# Patient Record
Sex: FEMALE | Race: WHITE | NOT HISPANIC OR LATINO | Employment: OTHER | URBAN - METROPOLITAN AREA
[De-identification: names, ages, dates, MRNs, and addresses within clinical notes are randomized per-mention and may not be internally consistent; named-entity substitution may affect disease eponyms.]

---

## 2017-04-11 ENCOUNTER — OFFICE VISIT (OUTPATIENT)
Dept: URGENT CARE | Facility: CLINIC | Age: 72
End: 2017-04-11
Payer: MEDICARE

## 2017-04-11 PROCEDURE — G0463 HOSPITAL OUTPT CLINIC VISIT: HCPCS

## 2017-04-11 PROCEDURE — 99213 OFFICE O/P EST LOW 20 MIN: CPT

## 2017-06-07 ENCOUNTER — ALLSCRIPTS OFFICE VISIT (OUTPATIENT)
Dept: OTHER | Facility: OTHER | Age: 72
End: 2017-06-07

## 2017-06-07 DIAGNOSIS — Z12.31 ENCOUNTER FOR SCREENING MAMMOGRAM FOR MALIGNANT NEOPLASM OF BREAST: ICD-10-CM

## 2017-06-07 DIAGNOSIS — E78.5 HYPERLIPIDEMIA: ICD-10-CM

## 2017-06-07 DIAGNOSIS — N63.0 BREAST LUMP: ICD-10-CM

## 2017-06-07 DIAGNOSIS — I10 ESSENTIAL (PRIMARY) HYPERTENSION: ICD-10-CM

## 2017-06-07 DIAGNOSIS — F32.9 MAJOR DEPRESSIVE DISORDER, SINGLE EPISODE: ICD-10-CM

## 2017-06-07 DIAGNOSIS — R73.09 OTHER ABNORMAL GLUCOSE: ICD-10-CM

## 2017-06-07 DIAGNOSIS — R92.8 OTHER ABNORMAL AND INCONCLUSIVE FINDINGS ON DIAGNOSTIC IMAGING OF BREAST: ICD-10-CM

## 2017-06-12 ENCOUNTER — APPOINTMENT (OUTPATIENT)
Dept: LAB | Facility: CLINIC | Age: 72
End: 2017-06-12
Payer: MEDICARE

## 2017-06-12 DIAGNOSIS — F32.9 MAJOR DEPRESSIVE DISORDER, SINGLE EPISODE: ICD-10-CM

## 2017-06-12 DIAGNOSIS — I10 ESSENTIAL (PRIMARY) HYPERTENSION: ICD-10-CM

## 2017-06-12 DIAGNOSIS — R73.09 OTHER ABNORMAL GLUCOSE: ICD-10-CM

## 2017-06-12 DIAGNOSIS — E78.5 HYPERLIPIDEMIA: ICD-10-CM

## 2017-06-12 LAB
ALBUMIN SERPL BCP-MCNC: 3.5 G/DL (ref 3.5–5)
ALP SERPL-CCNC: 108 U/L (ref 46–116)
ALT SERPL W P-5'-P-CCNC: 15 U/L (ref 12–78)
ANION GAP SERPL CALCULATED.3IONS-SCNC: 8 MMOL/L (ref 4–13)
AST SERPL W P-5'-P-CCNC: 14 U/L (ref 5–45)
BASOPHILS # BLD AUTO: 0.06 THOUSANDS/ΜL (ref 0–0.1)
BASOPHILS NFR BLD AUTO: 1 % (ref 0–1)
BILIRUB SERPL-MCNC: 0.43 MG/DL (ref 0.2–1)
BILIRUB UR QL STRIP: NEGATIVE
BUN SERPL-MCNC: 20 MG/DL (ref 5–25)
CALCIUM SERPL-MCNC: 9.1 MG/DL (ref 8.3–10.1)
CHLORIDE SERPL-SCNC: 105 MMOL/L (ref 100–108)
CHOLEST SERPL-MCNC: 174 MG/DL (ref 50–200)
CLARITY UR: NORMAL
CO2 SERPL-SCNC: 27 MMOL/L (ref 21–32)
COLOR UR: YELLOW
CREAT SERPL-MCNC: 1.25 MG/DL (ref 0.6–1.3)
EOSINOPHIL # BLD AUTO: 0.21 THOUSAND/ΜL (ref 0–0.61)
EOSINOPHIL NFR BLD AUTO: 2 % (ref 0–6)
ERYTHROCYTE [DISTWIDTH] IN BLOOD BY AUTOMATED COUNT: 14.3 % (ref 11.6–15.1)
EST. AVERAGE GLUCOSE BLD GHB EST-MCNC: 128 MG/DL
GFR SERPL CREATININE-BSD FRML MDRD: 42.4 ML/MIN/1.73SQ M
GLUCOSE P FAST SERPL-MCNC: 90 MG/DL (ref 65–99)
GLUCOSE UR STRIP-MCNC: NEGATIVE MG/DL
HBA1C MFR BLD: 6.1 % (ref 4.2–6.3)
HCT VFR BLD AUTO: 42.5 % (ref 34.8–46.1)
HDLC SERPL-MCNC: 38 MG/DL (ref 40–60)
HGB BLD-MCNC: 13.7 G/DL (ref 11.5–15.4)
HGB UR QL STRIP.AUTO: NEGATIVE
KETONES UR STRIP-MCNC: NEGATIVE MG/DL
LDLC SERPL CALC-MCNC: 110 MG/DL (ref 0–100)
LEUKOCYTE ESTERASE UR QL STRIP: NEGATIVE
LYMPHOCYTES # BLD AUTO: 2.57 THOUSANDS/ΜL (ref 0.6–4.47)
LYMPHOCYTES NFR BLD AUTO: 26 % (ref 14–44)
MCH RBC QN AUTO: 30.6 PG (ref 26.8–34.3)
MCHC RBC AUTO-ENTMCNC: 32.2 G/DL (ref 31.4–37.4)
MCV RBC AUTO: 95 FL (ref 82–98)
MONOCYTES # BLD AUTO: 1.3 THOUSAND/ΜL (ref 0.17–1.22)
MONOCYTES NFR BLD AUTO: 13 % (ref 4–12)
NEUTROPHILS # BLD AUTO: 5.69 THOUSANDS/ΜL (ref 1.85–7.62)
NEUTS SEG NFR BLD AUTO: 58 % (ref 43–75)
NITRITE UR QL STRIP: NEGATIVE
NRBC BLD AUTO-RTO: 0 /100 WBCS
PH UR STRIP.AUTO: 5.5 [PH] (ref 4.5–8)
PLATELET # BLD AUTO: 257 THOUSANDS/UL (ref 149–390)
PMV BLD AUTO: 11.8 FL (ref 8.9–12.7)
POTASSIUM SERPL-SCNC: 4.4 MMOL/L (ref 3.5–5.3)
PROT SERPL-MCNC: 7 G/DL (ref 6.4–8.2)
PROT UR STRIP-MCNC: NEGATIVE MG/DL
RBC # BLD AUTO: 4.48 MILLION/UL (ref 3.81–5.12)
SODIUM SERPL-SCNC: 140 MMOL/L (ref 136–145)
SP GR UR STRIP.AUTO: 1.01 (ref 1–1.03)
T4 FREE SERPL-MCNC: 0.99 NG/DL (ref 0.76–1.46)
TRIGL SERPL-MCNC: 130 MG/DL
TSH SERPL DL<=0.05 MIU/L-ACNC: 8.3 UIU/ML (ref 0.36–3.74)
UROBILINOGEN UR QL STRIP.AUTO: 0.2 E.U./DL
WBC # BLD AUTO: 9.95 THOUSAND/UL (ref 4.31–10.16)

## 2017-06-12 PROCEDURE — 84443 ASSAY THYROID STIM HORMONE: CPT

## 2017-06-12 PROCEDURE — 81003 URINALYSIS AUTO W/O SCOPE: CPT

## 2017-06-12 PROCEDURE — 83036 HEMOGLOBIN GLYCOSYLATED A1C: CPT

## 2017-06-12 PROCEDURE — 80053 COMPREHEN METABOLIC PANEL: CPT

## 2017-06-12 PROCEDURE — 84439 ASSAY OF FREE THYROXINE: CPT

## 2017-06-12 PROCEDURE — 85025 COMPLETE CBC W/AUTO DIFF WBC: CPT

## 2017-06-12 PROCEDURE — 80061 LIPID PANEL: CPT

## 2017-06-12 PROCEDURE — 36415 COLL VENOUS BLD VENIPUNCTURE: CPT

## 2017-06-23 ENCOUNTER — HOSPITAL ENCOUNTER (OUTPATIENT)
Dept: MAMMOGRAPHY | Facility: CLINIC | Age: 72
Discharge: HOME | End: 2017-06-23
Payer: MEDICARE

## 2017-06-23 DIAGNOSIS — Z12.31 ENCOUNTER FOR SCREENING MAMMOGRAM FOR MALIGNANT NEOPLASM OF BREAST: ICD-10-CM

## 2017-06-23 PROCEDURE — G0202 SCR MAMMO BI INCL CAD: HCPCS

## 2017-06-28 ENCOUNTER — GENERIC CONVERSION - ENCOUNTER (OUTPATIENT)
Dept: OTHER | Facility: OTHER | Age: 72
End: 2017-06-28

## 2017-06-28 ENCOUNTER — HOSPITAL ENCOUNTER (OUTPATIENT)
Dept: MAMMOGRAPHY | Facility: CLINIC | Age: 72
Discharge: HOME | End: 2017-06-28
Payer: MEDICARE

## 2017-06-28 ENCOUNTER — HOSPITAL ENCOUNTER (OUTPATIENT)
Dept: ULTRASOUND IMAGING | Facility: CLINIC | Age: 72
Discharge: HOME | End: 2017-06-28
Payer: MEDICARE

## 2017-06-28 DIAGNOSIS — N63.0 BREAST LUMP: ICD-10-CM

## 2017-06-28 DIAGNOSIS — R92.8 OTHER ABNORMAL AND INCONCLUSIVE FINDINGS ON DIAGNOSTIC IMAGING OF BREAST: ICD-10-CM

## 2017-06-28 DIAGNOSIS — R92.8 ABNORMAL SCREENING MAMMOGRAM: ICD-10-CM

## 2017-06-28 PROCEDURE — 76642 ULTRASOUND BREAST LIMITED: CPT

## 2017-06-28 PROCEDURE — G0279 TOMOSYNTHESIS, MAMMO: HCPCS

## 2017-06-28 PROCEDURE — G0206 DX MAMMO INCL CAD UNI: HCPCS

## 2017-07-25 ENCOUNTER — ALLSCRIPTS OFFICE VISIT (OUTPATIENT)
Dept: OTHER | Facility: OTHER | Age: 72
End: 2017-07-25

## 2017-08-04 ENCOUNTER — ALLSCRIPTS OFFICE VISIT (OUTPATIENT)
Dept: OTHER | Facility: OTHER | Age: 72
End: 2017-08-04

## 2017-08-29 ENCOUNTER — GENERIC CONVERSION - ENCOUNTER (OUTPATIENT)
Dept: OTHER | Facility: OTHER | Age: 72
End: 2017-08-29

## 2017-09-15 DIAGNOSIS — I70.211 ATHEROSCLEROSIS OF NATIVE ARTERY OF RIGHT LOWER EXTREMITY WITH INTERMITTENT CLAUDICATION (HCC): ICD-10-CM

## 2017-09-15 DIAGNOSIS — I71.6 THORACOABDOMINAL AORTIC ANEURYSM, WITHOUT RUPTURE (HCC): ICD-10-CM

## 2017-09-15 DIAGNOSIS — I71.4 ABDOMINAL AORTIC ANEURYSM WITHOUT RUPTURE (HCC): ICD-10-CM

## 2017-09-21 RX ORDER — VALSARTAN 160 MG/1
160 TABLET ORAL DAILY
COMMUNITY
End: 2018-03-09 | Stop reason: ALTCHOICE

## 2017-09-21 NOTE — PRE-PROCEDURE INSTRUCTIONS
Pre-Surgery Instructions:   Medication Instructions    aspirin (ECOTRIN) 325 mg EC tablet Patient was instructed by Physician and understands   atorvastatin (LIPITOR) 40 mg tablet Patient was instructed by Physician and understands   citalopram (CeleXA) 20 mg tablet Patient was instructed by Physician and understands   levothyroxine 100 mcg tablet Patient was instructed by Physician and understands   metoprolol succinate (TOPROL-XL) 50 mg 24 hr tablet Instructed patient per Anesthesia Guidelines   valsartan (DIOVAN) 160 mg tablet Patient was instructed by Physician and understands  Follow prep as per physician  You will receive a call with your time to arrive at the hospital   Secure transportation, you will be having anesthesia

## 2017-09-27 ENCOUNTER — HOSPITAL ENCOUNTER (OUTPATIENT)
Facility: HOSPITAL | Age: 72
Setting detail: OUTPATIENT SURGERY
Discharge: HOME | End: 2017-09-27
Attending: INTERNAL MEDICINE | Admitting: INTERNAL MEDICINE
Payer: MEDICARE

## 2017-09-27 ENCOUNTER — ANESTHESIA (OUTPATIENT)
Dept: PERIOP | Facility: HOSPITAL | Age: 72
End: 2017-09-27
Payer: MEDICARE

## 2017-09-27 ENCOUNTER — GENERIC CONVERSION - ENCOUNTER (OUTPATIENT)
Dept: FAMILY MEDICINE CLINIC | Facility: HOSPITAL | Age: 72
End: 2017-09-27

## 2017-09-27 ENCOUNTER — ANESTHESIA EVENT (OUTPATIENT)
Dept: PERIOP | Facility: HOSPITAL | Age: 72
End: 2017-09-27
Payer: MEDICARE

## 2017-09-27 VITALS
BODY MASS INDEX: 22.08 KG/M2 | OXYGEN SATURATION: 98 % | HEIGHT: 62 IN | WEIGHT: 120 LBS | TEMPERATURE: 97.9 F | SYSTOLIC BLOOD PRESSURE: 144 MMHG | RESPIRATION RATE: 18 BRPM | DIASTOLIC BLOOD PRESSURE: 82 MMHG | HEART RATE: 72 BPM

## 2017-09-27 DIAGNOSIS — R63.4 ABNORMAL WEIGHT LOSS: ICD-10-CM

## 2017-09-27 PROCEDURE — 88313 SPECIAL STAINS GROUP 2: CPT | Performed by: INTERNAL MEDICINE

## 2017-09-27 PROCEDURE — 87077 CULTURE AEROBIC IDENTIFY: CPT | Performed by: INTERNAL MEDICINE

## 2017-09-27 PROCEDURE — 88305 TISSUE EXAM BY PATHOLOGIST: CPT | Performed by: INTERNAL MEDICINE

## 2017-09-27 RX ORDER — PROPOFOL 10 MG/ML
INJECTION, EMULSION INTRAVENOUS AS NEEDED
Status: DISCONTINUED | OUTPATIENT
Start: 2017-09-27 | End: 2017-09-27 | Stop reason: SURG

## 2017-09-27 RX ORDER — SODIUM CHLORIDE 9 MG/ML
20 INJECTION, SOLUTION INTRAVENOUS CONTINUOUS
Status: DISCONTINUED | OUTPATIENT
Start: 2017-09-27 | End: 2017-09-27 | Stop reason: HOSPADM

## 2017-09-27 RX ADMIN — PROPOFOL 50 MG: 10 INJECTION, EMULSION INTRAVENOUS at 09:30

## 2017-09-27 RX ADMIN — PROPOFOL 20 MG: 10 INJECTION, EMULSION INTRAVENOUS at 10:00

## 2017-09-27 RX ADMIN — PROPOFOL 30 MG: 10 INJECTION, EMULSION INTRAVENOUS at 09:35

## 2017-09-27 RX ADMIN — PROPOFOL 30 MG: 10 INJECTION, EMULSION INTRAVENOUS at 09:40

## 2017-09-27 RX ADMIN — PROPOFOL 30 MG: 10 INJECTION, EMULSION INTRAVENOUS at 09:55

## 2017-09-27 RX ADMIN — PROPOFOL 30 MG: 10 INJECTION, EMULSION INTRAVENOUS at 09:50

## 2017-09-27 RX ADMIN — PROPOFOL 30 MG: 10 INJECTION, EMULSION INTRAVENOUS at 09:45

## 2017-09-27 RX ADMIN — SODIUM CHLORIDE 20 ML/HR: 0.9 INJECTION, SOLUTION INTRAVENOUS at 08:46

## 2017-09-27 NOTE — OP NOTE
**** GI/ENDOSCOPY REPORT ****     PATIENT NAME: Bishop Gunter - VISIT ID:  Patient ID: [id number]   YOB: 1945     INTRODUCTION: Esophagogastroduodenoscopy - A 81 female patient presents   for an outpatient Esophagogastroduodenoscopy at [facility]  INDICATIONS: Loss of weight  Nausea  Diarrhea  CONSENT: The benefits, risks, and alternatives to the procedure were   discussed and informed consent was obtained from the patient  PREPARATION:  EKG, pulse, pulse oximetry and blood pressure were monitored   throughout the procedure  MEDICATIONS:     PROCEDURE:  The endoscope was passed without difficulty through the mouth   under direct visualization and advanced to the 2nd portion of the   duodenum  The scope was withdrawn and the mucosa was carefully examined  FINDINGS:   Duodenum: The duodenum appeared to be normal  Two random   biopsies was taken  Stomach: Mild non-erosive gastritis was found in the   antrum  Two biopsies was taken  The specimens were collected for spencer   test    Esophagus: The esophagus appeared to be normal      COMPLICATIONS: There were no complications  IMPRESSIONS: Normal duodenum  Two biopsies taken  Mild non-erosive   gastritis found in the antrum  Two biopsies taken  Normal esophagus  RECOMMENDATIONS: Resume regular diet as tolerated  Continue current   medications  Follow-up on the results of the biopsy specimens in 2 weeks  Follow-up appointment in attending physician's office in 2 months  ESTIMATED BLOOD LOSS:     PATHOLOGY SPECIMENS: Two random biopsies taken  Two biopsies taken for spencer   test  Associated finding: Gastritis       PROCEDURE CODES: 10913 - EGD flexible; with biopsy     ICD-9 Codes: 783 21 Loss of weight 787 02 Nausea alone 787 91 Diarrhea     ICD-10 Codes: R63 4 Abnormal weight loss R11 0 Nausea R19 7 Diarrhea,   unspecified K29 Gastritis and duodenitis     PERFORMED BY: Dr Kya Helm, M D  on 09/27/2017  Version 1, electronically signed by JASVIR Shafer  on   09/27/2017 at 10:10

## 2017-09-27 NOTE — OP NOTE
**** GI/ENDOSCOPY REPORT ****     PATIENT NAME: Tom Madden ------ VISIT ID:  Patient ID:   [id number] YOB: 1945     INTRODUCTION: Colonoscopy - A 81 female patient presents for an outpatient   Colonoscopy at [facility]  PREVIOUS COLONOSCOPY:     INDICATIONS: Diarrhea  Loss of weight  CONSENT:  The benefits, risks, and alternatives to the procedure were   discussed and informed consent was obtained from the patient  PREPARATION: EKG, pulse, pulse oximetry and blood pressure were monitored   throughout the procedure  The patient was identified by myself both   verbally and by visual inspection of ID band  Airway Assessment   Classification: Airway class 2 - Visualization of the soft palate, fauces   and uvula  ASA Classification: See anesthesia record  MEDICATIONS: Anesthesia-check records     PROCEDURE:  The endoscope was passed without difficulty through the anus   under direct visualization and advanced to the cecum, confirmed by   appendiceal orifice and ileocecal valve  The scope was withdrawn and the   mucosa was carefully examined  The quality of the preparation was  Cecal   Intubation Time: 13 minutes(s) Scope Withdrawal Time: 6 minutes(s)     RECTAL EXAM: Normal rectal exam      FINDINGS:  Small internal hemorrhoids were found  Otherwise, the colon   appeared to be normal  Multiple random biopsies was taken from the   ascending colon and sigmoid colon  COMPLICATIONS: There were no complications  IMPRESSIONS: Internal hemorrhoids  RECOMMENDATIONS: Resume regular diet as tolerated  Continue current   medications  Follow-up on the results of the biopsy specimens  Follow-up   appointment in attending physician's office in 2 months  Colonoscopy   recommended in 10 years  ESTIMATED BLOOD LOSS:     PATHOLOGY SPECIMENS: Multiple random biopsies taken from the ascending   colon and sigmoid colon       PROCEDURE CODES: Colonoscopy with biopsy     ICD-9 Codes: 787 91 Diarrhea 783 21 Loss of weight     ICD-10 Codes: R19 7 Diarrhea, unspecified R63 4 Abnormal weight loss K64 9   Unspecified hemorrhoids     PERFORMED BY: JASVIR Mcgrath  on 09/27/2017  Version 1, electronically signed by JASVIR Masters  on   09/27/2017 at 10:13

## 2017-09-28 LAB — UREASE TISS QL: NEGATIVE

## 2017-10-10 ENCOUNTER — HOSPITAL ENCOUNTER (OUTPATIENT)
Dept: CT IMAGING | Facility: HOSPITAL | Age: 72
Discharge: HOME | End: 2017-10-10
Attending: SURGERY
Payer: MEDICARE

## 2017-10-10 DIAGNOSIS — I71.6 THORACOABDOMINAL AORTIC ANEURYSM, WITHOUT RUPTURE (HCC): ICD-10-CM

## 2017-10-10 DIAGNOSIS — I71.4 ABDOMINAL AORTIC ANEURYSM WITHOUT RUPTURE (HCC): ICD-10-CM

## 2017-10-10 PROCEDURE — 74174 CTA ABD&PLVS W/CONTRAST: CPT

## 2017-10-10 PROCEDURE — 71275 CT ANGIOGRAPHY CHEST: CPT

## 2017-10-10 RX ADMIN — IODIXANOL 100 ML: 320 INJECTION, SOLUTION INTRAVASCULAR at 09:41

## 2017-10-19 ENCOUNTER — ALLSCRIPTS OFFICE VISIT (OUTPATIENT)
Dept: OTHER | Facility: OTHER | Age: 72
End: 2017-10-19

## 2017-10-20 NOTE — PROGRESS NOTES
Assessment  1  Acute URI (465 9) (J06 9)    Plan  Acute URI    · Azithromycin 250 MG Oral Tablet; TAKE 2 TABLETS ON DAY 1 THEN TAKE 1  TABLET A DAY FOR 4 DAYS   · Fluticasone Propionate 50 MCG/ACT Nasal Suspension (Flonase Allergy Relief); 1  spray each nostril bid x 7 days    Discussion/Summary    Sinusitis, URI, Rx sentto stop smoking  Chief Complaint  Pt is here today c/o coughing , fever, chest congestion, and dry throat onset since yesterday Pt took some Delsym cough syrup today  Medications are current  Pt declined pneumonia immunization   DD      History of Present Illness  HPI: acute, fever, cough      Review of Systems    Constitutional: feeling poorly-- and-- feeling tired  ENT: nasal discharge  Cardiovascular: no complaints of slow or fast heart rate, no chest pain, no palpitations, no leg claudication or lower extremity edema  Respiratory: shortness of breath-- and-- cough  Gastrointestinal: no complaints of abdominal pain, no constipation, no nausea or diarrhea, no vomiting, no bloody stools  Active Problems  1  Aneurysm of infrarenal abdominal aorta (441 4) (I71 4)   2  Aneurysm, 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)   3  ASCVD (arteriosclerotic cardiovascular disease) (429 2,440 9) (I25 10)   4  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 (440 21) (I70 211)   5  Benign hypertension (401 1) (I10)   6  Chronic obstructive pulmonary disease (496) (J44 9)   7  Colon cancer screening (V76 51) (Z12 11)   8  Depression (311) (F32 9)   9  Diarrhea, unspecified type (787 91) (R19 7)   10  Exercise counseling (V65 41) (Z71 82)   11  History of CVA (cerebrovascular accident) (V12 54) (Z86 73)   12  Hyperlipidemia (272 4) (E78 5)   13  Left breast lump (611 72) (N63 20)   14  Denied: History of Mental health problem   15  History of No advance directives (V49 89) (Z78 9)   16  Prediabetes (790 29) (R73 03)   17   Denied: History of Substance abuse    Social History   · Cultural background   · Dental care, occasionally   · Does not exercise (V69 0) (Z72 3)   · Former smoker (V15 82) (Y32 519)   · Living alone (V60 3) (Z60 2)   · Living Situation: Supportive and safe   · No advance directives (V49 89) (Z78 9)   · History of No advance directives (V49 89) (Z78 9)   · No alcohol use   · No caffeine use   · No drug use   · Primary language is English   · Racial background   ·  (V61 07) (Z63 4)    Current Meds   1  Atorvastatin Calcium 10 MG Oral Tablet; TAKE 1 TABLET DAILY AS DIRECTED; Therapy: 68Hxh8360 to (Evaluate:09Jun2018)  Requested for: 77SAN5173; Last   Rx:14Jun2017 Ordered   2  Phoenix Aspirin 325 MG Oral Tablet; TAKE 1 TABLET DAILY; Last Rx:77Mxn5571 Ordered   3  Calcium 500 TABS; Therapy: (Recorded:39Neb1945) to Recorded   4  Dicyclomine HCl - 20 MG Oral Tablet; TAKE 1 TABLET 4 TIMES DAILY; Therapy: 75JGX6107 to (Evaluate:28Auu7149)  Requested for: 29Nxl5237; Last   Rx:17Dcu6699 Ordered   5  Levothyroxine Sodium 125 MCG Oral Tablet; TAKE 1 TABLET DAILY AS DIRECTED; Therapy: 99Quu2909 to (Evaluate:22Nov2017)  Requested for: 20KDH4170; Last   Rx:55Jmr6643 Ordered   6  Metoprolol Succinate ER 50 MG Oral Tablet Extended Release 24 Hour; Take 1 tablet   daily; Therapy: 68NEU1488 to (Evaluate:67Mrk7666); Last Rx:83Gwo5033 Ordered   7  Spiriva Respimat 2 5 MCG/ACT Inhalation Aerosol Solution; INHALE 2 PUFFS ONCE   DAILY; Therapy: 51AWM8855 to (Last Rx:03Mar2016)  Requested for: 65KZL1117 Ordered   8  Valsartan 160 MG Oral Tablet; TAKE ONE TABLET BY MOUTH ONCE DAILY; Therapy: 53AQE7772 to (Evaluate:21Jan2018)  Requested for: 14VTR5156; Last   Rx:20Tfe9028 Ordered    The medication list was reviewed and updated today  Allergies  1  Penicillins  2  No Known Environmental Allergies   3   No Known Food Allergies    Vitals   Recorded: 34EUS8422 11:34AM   Temperature 99 5 F, Tympanic   Heart Rate 80, L Radial   Pulse Quality Normal, L Radial   Systolic 754, LUE, Sitting   Diastolic 80, LUE, Sitting   Height 5 ft 2 in   Weight 118 lb    BMI Calculated 21 58   BSA Calculated 1 53     Physical Exam    Constitutional   General appearance: No acute distress, well appearing and well nourished  Eyes   Conjunctiva and lids: No swelling, erythema or discharge  Pupils and irises: Equal, round and reactive to light  Ears, Nose, Mouth, and Throat   External inspection of ears and nose: Normal     Otoscopic examination: Tympanic membranes translucent with normal light reflex  Canals patent without erythema  Nasal mucosa, septum, and turbinates: Normal without edema or erythema  Oropharynx: Normal with no erythema, edema, exudate or lesions  Pulmonary   Respiratory effort: No increased work of breathing or signs of respiratory distress  Auscultation of lungs: Clear to auscultation  Cardiovascular   Palpation of heart: Normal PMI, no thrills  Auscultation of heart: Normal rate and rhythm, normal S1 and S2, without murmurs  Examination of extremities for edema and/or varicosities: Normal     Carotid pulses: Normal     Abdomen   Abdomen: Non-tender, no masses  Liver and spleen: No hepatomegaly or splenomegaly  Lymphatic   Palpation of lymph nodes in neck: No lymphadenopathy  Musculoskeletal   Gait and station: Normal     Digits and nails: Normal without clubbing or cyanosis  Inspection/palpation of joints, bones, and muscles: Normal     Skin   Skin and subcutaneous tissue: Normal without rashes or lesions  Neurologic   Cranial nerves: Cranial nerves 2-12 intact  Reflexes: 2+ and symmetric  Sensation: No sensory loss      Psychiatric   Orientation to person, place, and time: Normal     Mood and affect: Normal          Future Appointments    Date/Time Provider Specialty Site   10/25/2017 01:00 PM Fadi Be [facility] [address]     Signatures   Electronically signed by : JASVIR Dooley ; Oct 19 2017 11:52AM EST                       (Author)

## 2017-11-06 ENCOUNTER — GENERIC CONVERSION - ENCOUNTER (OUTPATIENT)
Dept: OTHER | Facility: OTHER | Age: 72
End: 2017-11-06

## 2018-01-01 ENCOUNTER — HOSPITAL ENCOUNTER (INPATIENT)
Facility: HOSPITAL | Age: 73
LOS: 3 days | Discharge: HOME | DRG: 872 | End: 2018-09-24
Attending: EMERGENCY MEDICINE | Admitting: INTERNAL MEDICINE
Payer: COMMERCIAL

## 2018-01-01 ENCOUNTER — OFFICE VISIT (OUTPATIENT)
Dept: CARDIOLOGY CLINIC | Facility: CLINIC | Age: 73
End: 2018-01-01
Payer: COMMERCIAL

## 2018-01-01 ENCOUNTER — TELEPHONE (OUTPATIENT)
Dept: FAMILY MEDICINE CLINIC | Facility: HOSPITAL | Age: 73
End: 2018-01-01

## 2018-01-01 ENCOUNTER — HOSPITAL ENCOUNTER (OUTPATIENT)
Dept: INFUSION CENTER | Facility: HOSPITAL | Age: 73
Discharge: HOME | End: 2018-11-08
Payer: COMMERCIAL

## 2018-01-01 ENCOUNTER — HOSPITAL ENCOUNTER (OUTPATIENT)
Dept: NON INVASIVE DIAGNOSTICS | Facility: CLINIC | Age: 73
Discharge: HOME | End: 2018-07-16
Payer: COMMERCIAL

## 2018-01-01 ENCOUNTER — HOSPITAL ENCOUNTER (OUTPATIENT)
Facility: HOSPITAL | Age: 73
Setting detail: OUTPATIENT SURGERY
Discharge: HOME | End: 2018-08-01
Attending: INTERNAL MEDICINE | Admitting: INTERNAL MEDICINE
Payer: COMMERCIAL

## 2018-01-01 ENCOUNTER — TELEPHONE (OUTPATIENT)
Dept: HEMATOLOGY ONCOLOGY | Facility: CLINIC | Age: 73
End: 2018-01-01

## 2018-01-01 ENCOUNTER — HOSPITAL ENCOUNTER (OUTPATIENT)
Dept: NON INVASIVE DIAGNOSTICS | Facility: CLINIC | Age: 73
Discharge: HOME | End: 2018-07-09

## 2018-01-01 ENCOUNTER — DOCUMENTATION (OUTPATIENT)
Dept: INFUSION CENTER | Facility: HOSPITAL | Age: 73
End: 2018-01-01

## 2018-01-01 ENCOUNTER — OFFICE VISIT (OUTPATIENT)
Dept: CARDIAC SURGERY | Facility: CLINIC | Age: 73
End: 2018-01-01
Payer: COMMERCIAL

## 2018-01-01 ENCOUNTER — OFFICE VISIT (OUTPATIENT)
Dept: FAMILY MEDICINE CLINIC | Facility: HOSPITAL | Age: 73
End: 2018-01-01
Payer: COMMERCIAL

## 2018-01-01 ENCOUNTER — APPOINTMENT (EMERGENCY)
Dept: RADIOLOGY | Facility: HOSPITAL | Age: 73
DRG: 872 | End: 2018-01-01
Payer: COMMERCIAL

## 2018-01-01 ENCOUNTER — HOSPITAL ENCOUNTER (INPATIENT)
Facility: HOSPITAL | Age: 73
LOS: 6 days | Discharge: HOME | DRG: 683 | End: 2019-01-02
Attending: INTERNAL MEDICINE | Admitting: INTERNAL MEDICINE
Payer: COMMERCIAL

## 2018-01-01 ENCOUNTER — HOSPITAL ENCOUNTER (OUTPATIENT)
Dept: INFUSION CENTER | Facility: HOSPITAL | Age: 73
Discharge: HOME | End: 2018-10-04
Payer: COMMERCIAL

## 2018-01-01 ENCOUNTER — TELEPHONE (OUTPATIENT)
Dept: OTHER | Facility: OTHER | Age: 73
End: 2018-01-01

## 2018-01-01 ENCOUNTER — HOSPITAL ENCOUNTER (OUTPATIENT)
Dept: INFUSION CENTER | Facility: HOSPITAL | Age: 73
Discharge: HOME | End: 2018-12-13
Payer: COMMERCIAL

## 2018-01-01 ENCOUNTER — TELEPHONE (OUTPATIENT)
Dept: NUTRITION | Facility: CLINIC | Age: 73
End: 2018-01-01

## 2018-01-01 ENCOUNTER — APPOINTMENT (EMERGENCY)
Dept: RADIOLOGY | Facility: HOSPITAL | Age: 73
DRG: 683 | End: 2018-01-01
Payer: COMMERCIAL

## 2018-01-01 ENCOUNTER — HOSPITAL ENCOUNTER (OUTPATIENT)
Dept: INFUSION CENTER | Facility: CLINIC | Age: 73
Discharge: HOME | End: 2018-11-29
Payer: COMMERCIAL

## 2018-01-01 ENCOUNTER — APPOINTMENT (OUTPATIENT)
Dept: RADIATION ONCOLOGY | Facility: HOSPITAL | Age: 73
End: 2018-01-01
Attending: RADIOLOGY
Payer: COMMERCIAL

## 2018-01-01 ENCOUNTER — HOSPITAL ENCOUNTER (OUTPATIENT)
Dept: INFUSION CENTER | Facility: HOSPITAL | Age: 73
Discharge: HOME | End: 2018-09-27
Payer: COMMERCIAL

## 2018-01-01 ENCOUNTER — DOCUMENTATION (OUTPATIENT)
Dept: NUTRITION | Facility: HOSPITAL | Age: 73
End: 2018-01-01

## 2018-01-01 ENCOUNTER — OFFICE VISIT (OUTPATIENT)
Dept: HEMATOLOGY ONCOLOGY | Facility: CLINIC | Age: 73
End: 2018-01-01
Payer: COMMERCIAL

## 2018-01-01 ENCOUNTER — HOSPITAL ENCOUNTER (OUTPATIENT)
Dept: INFUSION CENTER | Facility: HOSPITAL | Age: 73
Discharge: HOME | End: 2018-12-27
Payer: COMMERCIAL

## 2018-01-01 ENCOUNTER — HOSPITAL ENCOUNTER (EMERGENCY)
Facility: HOSPITAL | Age: 73
Discharge: HOME | End: 2018-11-23
Attending: EMERGENCY MEDICINE | Admitting: EMERGENCY MEDICINE
Payer: COMMERCIAL

## 2018-01-01 ENCOUNTER — ANESTHESIA EVENT (OUTPATIENT)
Dept: GASTROENTEROLOGY | Facility: HOSPITAL | Age: 73
End: 2018-01-01
Payer: COMMERCIAL

## 2018-01-01 ENCOUNTER — APPOINTMENT (OUTPATIENT)
Dept: RADIATION ONCOLOGY | Facility: HOSPITAL | Age: 73
End: 2018-01-01
Payer: COMMERCIAL

## 2018-01-01 ENCOUNTER — HOSPITAL ENCOUNTER (OUTPATIENT)
Dept: INFUSION CENTER | Facility: HOSPITAL | Age: 73
Discharge: HOME | End: 2018-09-13
Payer: COMMERCIAL

## 2018-01-01 ENCOUNTER — HOSPITAL ENCOUNTER (OUTPATIENT)
Dept: INFUSION CENTER | Facility: HOSPITAL | Age: 73
Discharge: HOME | End: 2018-09-06
Payer: COMMERCIAL

## 2018-01-01 ENCOUNTER — APPOINTMENT (INPATIENT)
Dept: ULTRASOUND IMAGING | Facility: HOSPITAL | Age: 73
DRG: 683 | End: 2018-01-01
Payer: COMMERCIAL

## 2018-01-01 ENCOUNTER — CLINICAL SUPPORT (OUTPATIENT)
Dept: RADIATION ONCOLOGY | Facility: HOSPITAL | Age: 73
End: 2018-01-01
Attending: THORACIC SURGERY (CARDIOTHORACIC VASCULAR SURGERY)
Payer: COMMERCIAL

## 2018-01-01 ENCOUNTER — HOSPITAL ENCOUNTER (OUTPATIENT)
Dept: INFUSION CENTER | Facility: HOSPITAL | Age: 73
Discharge: HOME | End: 2018-08-29
Payer: COMMERCIAL

## 2018-01-01 ENCOUNTER — HOSPITAL ENCOUNTER (OUTPATIENT)
Dept: INFUSION CENTER | Facility: HOSPITAL | Age: 73
Discharge: HOME | End: 2018-09-20
Payer: COMMERCIAL

## 2018-01-01 ENCOUNTER — APPOINTMENT (EMERGENCY)
Dept: CT IMAGING | Facility: HOSPITAL | Age: 73
DRG: 372 | End: 2018-01-01
Payer: COMMERCIAL

## 2018-01-01 ENCOUNTER — TRANSITIONAL CARE MANAGEMENT (OUTPATIENT)
Dept: FAMILY MEDICINE CLINIC | Facility: HOSPITAL | Age: 73
End: 2018-01-01

## 2018-01-01 ENCOUNTER — TELEPHONE (OUTPATIENT)
Dept: CARDIAC SURGERY | Facility: CLINIC | Age: 73
End: 2018-01-01

## 2018-01-01 ENCOUNTER — HOSPITAL ENCOUNTER (OUTPATIENT)
Dept: RADIOLOGY | Age: 73
Discharge: HOME | End: 2018-08-17
Payer: COMMERCIAL

## 2018-01-01 ENCOUNTER — OFFICE VISIT (OUTPATIENT)
Dept: GASTROENTEROLOGY | Facility: MEDICAL CENTER | Age: 73
End: 2018-01-01
Payer: COMMERCIAL

## 2018-01-01 ENCOUNTER — HOSPITAL ENCOUNTER (INPATIENT)
Facility: HOSPITAL | Age: 73
LOS: 2 days | Discharge: HOME | DRG: 372 | End: 2018-10-10
Attending: EMERGENCY MEDICINE | Admitting: INTERNAL MEDICINE
Payer: COMMERCIAL

## 2018-01-01 ENCOUNTER — OFFICE VISIT (OUTPATIENT)
Dept: HEMATOLOGY ONCOLOGY | Facility: HOSPITAL | Age: 73
End: 2018-01-01
Payer: COMMERCIAL

## 2018-01-01 ENCOUNTER — TELEPHONE (OUTPATIENT)
Dept: GASTROENTEROLOGY | Facility: MEDICAL CENTER | Age: 73
End: 2018-01-01

## 2018-01-01 ENCOUNTER — VBI (OUTPATIENT)
Dept: ADMINISTRATIVE | Facility: OTHER | Age: 73
End: 2018-01-01

## 2018-01-01 ENCOUNTER — APPOINTMENT (OUTPATIENT)
Dept: RADIATION ONCOLOGY | Facility: CLINIC | Age: 73
End: 2018-01-01
Attending: RADIOLOGY
Payer: COMMERCIAL

## 2018-01-01 ENCOUNTER — ANESTHESIA (OUTPATIENT)
Dept: GASTROENTEROLOGY | Facility: HOSPITAL | Age: 73
End: 2018-01-01
Payer: COMMERCIAL

## 2018-01-01 ENCOUNTER — TELEPHONE (OUTPATIENT)
Dept: CARDIOLOGY CLINIC | Facility: CLINIC | Age: 73
End: 2018-01-01

## 2018-01-01 ENCOUNTER — DOCUMENTATION (OUTPATIENT)
Dept: FAMILY MEDICINE CLINIC | Facility: HOSPITAL | Age: 73
End: 2018-01-01

## 2018-01-01 ENCOUNTER — HOSPITAL ENCOUNTER (OUTPATIENT)
Dept: INFUSION CENTER | Facility: HOSPITAL | Age: 73
Discharge: HOME | End: 2018-11-23
Payer: COMMERCIAL

## 2018-01-01 ENCOUNTER — CLINICAL SUPPORT (OUTPATIENT)
Dept: RADIATION ONCOLOGY | Facility: HOSPITAL | Age: 73
End: 2018-01-01
Payer: COMMERCIAL

## 2018-01-01 ENCOUNTER — DOCUMENTATION (OUTPATIENT)
Dept: HEMATOLOGY ONCOLOGY | Facility: CLINIC | Age: 73
End: 2018-01-01

## 2018-01-01 ENCOUNTER — APPOINTMENT (INPATIENT)
Dept: RADIOLOGY | Facility: HOSPITAL | Age: 73
DRG: 872 | End: 2018-01-01
Payer: COMMERCIAL

## 2018-01-01 VITALS
DIASTOLIC BLOOD PRESSURE: 60 MMHG | HEIGHT: 61 IN | BODY MASS INDEX: 22.47 KG/M2 | HEART RATE: 105 BPM | WEIGHT: 119 LBS | SYSTOLIC BLOOD PRESSURE: 110 MMHG

## 2018-01-01 VITALS
DIASTOLIC BLOOD PRESSURE: 70 MMHG | SYSTOLIC BLOOD PRESSURE: 100 MMHG | HEIGHT: 61 IN | BODY MASS INDEX: 23.41 KG/M2 | HEART RATE: 84 BPM | WEIGHT: 124 LBS

## 2018-01-01 VITALS
SYSTOLIC BLOOD PRESSURE: 100 MMHG | HEIGHT: 62 IN | WEIGHT: 126.2 LBS | DIASTOLIC BLOOD PRESSURE: 78 MMHG | BODY MASS INDEX: 23.22 KG/M2

## 2018-01-01 VITALS
SYSTOLIC BLOOD PRESSURE: 134 MMHG | RESPIRATION RATE: 18 BRPM | DIASTOLIC BLOOD PRESSURE: 88 MMHG | HEART RATE: 93 BPM | TEMPERATURE: 97.5 F | OXYGEN SATURATION: 93 % | BODY MASS INDEX: 23.81 KG/M2 | WEIGHT: 130.2 LBS

## 2018-01-01 VITALS
OXYGEN SATURATION: 95 % | WEIGHT: 117.95 LBS | SYSTOLIC BLOOD PRESSURE: 120 MMHG | RESPIRATION RATE: 20 BRPM | TEMPERATURE: 97.5 F | BODY MASS INDEX: 22.66 KG/M2 | HEART RATE: 106 BPM | DIASTOLIC BLOOD PRESSURE: 76 MMHG

## 2018-01-01 VITALS
SYSTOLIC BLOOD PRESSURE: 105 MMHG | DIASTOLIC BLOOD PRESSURE: 60 MMHG | HEIGHT: 62 IN | WEIGHT: 128.5 LBS | BODY MASS INDEX: 23.65 KG/M2 | OXYGEN SATURATION: 97 % | HEART RATE: 72 BPM

## 2018-01-01 VITALS
HEIGHT: 61 IN | HEART RATE: 76 BPM | WEIGHT: 126.54 LBS | TEMPERATURE: 97 F | SYSTOLIC BLOOD PRESSURE: 132 MMHG | RESPIRATION RATE: 20 BRPM | DIASTOLIC BLOOD PRESSURE: 74 MMHG | BODY MASS INDEX: 23.89 KG/M2

## 2018-01-01 VITALS
RESPIRATION RATE: 18 BRPM | OXYGEN SATURATION: 96 % | DIASTOLIC BLOOD PRESSURE: 86 MMHG | HEART RATE: 79 BPM | WEIGHT: 125.88 LBS | HEIGHT: 61 IN | TEMPERATURE: 98.1 F | SYSTOLIC BLOOD PRESSURE: 137 MMHG | BODY MASS INDEX: 23.77 KG/M2

## 2018-01-01 VITALS
HEART RATE: 120 BPM | HEIGHT: 61 IN | BODY MASS INDEX: 24.45 KG/M2 | WEIGHT: 129.5 LBS | SYSTOLIC BLOOD PRESSURE: 100 MMHG | DIASTOLIC BLOOD PRESSURE: 60 MMHG

## 2018-01-01 VITALS
HEART RATE: 90 BPM | DIASTOLIC BLOOD PRESSURE: 97 MMHG | DIASTOLIC BLOOD PRESSURE: 86 MMHG | SYSTOLIC BLOOD PRESSURE: 126 MMHG | HEIGHT: 61 IN | TEMPERATURE: 97.8 F | OXYGEN SATURATION: 97 % | TEMPERATURE: 98.1 F | RESPIRATION RATE: 18 BRPM | OXYGEN SATURATION: 99 % | SYSTOLIC BLOOD PRESSURE: 144 MMHG | BODY MASS INDEX: 23.41 KG/M2 | WEIGHT: 124 LBS | WEIGHT: 130.07 LBS | BODY MASS INDEX: 24.56 KG/M2 | RESPIRATION RATE: 18 BRPM | HEART RATE: 122 BPM | HEIGHT: 61 IN

## 2018-01-01 VITALS
WEIGHT: 119.8 LBS | SYSTOLIC BLOOD PRESSURE: 122 MMHG | OXYGEN SATURATION: 95 % | HEART RATE: 94 BPM | BODY MASS INDEX: 23.4 KG/M2 | TEMPERATURE: 98 F | DIASTOLIC BLOOD PRESSURE: 82 MMHG | RESPIRATION RATE: 16 BRPM

## 2018-01-01 VITALS
OXYGEN SATURATION: 99 % | SYSTOLIC BLOOD PRESSURE: 154 MMHG | TEMPERATURE: 96.6 F | BODY MASS INDEX: 51.03 KG/M2 | HEART RATE: 87 BPM | HEIGHT: 60 IN | RESPIRATION RATE: 19 BRPM | WEIGHT: 259.92 LBS | DIASTOLIC BLOOD PRESSURE: 76 MMHG

## 2018-01-01 VITALS
RESPIRATION RATE: 18 BRPM | TEMPERATURE: 99.3 F | WEIGHT: 115.3 LBS | HEART RATE: 96 BPM | OXYGEN SATURATION: 90 % | DIASTOLIC BLOOD PRESSURE: 48 MMHG | SYSTOLIC BLOOD PRESSURE: 72 MMHG | BODY MASS INDEX: 22.52 KG/M2

## 2018-01-01 VITALS
TEMPERATURE: 98.2 F | SYSTOLIC BLOOD PRESSURE: 133 MMHG | HEART RATE: 83 BPM | HEIGHT: 61 IN | WEIGHT: 133.38 LBS | RESPIRATION RATE: 16 BRPM | BODY MASS INDEX: 25.18 KG/M2 | OXYGEN SATURATION: 94 % | DIASTOLIC BLOOD PRESSURE: 66 MMHG

## 2018-01-01 VITALS
TEMPERATURE: 96.8 F | HEART RATE: 85 BPM | DIASTOLIC BLOOD PRESSURE: 90 MMHG | BODY MASS INDEX: 22.39 KG/M2 | WEIGHT: 119 LBS | OXYGEN SATURATION: 99 % | RESPIRATION RATE: 18 BRPM | BODY MASS INDEX: 23.36 KG/M2 | TEMPERATURE: 97 F | SYSTOLIC BLOOD PRESSURE: 126 MMHG | WEIGHT: 114.64 LBS | DIASTOLIC BLOOD PRESSURE: 98 MMHG | OXYGEN SATURATION: 86 % | HEIGHT: 60 IN | SYSTOLIC BLOOD PRESSURE: 131 MMHG | HEART RATE: 89 BPM

## 2018-01-01 VITALS
RESPIRATION RATE: 18 BRPM | SYSTOLIC BLOOD PRESSURE: 178 MMHG | HEART RATE: 89 BPM | TEMPERATURE: 98.2 F | WEIGHT: 131 LBS | SYSTOLIC BLOOD PRESSURE: 138 MMHG | HEART RATE: 111 BPM | DIASTOLIC BLOOD PRESSURE: 107 MMHG | OXYGEN SATURATION: 96 % | OXYGEN SATURATION: 96 % | DIASTOLIC BLOOD PRESSURE: 82 MMHG | HEIGHT: 61 IN | TEMPERATURE: 96.5 F | BODY MASS INDEX: 24.73 KG/M2 | RESPIRATION RATE: 16 BRPM

## 2018-01-01 VITALS
DIASTOLIC BLOOD PRESSURE: 66 MMHG | SYSTOLIC BLOOD PRESSURE: 124 MMHG | TEMPERATURE: 97.2 F | BODY MASS INDEX: 24.56 KG/M2 | HEART RATE: 76 BPM | WEIGHT: 130.07 LBS | RESPIRATION RATE: 18 BRPM | HEIGHT: 61 IN

## 2018-01-01 VITALS
WEIGHT: 129 LBS | TEMPERATURE: 97.1 F | DIASTOLIC BLOOD PRESSURE: 90 MMHG | SYSTOLIC BLOOD PRESSURE: 144 MMHG | HEIGHT: 62 IN | RESPIRATION RATE: 16 BRPM | BODY MASS INDEX: 23.74 KG/M2 | HEART RATE: 94 BPM | OXYGEN SATURATION: 96 %

## 2018-01-01 VITALS
BODY MASS INDEX: 23.32 KG/M2 | SYSTOLIC BLOOD PRESSURE: 90 MMHG | HEIGHT: 61 IN | DIASTOLIC BLOOD PRESSURE: 70 MMHG | HEART RATE: 105 BPM | WEIGHT: 123.5 LBS

## 2018-01-01 VITALS
RESPIRATION RATE: 16 BRPM | WEIGHT: 126 LBS | SYSTOLIC BLOOD PRESSURE: 165 MMHG | DIASTOLIC BLOOD PRESSURE: 107 MMHG | HEART RATE: 84 BPM | TEMPERATURE: 97 F | HEIGHT: 62 IN | BODY MASS INDEX: 23.19 KG/M2 | OXYGEN SATURATION: 98 %

## 2018-01-01 VITALS
SYSTOLIC BLOOD PRESSURE: 148 MMHG | TEMPERATURE: 97.4 F | RESPIRATION RATE: 18 BRPM | HEART RATE: 100 BPM | BODY MASS INDEX: 24.56 KG/M2 | HEIGHT: 61 IN | WEIGHT: 130.07 LBS | DIASTOLIC BLOOD PRESSURE: 98 MMHG

## 2018-01-01 VITALS
OXYGEN SATURATION: 90 % | DIASTOLIC BLOOD PRESSURE: 67 MMHG | TEMPERATURE: 97.3 F | SYSTOLIC BLOOD PRESSURE: 129 MMHG | HEIGHT: 62 IN | HEART RATE: 83 BPM | WEIGHT: 128.8 LBS | BODY MASS INDEX: 23.7 KG/M2

## 2018-01-01 VITALS
TEMPERATURE: 97.3 F | DIASTOLIC BLOOD PRESSURE: 62 MMHG | RESPIRATION RATE: 18 BRPM | HEART RATE: 82 BPM | WEIGHT: 130.07 LBS | SYSTOLIC BLOOD PRESSURE: 135 MMHG | HEIGHT: 62 IN | BODY MASS INDEX: 23.94 KG/M2

## 2018-01-01 VITALS
HEART RATE: 98 BPM | OXYGEN SATURATION: 97 % | SYSTOLIC BLOOD PRESSURE: 148 MMHG | TEMPERATURE: 97 F | BODY MASS INDEX: 24.14 KG/M2 | WEIGHT: 131.2 LBS | HEIGHT: 62 IN | DIASTOLIC BLOOD PRESSURE: 77 MMHG

## 2018-01-01 VITALS
BODY MASS INDEX: 23.21 KG/M2 | WEIGHT: 118.83 LBS | TEMPERATURE: 98.2 F | RESPIRATION RATE: 16 BRPM | SYSTOLIC BLOOD PRESSURE: 116 MMHG | DIASTOLIC BLOOD PRESSURE: 86 MMHG | HEART RATE: 87 BPM

## 2018-01-01 VITALS
SYSTOLIC BLOOD PRESSURE: 118 MMHG | TEMPERATURE: 97.7 F | WEIGHT: 130 LBS | DIASTOLIC BLOOD PRESSURE: 82 MMHG | BODY MASS INDEX: 23.92 KG/M2 | HEIGHT: 62 IN | HEART RATE: 65 BPM

## 2018-01-01 VITALS
RESPIRATION RATE: 20 BRPM | SYSTOLIC BLOOD PRESSURE: 150 MMHG | BODY MASS INDEX: 23.64 KG/M2 | HEIGHT: 61 IN | DIASTOLIC BLOOD PRESSURE: 90 MMHG | WEIGHT: 125.22 LBS | TEMPERATURE: 97.8 F | HEART RATE: 100 BPM

## 2018-01-01 DIAGNOSIS — E86.0 DEHYDRATION: ICD-10-CM

## 2018-01-01 DIAGNOSIS — M79.18 MUSCLE PAIN, MYOFACIAL: ICD-10-CM

## 2018-01-01 DIAGNOSIS — R59.0 MEDIASTINAL ADENOPATHY: Primary | ICD-10-CM

## 2018-01-01 DIAGNOSIS — I65.23 BILATERAL CAROTID ARTERY STENOSIS: Chronic | ICD-10-CM

## 2018-01-01 DIAGNOSIS — Z01.810 PREOP CARDIOVASCULAR EXAM: Primary | ICD-10-CM

## 2018-01-01 DIAGNOSIS — M79.10 MUSCLE PAIN: ICD-10-CM

## 2018-01-01 DIAGNOSIS — A04.72 C. DIFFICILE DIARRHEA: ICD-10-CM

## 2018-01-01 DIAGNOSIS — I25.10 ASCVD (ARTERIOSCLEROTIC CARDIOVASCULAR DISEASE): ICD-10-CM

## 2018-01-01 DIAGNOSIS — C34.12 PANCOASTS SYNDROME, LEFT (HCC): Primary | ICD-10-CM

## 2018-01-01 DIAGNOSIS — E78.00 PURE HYPERCHOLESTEROLEMIA: ICD-10-CM

## 2018-01-01 DIAGNOSIS — C34.92 NONSQUAMOUS NONSMALL CELL NEOPLASM OF LEFT LUNG (HCC): ICD-10-CM

## 2018-01-01 DIAGNOSIS — R19.7 DIARRHEA: ICD-10-CM

## 2018-01-01 DIAGNOSIS — M79.10 MUSCLE PAIN: Primary | ICD-10-CM

## 2018-01-01 DIAGNOSIS — R78.81 GRAM-NEGATIVE BACTEREMIA: ICD-10-CM

## 2018-01-01 DIAGNOSIS — I10 BENIGN HYPERTENSION: ICD-10-CM

## 2018-01-01 DIAGNOSIS — Z01.810 PREOPERATIVE CARDIOVASCULAR EXAMINATION: Primary | ICD-10-CM

## 2018-01-01 DIAGNOSIS — I71.6 THORACOABDOMINAL AORTIC ANEURYSM (TAAA) WITHOUT RUPTURE (HCC): ICD-10-CM

## 2018-01-01 DIAGNOSIS — F32.A MINOR DEPRESSION: ICD-10-CM

## 2018-01-01 DIAGNOSIS — R50.9 FEVER: ICD-10-CM

## 2018-01-01 DIAGNOSIS — C34.92 NONSQUAMOUS NONSMALL CELL NEOPLASM OF LEFT LUNG (HCC): Primary | ICD-10-CM

## 2018-01-01 DIAGNOSIS — D49.1 LUNG NEOPLASM: ICD-10-CM

## 2018-01-01 DIAGNOSIS — J44.9 COPD WITH ASTHMA (HCC): Primary | ICD-10-CM

## 2018-01-01 DIAGNOSIS — H25.012 CORTICAL AGE-RELATED CATARACT OF LEFT EYE: ICD-10-CM

## 2018-01-01 DIAGNOSIS — E87.6 HYPOKALEMIA: ICD-10-CM

## 2018-01-01 DIAGNOSIS — R59.0 MEDIASTINAL ADENOPATHY: ICD-10-CM

## 2018-01-01 DIAGNOSIS — Z01.810 PREOPERATIVE CARDIOVASCULAR EXAMINATION: ICD-10-CM

## 2018-01-01 DIAGNOSIS — M48.062 SPINAL STENOSIS OF LUMBAR REGION WITH NEUROGENIC CLAUDICATION: ICD-10-CM

## 2018-01-01 DIAGNOSIS — R00.0 TACHYCARDIA: ICD-10-CM

## 2018-01-01 DIAGNOSIS — J44.9 CHRONIC OBSTRUCTIVE PULMONARY DISEASE, UNSPECIFIED COPD TYPE (HCC): Chronic | ICD-10-CM

## 2018-01-01 DIAGNOSIS — I10 ESSENTIAL HYPERTENSION: Primary | ICD-10-CM

## 2018-01-01 DIAGNOSIS — I95.2 HYPOTENSION DUE TO DRUGS: ICD-10-CM

## 2018-01-01 DIAGNOSIS — I70.211 ATHEROSCLER OF NATIVE ARTERY OF RIGHT LEG WITH INTERMIT CLAUDICATION (HCC): ICD-10-CM

## 2018-01-01 DIAGNOSIS — I10 HYPERTENSION, UNCONTROLLED: Chronic | ICD-10-CM

## 2018-01-01 DIAGNOSIS — A04.72 CLOSTRIDIUM DIFFICILE COLITIS: Primary | ICD-10-CM

## 2018-01-01 DIAGNOSIS — J41.0 SIMPLE CHRONIC BRONCHITIS (HCC): Chronic | ICD-10-CM

## 2018-01-01 DIAGNOSIS — N17.9 ACUTE ON CHRONIC RENAL FAILURE (HCC): Primary | ICD-10-CM

## 2018-01-01 DIAGNOSIS — I10 ESSENTIAL HYPERTENSION: ICD-10-CM

## 2018-01-01 DIAGNOSIS — I25.10 ASCVD (ARTERIOSCLEROTIC CARDIOVASCULAR DISEASE): Primary | ICD-10-CM

## 2018-01-01 DIAGNOSIS — N17.9 AKI (ACUTE KIDNEY INJURY) (HCC): Primary | ICD-10-CM

## 2018-01-01 DIAGNOSIS — C34.12 SQUAMOUS CELL CARCINOMA OF BRONCHUS IN LEFT UPPER LOBE (HCC): Primary | ICD-10-CM

## 2018-01-01 DIAGNOSIS — R65.20 SEVERE SEPSIS (HCC): Primary | ICD-10-CM

## 2018-01-01 DIAGNOSIS — C34.90 NONSQUAMOUS NONSMALL CELL NEOPLASM OF LUNG, UNSPECIFIED LATERALITY (HCC): ICD-10-CM

## 2018-01-01 DIAGNOSIS — Z23 NEED FOR VACCINATION WITH 13-POLYVALENT PNEUMOCOCCAL CONJUGATE VACCINE: ICD-10-CM

## 2018-01-01 DIAGNOSIS — I73.9 PAD (PERIPHERAL ARTERY DISEASE) (HCC): ICD-10-CM

## 2018-01-01 DIAGNOSIS — N13.30 HYDRONEPHROSIS DETERMINED BY ULTRASOUND: ICD-10-CM

## 2018-01-01 DIAGNOSIS — M54.16 LUMBAR RADICULOPATHY, ACUTE: ICD-10-CM

## 2018-01-01 DIAGNOSIS — E83.42 HYPOMAGNESEMIA: Primary | ICD-10-CM

## 2018-01-01 DIAGNOSIS — A41.9 SEVERE SEPSIS (HCC): Primary | ICD-10-CM

## 2018-01-01 DIAGNOSIS — Z01.810 PREOP CARDIOVASCULAR EXAM: ICD-10-CM

## 2018-01-01 DIAGNOSIS — N18.9 ACUTE ON CHRONIC RENAL FAILURE (HCC): Primary | ICD-10-CM

## 2018-01-01 DIAGNOSIS — I71.4 ANEURYSM OF INFRARENAL ABDOMINAL AORTA (HCC): ICD-10-CM

## 2018-01-01 DIAGNOSIS — N18.30 STAGE 3 CHRONIC KIDNEY DISEASE (HCC): ICD-10-CM

## 2018-01-01 LAB
ALBUMIN SERPL BCP-MCNC: 2.5 G/DL (ref 3.5–5)
ALBUMIN SERPL BCP-MCNC: 2.6 G/DL (ref 3.5–5)
ALBUMIN SERPL BCP-MCNC: 2.9 G/DL (ref 3.5–5)
ALBUMIN SERPL BCP-MCNC: 3.2 G/DL (ref 3.5–5)
ALBUMIN SERPL BCP-MCNC: 3.2 G/DL (ref 3.5–5.7)
ALBUMIN SERPL BCP-MCNC: 3.3 G/DL (ref 3.5–5)
ALBUMIN SERPL-MCNC: 3.5 G/DL (ref 3.6–5.1)
ALBUMIN SERPL-MCNC: 3.6 G/DL (ref 3.6–5.1)
ALBUMIN SERPL-MCNC: 3.8 G/DL (ref 3.6–5.1)
ALBUMIN SERPL-MCNC: 3.8 G/DL (ref 3.6–5.1)
ALBUMIN/GLOB SERPL: 1.3 (CALC) (ref 1–2.5)
ALBUMIN/GLOB SERPL: 1.3 (CALC) (ref 1–2.5)
ALBUMIN/GLOB SERPL: 1.4 (CALC) (ref 1–2.5)
ALBUMIN/GLOB SERPL: 1.6 (CALC) (ref 1–2.5)
ALP SERPL-CCNC: 103 U/L (ref 46–116)
ALP SERPL-CCNC: 103 U/L (ref 46–116)
ALP SERPL-CCNC: 108 U/L (ref 33–130)
ALP SERPL-CCNC: 112 U/L (ref 46–116)
ALP SERPL-CCNC: 80 U/L (ref 33–130)
ALP SERPL-CCNC: 82 U/L (ref 46–116)
ALP SERPL-CCNC: 87 U/L (ref 33–130)
ALP SERPL-CCNC: 89 U/L (ref 46–116)
ALP SERPL-CCNC: 97 U/L (ref 33–130)
ALP SERPL-CCNC: 99 U/L (ref 46–116)
ALT SERPL W P-5'-P-CCNC: 19 U/L (ref 12–78)
ALT SERPL W P-5'-P-CCNC: 21 U/L (ref 12–78)
ALT SERPL W P-5'-P-CCNC: 22 U/L (ref 12–78)
ALT SERPL W P-5'-P-CCNC: 27 U/L (ref 12–78)
ALT SERPL W P-5'-P-CCNC: 30 U/L (ref 12–78)
ALT SERPL W P-5'-P-CCNC: 85 U/L (ref 12–78)
ALT SERPL-CCNC: 11 U/L (ref 6–29)
ALT SERPL-CCNC: 12 U/L (ref 6–29)
ALT SERPL-CCNC: 13 U/L (ref 6–29)
ALT SERPL-CCNC: 15 U/L (ref 6–29)
ANION GAP BLD CALC-SCNC: 15 MMOL/L (ref 4–13)
ANION GAP SERPL CALCULATED.3IONS-SCNC: 11 MMOL/L (ref 4–13)
ANION GAP SERPL CALCULATED.3IONS-SCNC: 11 MMOL/L (ref 4–13)
ANION GAP SERPL CALCULATED.3IONS-SCNC: 15 MMOL/L (ref 4–13)
ANION GAP SERPL CALCULATED.3IONS-SCNC: 6 MMOL/L (ref 4–13)
ANION GAP SERPL CALCULATED.3IONS-SCNC: 6 MMOL/L (ref 4–13)
ANION GAP SERPL CALCULATED.3IONS-SCNC: 7 MMOL/L (ref 4–13)
ANION GAP SERPL CALCULATED.3IONS-SCNC: 7 MMOL/L (ref 4–13)
ANION GAP SERPL CALCULATED.3IONS-SCNC: 8 MMOL/L (ref 4–13)
ANION GAP SERPL CALCULATED.3IONS-SCNC: 9 MMOL/L (ref 4–13)
APTT PPP: 26 SECONDS (ref 24–36)
APTT PPP: 32 SECONDS (ref 26–38)
AST SERPL W P-5'-P-CCNC: 16 U/L (ref 5–45)
AST SERPL W P-5'-P-CCNC: 17 U/L (ref 5–45)
AST SERPL W P-5'-P-CCNC: 18 U/L (ref 5–45)
AST SERPL W P-5'-P-CCNC: 19 U/L (ref 5–45)
AST SERPL W P-5'-P-CCNC: 24 U/L (ref 5–45)
AST SERPL W P-5'-P-CCNC: 64 U/L (ref 5–45)
AST SERPL-CCNC: 14 U/L (ref 10–35)
AST SERPL-CCNC: 15 U/L (ref 10–35)
AST SERPL-CCNC: 15 U/L (ref 10–35)
AST SERPL-CCNC: 17 U/L (ref 10–35)
ATRIAL RATE: 101 BPM
ATRIAL RATE: 114 BPM
ATRIAL RATE: 86 BPM
BACTERIA BLD CULT: ABNORMAL
BACTERIA BLD CULT: NORMAL
BACTERIA BLD CULT: NORMAL
BACTERIA UR CULT: ABNORMAL
BACTERIA UR CULT: ABNORMAL
BACTERIA UR QL AUTO: ABNORMAL /HPF
BACTERIA UR QL AUTO: ABNORMAL /HPF
BASOPHILS # BLD AUTO: 0 THOUSAND/UL (ref 0–0.1)
BASOPHILS # BLD AUTO: 0.01 THOUSANDS/ΜL (ref 0–0.1)
BASOPHILS # BLD AUTO: 0.02 THOUSANDS/ΜL (ref 0–0.1)
BASOPHILS # BLD AUTO: 0.03 THOUSANDS/ΜL (ref 0–0.1)
BASOPHILS # BLD AUTO: 42 CELLS/UL (ref 0–200)
BASOPHILS # BLD AUTO: 53 CELLS/UL (ref 0–200)
BASOPHILS # BLD AUTO: 61 CELLS/UL (ref 0–200)
BASOPHILS # BLD AUTO: 61 CELLS/UL (ref 0–200)
BASOPHILS # BLD MANUAL: 0 THOUSAND/UL (ref 0–0.1)
BASOPHILS NFR BLD AUTO: 0 % (ref 0–1)
BASOPHILS NFR BLD AUTO: 0.7 %
BASOPHILS NFR BLD AUTO: 1 % (ref 0–1)
BASOPHILS NFR BLD AUTO: 1.1 %
BASOPHILS NFR MAR MANUAL: 0 % (ref 0–1)
BASOPHILS NFR MAR MANUAL: 0 % (ref 0–1)
BILIRUB SERPL-MCNC: 0.2 MG/DL (ref 0.2–1)
BILIRUB SERPL-MCNC: 0.2 MG/DL (ref 0.2–1)
BILIRUB SERPL-MCNC: 0.3 MG/DL (ref 0.2–1)
BILIRUB SERPL-MCNC: 0.3 MG/DL (ref 0.2–1.2)
BILIRUB SERPL-MCNC: 0.4 MG/DL (ref 0.2–1)
BILIRUB SERPL-MCNC: 0.4 MG/DL (ref 0.2–1)
BILIRUB SERPL-MCNC: 0.5 MG/DL (ref 0.2–1.2)
BILIRUB SERPL-MCNC: 0.6 MG/DL (ref 0.2–1)
BILIRUB UR QL STRIP: NEGATIVE
BUN BLD-MCNC: 17 MG/DL (ref 5–25)
BUN SERPL-MCNC: 10 MG/DL (ref 5–25)
BUN SERPL-MCNC: 13 MG/DL (ref 5–25)
BUN SERPL-MCNC: 14 MG/DL (ref 5–25)
BUN SERPL-MCNC: 15 MG/DL (ref 5–25)
BUN SERPL-MCNC: 16 MG/DL (ref 5–25)
BUN SERPL-MCNC: 18 MG/DL (ref 7–25)
BUN SERPL-MCNC: 21 MG/DL (ref 5–25)
BUN SERPL-MCNC: 22 MG/DL (ref 5–25)
BUN SERPL-MCNC: 22 MG/DL (ref 7–25)
BUN SERPL-MCNC: 24 MG/DL (ref 5–25)
BUN SERPL-MCNC: 27 MG/DL (ref 7–25)
BUN SERPL-MCNC: 29 MG/DL (ref 5–25)
BUN SERPL-MCNC: 32 MG/DL (ref 7–25)
BUN SERPL-MCNC: 33 MG/DL (ref 5–25)
BUN SERPL-MCNC: 35 MG/DL (ref 5–25)
BUN SERPL-MCNC: 35 MG/DL (ref 5–25)
BUN SERPL-MCNC: 8 MG/DL (ref 5–25)
BUN/CREAT SERPL: 14 (CALC) (ref 6–22)
BUN/CREAT SERPL: 15 (CALC) (ref 6–22)
BUN/CREAT SERPL: 16 (CALC) (ref 6–22)
BUN/CREAT SERPL: 18 (CALC) (ref 6–22)
BURR CELLS BLD QL SMEAR: PRESENT
C DIFF TOX GENS STL QL NAA+PROBE: ABNORMAL
C DIFF TOX GENS STL QL NAA+PROBE: NORMAL
CA-I BLD-SCNC: 1.27 MMOL/L (ref 1.12–1.32)
CALCIUM SERPL-MCNC: 7.5 MG/DL (ref 8.3–10.1)
CALCIUM SERPL-MCNC: 8.2 MG/DL (ref 8.3–10.1)
CALCIUM SERPL-MCNC: 8.2 MG/DL (ref 8.3–10.1)
CALCIUM SERPL-MCNC: 8.3 MG/DL (ref 8.3–10.1)
CALCIUM SERPL-MCNC: 8.4 MG/DL (ref 8.3–10.1)
CALCIUM SERPL-MCNC: 8.5 MG/DL (ref 8.3–10.1)
CALCIUM SERPL-MCNC: 8.5 MG/DL (ref 8.3–10.1)
CALCIUM SERPL-MCNC: 8.7 MG/DL (ref 8.3–10.1)
CALCIUM SERPL-MCNC: 8.8 MG/DL (ref 8.3–10.1)
CALCIUM SERPL-MCNC: 9 MG/DL (ref 8.6–10.4)
CALCIUM SERPL-MCNC: 9.1 MG/DL (ref 8.3–10.1)
CALCIUM SERPL-MCNC: 9.2 MG/DL (ref 8.3–10.1)
CALCIUM SERPL-MCNC: 9.3 MG/DL (ref 8.6–10.4)
CALCIUM SERPL-MCNC: 9.3 MG/DL (ref 8.6–10.4)
CALCIUM SERPL-MCNC: 9.4 MG/DL (ref 8.6–10.4)
CAMPYLOBACTER DNA SPEC NAA+PROBE: NORMAL
CHEST PAIN STATEMENT: NORMAL
CHLORIDE BLD-SCNC: 104 MMOL/L (ref 100–108)
CHLORIDE SERPL-SCNC: 103 MMOL/L (ref 100–108)
CHLORIDE SERPL-SCNC: 103 MMOL/L (ref 100–108)
CHLORIDE SERPL-SCNC: 104 MMOL/L (ref 98–110)
CHLORIDE SERPL-SCNC: 105 MMOL/L (ref 100–108)
CHLORIDE SERPL-SCNC: 106 MMOL/L (ref 98–108)
CHLORIDE SERPL-SCNC: 106 MMOL/L (ref 98–110)
CHLORIDE SERPL-SCNC: 107 MMOL/L (ref 98–110)
CHLORIDE SERPL-SCNC: 107 MMOL/L (ref 98–110)
CHLORIDE SERPL-SCNC: 109 MMOL/L (ref 100–108)
CHLORIDE SERPL-SCNC: 109 MMOL/L (ref 100–108)
CHLORIDE SERPL-SCNC: 110 MMOL/L (ref 100–108)
CHLORIDE SERPL-SCNC: 111 MMOL/L (ref 100–108)
CHLORIDE SERPL-SCNC: 111 MMOL/L (ref 100–108)
CHLORIDE SERPL-SCNC: 99 MMOL/L (ref 100–108)
CLARITY UR: ABNORMAL
CLARITY UR: CLEAR
CLARITY UR: CLEAR
CO2 SERPL-SCNC: 21 MMOL/L (ref 21–32)
CO2 SERPL-SCNC: 22 MMOL/L (ref 21–32)
CO2 SERPL-SCNC: 23 MMOL/L (ref 20–32)
CO2 SERPL-SCNC: 23 MMOL/L (ref 21–32)
CO2 SERPL-SCNC: 24 MMOL/L (ref 21–32)
CO2 SERPL-SCNC: 25 MMOL/L (ref 21–32)
CO2 SERPL-SCNC: 26 MMOL/L (ref 20–32)
CO2 SERPL-SCNC: 26 MMOL/L (ref 21–32)
CO2 SERPL-SCNC: 27 MMOL/L (ref 20–32)
CO2 SERPL-SCNC: 27 MMOL/L (ref 21–32)
CO2 SERPL-SCNC: 28 MMOL/L (ref 20–32)
CO2 SERPL-SCNC: 28 MMOL/L (ref 21–32)
COARSE GRAN CASTS URNS QL MICRO: ABNORMAL /LPF
COLOR UR: YELLOW
CREAT BLD-MCNC: 1.2 MG/DL (ref 0.6–1.3)
CREAT SERPL-MCNC: 1.19 MG/DL (ref 0.6–1.3)
CREAT SERPL-MCNC: 1.22 MG/DL (ref 0.6–1.3)
CREAT SERPL-MCNC: 1.23 MG/DL (ref 0.6–1.3)
CREAT SERPL-MCNC: 1.28 MG/DL (ref 0.6–1.3)
CREAT SERPL-MCNC: 1.3 MG/DL (ref 0.6–0.93)
CREAT SERPL-MCNC: 1.34 MG/DL (ref 0.6–1.3)
CREAT SERPL-MCNC: 1.4 MG/DL (ref 0.6–1.3)
CREAT SERPL-MCNC: 1.44 MG/DL (ref 0.6–1.3)
CREAT SERPL-MCNC: 1.46 MG/DL (ref 0.6–0.93)
CREAT SERPL-MCNC: 1.48 MG/DL (ref 0.6–0.93)
CREAT SERPL-MCNC: 1.49 MG/DL (ref 0.6–1.3)
CREAT SERPL-MCNC: 1.58 MG/DL (ref 0.6–1.3)
CREAT SERPL-MCNC: 1.83 MG/DL (ref 0.6–1.3)
CREAT SERPL-MCNC: 2.04 MG/DL (ref 0.6–0.93)
CREAT SERPL-MCNC: 2.1 MG/DL (ref 0.6–1.3)
CREAT SERPL-MCNC: 2.3 MG/DL (ref 0.6–1.3)
CREAT SERPL-MCNC: 2.57 MG/DL (ref 0.6–1.3)
EOSINOPHIL # BLD AUTO: 0.03 THOUSAND/ΜL (ref 0–0.61)
EOSINOPHIL # BLD AUTO: 0.04 THOUSAND/ΜL (ref 0–0.61)
EOSINOPHIL # BLD AUTO: 0.05 THOUSAND/ΜL (ref 0–0.61)
EOSINOPHIL # BLD AUTO: 0.07 THOUSAND/ΜL (ref 0–0.61)
EOSINOPHIL # BLD AUTO: 0.13 THOUSAND/ΜL (ref 0–0.61)
EOSINOPHIL # BLD AUTO: 0.14 THOUSAND/ΜL (ref 0–0.61)
EOSINOPHIL # BLD AUTO: 0.18 THOUSAND/ΜL (ref 0–0.61)
EOSINOPHIL # BLD AUTO: 0.21 THOUSAND/ΜL (ref 0–0.61)
EOSINOPHIL # BLD AUTO: 0.23 THOUSAND/UL (ref 0–0.61)
EOSINOPHIL # BLD AUTO: 0.24 THOUSAND/ΜL (ref 0–0.61)
EOSINOPHIL # BLD AUTO: 102 CELLS/UL (ref 15–500)
EOSINOPHIL # BLD AUTO: 157 CELLS/UL (ref 15–500)
EOSINOPHIL # BLD AUTO: 182 CELLS/UL (ref 15–500)
EOSINOPHIL # BLD AUTO: 503 CELLS/UL (ref 15–500)
EOSINOPHIL # BLD MANUAL: 0 THOUSAND/UL (ref 0–0.4)
EOSINOPHIL NFR BLD AUTO: 1 % (ref 0–6)
EOSINOPHIL NFR BLD AUTO: 1 % (ref 0–6)
EOSINOPHIL NFR BLD AUTO: 1.7 %
EOSINOPHIL NFR BLD AUTO: 1.8 %
EOSINOPHIL NFR BLD AUTO: 2 % (ref 0–6)
EOSINOPHIL NFR BLD AUTO: 2 % (ref 0–6)
EOSINOPHIL NFR BLD AUTO: 3 % (ref 0–6)
EOSINOPHIL NFR BLD AUTO: 3.3 %
EOSINOPHIL NFR BLD AUTO: 4 % (ref 0–6)
EOSINOPHIL NFR BLD AUTO: 5 % (ref 0–6)
EOSINOPHIL NFR BLD AUTO: 6.7 %
EOSINOPHIL NFR BLD MANUAL: 0 % (ref 0–6)
EOSINOPHIL NFR BLD MANUAL: 4 % (ref 0–6)
ERYTHROCYTE [DISTWIDTH] IN BLOOD BY AUTOMATED COUNT: 12.7 % (ref 11–15)
ERYTHROCYTE [DISTWIDTH] IN BLOOD BY AUTOMATED COUNT: 12.9 % (ref 11–15)
ERYTHROCYTE [DISTWIDTH] IN BLOOD BY AUTOMATED COUNT: 13.5 % (ref 11–15)
ERYTHROCYTE [DISTWIDTH] IN BLOOD BY AUTOMATED COUNT: 14.1 % (ref 11.6–15.1)
ERYTHROCYTE [DISTWIDTH] IN BLOOD BY AUTOMATED COUNT: 14.1 % (ref 11.6–15.1)
ERYTHROCYTE [DISTWIDTH] IN BLOOD BY AUTOMATED COUNT: 14.3 % (ref 11.6–15.1)
ERYTHROCYTE [DISTWIDTH] IN BLOOD BY AUTOMATED COUNT: 14.4 % (ref 11.6–15.1)
ERYTHROCYTE [DISTWIDTH] IN BLOOD BY AUTOMATED COUNT: 14.5 % (ref 11.6–15.1)
ERYTHROCYTE [DISTWIDTH] IN BLOOD BY AUTOMATED COUNT: 14.7 % (ref 11.6–15.1)
ERYTHROCYTE [DISTWIDTH] IN BLOOD BY AUTOMATED COUNT: 14.9 % (ref 11.6–15.1)
ERYTHROCYTE [DISTWIDTH] IN BLOOD BY AUTOMATED COUNT: 14.9 % (ref 11.6–15.1)
ERYTHROCYTE [DISTWIDTH] IN BLOOD BY AUTOMATED COUNT: 15.1 % (ref 11.6–15.1)
ERYTHROCYTE [DISTWIDTH] IN BLOOD BY AUTOMATED COUNT: 15.6 % (ref 11.6–15.1)
ERYTHROCYTE [DISTWIDTH] IN BLOOD BY AUTOMATED COUNT: 15.7 % (ref 11.6–15.1)
ERYTHROCYTE [DISTWIDTH] IN BLOOD BY AUTOMATED COUNT: 15.9 % (ref 11.6–15.1)
ERYTHROCYTE [DISTWIDTH] IN BLOOD BY AUTOMATED COUNT: 16.9 % (ref 11–15)
ERYTHROCYTE [DISTWIDTH] IN BLOOD BY AUTOMATED COUNT: 19.9 % (ref 11.6–15.1)
FLUAV AG SPEC QL: NORMAL
FLUAV AG SPEC QL: NORMAL
FLUBV AG SPEC QL: NORMAL
FLUBV AG SPEC QL: NORMAL
GFR SERPL CREATININE-BSD FRML MDRD: 18 ML/MIN/1.73SQ M
GFR SERPL CREATININE-BSD FRML MDRD: 21 ML/MIN/1.73SQ M
GFR SERPL CREATININE-BSD FRML MDRD: 23 ML/MIN/1.73SQ M
GFR SERPL CREATININE-BSD FRML MDRD: 27 ML/MIN/1.73SQ M
GFR SERPL CREATININE-BSD FRML MDRD: 33 ML/MIN/1.73SQ M
GFR SERPL CREATININE-BSD FRML MDRD: 35 ML/MIN/1.73SQ M
GFR SERPL CREATININE-BSD FRML MDRD: 37 ML/MIN/1.73SQ M
GFR SERPL CREATININE-BSD FRML MDRD: 38 ML/MIN/1.73SQ M
GFR SERPL CREATININE-BSD FRML MDRD: 40 ML/MIN/1.73SQ M
GFR SERPL CREATININE-BSD FRML MDRD: 42 ML/MIN/1.73SQ M
GFR SERPL CREATININE-BSD FRML MDRD: 44 ML/MIN/1.73SQ M
GFR SERPL CREATININE-BSD FRML MDRD: 45 ML/MIN/1.73SQ M
GFR SERPL CREATININE-BSD FRML MDRD: 46 ML/MIN/1.73SQ M
GFR SERPL CREATININE-BSD FRML MDRD: 46 ML/MIN/1.73SQ M
GLOBULIN SER CALC-MCNC: 2.4 G/DL (CALC) (ref 1.9–3.7)
GLOBULIN SER CALC-MCNC: 2.7 G/DL (CALC) (ref 1.9–3.7)
GLUCOSE SERPL-MCNC: 103 MG/DL (ref 65–140)
GLUCOSE SERPL-MCNC: 105 MG/DL (ref 65–140)
GLUCOSE SERPL-MCNC: 106 MG/DL (ref 65–140)
GLUCOSE SERPL-MCNC: 109 MG/DL (ref 65–140)
GLUCOSE SERPL-MCNC: 118 MG/DL (ref 65–140)
GLUCOSE SERPL-MCNC: 159 MG/DL (ref 65–99)
GLUCOSE SERPL-MCNC: 77 MG/DL (ref 65–140)
GLUCOSE SERPL-MCNC: 80 MG/DL (ref 65–140)
GLUCOSE SERPL-MCNC: 82 MG/DL (ref 65–140)
GLUCOSE SERPL-MCNC: 83 MG/DL (ref 65–140)
GLUCOSE SERPL-MCNC: 85 MG/DL (ref 65–140)
GLUCOSE SERPL-MCNC: 86 MG/DL (ref 65–140)
GLUCOSE SERPL-MCNC: 86 MG/DL (ref 65–140)
GLUCOSE SERPL-MCNC: 89 MG/DL (ref 65–140)
GLUCOSE SERPL-MCNC: 91 MG/DL (ref 65–140)
GLUCOSE SERPL-MCNC: 91 MG/DL (ref 65–140)
GLUCOSE SERPL-MCNC: 93 MG/DL (ref 65–99)
GLUCOSE SERPL-MCNC: 97 MG/DL (ref 65–99)
GLUCOSE SERPL-MCNC: 98 MG/DL (ref 65–99)
GLUCOSE UR STRIP-MCNC: NEGATIVE MG/DL
GRAM STN SPEC: ABNORMAL
HCT VFR BLD AUTO: 28 % (ref 34.8–46.1)
HCT VFR BLD AUTO: 28.9 % (ref 34.8–46.1)
HCT VFR BLD AUTO: 30.2 % (ref 34.8–46.1)
HCT VFR BLD AUTO: 30.2 % (ref 34.8–46.1)
HCT VFR BLD AUTO: 30.6 % (ref 34.8–46.1)
HCT VFR BLD AUTO: 31.2 % (ref 34.8–46.1)
HCT VFR BLD AUTO: 31.5 % (ref 34.8–46.1)
HCT VFR BLD AUTO: 31.8 % (ref 34.8–46.1)
HCT VFR BLD AUTO: 32 % (ref 34.8–46.1)
HCT VFR BLD AUTO: 32.9 % (ref 34.8–46.1)
HCT VFR BLD AUTO: 32.9 % (ref 34.8–46.1)
HCT VFR BLD AUTO: 33.7 % (ref 35–45)
HCT VFR BLD AUTO: 34.3 % (ref 34.8–46.1)
HCT VFR BLD AUTO: 35 % (ref 35–45)
HCT VFR BLD AUTO: 35.5 % (ref 35–45)
HCT VFR BLD AUTO: 35.8 % (ref 34.8–46.1)
HCT VFR BLD AUTO: 38.9 % (ref 35–45)
HCT VFR BLD CALC: 36 % (ref 34.8–46.1)
HGB BLD-MCNC: 10 G/DL (ref 11.5–15.4)
HGB BLD-MCNC: 10.2 G/DL (ref 11.5–15.4)
HGB BLD-MCNC: 10.2 G/DL (ref 11.5–15.4)
HGB BLD-MCNC: 10.5 G/DL (ref 11.5–15.4)
HGB BLD-MCNC: 10.5 G/DL (ref 11.5–15.4)
HGB BLD-MCNC: 11.3 G/DL (ref 11.7–15.5)
HGB BLD-MCNC: 11.4 G/DL (ref 11.5–15.4)
HGB BLD-MCNC: 11.6 G/DL (ref 11.5–15.4)
HGB BLD-MCNC: 11.8 G/DL (ref 11.7–15.5)
HGB BLD-MCNC: 11.8 G/DL (ref 11.7–15.5)
HGB BLD-MCNC: 13 G/DL (ref 11.7–15.5)
HGB BLD-MCNC: 8.8 G/DL (ref 11.5–15.4)
HGB BLD-MCNC: 9.4 G/DL (ref 11.5–15.4)
HGB BLD-MCNC: 9.5 G/DL (ref 11.5–15.4)
HGB BLD-MCNC: 9.6 G/DL (ref 11.5–15.4)
HGB BLD-MCNC: 9.7 G/DL (ref 11.5–15.4)
HGB BLD-MCNC: 9.8 G/DL (ref 11.5–15.4)
HGB BLDA-MCNC: 12.2 G/DL (ref 11.5–15.4)
HGB UR QL STRIP.AUTO: ABNORMAL
HGB UR QL STRIP.AUTO: NEGATIVE
HGB UR QL STRIP.AUTO: NEGATIVE
HYALINE CASTS #/AREA URNS LPF: ABNORMAL /LPF
IMM GRANULOCYTES # BLD AUTO: 0.01 THOUSAND/UL (ref 0–0.2)
IMM GRANULOCYTES # BLD AUTO: 0.02 THOUSAND/UL (ref 0–0.2)
IMM GRANULOCYTES # BLD AUTO: 0.03 THOUSAND/UL (ref 0–0.2)
IMM GRANULOCYTES # BLD AUTO: 0.06 THOUSAND/UL (ref 0–0.2)
IMM GRANULOCYTES # BLD AUTO: 0.06 THOUSAND/UL (ref 0–0.2)
IMM GRANULOCYTES NFR BLD AUTO: 0 % (ref 0–2)
IMM GRANULOCYTES NFR BLD AUTO: 1 % (ref 0–2)
INR PPP: 1.06 (ref 0.86–1.17)
INR PPP: 1.12 (ref 0.86–1.17)
KETONES UR STRIP-MCNC: NEGATIVE MG/DL
LACTATE SERPL-SCNC: 0.9 MMOL/L (ref 0.5–2)
LACTATE SERPL-SCNC: 1.2 MMOL/L (ref 0.5–2)
LACTATE SERPL-SCNC: 2.3 MMOL/L (ref 0.5–2)
LACTATE SERPL-SCNC: 2.4 MMOL/L (ref 0.5–2)
LEUKOCYTE ESTERASE UR QL STRIP: ABNORMAL
LEUKOCYTE ESTERASE UR QL STRIP: NEGATIVE
LEUKOCYTE ESTERASE UR QL STRIP: NEGATIVE
LIPASE SERPL-CCNC: 273 U/L (ref 73–393)
LYMPHOCYTES # BLD AUTO: 0.17 THOUSANDS/ΜL (ref 0.6–4.47)
LYMPHOCYTES # BLD AUTO: 0.23 THOUSAND/UL (ref 0.6–4.47)
LYMPHOCYTES # BLD AUTO: 0.41 THOUSANDS/ΜL (ref 0.6–4.47)
LYMPHOCYTES # BLD AUTO: 0.55 THOUSANDS/ΜL (ref 0.6–4.47)
LYMPHOCYTES # BLD AUTO: 0.56 THOUSANDS/ΜL (ref 0.6–4.47)
LYMPHOCYTES # BLD AUTO: 0.7 THOUSANDS/ΜL (ref 0.6–4.47)
LYMPHOCYTES # BLD AUTO: 0.8 THOUSANDS/ΜL (ref 0.6–4.47)
LYMPHOCYTES # BLD AUTO: 1.03 THOUSANDS/ΜL (ref 0.6–4.47)
LYMPHOCYTES # BLD AUTO: 1.07 THOUSANDS/ΜL (ref 0.6–4.47)
LYMPHOCYTES # BLD AUTO: 1.1 THOUSANDS/ΜL (ref 0.6–4.47)
LYMPHOCYTES # BLD AUTO: 1.68 THOUSAND/UL (ref 0.6–4.47)
LYMPHOCYTES # BLD AUTO: 12 % (ref 14–44)
LYMPHOCYTES # BLD AUTO: 1935 CELLS/UL (ref 850–3900)
LYMPHOCYTES # BLD AUTO: 2340 CELLS/UL (ref 850–3900)
LYMPHOCYTES # BLD AUTO: 29 % (ref 14–44)
LYMPHOCYTES # BLD AUTO: 2906 CELLS/UL (ref 850–3900)
LYMPHOCYTES # BLD AUTO: 996 CELLS/UL (ref 850–3900)
LYMPHOCYTES NFR BLD AUTO: 10 % (ref 14–44)
LYMPHOCYTES NFR BLD AUTO: 14 % (ref 14–44)
LYMPHOCYTES NFR BLD AUTO: 18 % (ref 14–44)
LYMPHOCYTES NFR BLD AUTO: 18.1 %
LYMPHOCYTES NFR BLD AUTO: 22 % (ref 14–44)
LYMPHOCYTES NFR BLD AUTO: 22 % (ref 14–44)
LYMPHOCYTES NFR BLD AUTO: 25 % (ref 14–44)
LYMPHOCYTES NFR BLD AUTO: 25.8 %
LYMPHOCYTES NFR BLD AUTO: 26 % (ref 14–44)
LYMPHOCYTES NFR BLD AUTO: 33.4 %
LYMPHOCYTES NFR BLD AUTO: 39 %
LYMPHOCYTES NFR BLD AUTO: 6 % (ref 14–44)
LYMPHOCYTES NFR BLD AUTO: 7 % (ref 14–44)
MACROCYTES BLD QL AUTO: PRESENT
MAGNESIUM SERPL-MCNC: 1.4 MG/DL (ref 1.6–2.6)
MAGNESIUM SERPL-MCNC: 1.5 MG/DL (ref 1.6–2.6)
MAGNESIUM SERPL-MCNC: 2 MG/DL (ref 1.6–2.6)
MAGNESIUM SERPL-MCNC: 2.1 MG/DL (ref 1.6–2.6)
MAX DIASTOLIC BP: 100 MMHG
MAX HEART RATE: 97 BPM
MAX PREDICTED HEART RATE: 149 BPM
MAX. SYSTOLIC BP: 169 MMHG
MCH RBC QN AUTO: 31.1 PG (ref 26.8–34.3)
MCH RBC QN AUTO: 31.8 PG (ref 26.8–34.3)
MCH RBC QN AUTO: 32 PG (ref 26.8–34.3)
MCH RBC QN AUTO: 32.1 PG (ref 27–33)
MCH RBC QN AUTO: 32.3 PG (ref 26.8–34.3)
MCH RBC QN AUTO: 32.3 PG (ref 27–33)
MCH RBC QN AUTO: 32.4 PG (ref 26.8–34.3)
MCH RBC QN AUTO: 32.6 PG (ref 26.8–34.3)
MCH RBC QN AUTO: 32.6 PG (ref 26.8–34.3)
MCH RBC QN AUTO: 32.7 PG (ref 26.8–34.3)
MCH RBC QN AUTO: 32.8 PG (ref 27–33)
MCH RBC QN AUTO: 33.1 PG (ref 26.8–34.3)
MCH RBC QN AUTO: 33.1 PG (ref 27–33)
MCH RBC QN AUTO: 33.6 PG (ref 26.8–34.3)
MCH RBC QN AUTO: 33.9 PG (ref 26.8–34.3)
MCHC RBC AUTO-ENTMCNC: 31 G/DL (ref 31.4–37.4)
MCHC RBC AUTO-ENTMCNC: 31.1 G/DL (ref 31.4–37.4)
MCHC RBC AUTO-ENTMCNC: 31.4 G/DL (ref 31.4–37.4)
MCHC RBC AUTO-ENTMCNC: 31.4 G/DL (ref 31.4–37.4)
MCHC RBC AUTO-ENTMCNC: 31.5 G/DL (ref 31.4–37.4)
MCHC RBC AUTO-ENTMCNC: 31.7 G/DL (ref 31.4–37.4)
MCHC RBC AUTO-ENTMCNC: 31.8 G/DL (ref 31.4–37.4)
MCHC RBC AUTO-ENTMCNC: 31.9 G/DL (ref 31.4–37.4)
MCHC RBC AUTO-ENTMCNC: 32.1 G/DL (ref 31.4–37.4)
MCHC RBC AUTO-ENTMCNC: 32.5 G/DL (ref 31.4–37.4)
MCHC RBC AUTO-ENTMCNC: 32.5 G/DL (ref 31.4–37.4)
MCHC RBC AUTO-ENTMCNC: 32.8 G/DL (ref 31.4–37.4)
MCHC RBC AUTO-ENTMCNC: 33.2 G/DL (ref 32–36)
MCHC RBC AUTO-ENTMCNC: 33.4 G/DL (ref 32–36)
MCHC RBC AUTO-ENTMCNC: 33.5 G/DL (ref 32–36)
MCHC RBC AUTO-ENTMCNC: 33.7 G/DL (ref 31.4–37.4)
MCHC RBC AUTO-ENTMCNC: 33.7 G/DL (ref 32–36)
MCV RBC AUTO: 100 FL (ref 82–98)
MCV RBC AUTO: 101 FL (ref 82–98)
MCV RBC AUTO: 101 FL (ref 82–98)
MCV RBC AUTO: 103 FL (ref 82–98)
MCV RBC AUTO: 104 FL (ref 82–98)
MCV RBC AUTO: 95.1 FL (ref 80–100)
MCV RBC AUTO: 96.8 FL (ref 80–100)
MCV RBC AUTO: 98 FL (ref 80–100)
MCV RBC AUTO: 99 FL (ref 82–98)
MCV RBC AUTO: 99.4 FL (ref 80–100)
MONOCYTES # BLD AUTO: 0.15 THOUSAND/ΜL (ref 0.17–1.22)
MONOCYTES # BLD AUTO: 0.17 THOUSAND/UL (ref 0–1.22)
MONOCYTES # BLD AUTO: 0.17 THOUSAND/ΜL (ref 0.17–1.22)
MONOCYTES # BLD AUTO: 0.17 THOUSAND/ΜL (ref 0.17–1.22)
MONOCYTES # BLD AUTO: 0.19 THOUSAND/ΜL (ref 0.17–1.22)
MONOCYTES # BLD AUTO: 0.52 THOUSAND/UL (ref 0–1.22)
MONOCYTES # BLD AUTO: 0.81 THOUSAND/ΜL (ref 0.17–1.22)
MONOCYTES # BLD AUTO: 0.84 THOUSAND/ΜL (ref 0.17–1.22)
MONOCYTES # BLD AUTO: 0.92 THOUSAND/ΜL (ref 0.17–1.22)
MONOCYTES # BLD AUTO: 0.97 THOUSAND/ΜL (ref 0.17–1.22)
MONOCYTES # BLD AUTO: 1.56 THOUSAND/ΜL (ref 0.17–1.22)
MONOCYTES # BLD AUTO: 534 CELLS/UL (ref 200–950)
MONOCYTES # BLD AUTO: 887 CELLS/UL (ref 200–950)
MONOCYTES # BLD AUTO: 975 CELLS/UL (ref 200–950)
MONOCYTES # BLD AUTO: 979 CELLS/UL (ref 200–950)
MONOCYTES NFR BLD AUTO: 10 % (ref 4–12)
MONOCYTES NFR BLD AUTO: 10.2 %
MONOCYTES NFR BLD AUTO: 13 %
MONOCYTES NFR BLD AUTO: 14 % (ref 4–12)
MONOCYTES NFR BLD AUTO: 15 % (ref 4–12)
MONOCYTES NFR BLD AUTO: 16 % (ref 4–12)
MONOCYTES NFR BLD AUTO: 17.8 %
MONOCYTES NFR BLD AUTO: 19 % (ref 4–12)
MONOCYTES NFR BLD AUTO: 6 % (ref 4–12)
MONOCYTES NFR BLD AUTO: 7 % (ref 4–12)
MONOCYTES NFR BLD AUTO: 8 % (ref 4–12)
MONOCYTES NFR BLD AUTO: 8 % (ref 4–12)
MONOCYTES NFR BLD AUTO: 8.9 %
MONOCYTES NFR BLD AUTO: 9 % (ref 4–12)
MONOCYTES NFR BLD: 9 % (ref 4–12)
NEUTROPHILS # BLD AUTO: 1.26 THOUSANDS/ΜL (ref 1.85–7.62)
NEUTROPHILS # BLD AUTO: 1.34 THOUSANDS/ΜL (ref 1.85–7.62)
NEUTROPHILS # BLD AUTO: 1.83 THOUSANDS/ΜL (ref 1.85–7.62)
NEUTROPHILS # BLD AUTO: 2.27 THOUSANDS/ΜL (ref 1.85–7.62)
NEUTROPHILS # BLD AUTO: 2.77 THOUSANDS/ΜL (ref 1.85–7.62)
NEUTROPHILS # BLD AUTO: 2982 CELLS/UL (ref 1500–7800)
NEUTROPHILS # BLD AUTO: 3.69 THOUSANDS/ΜL (ref 1.85–7.62)
NEUTROPHILS # BLD AUTO: 3.8 THOUSANDS/ΜL (ref 1.85–7.62)
NEUTROPHILS # BLD AUTO: 3284 CELLS/UL (ref 1500–7800)
NEUTROPHILS # BLD AUTO: 4035 CELLS/UL (ref 1500–7800)
NEUTROPHILS # BLD AUTO: 4689 CELLS/UL (ref 1500–7800)
NEUTROPHILS # BLD AUTO: 6.35 THOUSANDS/ΜL (ref 1.85–7.62)
NEUTROPHILS # BLD AUTO: 8.47 THOUSANDS/ΜL (ref 1.85–7.62)
NEUTROPHILS # BLD MANUAL: 1.49 THOUSAND/UL (ref 1.85–7.62)
NEUTROPHILS NFR BLD AUTO: 49.7 %
NEUTROPHILS NFR BLD AUTO: 53.8 %
NEUTROPHILS NFR BLD AUTO: 53.9 %
NEUTROPHILS NFR BLD AUTO: 59.7 %
NEUTS BAND NFR BLD MANUAL: 1 % (ref 0–8)
NEUTS BAND NFR BLD MANUAL: 1 % (ref 0–8)
NEUTS SEG # BLD: 3.36 THOUSAND/UL (ref 1.81–6.82)
NEUTS SEG NFR BLD AUTO: 54 % (ref 43–75)
NEUTS SEG NFR BLD AUTO: 57 % (ref 43–75)
NEUTS SEG NFR BLD AUTO: 61 % (ref 43–75)
NEUTS SEG NFR BLD AUTO: 64 % (ref 43–75)
NEUTS SEG NFR BLD AUTO: 65 % (ref 43–75)
NEUTS SEG NFR BLD AUTO: 66 % (ref 43–75)
NEUTS SEG NFR BLD AUTO: 66 % (ref 43–75)
NEUTS SEG NFR BLD AUTO: 74 % (ref 43–75)
NEUTS SEG NFR BLD AUTO: 78 % (ref 43–75)
NEUTS SEG NFR BLD AUTO: 80 % (ref 43–75)
NEUTS SEG NFR BLD AUTO: 84 % (ref 43–75)
NITRITE UR QL STRIP: NEGATIVE
NITRITE UR QL STRIP: NEGATIVE
NITRITE UR QL STRIP: POSITIVE
NON-SQ EPI CELLS URNS QL MICRO: ABNORMAL /HPF
NON-SQ EPI CELLS URNS QL MICRO: ABNORMAL /HPF
NRBC BLD AUTO-RTO: 0 /100 WBCS
P AXIS: 47 DEGREES
P AXIS: 76 DEGREES
P AXIS: 80 DEGREES
PCO2 BLD: 26 MMOL/L (ref 21–32)
PH UR STRIP.AUTO: 5.5 [PH] (ref 4.5–8)
PH UR STRIP.AUTO: 5.5 [PH] (ref 4.5–8)
PH UR STRIP.AUTO: 6 [PH] (ref 4.5–8)
PHOSPHATE SERPL-MCNC: 2.3 MG/DL (ref 2.3–4.1)
PLATELET # BLD AUTO: 100 THOUSANDS/UL (ref 149–390)
PLATELET # BLD AUTO: 103 THOUSANDS/UL (ref 149–390)
PLATELET # BLD AUTO: 107 THOUSANDS/UL (ref 149–390)
PLATELET # BLD AUTO: 120 THOUSANDS/UL (ref 149–390)
PLATELET # BLD AUTO: 132 THOUSANDS/UL (ref 149–390)
PLATELET # BLD AUTO: 146 THOUSANDS/UL (ref 149–390)
PLATELET # BLD AUTO: 152 THOUSANDS/UL (ref 149–390)
PLATELET # BLD AUTO: 156 THOUSANDS/UL (ref 149–390)
PLATELET # BLD AUTO: 160 THOUSANDS/UL (ref 149–390)
PLATELET # BLD AUTO: 173 THOUSANDS/UL (ref 149–390)
PLATELET # BLD AUTO: 176 THOUSANDS/UL (ref 149–390)
PLATELET # BLD AUTO: 192 THOUSAND/UL (ref 140–400)
PLATELET # BLD AUTO: 195 THOUSAND/UL (ref 140–400)
PLATELET # BLD AUTO: 196 THOUSAND/UL (ref 140–400)
PLATELET # BLD AUTO: 210 THOUSAND/UL (ref 140–400)
PLATELET # BLD AUTO: 89 THOUSANDS/UL (ref 149–390)
PLATELET # BLD AUTO: 93 THOUSANDS/UL (ref 149–390)
PLATELET # BLD AUTO: 94 THOUSANDS/UL (ref 149–390)
PLATELET BLD QL SMEAR: ABNORMAL
PLATELET BLD QL SMEAR: ADEQUATE
PMV BLD AUTO: 10.8 FL (ref 8.9–12.7)
PMV BLD AUTO: 11.1 FL (ref 8.9–12.7)
PMV BLD AUTO: 11.2 FL (ref 8.9–12.7)
PMV BLD AUTO: 11.3 FL (ref 8.9–12.7)
PMV BLD AUTO: 11.5 FL (ref 8.9–12.7)
PMV BLD AUTO: 11.5 FL (ref 8.9–12.7)
PMV BLD AUTO: 11.6 FL (ref 8.9–12.7)
PMV BLD AUTO: 11.7 FL (ref 8.9–12.7)
PMV BLD AUTO: 12 FL (ref 8.9–12.7)
PMV BLD AUTO: 8.2 FL (ref 8.9–12.7)
PMV BLD REES-ECKER: 10.6 FL (ref 7.5–12.5)
PMV BLD REES-ECKER: 11 FL (ref 7.5–12.5)
PMV BLD REES-ECKER: 11.2 FL (ref 7.5–12.5)
PMV BLD REES-ECKER: 11.5 FL (ref 7.5–12.5)
POTASSIUM BLD-SCNC: 4.2 MMOL/L (ref 3.5–5.3)
POTASSIUM SERPL-SCNC: 3.7 MMOL/L (ref 3.5–5.3)
POTASSIUM SERPL-SCNC: 3.7 MMOL/L (ref 3.5–5.3)
POTASSIUM SERPL-SCNC: 4 MMOL/L (ref 3.5–5.3)
POTASSIUM SERPL-SCNC: 4.1 MMOL/L (ref 3.5–5.3)
POTASSIUM SERPL-SCNC: 4.1 MMOL/L (ref 3.5–5.3)
POTASSIUM SERPL-SCNC: 4.2 MMOL/L (ref 3.5–5.3)
POTASSIUM SERPL-SCNC: 4.3 MMOL/L (ref 3.5–5.3)
POTASSIUM SERPL-SCNC: 4.4 MMOL/L (ref 3.5–5.3)
POTASSIUM SERPL-SCNC: 4.5 MMOL/L (ref 3.5–5.3)
POTASSIUM SERPL-SCNC: 4.5 MMOL/L (ref 3.5–5.3)
POTASSIUM SERPL-SCNC: 4.7 MMOL/L (ref 3.5–5.3)
POTASSIUM SERPL-SCNC: 4.7 MMOL/L (ref 3.5–5.3)
PR INTERVAL: 112 MS
PR INTERVAL: 124 MS
PR INTERVAL: 124 MS
PROCALCITONIN SERPL-MCNC: 0.14 NG/ML
PROCALCITONIN SERPL-MCNC: 0.48 NG/ML
PROCALCITONIN SERPL-MCNC: 0.51 NG/ML
PROCALCITONIN SERPL-MCNC: 0.54 NG/ML
PROT SERPL-MCNC: 5.5 G/DL (ref 6.4–8.2)
PROT SERPL-MCNC: 5.6 G/DL (ref 6.4–8.2)
PROT SERPL-MCNC: 6.1 G/DL (ref 6.4–8.2)
PROT SERPL-MCNC: 6.2 G/DL (ref 6.1–8.1)
PROT SERPL-MCNC: 6.2 G/DL (ref 6.1–8.1)
PROT SERPL-MCNC: 6.3 G/DL (ref 6.1–8.1)
PROT SERPL-MCNC: 6.5 G/DL (ref 6.1–8.1)
PROT SERPL-MCNC: 6.7 G/DL (ref 6.4–8.2)
PROT SERPL-MCNC: 6.8 G/DL (ref 6.4–8.2)
PROT SERPL-MCNC: 7 G/DL (ref 6.4–8.2)
PROT UR STRIP-MCNC: ABNORMAL MG/DL
PROT UR STRIP-MCNC: ABNORMAL MG/DL
PROT UR STRIP-MCNC: NEGATIVE MG/DL
PROTHROMBIN TIME: 13.2 SECONDS (ref 11.8–14.2)
PROTHROMBIN TIME: 13.8 SECONDS (ref 11.8–14.2)
PROTOCOL NAME: NORMAL
QRS AXIS: 53 DEGREES
QRS AXIS: 56 DEGREES
QRS AXIS: 68 DEGREES
QRS AXIS: NORMAL DEGREES
QRSD INTERVAL: 82 MS
QRSD INTERVAL: 84 MS
QRSD INTERVAL: 86 MS
QT INTERVAL: 344 MS
QT INTERVAL: 346 MS
QT INTERVAL: 374 MS
QTC INTERVAL: 447 MS
QTC INTERVAL: 448 MS
QTC INTERVAL: 474 MS
RBC # BLD AUTO: 2.77 MILLION/UL (ref 3.81–5.12)
RBC # BLD AUTO: 2.88 MILLION/UL (ref 3.81–5.12)
RBC # BLD AUTO: 2.91 MILLION/UL (ref 3.81–5.12)
RBC # BLD AUTO: 2.92 MILLION/UL (ref 3.81–5.12)
RBC # BLD AUTO: 2.94 MILLION/UL (ref 3.81–5.12)
RBC # BLD AUTO: 3 MILLION/UL (ref 3.81–5.12)
RBC # BLD AUTO: 3.14 MILLION/UL (ref 3.81–5.12)
RBC # BLD AUTO: 3.17 MILLION/UL (ref 3.81–5.12)
RBC # BLD AUTO: 3.19 MILLION/UL (ref 3.81–5.12)
RBC # BLD AUTO: 3.24 MILLION/UL (ref 3.81–5.12)
RBC # BLD AUTO: 3.28 MILLION/UL (ref 3.81–5.12)
RBC # BLD AUTO: 3.42 MILLION/UL (ref 3.81–5.12)
RBC # BLD AUTO: 3.44 MILLION/UL (ref 3.8–5.1)
RBC # BLD AUTO: 3.57 MILLION/UL (ref 3.8–5.1)
RBC # BLD AUTO: 3.58 MILLION/UL (ref 3.81–5.12)
RBC # BLD AUTO: 3.68 MILLION/UL (ref 3.8–5.1)
RBC # BLD AUTO: 4.02 MILLION/UL (ref 3.8–5.1)
RBC #/AREA URNS AUTO: ABNORMAL /HPF
RBC #/AREA URNS AUTO: ABNORMAL /HPF
RBC MORPH BLD: PRESENT
REASON FOR TERMINATION: NORMAL
RSV B RNA SPEC QL NAA+PROBE: NORMAL
RSV B RNA SPEC QL NAA+PROBE: NORMAL
SALMONELLA DNA SPEC QL NAA+PROBE: NORMAL
SHIGA TOXIN STX GENE SPEC NAA+PROBE: NORMAL
SHIGELLA DNA SPEC QL NAA+PROBE: NORMAL
SL AMB EGFR AFRICAN AMERICAN: 28 ML/MIN/1.73M2
SL AMB EGFR AFRICAN AMERICAN: 41 ML/MIN/1.73M2
SL AMB EGFR AFRICAN AMERICAN: 42 ML/MIN/1.73M2
SL AMB EGFR AFRICAN AMERICAN: 48 ML/MIN/1.73M2
SL AMB EGFR NON AFRICAN AMERICAN: 24 ML/MIN/1.73M2
SL AMB EGFR NON AFRICAN AMERICAN: 35 ML/MIN/1.73M2
SL AMB EGFR NON AFRICAN AMERICAN: 36 ML/MIN/1.73M2
SL AMB EGFR NON AFRICAN AMERICAN: 41 ML/MIN/1.73M2
SODIUM BLD-SCNC: 140 MMOL/L (ref 136–145)
SODIUM SERPL-SCNC: 134 MMOL/L (ref 136–145)
SODIUM SERPL-SCNC: 137 MMOL/L (ref 136–145)
SODIUM SERPL-SCNC: 139 MMOL/L (ref 136–145)
SODIUM SERPL-SCNC: 139 MMOL/L (ref 136–145)
SODIUM SERPL-SCNC: 140 MMOL/L (ref 135–146)
SODIUM SERPL-SCNC: 140 MMOL/L (ref 136–145)
SODIUM SERPL-SCNC: 141 MMOL/L (ref 135–146)
SODIUM SERPL-SCNC: 141 MMOL/L (ref 136–145)
SODIUM SERPL-SCNC: 142 MMOL/L (ref 136–145)
SODIUM SERPL-SCNC: 143 MMOL/L (ref 135–146)
SODIUM SERPL-SCNC: 143 MMOL/L (ref 135–146)
SODIUM SERPL-SCNC: 143 MMOL/L (ref 136–145)
SODIUM SERPL-SCNC: 143 MMOL/L (ref 136–145)
SODIUM SERPL-SCNC: 144 MMOL/L (ref 136–145)
SP GR UR STRIP.AUTO: 1.01 (ref 1–1.03)
SP GR UR STRIP.AUTO: 1.02 (ref 1–1.03)
SP GR UR STRIP.AUTO: >=1.03 (ref 1–1.03)
SPECIMEN SOURCE: ABNORMAL
T WAVE AXIS: 65 DEGREES
T WAVE AXIS: 67 DEGREES
T WAVE AXIS: 74 DEGREES
T4 FREE SERPL-MCNC: 1.1 NG/DL (ref 0.8–1.8)
T4 FREE SERPL-MCNC: 1.9 NG/DL (ref 0.8–1.8)
TARGET HR FORMULA: NORMAL
TEST INDICATION: NORMAL
TIME IN EXERCISE PHASE: NORMAL
TOTAL CELLS COUNTED SPEC: 100
TOTAL CELLS COUNTED SPEC: 100
TSH SERPL DL<=0.05 MIU/L-ACNC: 0.93 UIU/ML (ref 0.36–3.74)
TSH SERPL DL<=0.05 MIU/L-ACNC: 1.62 UIU/ML (ref 0.36–3.74)
TSH SERPL-ACNC: 0.05 MIU/L (ref 0.4–4.5)
TSH SERPL-ACNC: 4.87 MIU/L (ref 0.4–4.5)
URINE COMMENT: ABNORMAL
UROBILINOGEN UR QL STRIP.AUTO: 0.2 E.U./DL
VENTRICULAR RATE: 101 BPM
VENTRICULAR RATE: 114 BPM
VENTRICULAR RATE: 86 BPM
WBC # BLD AUTO: 1.66 THOUSAND/UL (ref 4.31–10.16)
WBC # BLD AUTO: 1.68 THOUSAND/UL (ref 4.31–10.16)
WBC # BLD AUTO: 1.88 THOUSAND/UL (ref 4.31–10.16)
WBC # BLD AUTO: 1.89 THOUSAND/UL (ref 4.31–10.16)
WBC # BLD AUTO: 11.32 THOUSAND/UL (ref 4.31–10.16)
WBC # BLD AUTO: 2.16 THOUSAND/UL (ref 4.31–10.16)
WBC # BLD AUTO: 2.71 THOUSAND/UL (ref 4.31–10.16)
WBC # BLD AUTO: 2.77 THOUSAND/UL (ref 4.31–10.16)
WBC # BLD AUTO: 5.12 THOUSAND/UL (ref 4.31–10.16)
WBC # BLD AUTO: 5.5 THOUSAND/UL (ref 3.8–10.8)
WBC # BLD AUTO: 5.75 THOUSAND/UL (ref 4.31–10.16)
WBC # BLD AUTO: 5.77 THOUSAND/UL (ref 4.31–10.16)
WBC # BLD AUTO: 5.8 THOUSAND/UL (ref 4.31–10.16)
WBC # BLD AUTO: 6 THOUSAND/UL (ref 3.8–10.8)
WBC # BLD AUTO: 7.5 THOUSAND/UL (ref 3.8–10.8)
WBC # BLD AUTO: 7.93 THOUSAND/UL (ref 4.31–10.16)
WBC # BLD AUTO: 8.7 THOUSAND/UL (ref 3.8–10.8)
WBC #/AREA URNS AUTO: ABNORMAL /HPF
WBC #/AREA URNS AUTO: ABNORMAL /HPF
WBC CASTS URNS QL MICRO: ABNORMAL /LPF
WBC CLUMPS # UR AUTO: PRESENT /UL
WBC NRBC COR # BLD: 5.8 THOUSAND/UL (ref 4.31–10.16)

## 2018-01-01 PROCEDURE — 85025 COMPLETE CBC W/AUTO DIFF WBC: CPT | Performed by: INTERNAL MEDICINE

## 2018-01-01 PROCEDURE — 99221 1ST HOSP IP/OBS SF/LOW 40: CPT | Performed by: INTERNAL MEDICINE

## 2018-01-01 PROCEDURE — 81001 URINALYSIS AUTO W/SCOPE: CPT | Performed by: PHYSICIAN ASSISTANT

## 2018-01-01 PROCEDURE — 93005 ELECTROCARDIOGRAM TRACING: CPT

## 2018-01-01 PROCEDURE — 77386 HB NTSTY MODUL RAD TX DLVR CPLX: CPT | Performed by: RADIOLOGY

## 2018-01-01 PROCEDURE — 93010 ELECTROCARDIOGRAM REPORT: CPT | Performed by: INTERNAL MEDICINE

## 2018-01-01 PROCEDURE — 99238 HOSP IP/OBS DSCHRG MGMT 30/<: CPT | Performed by: INTERNAL MEDICINE

## 2018-01-01 PROCEDURE — 77334 RADIATION TREATMENT AID(S): CPT | Performed by: RADIOLOGY

## 2018-01-01 PROCEDURE — 99222 1ST HOSP IP/OBS MODERATE 55: CPT | Performed by: INTERNAL MEDICINE

## 2018-01-01 PROCEDURE — A9502 TC99M TETROFOSMIN: HCPCS

## 2018-01-01 PROCEDURE — 83605 ASSAY OF LACTIC ACID: CPT | Performed by: INTERNAL MEDICINE

## 2018-01-01 PROCEDURE — 93017 CV STRESS TEST TRACING ONLY: CPT

## 2018-01-01 PROCEDURE — G8979 MOBILITY GOAL STATUS: HCPCS

## 2018-01-01 PROCEDURE — 88342 IMHCHEM/IMCYTCHM 1ST ANTB: CPT | Performed by: PATHOLOGY

## 2018-01-01 PROCEDURE — 96413 CHEMO IV INFUSION 1 HR: CPT

## 2018-01-01 PROCEDURE — 85025 COMPLETE CBC W/AUTO DIFF WBC: CPT | Performed by: PHYSICIAN ASSISTANT

## 2018-01-01 PROCEDURE — 85027 COMPLETE CBC AUTOMATED: CPT

## 2018-01-01 PROCEDURE — 99233 SBSQ HOSP IP/OBS HIGH 50: CPT | Performed by: INTERNAL MEDICINE

## 2018-01-01 PROCEDURE — 99222 1ST HOSP IP/OBS MODERATE 55: CPT | Performed by: PHYSICIAN ASSISTANT

## 2018-01-01 PROCEDURE — 77300 RADIATION THERAPY DOSE PLAN: CPT | Performed by: RADIOLOGY

## 2018-01-01 PROCEDURE — 84145 PROCALCITONIN (PCT): CPT | Performed by: INTERNAL MEDICINE

## 2018-01-01 PROCEDURE — 93000 ELECTROCARDIOGRAM COMPLETE: CPT | Performed by: INTERNAL MEDICINE

## 2018-01-01 PROCEDURE — 77338 DESIGN MLC DEVICE FOR IMRT: CPT | Performed by: RADIOLOGY

## 2018-01-01 PROCEDURE — 99213 OFFICE O/P EST LOW 20 MIN: CPT | Performed by: THORACIC SURGERY (CARDIOTHORACIC VASCULAR SURGERY)

## 2018-01-01 PROCEDURE — G0009 ADMIN PNEUMOCOCCAL VACCINE: HCPCS

## 2018-01-01 PROCEDURE — 99285 EMERGENCY DEPT VISIT HI MDM: CPT

## 2018-01-01 PROCEDURE — C9492 INJECTION, DURVALUMAB: HCPCS | Performed by: INTERNAL MEDICINE

## 2018-01-01 PROCEDURE — 94760 N-INVAS EAR/PLS OXIMETRY 1: CPT

## 2018-01-01 PROCEDURE — 99214 OFFICE O/P EST MOD 30 MIN: CPT | Performed by: INTERNAL MEDICINE

## 2018-01-01 PROCEDURE — 71046 X-RAY EXAM CHEST 2 VIEWS: CPT

## 2018-01-01 PROCEDURE — 99204 OFFICE O/P NEW MOD 45 MIN: CPT | Performed by: INTERNAL MEDICINE

## 2018-01-01 PROCEDURE — 99232 SBSQ HOSP IP/OBS MODERATE 35: CPT | Performed by: INTERNAL MEDICINE

## 2018-01-01 PROCEDURE — 84145 PROCALCITONIN (PCT): CPT | Performed by: EMERGENCY MEDICINE

## 2018-01-01 PROCEDURE — 80048 BASIC METABOLIC PNL TOTAL CA: CPT | Performed by: INTERNAL MEDICINE

## 2018-01-01 PROCEDURE — 85610 PROTHROMBIN TIME: CPT | Performed by: EMERGENCY MEDICINE

## 2018-01-01 PROCEDURE — 74176 CT ABD & PELVIS W/O CONTRAST: CPT

## 2018-01-01 PROCEDURE — 96374 THER/PROPH/DIAG INJ IV PUSH: CPT

## 2018-01-01 PROCEDURE — 77417 THER RADIOLOGY PORT IMAGE(S): CPT | Performed by: RADIOLOGY

## 2018-01-01 PROCEDURE — 94640 AIRWAY INHALATION TREATMENT: CPT

## 2018-01-01 PROCEDURE — G8988 SELF CARE GOAL STATUS: HCPCS

## 2018-01-01 PROCEDURE — 81001 URINALYSIS AUTO W/SCOPE: CPT | Performed by: INTERNAL MEDICINE

## 2018-01-01 PROCEDURE — 96365 THER/PROPH/DIAG IV INF INIT: CPT

## 2018-01-01 PROCEDURE — 80053 COMPREHEN METABOLIC PANEL: CPT | Performed by: EMERGENCY MEDICINE

## 2018-01-01 PROCEDURE — 83735 ASSAY OF MAGNESIUM: CPT | Performed by: INTERNAL MEDICINE

## 2018-01-01 PROCEDURE — 96367 TX/PROPH/DG ADDL SEQ IV INF: CPT

## 2018-01-01 PROCEDURE — 88305 TISSUE EXAM BY PATHOLOGIST: CPT | Performed by: PATHOLOGY

## 2018-01-01 PROCEDURE — 77336 RADIATION PHYSICS CONSULT: CPT | Performed by: RADIOLOGY

## 2018-01-01 PROCEDURE — 83605 ASSAY OF LACTIC ACID: CPT | Performed by: PHYSICIAN ASSISTANT

## 2018-01-01 PROCEDURE — 84443 ASSAY THYROID STIM HORMONE: CPT

## 2018-01-01 PROCEDURE — 87186 SC STD MICRODIL/AGAR DIL: CPT | Performed by: PHYSICIAN ASSISTANT

## 2018-01-01 PROCEDURE — 80053 COMPREHEN METABOLIC PANEL: CPT

## 2018-01-01 PROCEDURE — 87493 C DIFF AMPLIFIED PROBE: CPT | Performed by: INTERNAL MEDICINE

## 2018-01-01 PROCEDURE — 83605 ASSAY OF LACTIC ACID: CPT | Performed by: EMERGENCY MEDICINE

## 2018-01-01 PROCEDURE — 99205 OFFICE O/P NEW HI 60 MIN: CPT | Performed by: INTERNAL MEDICINE

## 2018-01-01 PROCEDURE — 36415 COLL VENOUS BLD VENIPUNCTURE: CPT | Performed by: EMERGENCY MEDICINE

## 2018-01-01 PROCEDURE — 77370 RADIATION PHYSICS CONSULT: CPT | Performed by: RADIOLOGY

## 2018-01-01 PROCEDURE — 96417 CHEMO IV INFUS EACH ADDL SEQ: CPT

## 2018-01-01 PROCEDURE — 99214 OFFICE O/P EST MOD 30 MIN: CPT | Performed by: RADIOLOGY

## 2018-01-01 PROCEDURE — 88172 CYTP DX EVAL FNA 1ST EA SITE: CPT | Performed by: PATHOLOGY

## 2018-01-01 PROCEDURE — 87631 RESP VIRUS 3-5 TARGETS: CPT | Performed by: INTERNAL MEDICINE

## 2018-01-01 PROCEDURE — 97163 PT EVAL HIGH COMPLEX 45 MIN: CPT

## 2018-01-01 PROCEDURE — 99215 OFFICE O/P EST HI 40 MIN: CPT | Performed by: RADIOLOGY

## 2018-01-01 PROCEDURE — 1111F DSCHRG MED/CURRENT MED MERGE: CPT | Performed by: INTERNAL MEDICINE

## 2018-01-01 PROCEDURE — 3008F BODY MASS INDEX DOCD: CPT | Performed by: INTERNAL MEDICINE

## 2018-01-01 PROCEDURE — 78452 HT MUSCLE IMAGE SPECT MULT: CPT

## 2018-01-01 PROCEDURE — 84443 ASSAY THYROID STIM HORMONE: CPT | Performed by: EMERGENCY MEDICINE

## 2018-01-01 PROCEDURE — 85027 COMPLETE CBC AUTOMATED: CPT | Performed by: INTERNAL MEDICINE

## 2018-01-01 PROCEDURE — 87077 CULTURE AEROBIC IDENTIFY: CPT | Performed by: PHYSICIAN ASSISTANT

## 2018-01-01 PROCEDURE — 88341 IMHCHEM/IMCYTCHM EA ADD ANTB: CPT | Performed by: PATHOLOGY

## 2018-01-01 PROCEDURE — 85007 BL SMEAR W/DIFF WBC COUNT: CPT | Performed by: INTERNAL MEDICINE

## 2018-01-01 PROCEDURE — 94664 DEMO&/EVAL PT USE INHALER: CPT

## 2018-01-01 PROCEDURE — 77470 SPECIAL RADIATION TREATMENT: CPT | Performed by: RADIOLOGY

## 2018-01-01 PROCEDURE — 88173 CYTOPATH EVAL FNA REPORT: CPT | Performed by: PATHOLOGY

## 2018-01-01 PROCEDURE — 97166 OT EVAL MOD COMPLEX 45 MIN: CPT

## 2018-01-01 PROCEDURE — 85025 COMPLETE CBC W/AUTO DIFF WBC: CPT | Performed by: EMERGENCY MEDICINE

## 2018-01-01 PROCEDURE — 99211 OFF/OP EST MAY X REQ PHY/QHP: CPT | Performed by: THORACIC SURGERY (CARDIOTHORACIC VASCULAR SURGERY)

## 2018-01-01 PROCEDURE — 80053 COMPREHEN METABOLIC PANEL: CPT | Performed by: INTERNAL MEDICINE

## 2018-01-01 PROCEDURE — 90670 PCV13 VACCINE IM: CPT

## 2018-01-01 PROCEDURE — 87505 NFCT AGENT DETECTION GI: CPT | Performed by: INTERNAL MEDICINE

## 2018-01-01 PROCEDURE — 78815 PET IMAGE W/CT SKULL-THIGH: CPT

## 2018-01-01 PROCEDURE — 87040 BLOOD CULTURE FOR BACTERIA: CPT | Performed by: EMERGENCY MEDICINE

## 2018-01-01 PROCEDURE — 85610 PROTHROMBIN TIME: CPT | Performed by: PHYSICIAN ASSISTANT

## 2018-01-01 PROCEDURE — 96360 HYDRATION IV INFUSION INIT: CPT

## 2018-01-01 PROCEDURE — G8987 SELF CARE CURRENT STATUS: HCPCS

## 2018-01-01 PROCEDURE — 76770 US EXAM ABDO BACK WALL COMP: CPT

## 2018-01-01 PROCEDURE — 43238 EGD US FINE NEEDLE BX/ASPIR: CPT | Performed by: INTERNAL MEDICINE

## 2018-01-01 PROCEDURE — 85730 THROMBOPLASTIN TIME PARTIAL: CPT | Performed by: EMERGENCY MEDICINE

## 2018-01-01 PROCEDURE — 77301 RADIOTHERAPY DOSE PLAN IMRT: CPT | Performed by: RADIOLOGY

## 2018-01-01 PROCEDURE — 99495 TRANSJ CARE MGMT MOD F2F 14D: CPT | Performed by: INTERNAL MEDICINE

## 2018-01-01 PROCEDURE — 87631 RESP VIRUS 3-5 TARGETS: CPT | Performed by: PHYSICIAN ASSISTANT

## 2018-01-01 PROCEDURE — 84100 ASSAY OF PHOSPHORUS: CPT | Performed by: INTERNAL MEDICINE

## 2018-01-01 PROCEDURE — 85014 HEMATOCRIT: CPT

## 2018-01-01 PROCEDURE — 87086 URINE CULTURE/COLONY COUNT: CPT | Performed by: PHYSICIAN ASSISTANT

## 2018-01-01 PROCEDURE — 36415 COLL VENOUS BLD VENIPUNCTURE: CPT | Performed by: PHYSICIAN ASSISTANT

## 2018-01-01 PROCEDURE — 83690 ASSAY OF LIPASE: CPT | Performed by: EMERGENCY MEDICINE

## 2018-01-01 PROCEDURE — 85049 AUTOMATED PLATELET COUNT: CPT | Performed by: PHYSICIAN ASSISTANT

## 2018-01-01 PROCEDURE — 87040 BLOOD CULTURE FOR BACTERIA: CPT | Performed by: INTERNAL MEDICINE

## 2018-01-01 PROCEDURE — 81003 URINALYSIS AUTO W/O SCOPE: CPT | Performed by: EMERGENCY MEDICINE

## 2018-01-01 PROCEDURE — 96361 HYDRATE IV INFUSION ADD-ON: CPT

## 2018-01-01 PROCEDURE — 96415 CHEMO IV INFUSION ADDL HR: CPT

## 2018-01-01 PROCEDURE — 80047 BASIC METABLC PNL IONIZED CA: CPT

## 2018-01-01 PROCEDURE — 85007 BL SMEAR W/DIFF WBC COUNT: CPT

## 2018-01-01 PROCEDURE — 84145 PROCALCITONIN (PCT): CPT | Performed by: PHYSICIAN ASSISTANT

## 2018-01-01 PROCEDURE — A9552 F18 FDG: HCPCS

## 2018-01-01 PROCEDURE — 99223 1ST HOSP IP/OBS HIGH 75: CPT | Performed by: INTERNAL MEDICINE

## 2018-01-01 PROCEDURE — 4040F PNEUMOC VAC/ADMIN/RCVD: CPT | Performed by: INTERNAL MEDICINE

## 2018-01-01 PROCEDURE — 99283 EMERGENCY DEPT VISIT LOW MDM: CPT

## 2018-01-01 PROCEDURE — 99239 HOSP IP/OBS DSCHRG MGMT >30: CPT | Performed by: INTERNAL MEDICINE

## 2018-01-01 PROCEDURE — 80048 BASIC METABOLIC PNL TOTAL CA: CPT | Performed by: PHYSICIAN ASSISTANT

## 2018-01-01 PROCEDURE — 80053 COMPREHEN METABOLIC PANEL: CPT | Performed by: PHYSICIAN ASSISTANT

## 2018-01-01 PROCEDURE — 85730 THROMBOPLASTIN TIME PARTIAL: CPT | Performed by: PHYSICIAN ASSISTANT

## 2018-01-01 PROCEDURE — 82948 REAGENT STRIP/BLOOD GLUCOSE: CPT

## 2018-01-01 PROCEDURE — 87040 BLOOD CULTURE FOR BACTERIA: CPT | Performed by: PHYSICIAN ASSISTANT

## 2018-01-01 PROCEDURE — G8978 MOBILITY CURRENT STATUS: HCPCS

## 2018-01-01 RX ORDER — SODIUM CHLORIDE 9 MG/ML
20 INJECTION, SOLUTION INTRAVENOUS CONTINUOUS
Status: DISPENSED | OUTPATIENT
Start: 2018-01-01 | End: 2018-01-01

## 2018-01-01 RX ORDER — GABAPENTIN 100 MG/1
200 CAPSULE ORAL 3 TIMES DAILY
Status: DISCONTINUED | OUTPATIENT
Start: 2018-01-01 | End: 2018-01-01 | Stop reason: HOSPADM

## 2018-01-01 RX ORDER — PREDNISOLONE ACETATE 10 MG/ML
1 SUSPENSION/ DROPS OPHTHALMIC 2 TIMES DAILY
Status: DISCONTINUED | OUTPATIENT
Start: 2018-01-01 | End: 2018-01-01

## 2018-01-01 RX ORDER — ONDANSETRON 2 MG/ML
4 INJECTION INTRAMUSCULAR; INTRAVENOUS EVERY 6 HOURS PRN
Status: DISCONTINUED | OUTPATIENT
Start: 2018-01-01 | End: 2018-01-01 | Stop reason: HOSPADM

## 2018-01-01 RX ORDER — SODIUM CHLORIDE 9 MG/ML
40 INJECTION, SOLUTION INTRAVENOUS ONCE
Status: COMPLETED | OUTPATIENT
Start: 2018-01-01 | End: 2018-01-01

## 2018-01-01 RX ORDER — DIPHENHYDRAMINE HCL 12.5MG/5ML
25 LIQUID (ML) ORAL EVERY 6 HOURS PRN
Status: DISCONTINUED | OUTPATIENT
Start: 2018-01-01 | End: 2019-01-01 | Stop reason: HOSPADM

## 2018-01-01 RX ORDER — CALCIUM CARBONATE 200(500)MG
1000 TABLET,CHEWABLE ORAL DAILY PRN
Status: DISCONTINUED | OUTPATIENT
Start: 2018-01-01 | End: 2019-01-01 | Stop reason: HOSPADM

## 2018-01-01 RX ORDER — METOPROLOL SUCCINATE 50 MG/1
50 TABLET, EXTENDED RELEASE ORAL 2 TIMES DAILY
Status: DISCONTINUED | OUTPATIENT
Start: 2018-01-01 | End: 2019-01-01 | Stop reason: HOSPADM

## 2018-01-01 RX ORDER — 0.9 % SODIUM CHLORIDE 0.9 %
3 VIAL (ML) INJECTION AS NEEDED
Status: DISCONTINUED | OUTPATIENT
Start: 2018-01-01 | End: 2019-01-01 | Stop reason: HOSPADM

## 2018-01-01 RX ORDER — GABAPENTIN 100 MG/1
CAPSULE ORAL
Qty: 30 CAPSULE | Refills: 11 | OUTPATIENT
Start: 2018-01-01

## 2018-01-01 RX ORDER — VANCOMYCIN HYDROCHLORIDE 1 G/200ML
15 INJECTION, SOLUTION INTRAVENOUS ONCE
Status: COMPLETED | OUTPATIENT
Start: 2018-01-01 | End: 2018-01-01

## 2018-01-01 RX ORDER — ASPIRIN 325 MG
325 TABLET, DELAYED RELEASE (ENTERIC COATED) ORAL DAILY
Status: DISCONTINUED | OUTPATIENT
Start: 2018-01-01 | End: 2018-01-01 | Stop reason: HOSPADM

## 2018-01-01 RX ORDER — HEPARIN SODIUM 5000 [USP'U]/ML
5000 INJECTION, SOLUTION INTRAVENOUS; SUBCUTANEOUS EVERY 8 HOURS SCHEDULED
Status: DISCONTINUED | OUTPATIENT
Start: 2018-01-01 | End: 2019-01-01 | Stop reason: HOSPADM

## 2018-01-01 RX ORDER — ATORVASTATIN CALCIUM 40 MG/1
40 TABLET, FILM COATED ORAL
Status: DISCONTINUED | OUTPATIENT
Start: 2018-01-01 | End: 2018-01-01

## 2018-01-01 RX ORDER — PREDNISONE 10 MG/1
TABLET ORAL
COMMUNITY
Start: 2018-01-01 | End: 2019-01-01 | Stop reason: DRUGHIGH

## 2018-01-01 RX ORDER — HYDROCODONE BITARTRATE AND ACETAMINOPHEN 5; 325 MG/1; MG/1
1 TABLET ORAL EVERY 6 HOURS PRN
Status: DISCONTINUED | OUTPATIENT
Start: 2018-01-01 | End: 2018-01-01 | Stop reason: HOSPADM

## 2018-01-01 RX ORDER — SODIUM CHLORIDE 9 MG/ML
75 INJECTION, SOLUTION INTRAVENOUS CONTINUOUS
Status: DISCONTINUED | OUTPATIENT
Start: 2018-01-01 | End: 2018-01-01 | Stop reason: HOSPADM

## 2018-01-01 RX ORDER — ACETAMINOPHEN 325 MG/1
650 TABLET ORAL EVERY 6 HOURS PRN
Status: DISCONTINUED | OUTPATIENT
Start: 2018-01-01 | End: 2018-01-01 | Stop reason: HOSPADM

## 2018-01-01 RX ORDER — LISINOPRIL 10 MG/1
10 TABLET ORAL DAILY
COMMUNITY
End: 2018-01-01 | Stop reason: HOSPADM

## 2018-01-01 RX ORDER — ATORVASTATIN CALCIUM 40 MG/1
40 TABLET, FILM COATED ORAL DAILY
Status: DISCONTINUED | OUTPATIENT
Start: 2018-01-01 | End: 2019-01-01 | Stop reason: HOSPADM

## 2018-01-01 RX ORDER — MAGNESIUM SULFATE HEPTAHYDRATE 40 MG/ML
2 INJECTION, SOLUTION INTRAVENOUS ONCE
Status: COMPLETED | OUTPATIENT
Start: 2018-01-01 | End: 2018-01-01

## 2018-01-01 RX ORDER — ATORVASTATIN CALCIUM 40 MG/1
40 TABLET, FILM COATED ORAL DAILY
Status: DISCONTINUED | OUTPATIENT
Start: 2018-01-01 | End: 2018-01-01 | Stop reason: HOSPADM

## 2018-01-01 RX ORDER — LISINOPRIL 10 MG/1
TABLET ORAL
COMMUNITY
Start: 2018-01-01 | End: 2018-01-01 | Stop reason: ALTCHOICE

## 2018-01-01 RX ORDER — HYDROCODONE BITARTRATE AND ACETAMINOPHEN 5; 325 MG/1; MG/1
1 TABLET ORAL ONCE
Status: COMPLETED | OUTPATIENT
Start: 2018-01-01 | End: 2018-01-01

## 2018-01-01 RX ORDER — LISINOPRIL 10 MG/1
10 TABLET ORAL DAILY
Qty: 90 TABLET | Refills: 1 | Status: SHIPPED | OUTPATIENT
Start: 2018-01-01 | End: 2018-01-01 | Stop reason: DRUGHIGH

## 2018-01-01 RX ORDER — SODIUM CHLORIDE 9 MG/ML
20 INJECTION, SOLUTION INTRAVENOUS CONTINUOUS
Status: DISCONTINUED | OUTPATIENT
Start: 2018-01-01 | End: 2018-01-01 | Stop reason: HOSPADM

## 2018-01-01 RX ORDER — SODIUM CHLORIDE 9 MG/ML
20 INJECTION, SOLUTION INTRAVENOUS ONCE
Status: COMPLETED | OUTPATIENT
Start: 2018-01-01 | End: 2018-01-01

## 2018-01-01 RX ORDER — ACETAMINOPHEN 325 MG/1
650 TABLET ORAL EVERY 6 HOURS PRN
Status: DISCONTINUED | OUTPATIENT
Start: 2018-01-01 | End: 2019-01-01 | Stop reason: HOSPADM

## 2018-01-01 RX ORDER — LEVOTHYROXINE SODIUM 0.1 MG/1
100 TABLET ORAL
Status: DISCONTINUED | OUTPATIENT
Start: 2018-01-01 | End: 2019-01-01 | Stop reason: HOSPADM

## 2018-01-01 RX ORDER — FAMOTIDINE 20 MG/1
20 TABLET, FILM COATED ORAL 2 TIMES DAILY
Status: DISCONTINUED | OUTPATIENT
Start: 2018-01-01 | End: 2018-01-01 | Stop reason: HOSPADM

## 2018-01-01 RX ORDER — LIDOCAINE 50 MG/G
1 PATCH TOPICAL ONCE
Status: COMPLETED | OUTPATIENT
Start: 2018-01-01 | End: 2018-01-01

## 2018-01-01 RX ORDER — METOPROLOL SUCCINATE 50 MG/1
50 TABLET, EXTENDED RELEASE ORAL 2 TIMES DAILY
Status: DISCONTINUED | OUTPATIENT
Start: 2018-01-01 | End: 2018-01-01 | Stop reason: HOSPADM

## 2018-01-01 RX ORDER — ALBUTEROL SULFATE 90 UG/1
2 AEROSOL, METERED RESPIRATORY (INHALATION) EVERY 4 HOURS PRN
Status: DISCONTINUED | OUTPATIENT
Start: 2018-01-01 | End: 2018-01-01 | Stop reason: HOSPADM

## 2018-01-01 RX ORDER — ACETAMINOPHEN 325 MG/1
650 TABLET ORAL ONCE
Status: COMPLETED | OUTPATIENT
Start: 2018-01-01 | End: 2018-01-01

## 2018-01-01 RX ORDER — SODIUM CHLORIDE 9 MG/ML
100 INJECTION, SOLUTION INTRAVENOUS CONTINUOUS
Status: DISCONTINUED | OUTPATIENT
Start: 2018-01-01 | End: 2018-01-01

## 2018-01-01 RX ORDER — ALBUTEROL SULFATE 90 UG/1
2 AEROSOL, METERED RESPIRATORY (INHALATION) EVERY 6 HOURS PRN
Qty: 18 G | Refills: 1 | Status: SHIPPED | OUTPATIENT
Start: 2018-01-01 | End: 2019-01-01 | Stop reason: HOSPADM

## 2018-01-01 RX ORDER — SODIUM CHLORIDE 9 MG/ML
50 INJECTION, SOLUTION INTRAVENOUS CONTINUOUS
Status: DISCONTINUED | OUTPATIENT
Start: 2018-01-01 | End: 2019-01-01

## 2018-01-01 RX ORDER — ASPIRIN 325 MG
325 TABLET, DELAYED RELEASE (ENTERIC COATED) ORAL DAILY
Status: DISCONTINUED | OUTPATIENT
Start: 2018-01-01 | End: 2019-01-01 | Stop reason: HOSPADM

## 2018-01-01 RX ORDER — ALBUTEROL SULFATE 90 UG/1
2 AEROSOL, METERED RESPIRATORY (INHALATION) EVERY 6 HOURS PRN
Status: DISCONTINUED | OUTPATIENT
Start: 2018-01-01 | End: 2018-01-01 | Stop reason: HOSPADM

## 2018-01-01 RX ORDER — DIPHENHYDRAMINE HCL 25 MG
25 TABLET ORAL EVERY 6 HOURS PRN
Status: DISCONTINUED | OUTPATIENT
Start: 2018-01-01 | End: 2018-01-01 | Stop reason: HOSPADM

## 2018-01-01 RX ORDER — INFLUENZA A VIRUSA/MICHIGAN/45/2015 X-275 (H1N1) ANTIGEN (FORMALDEHYDE INACTIVATED), INFLUENZA A VIRUS A/HONG KONG/4801/2014 X-263B (H3N2) ANTIGEN (FORMALDEHYDE INACTIVATED), AND INFLUENZA B VIRUS B/BRISBANE/60/2008 ANTIGEN (FORMALDEHYDE INACTIVATED) 60; 60; 60 UG/.5ML; UG/.5ML; UG/.5ML
INJECTION, SUSPENSION INTRAMUSCULAR
COMMUNITY
Start: 2018-01-01 | End: 2019-01-01 | Stop reason: HOSPADM

## 2018-01-01 RX ORDER — LEVOTHYROXINE SODIUM 0.1 MG/1
100 TABLET ORAL
Status: DISCONTINUED | OUTPATIENT
Start: 2018-01-01 | End: 2018-01-01 | Stop reason: HOSPADM

## 2018-01-01 RX ORDER — CHLORAL HYDRATE 500 MG
1000 CAPSULE ORAL DAILY
Status: DISCONTINUED | OUTPATIENT
Start: 2018-01-01 | End: 2019-01-01 | Stop reason: HOSPADM

## 2018-01-01 RX ORDER — PROPOFOL 10 MG/ML
INJECTION, EMULSION INTRAVENOUS CONTINUOUS PRN
Status: DISCONTINUED | OUTPATIENT
Start: 2018-01-01 | End: 2018-01-01 | Stop reason: SURG

## 2018-01-01 RX ORDER — METOPROLOL SUCCINATE 50 MG/1
50 TABLET, EXTENDED RELEASE ORAL 2 TIMES DAILY
Qty: 60 TABLET | Refills: 2 | Status: SHIPPED | OUTPATIENT
Start: 2018-01-01

## 2018-01-01 RX ORDER — VANCOMYCIN HYDROCHLORIDE 125 MG/1
125 CAPSULE ORAL 4 TIMES DAILY
Qty: 44 CAPSULE | Refills: 0 | Status: SHIPPED | OUTPATIENT
Start: 2018-01-01 | End: 2018-01-01

## 2018-01-01 RX ORDER — ONDANSETRON 2 MG/ML
4 INJECTION INTRAMUSCULAR; INTRAVENOUS EVERY 6 HOURS PRN
Status: DISCONTINUED | OUTPATIENT
Start: 2018-01-01 | End: 2019-01-01 | Stop reason: HOSPADM

## 2018-01-01 RX ORDER — GABAPENTIN 100 MG/1
200 CAPSULE ORAL 3 TIMES DAILY
COMMUNITY
End: 2018-01-01 | Stop reason: SDUPTHER

## 2018-01-01 RX ORDER — PROPOFOL 10 MG/ML
INJECTION, EMULSION INTRAVENOUS AS NEEDED
Status: DISCONTINUED | OUTPATIENT
Start: 2018-01-01 | End: 2018-01-01 | Stop reason: SURG

## 2018-01-01 RX ORDER — SODIUM CHLORIDE 9 MG/ML
125 INJECTION, SOLUTION INTRAVENOUS CONTINUOUS
Status: DISCONTINUED | OUTPATIENT
Start: 2018-01-01 | End: 2018-01-01 | Stop reason: HOSPADM

## 2018-01-01 RX ORDER — ALBUTEROL SULFATE 90 UG/1
2 AEROSOL, METERED RESPIRATORY (INHALATION) EVERY 6 HOURS PRN
Status: DISCONTINUED | OUTPATIENT
Start: 2018-01-01 | End: 2019-01-01 | Stop reason: HOSPADM

## 2018-01-01 RX ORDER — HEPARIN SODIUM 5000 [USP'U]/ML
5000 INJECTION, SOLUTION INTRAVENOUS; SUBCUTANEOUS EVERY 8 HOURS SCHEDULED
Status: DISCONTINUED | OUTPATIENT
Start: 2018-01-01 | End: 2018-01-01 | Stop reason: HOSPADM

## 2018-01-01 RX ORDER — METOPROLOL SUCCINATE 50 MG/1
50 TABLET, EXTENDED RELEASE ORAL ONCE
Status: COMPLETED | OUTPATIENT
Start: 2018-01-01 | End: 2018-01-01

## 2018-01-01 RX ORDER — SODIUM CHLORIDE 9 MG/ML
75 INJECTION, SOLUTION INTRAVENOUS CONTINUOUS
Status: DISCONTINUED | OUTPATIENT
Start: 2018-01-01 | End: 2018-01-01

## 2018-01-01 RX ORDER — GABAPENTIN 100 MG/1
200 CAPSULE ORAL 3 TIMES DAILY
Qty: 180 CAPSULE | Refills: 0 | Status: SHIPPED | OUTPATIENT
Start: 2018-01-01 | End: 2018-01-01 | Stop reason: SDUPTHER

## 2018-01-01 RX ORDER — LEVOTHYROXINE SODIUM 0.1 MG/1
100 TABLET ORAL DAILY
Status: DISCONTINUED | OUTPATIENT
Start: 2018-01-01 | End: 2018-01-01 | Stop reason: HOSPADM

## 2018-01-01 RX ORDER — GABAPENTIN 100 MG/1
200 CAPSULE ORAL 3 TIMES DAILY
Qty: 180 CAPSULE | Refills: 0 | Status: SHIPPED | OUTPATIENT
Start: 2018-01-01 | End: 2019-01-01 | Stop reason: SDUPTHER

## 2018-01-01 RX ORDER — METRONIDAZOLE 500 MG/1
500 TABLET ORAL 3 TIMES DAILY
Qty: 36 TABLET | Refills: 0 | Status: SHIPPED | OUTPATIENT
Start: 2018-01-01 | End: 2018-01-01

## 2018-01-01 RX ORDER — GABAPENTIN 100 MG/1
200 CAPSULE ORAL 3 TIMES DAILY
Status: DISCONTINUED | OUTPATIENT
Start: 2018-01-01 | End: 2019-01-01 | Stop reason: HOSPADM

## 2018-01-01 RX ORDER — SENNOSIDES 8.6 MG
1 TABLET ORAL DAILY
Status: DISCONTINUED | OUTPATIENT
Start: 2018-01-01 | End: 2019-01-01 | Stop reason: HOSPADM

## 2018-01-01 RX ORDER — LISINOPRIL 10 MG/1
10 TABLET ORAL DAILY
Status: DISCONTINUED | OUTPATIENT
Start: 2018-01-01 | End: 2018-01-01 | Stop reason: HOSPADM

## 2018-01-01 RX ORDER — VANCOMYCIN HYDROCHLORIDE 125 MG/1
CAPSULE ORAL
COMMUNITY
Start: 2018-01-01 | End: 2018-01-01 | Stop reason: ALTCHOICE

## 2018-01-01 RX ORDER — ALBUTEROL SULFATE 90 UG/1
2 AEROSOL, METERED RESPIRATORY (INHALATION) EVERY 6 HOURS PRN
Status: DISCONTINUED | OUTPATIENT
Start: 2018-01-01 | End: 2018-01-01

## 2018-01-01 RX ADMIN — LEVOTHYROXINE SODIUM 100 MCG: 100 TABLET ORAL at 06:44

## 2018-01-01 RX ADMIN — HYDROCODONE BITARTRATE AND ACETAMINOPHEN 1 TABLET: 5; 325 TABLET ORAL at 13:14

## 2018-01-01 RX ADMIN — HEPARIN SODIUM 5000 UNITS: 5000 INJECTION INTRAVENOUS; SUBCUTANEOUS at 21:21

## 2018-01-01 RX ADMIN — METOPROLOL SUCCINATE 50 MG: 50 TABLET, EXTENDED RELEASE ORAL at 08:48

## 2018-01-01 RX ADMIN — SODIUM CHLORIDE 1000 ML: 0.9 INJECTION, SOLUTION INTRAVENOUS at 13:17

## 2018-01-01 RX ADMIN — HEPARIN SODIUM 5000 UNITS: 5000 INJECTION INTRAVENOUS; SUBCUTANEOUS at 15:04

## 2018-01-01 RX ADMIN — HEPARIN SODIUM 5000 UNITS: 5000 INJECTION INTRAVENOUS; SUBCUTANEOUS at 21:53

## 2018-01-01 RX ADMIN — REGADENOSON 0.4 MG: 0.08 INJECTION, SOLUTION INTRAVENOUS at 09:10

## 2018-01-01 RX ADMIN — METOPROLOL SUCCINATE 50 MG: 50 TABLET, EXTENDED RELEASE ORAL at 21:38

## 2018-01-01 RX ADMIN — FAMOTIDINE 20 MG: 20 TABLET ORAL at 08:39

## 2018-01-01 RX ADMIN — GABAPENTIN 200 MG: 100 CAPSULE ORAL at 08:39

## 2018-01-01 RX ADMIN — ACETAMINOPHEN 650 MG: 325 TABLET, FILM COATED ORAL at 08:49

## 2018-01-01 RX ADMIN — GABAPENTIN 200 MG: 100 CAPSULE ORAL at 15:09

## 2018-01-01 RX ADMIN — Medication 30 MG: at 08:57

## 2018-01-01 RX ADMIN — ASPIRIN 325 MG: 325 TABLET, DELAYED RELEASE ORAL at 08:48

## 2018-01-01 RX ADMIN — GABAPENTIN 200 MG: 100 CAPSULE ORAL at 08:26

## 2018-01-01 RX ADMIN — SODIUM CHLORIDE 125 ML/HR: 0.9 INJECTION, SOLUTION INTRAVENOUS at 23:54

## 2018-01-01 RX ADMIN — VANCOMYCIN 125 MG: KIT at 13:36

## 2018-01-01 RX ADMIN — DEXAMETHASONE SODIUM PHOSPHATE: 10 INJECTION, SOLUTION INTRAMUSCULAR; INTRAVENOUS at 11:14

## 2018-01-01 RX ADMIN — HEPARIN SODIUM 5000 UNITS: 5000 INJECTION INTRAVENOUS; SUBCUTANEOUS at 21:33

## 2018-01-01 RX ADMIN — GABAPENTIN 200 MG: 100 CAPSULE ORAL at 21:38

## 2018-01-01 RX ADMIN — CEFTAZIDIME 2000 MG: 2 INJECTION, POWDER, FOR SOLUTION INTRAVENOUS at 09:51

## 2018-01-01 RX ADMIN — ENOXAPARIN SODIUM 30 MG: 30 INJECTION SUBCUTANEOUS at 15:26

## 2018-01-01 RX ADMIN — VANCOMYCIN HYDROCHLORIDE 750 MG: 750 INJECTION, SOLUTION INTRAVENOUS at 22:20

## 2018-01-01 RX ADMIN — METOPROLOL SUCCINATE 50 MG: 50 TABLET, EXTENDED RELEASE ORAL at 08:56

## 2018-01-01 RX ADMIN — PROPOFOL 60 MCG/KG/MIN: 10 INJECTION, EMULSION INTRAVENOUS at 10:04

## 2018-01-01 RX ADMIN — FAMOTIDINE 20 MG: 20 TABLET ORAL at 08:58

## 2018-01-01 RX ADMIN — SODIUM CHLORIDE 100 ML/HR: 0.9 INJECTION, SOLUTION INTRAVENOUS at 21:36

## 2018-01-01 RX ADMIN — SODIUM CHLORIDE 20 ML/HR: 9 INJECTION, SOLUTION INTRAVENOUS at 15:00

## 2018-01-01 RX ADMIN — VANCOMYCIN HYDROCHLORIDE 1000 MG: 1 INJECTION, SOLUTION INTRAVENOUS at 10:27

## 2018-01-01 RX ADMIN — DIPHENHYDRAMINE HYDROCHLORIDE 25 MG: 25 SOLUTION ORAL at 13:05

## 2018-01-01 RX ADMIN — SODIUM CHLORIDE 125 ML/HR: 0.9 INJECTION, SOLUTION INTRAVENOUS at 09:48

## 2018-01-01 RX ADMIN — HEPARIN SODIUM 5000 UNITS: 5000 INJECTION INTRAVENOUS; SUBCUTANEOUS at 08:05

## 2018-01-01 RX ADMIN — DIPHENHYDRAMINE HYDROCHLORIDE: 50 INJECTION, SOLUTION INTRAMUSCULAR; INTRAVENOUS at 11:12

## 2018-01-01 RX ADMIN — ATORVASTATIN CALCIUM 40 MG: 40 TABLET, FILM COATED ORAL at 09:34

## 2018-01-01 RX ADMIN — DIPHENHYDRAMINE HYDROCHLORIDE: 50 INJECTION, SOLUTION INTRAMUSCULAR; INTRAVENOUS at 12:50

## 2018-01-01 RX ADMIN — ALBUTEROL SULFATE 2 PUFF: 90 AEROSOL, METERED RESPIRATORY (INHALATION) at 02:24

## 2018-01-01 RX ADMIN — ENOXAPARIN SODIUM 30 MG: 30 INJECTION SUBCUTANEOUS at 08:26

## 2018-01-01 RX ADMIN — ALBUTEROL SULFATE 2 PUFF: 90 AEROSOL, METERED RESPIRATORY (INHALATION) at 03:40

## 2018-01-01 RX ADMIN — SODIUM CHLORIDE 1000 ML: 0.9 INJECTION, SOLUTION INTRAVENOUS at 09:12

## 2018-01-01 RX ADMIN — HEPARIN SODIUM 5000 UNITS: 5000 INJECTION INTRAVENOUS; SUBCUTANEOUS at 05:57

## 2018-01-01 RX ADMIN — SODIUM CHLORIDE 125 ML/HR: 0.9 INJECTION, SOLUTION INTRAVENOUS at 05:59

## 2018-01-01 RX ADMIN — ATORVASTATIN CALCIUM 40 MG: 40 TABLET, FILM COATED ORAL at 08:26

## 2018-01-01 RX ADMIN — PACLITAXEL 79 MG: 6 INJECTION, SOLUTION INTRAVENOUS at 12:50

## 2018-01-01 RX ADMIN — LEVOTHYROXINE SODIUM 100 MCG: 100 TABLET ORAL at 12:28

## 2018-01-01 RX ADMIN — HEPARIN SODIUM 5000 UNITS: 5000 INJECTION INTRAVENOUS; SUBCUTANEOUS at 05:10

## 2018-01-01 RX ADMIN — ASPIRIN 325 MG: 325 TABLET, DELAYED RELEASE ORAL at 09:11

## 2018-01-01 RX ADMIN — SODIUM CHLORIDE 20 ML/HR: 0.9 INJECTION, SOLUTION INTRAVENOUS at 11:50

## 2018-01-01 RX ADMIN — GABAPENTIN 200 MG: 100 CAPSULE ORAL at 17:04

## 2018-01-01 RX ADMIN — LEVOTHYROXINE SODIUM 100 MCG: 100 TABLET ORAL at 06:30

## 2018-01-01 RX ADMIN — SODIUM CHLORIDE 20 ML/HR: 0.9 INJECTION, SOLUTION INTRAVENOUS at 13:13

## 2018-01-01 RX ADMIN — DIPHENHYDRAMINE HYDROCHLORIDE: 50 INJECTION, SOLUTION INTRAMUSCULAR; INTRAVENOUS at 11:34

## 2018-01-01 RX ADMIN — IOHEXOL 5 ML: 240 INJECTION, SOLUTION INTRATHECAL; INTRAVASCULAR; INTRAVENOUS; ORAL at 13:10

## 2018-01-01 RX ADMIN — HEPARIN SODIUM 5000 UNITS: 5000 INJECTION INTRAVENOUS; SUBCUTANEOUS at 13:36

## 2018-01-01 RX ADMIN — GABAPENTIN 200 MG: 100 CAPSULE ORAL at 09:11

## 2018-01-01 RX ADMIN — MAGNESIUM SULFATE IN WATER 2 G: 40 INJECTION, SOLUTION INTRAVENOUS at 12:58

## 2018-01-01 RX ADMIN — HEPARIN SODIUM 5000 UNITS: 5000 INJECTION INTRAVENOUS; SUBCUTANEOUS at 06:38

## 2018-01-01 RX ADMIN — LEVOTHYROXINE SODIUM 100 MCG: 100 TABLET ORAL at 06:05

## 2018-01-01 RX ADMIN — VANCOMYCIN 125 MG: KIT at 00:55

## 2018-01-01 RX ADMIN — HEPARIN SODIUM 5000 UNITS: 5000 INJECTION INTRAVENOUS; SUBCUTANEOUS at 05:42

## 2018-01-01 RX ADMIN — Medication 10 ML: at 10:01

## 2018-01-01 RX ADMIN — SODIUM CHLORIDE 125 ML/HR: 0.9 INJECTION, SOLUTION INTRAVENOUS at 20:54

## 2018-01-01 RX ADMIN — SODIUM CHLORIDE 20 ML/HR: 0.9 INJECTION, SOLUTION INTRAVENOUS at 11:11

## 2018-01-01 RX ADMIN — ATORVASTATIN CALCIUM 40 MG: 40 TABLET, FILM COATED ORAL at 08:05

## 2018-01-01 RX ADMIN — PROPOFOL 60 MG: 10 INJECTION, EMULSION INTRAVENOUS at 10:19

## 2018-01-01 RX ADMIN — LEVOTHYROXINE SODIUM 100 MCG: 100 TABLET ORAL at 06:38

## 2018-01-01 RX ADMIN — VANCOMYCIN 125 MG: KIT at 11:10

## 2018-01-01 RX ADMIN — SODIUM CHLORIDE 75 ML/HR: 0.9 INJECTION, SOLUTION INTRAVENOUS at 21:25

## 2018-01-01 RX ADMIN — CEFEPIME HYDROCHLORIDE 2000 MG: 2 INJECTION, SOLUTION INTRAVENOUS at 23:47

## 2018-01-01 RX ADMIN — SODIUM CHLORIDE 1000 ML: 0.9 INJECTION, SOLUTION INTRAVENOUS at 11:04

## 2018-01-01 RX ADMIN — HEPARIN SODIUM 5000 UNITS: 5000 INJECTION INTRAVENOUS; SUBCUTANEOUS at 06:44

## 2018-01-01 RX ADMIN — DIPHENHYDRAMINE HCL 25 MG: 25 TABLET ORAL at 21:21

## 2018-01-01 RX ADMIN — OMEGA-3 FATTY ACIDS CAP 1000 MG 1000 MG: 1000 CAP at 08:05

## 2018-01-01 RX ADMIN — ENOXAPARIN SODIUM 30 MG: 30 INJECTION SUBCUTANEOUS at 09:34

## 2018-01-01 RX ADMIN — ACETAMINOPHEN 650 MG: 325 TABLET, FILM COATED ORAL at 17:23

## 2018-01-01 RX ADMIN — ACETAMINOPHEN 650 MG: 325 TABLET, FILM COATED ORAL at 20:45

## 2018-01-01 RX ADMIN — VANCOMYCIN 125 MG: KIT at 00:47

## 2018-01-01 RX ADMIN — GABAPENTIN 200 MG: 100 CAPSULE ORAL at 08:38

## 2018-01-01 RX ADMIN — PROPOFOL 80 MG: 10 INJECTION, EMULSION INTRAVENOUS at 10:04

## 2018-01-01 RX ADMIN — HEPARIN SODIUM 5000 UNITS: 5000 INJECTION INTRAVENOUS; SUBCUTANEOUS at 14:35

## 2018-01-01 RX ADMIN — METOPROLOL SUCCINATE 50 MG: 50 TABLET, EXTENDED RELEASE ORAL at 17:05

## 2018-01-01 RX ADMIN — ASPIRIN 325 MG: 325 TABLET, COATED ORAL at 09:33

## 2018-01-01 RX ADMIN — CEFEPIME HYDROCHLORIDE 1000 MG: 1 INJECTION, SOLUTION INTRAVENOUS at 00:35

## 2018-01-01 RX ADMIN — VANCOMYCIN HYDROCHLORIDE 750 MG: 750 INJECTION, SOLUTION INTRAVENOUS at 10:25

## 2018-01-01 RX ADMIN — SODIUM CHLORIDE 125 ML/HR: 0.9 INJECTION, SOLUTION INTRAVENOUS at 12:32

## 2018-01-01 RX ADMIN — Medication 30 MG: at 08:06

## 2018-01-01 RX ADMIN — PACLITAXEL 79 MG: 6 INJECTION, SOLUTION INTRAVENOUS at 12:04

## 2018-01-01 RX ADMIN — ASPIRIN 325 MG: 325 TABLET, COATED ORAL at 12:28

## 2018-01-01 RX ADMIN — CALCIUM CARBONATE (ANTACID) CHEW TAB 500 MG 1000 MG: 500 CHEW TAB at 20:18

## 2018-01-01 RX ADMIN — VANCOMYCIN 125 MG: KIT at 17:08

## 2018-01-01 RX ADMIN — CARBOPLATIN 120 MG: 10 INJECTION, SOLUTION INTRAVENOUS at 13:54

## 2018-01-01 RX ADMIN — FAMOTIDINE 20 MG: 20 TABLET ORAL at 17:28

## 2018-01-01 RX ADMIN — CEFEPIME HYDROCHLORIDE 1000 MG: 1 INJECTION, SOLUTION INTRAVENOUS at 22:54

## 2018-01-01 RX ADMIN — SODIUM CHLORIDE 1000 MG: 900 INJECTION INTRAVENOUS at 05:57

## 2018-01-01 RX ADMIN — SODIUM CHLORIDE 20 ML/HR: 0.9 INJECTION, SOLUTION INTRAVENOUS at 12:00

## 2018-01-01 RX ADMIN — HEPARIN SODIUM 5000 UNITS: 5000 INJECTION INTRAVENOUS; SUBCUTANEOUS at 00:24

## 2018-01-01 RX ADMIN — CALCIUM CARBONATE (ANTACID) CHEW TAB 500 MG 1000 MG: 500 CHEW TAB at 10:17

## 2018-01-01 RX ADMIN — SODIUM CHLORIDE 1000 ML: 0.9 INJECTION, SOLUTION INTRAVENOUS at 20:45

## 2018-01-01 RX ADMIN — METRONIDAZOLE 500 MG: 500 INJECTION, SOLUTION INTRAVENOUS at 01:42

## 2018-01-01 RX ADMIN — LISINOPRIL 10 MG: 10 TABLET ORAL at 11:09

## 2018-01-01 RX ADMIN — HEPARIN SODIUM 5000 UNITS: 5000 INJECTION INTRAVENOUS; SUBCUTANEOUS at 21:45

## 2018-01-01 RX ADMIN — GABAPENTIN 200 MG: 100 CAPSULE ORAL at 16:06

## 2018-01-01 RX ADMIN — LEVOTHYROXINE SODIUM 100 MCG: 100 TABLET ORAL at 05:57

## 2018-01-01 RX ADMIN — METOPROLOL SUCCINATE 50 MG: 50 TABLET, EXTENDED RELEASE ORAL at 18:01

## 2018-01-01 RX ADMIN — ASPIRIN 325 MG: 325 TABLET, DELAYED RELEASE ORAL at 08:56

## 2018-01-01 RX ADMIN — VANCOMYCIN 125 MG: KIT at 23:22

## 2018-01-01 RX ADMIN — CARBOPLATIN 115 MG: 10 INJECTION, SOLUTION INTRAVENOUS at 13:57

## 2018-01-01 RX ADMIN — OMEGA-3 FATTY ACIDS CAP 1000 MG 1000 MG: 1000 CAP at 08:48

## 2018-01-01 RX ADMIN — GABAPENTIN 200 MG: 100 CAPSULE ORAL at 08:56

## 2018-01-01 RX ADMIN — ASPIRIN 325 MG: 325 TABLET, DELAYED RELEASE ORAL at 08:05

## 2018-01-01 RX ADMIN — Medication 10 ML: at 07:38

## 2018-01-01 RX ADMIN — HEPARIN SODIUM 5000 UNITS: 5000 INJECTION INTRAVENOUS; SUBCUTANEOUS at 06:05

## 2018-01-01 RX ADMIN — METOPROLOL SUCCINATE 50 MG: 50 TABLET, EXTENDED RELEASE ORAL at 17:43

## 2018-01-01 RX ADMIN — SODIUM CHLORIDE 100 ML/HR: 0.9 INJECTION, SOLUTION INTRAVENOUS at 08:54

## 2018-01-01 RX ADMIN — VANCOMYCIN 125 MG: KIT at 06:37

## 2018-01-01 RX ADMIN — PACLITAXEL 79 MG: 6 INJECTION, SOLUTION INTRAVENOUS at 12:01

## 2018-01-01 RX ADMIN — DEXAMETHASONE SODIUM PHOSPHATE: 10 INJECTION, SOLUTION INTRAMUSCULAR; INTRAVENOUS at 12:25

## 2018-01-01 RX ADMIN — GABAPENTIN 200 MG: 100 CAPSULE ORAL at 08:59

## 2018-01-01 RX ADMIN — ONDANSETRON 4 MG: 2 INJECTION, SOLUTION INTRAMUSCULAR; INTRAVENOUS at 12:58

## 2018-01-01 RX ADMIN — ACETAMINOPHEN 650 MG: 325 TABLET, FILM COATED ORAL at 00:21

## 2018-01-01 RX ADMIN — CARBOPLATIN 115 MG: 10 INJECTION, SOLUTION INTRAVENOUS at 13:10

## 2018-01-01 RX ADMIN — LISINOPRIL 10 MG: 10 TABLET ORAL at 08:38

## 2018-01-01 RX ADMIN — GABAPENTIN 200 MG: 100 CAPSULE ORAL at 12:28

## 2018-01-01 RX ADMIN — GABAPENTIN 200 MG: 100 CAPSULE ORAL at 21:33

## 2018-01-01 RX ADMIN — METRONIDAZOLE 500 MG: 500 INJECTION, SOLUTION INTRAVENOUS at 10:25

## 2018-01-01 RX ADMIN — GABAPENTIN 200 MG: 100 CAPSULE ORAL at 21:15

## 2018-01-01 RX ADMIN — SODIUM CHLORIDE 1000 MG: 900 INJECTION INTRAVENOUS at 14:44

## 2018-01-01 RX ADMIN — METOPROLOL SUCCINATE 25 MG: 25 TABLET, EXTENDED RELEASE ORAL at 15:27

## 2018-01-01 RX ADMIN — GABAPENTIN 200 MG: 100 CAPSULE ORAL at 21:30

## 2018-01-01 RX ADMIN — GABAPENTIN 200 MG: 100 CAPSULE ORAL at 17:43

## 2018-01-01 RX ADMIN — ASPIRIN 325 MG: 325 TABLET, COATED ORAL at 08:39

## 2018-01-01 RX ADMIN — LIDOCAINE HYDROCHLORIDE 10 ML: 20 SOLUTION ORAL; TOPICAL at 09:50

## 2018-01-01 RX ADMIN — VANCOMYCIN 125 MG: KIT at 06:30

## 2018-01-01 RX ADMIN — SENNOSIDES 8.6 MG: 8.6 TABLET, FILM COATED ORAL at 08:48

## 2018-01-01 RX ADMIN — METOPROLOL SUCCINATE 50 MG: 50 TABLET, EXTENDED RELEASE ORAL at 08:38

## 2018-01-01 RX ADMIN — DIPHENHYDRAMINE HYDROCHLORIDE: 50 INJECTION, SOLUTION INTRAMUSCULAR; INTRAVENOUS at 11:58

## 2018-01-01 RX ADMIN — LIDOCAINE 1 PATCH: 50 PATCH TOPICAL at 11:04

## 2018-01-01 RX ADMIN — VANCOMYCIN 125 MG: KIT at 23:54

## 2018-01-01 RX ADMIN — DEXAMETHASONE SODIUM PHOSPHATE: 10 INJECTION, SOLUTION INTRAMUSCULAR; INTRAVENOUS at 11:25

## 2018-01-01 RX ADMIN — GABAPENTIN 200 MG: 100 CAPSULE ORAL at 15:59

## 2018-01-01 RX ADMIN — FAMOTIDINE 20 MG: 20 TABLET ORAL at 17:57

## 2018-01-01 RX ADMIN — ACETAMINOPHEN 650 MG: 325 TABLET, FILM COATED ORAL at 03:51

## 2018-01-01 RX ADMIN — CARBOPLATIN 114 MG: 10 INJECTION, SOLUTION INTRAVENOUS at 15:09

## 2018-01-01 RX ADMIN — METOPROLOL SUCCINATE 50 MG: 50 TABLET, EXTENDED RELEASE ORAL at 17:57

## 2018-01-01 RX ADMIN — SODIUM CHLORIDE 100 ML/HR: 0.9 INJECTION, SOLUTION INTRAVENOUS at 02:21

## 2018-01-01 RX ADMIN — HEPARIN SODIUM 5000 UNITS: 5000 INJECTION INTRAVENOUS; SUBCUTANEOUS at 13:35

## 2018-01-01 RX ADMIN — GABAPENTIN 200 MG: 100 CAPSULE ORAL at 00:24

## 2018-01-01 RX ADMIN — ACETAMINOPHEN 650 MG: 325 TABLET, FILM COATED ORAL at 10:17

## 2018-01-01 RX ADMIN — SODIUM CHLORIDE 40 ML/HR: 9 INJECTION, SOLUTION INTRAVENOUS at 14:24

## 2018-01-01 RX ADMIN — DIPHENHYDRAMINE HYDROCHLORIDE: 50 INJECTION, SOLUTION INTRAMUSCULAR; INTRAVENOUS at 11:04

## 2018-01-01 RX ADMIN — VANCOMYCIN 125 MG: KIT at 17:27

## 2018-01-01 RX ADMIN — HEPARIN SODIUM 5000 UNITS: 5000 INJECTION INTRAVENOUS; SUBCUTANEOUS at 21:15

## 2018-01-01 RX ADMIN — PACLITAXEL 79 MG: 6 INJECTION, SOLUTION INTRAVENOUS at 12:00

## 2018-01-01 RX ADMIN — VANCOMYCIN 125 MG: KIT at 18:37

## 2018-01-01 RX ADMIN — OMEGA-3 FATTY ACIDS CAP 1000 MG 1000 MG: 1000 CAP at 09:11

## 2018-01-01 RX ADMIN — SODIUM CHLORIDE 20 ML/HR: 0.9 INJECTION, SOLUTION INTRAVENOUS at 11:01

## 2018-01-01 RX ADMIN — SODIUM CHLORIDE 20 ML/HR: 0.9 INJECTION, SOLUTION INTRAVENOUS at 14:37

## 2018-01-01 RX ADMIN — GABAPENTIN 200 MG: 100 CAPSULE ORAL at 08:05

## 2018-01-01 RX ADMIN — VANCOMYCIN 125 MG: KIT at 05:57

## 2018-01-01 RX ADMIN — SODIUM CHLORIDE 20 ML/HR: 0.9 INJECTION, SOLUTION INTRAVENOUS at 11:04

## 2018-01-01 RX ADMIN — GABAPENTIN 200 MG: 100 CAPSULE ORAL at 20:40

## 2018-01-01 RX ADMIN — ATORVASTATIN CALCIUM 40 MG: 40 TABLET, FILM COATED ORAL at 15:30

## 2018-01-01 RX ADMIN — METOPROLOL SUCCINATE 50 MG: 50 TABLET, EXTENDED RELEASE ORAL at 12:28

## 2018-01-01 RX ADMIN — GABAPENTIN 200 MG: 100 CAPSULE ORAL at 20:18

## 2018-01-01 RX ADMIN — SODIUM CHLORIDE 50 ML/HR: 0.9 INJECTION, SOLUTION INTRAVENOUS at 04:57

## 2018-01-01 RX ADMIN — HEPARIN SODIUM 5000 UNITS: 5000 INJECTION INTRAVENOUS; SUBCUTANEOUS at 21:38

## 2018-01-01 RX ADMIN — ATORVASTATIN CALCIUM 40 MG: 40 TABLET, FILM COATED ORAL at 08:38

## 2018-01-01 RX ADMIN — ACETAMINOPHEN 650 MG: 325 TABLET, FILM COATED ORAL at 17:50

## 2018-01-01 RX ADMIN — HYDROCODONE BITARTRATE AND ACETAMINOPHEN 1 TABLET: 5; 325 TABLET ORAL at 17:27

## 2018-01-01 RX ADMIN — PACLITAXEL 79 MG: 6 INJECTION, SOLUTION INTRAVENOUS at 14:01

## 2018-01-01 RX ADMIN — DURVALUMAB 540 MG: 120 INJECTION, SOLUTION INTRAVENOUS at 13:13

## 2018-01-01 RX ADMIN — PACLITAXEL 79 MG: 6 INJECTION, SOLUTION, CONCENTRATE INTRAVENOUS at 12:53

## 2018-01-01 RX ADMIN — METOPROLOL SUCCINATE 50 MG: 50 TABLET, EXTENDED RELEASE ORAL at 18:51

## 2018-01-01 RX ADMIN — SENNOSIDES 8.6 MG: 8.6 TABLET, FILM COATED ORAL at 08:05

## 2018-01-01 RX ADMIN — SODIUM CHLORIDE 75 ML/HR: 0.9 INJECTION, SOLUTION INTRAVENOUS at 00:46

## 2018-01-01 RX ADMIN — DEXAMETHASONE SODIUM PHOSPHATE: 10 INJECTION, SOLUTION INTRAMUSCULAR; INTRAVENOUS at 11:33

## 2018-01-01 RX ADMIN — DURVALUMAB 540 MG: 500 INJECTION, SOLUTION INTRAVENOUS at 15:00

## 2018-01-01 RX ADMIN — FAMOTIDINE 20 MG: 20 TABLET ORAL at 18:37

## 2018-01-01 RX ADMIN — GABAPENTIN 200 MG: 100 CAPSULE ORAL at 15:51

## 2018-01-01 RX ADMIN — GABAPENTIN 200 MG: 100 CAPSULE ORAL at 21:21

## 2018-01-01 RX ADMIN — GABAPENTIN 200 MG: 100 CAPSULE ORAL at 21:45

## 2018-01-01 RX ADMIN — Medication 30 MG: at 08:49

## 2018-01-01 RX ADMIN — METOPROLOL SUCCINATE 50 MG: 50 TABLET, EXTENDED RELEASE ORAL at 08:26

## 2018-01-01 RX ADMIN — DURVALUMAB 562 MG: 500 INJECTION, SOLUTION INTRAVENOUS at 14:24

## 2018-01-01 RX ADMIN — METOPROLOL SUCCINATE 50 MG: 50 TABLET, EXTENDED RELEASE ORAL at 17:22

## 2018-01-01 RX ADMIN — ATORVASTATIN CALCIUM 40 MG: 40 TABLET, FILM COATED ORAL at 12:28

## 2018-01-01 RX ADMIN — GABAPENTIN 200 MG: 100 CAPSULE ORAL at 17:28

## 2018-01-01 RX ADMIN — ALBUTEROL SULFATE 2 PUFF: 90 AEROSOL, METERED RESPIRATORY (INHALATION) at 14:41

## 2018-01-01 RX ADMIN — METOPROLOL SUCCINATE 50 MG: 50 TABLET, EXTENDED RELEASE ORAL at 08:39

## 2018-01-01 RX ADMIN — LEVOTHYROXINE SODIUM 100 MCG: 100 TABLET ORAL at 05:42

## 2018-01-01 RX ADMIN — HEPARIN SODIUM 5000 UNITS: 5000 INJECTION INTRAVENOUS; SUBCUTANEOUS at 21:30

## 2018-01-01 RX ADMIN — ATORVASTATIN CALCIUM 40 MG: 40 TABLET, FILM COATED ORAL at 08:48

## 2018-01-01 RX ADMIN — DIPHENHYDRAMINE HCL 25 MG: 25 TABLET ORAL at 21:42

## 2018-01-01 RX ADMIN — VANCOMYCIN 125 MG: KIT at 11:27

## 2018-01-01 RX ADMIN — SODIUM CHLORIDE 1000 ML: 0.9 INJECTION, SOLUTION INTRAVENOUS at 11:31

## 2018-01-01 RX ADMIN — SODIUM CHLORIDE 100 ML/HR: 0.9 INJECTION, SOLUTION INTRAVENOUS at 11:57

## 2018-01-01 RX ADMIN — ASPIRIN 325 MG: 325 TABLET, COATED ORAL at 15:26

## 2018-01-01 RX ADMIN — METOPROLOL SUCCINATE 50 MG: 50 TABLET, EXTENDED RELEASE ORAL at 09:11

## 2018-01-01 RX ADMIN — LEVOTHYROXINE SODIUM 100 MCG: 100 TABLET ORAL at 05:10

## 2018-01-01 RX ADMIN — ALBUTEROL SULFATE 2 PUFF: 90 AEROSOL, METERED RESPIRATORY (INHALATION) at 18:37

## 2018-01-01 RX ADMIN — SODIUM CHLORIDE 50 ML/HR: 0.9 INJECTION, SOLUTION INTRAVENOUS at 11:47

## 2018-01-01 RX ADMIN — MAGNESIUM SULFATE HEPTAHYDRATE 2 G: 40 INJECTION, SOLUTION INTRAVENOUS at 11:31

## 2018-01-01 RX ADMIN — VANCOMYCIN 125 MG: KIT at 12:41

## 2018-01-01 RX ADMIN — SODIUM CHLORIDE 125 ML/HR: 0.9 INJECTION, SOLUTION INTRAVENOUS at 15:27

## 2018-01-01 RX ADMIN — METOPROLOL SUCCINATE 50 MG: 50 TABLET, EXTENDED RELEASE ORAL at 08:59

## 2018-01-01 RX ADMIN — VANCOMYCIN 125 MG: KIT at 06:05

## 2018-01-01 RX ADMIN — SODIUM CHLORIDE 1000 MG: 900 INJECTION INTRAVENOUS at 21:38

## 2018-01-01 RX ADMIN — LISINOPRIL 10 MG: 10 TABLET ORAL at 12:28

## 2018-01-01 RX ADMIN — HEPARIN SODIUM 5000 UNITS: 5000 INJECTION INTRAVENOUS; SUBCUTANEOUS at 13:56

## 2018-01-01 RX ADMIN — LEVOTHYROXINE SODIUM 100 MCG: 100 TABLET ORAL at 15:26

## 2018-01-01 RX ADMIN — CEFEPIME HYDROCHLORIDE 1000 MG: 1 INJECTION, SOLUTION INTRAVENOUS at 23:22

## 2018-01-01 RX ADMIN — SODIUM CHLORIDE 75 ML/HR: 0.9 INJECTION, SOLUTION INTRAVENOUS at 09:40

## 2018-01-01 RX ADMIN — LISINOPRIL 10 MG: 10 TABLET ORAL at 08:39

## 2018-01-01 RX ADMIN — ATORVASTATIN CALCIUM 40 MG: 40 TABLET, FILM COATED ORAL at 09:11

## 2018-01-01 RX ADMIN — ASPIRIN 325 MG: 325 TABLET, COATED ORAL at 08:26

## 2018-01-01 RX ADMIN — SODIUM CHLORIDE 75 ML/HR: 0.9 INJECTION, SOLUTION INTRAVENOUS at 00:24

## 2018-01-01 RX ADMIN — Medication 30 MG: at 09:12

## 2018-01-01 RX ADMIN — DEXAMETHASONE SODIUM PHOSPHATE: 10 INJECTION, SOLUTION INTRAMUSCULAR; INTRAVENOUS at 12:18

## 2018-01-01 RX ADMIN — ASPIRIN 325 MG: 325 TABLET, COATED ORAL at 08:58

## 2018-01-01 RX ADMIN — METRONIDAZOLE 500 MG: 500 INJECTION, SOLUTION INTRAVENOUS at 17:05

## 2018-01-01 RX ADMIN — DURVALUMAB 540 MG: 500 INJECTION, SOLUTION INTRAVENOUS at 14:37

## 2018-01-01 RX ADMIN — CARBOPLATIN 120 MG: 10 INJECTION, SOLUTION INTRAVENOUS at 13:04

## 2018-01-01 RX ADMIN — FAMOTIDINE 20 MG: 20 TABLET ORAL at 08:38

## 2018-01-01 RX ADMIN — ATORVASTATIN CALCIUM 40 MG: 40 TABLET, FILM COATED ORAL at 08:59

## 2018-01-01 RX ADMIN — DIPHENHYDRAMINE HYDROCHLORIDE: 50 INJECTION, SOLUTION INTRAMUSCULAR; INTRAVENOUS at 11:51

## 2018-01-01 RX ADMIN — LEVOTHYROXINE SODIUM 100 MCG: 100 TABLET ORAL at 06:00

## 2018-01-01 RX ADMIN — METOPROLOL SUCCINATE 50 MG: 50 TABLET, EXTENDED RELEASE ORAL at 09:34

## 2018-01-01 RX ADMIN — GABAPENTIN 200 MG: 100 CAPSULE ORAL at 21:39

## 2018-01-01 RX ADMIN — CARBOPLATIN 114 MG: 10 INJECTION, SOLUTION INTRAVENOUS at 13:06

## 2018-01-01 RX ADMIN — ASPIRIN 325 MG: 325 TABLET, COATED ORAL at 08:38

## 2018-01-01 RX ADMIN — VANCOMYCIN 125 MG: KIT at 08:38

## 2018-01-01 RX ADMIN — ACETAMINOPHEN 650 MG: 325 TABLET, FILM COATED ORAL at 20:18

## 2018-01-01 RX ADMIN — DEXAMETHASONE SODIUM PHOSPHATE: 10 INJECTION, SOLUTION INTRAMUSCULAR; INTRAVENOUS at 12:06

## 2018-01-01 RX ADMIN — GABAPENTIN 200 MG: 100 CAPSULE ORAL at 09:34

## 2018-01-01 RX ADMIN — GABAPENTIN 200 MG: 100 CAPSULE ORAL at 08:48

## 2018-01-01 RX ADMIN — ATORVASTATIN CALCIUM 40 MG: 40 TABLET, FILM COATED ORAL at 08:39

## 2018-01-01 RX ADMIN — HEPARIN SODIUM 5000 UNITS: 5000 INJECTION INTRAVENOUS; SUBCUTANEOUS at 15:09

## 2018-01-01 RX ADMIN — ATORVASTATIN CALCIUM 40 MG: 40 TABLET, FILM COATED ORAL at 08:56

## 2018-01-01 RX ADMIN — SODIUM CHLORIDE 20 ML/HR: 0.9 INJECTION, SOLUTION INTRAVENOUS at 11:47

## 2018-01-01 RX ADMIN — VANCOMYCIN 125 MG: KIT at 17:57

## 2018-01-01 RX ADMIN — GABAPENTIN 200 MG: 100 CAPSULE ORAL at 15:04

## 2018-01-01 RX ADMIN — OMEGA-3 FATTY ACIDS CAP 1000 MG 1000 MG: 1000 CAP at 08:56

## 2018-01-01 RX ADMIN — GABAPENTIN 200 MG: 100 CAPSULE ORAL at 15:25

## 2018-01-11 NOTE — RESULT NOTES
Verified Results  *US BREAST LEFT LIMITED (DIAGNOSTIC) 00TOL8900 11:58AM Ramon Govea     Test Name Result Flag Reference   US BREAST LEFT LIMITED (Report)     Patient History:   Patient is postmenopausal    Family history of premenopausal breast cancer at age 37 in    daughter, premenopausal breast cancer at age 39 in paternal    cousin  Patient is an every day smoker, and has smoked for 54 years  Patient's BMI is 21 9  Reason for exam: addl evaluation requested from abnormal    screening  Callback for mass in the upper outer left breast      Mammo Diagnostic Left W DBT and CAD: June 28, 2017 - Check In #:    [de-identified]   3D Procedure   3D views: CC and MLO view(s) were taken of the left breast    2D Synthetic views: CC and MLO view(s) were taken of the left    breast        Technologist: RT Daniel(R)(M)   Prior study comparison: June 23, 2017, mammo screening bilateral    W CAD, performed at [address] [place] [address]  There are scattered fibroglandular densities  These images were    obtained using digital technique and with the assistance of    Computer Aided Detection  There are no dominant masses, foci of    architectural distortion or suspicious clusters of calcification    to suggest malignancy  The visualized skin appears normal     Previously described mass persists on tomographic series  Targeted ultrasound demonstrates a lymph node correlate at the 2    o'clock position, 7 cm from the nipple  US Breast Left Limited: June 28, 2017 - Check In #: [de-identified]   Standard views  Technologist: Radha Rayo   A uniformly echogenic layer of fibroglandular tissue  ACR BI-RADSï¾® Assessments: BiRad:2 - Benign (Overall)   Left breast Lt Diag Mammo: BiRad:2 - benign finding in the left    breast    Left breast Left Brst US: BiRad:2 - benign finding in the left    breast      Recommendation:   Routine screening mammogram of both breasts in 1 year  Transcription Location: [address] [place] [address] [place]: [id number]     Risk Value(s):   Tyrer-Cuzick 10 Year: 2 900%, Tyrer-Cuzick Lifetime: 4 600%,    Myriad Table: 5 6%, NELLA 5 Year: 3 2%, NCI Lifetime: 9 2%, MRS    : Based on personal and/or family history,    consideration of hereditary risk assessment may be warranted     Signed by:   Betsy Domingo MD   6/28/17

## 2018-01-12 VITALS
DIASTOLIC BLOOD PRESSURE: 86 MMHG | BODY MASS INDEX: 22.26 KG/M2 | RESPIRATION RATE: 16 BRPM | WEIGHT: 121 LBS | SYSTOLIC BLOOD PRESSURE: 120 MMHG | HEART RATE: 82 BPM | HEIGHT: 62 IN

## 2018-01-12 NOTE — MISCELLANEOUS
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)   3  ASCVD (arteriosclerotic cardiovascular disease) (429 2,440 9) (I25 10)   4  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 (440 21) (I70 211)   5  Benign hypertension (401 1) (I10)   6  Chronic obstructive pulmonary disease (496) (J44 9)   7  Claudication, intermittent (443 9) (I73 9)   8  Depression (311) (F32 9)   9  History of CVA (cerebrovascular accident) (V12 54) (Z86 73)   10  Hyperlipidemia (272 4) (E78 5)   11  Insomnia (780 52) (G47 00)   12  Mediastinal lymphadenopathy (785 6) (R59 0)   13  Denied: History of Mental health problem   14  Sciatica of right side (724 3) (M54 31)   15  Denied: History of Substance abuse    Current Meds   1  Atorvastatin Calcium 40 MG Oral Tablet (Lipitor); TAKE 1 TABLET DAILY AS DIRECTED; Therapy: 71Rce3716 to (Evaluate:30Oct2016)  Requested for: 52RDS7302; Last   Rx:50Qzo7766 Ordered   2  Phoenix Aspirin 325 MG Oral Tablet; TAKE 1 TABLET DAILY; Last Rx:41Bsp7076 Ordered   3  Citalopram Hydrobromide 20 MG Oral Tablet; TAKE ONE TABLET BY MOUTH ONCE   DAILY  Requested for: 89OFZ3084; Last Rx:14Ajg3911 Ordered   4  Levothyroxine Sodium 100 MCG Oral Tablet (Synthroid); TAKE 1 TABLET DAILY AS   DIRECTED; Therapy: 49Mvx0276 to [phone])  Requested for: 91Tkh6379; Last   Rx:24Apr2015 Ordered   5  Metoprolol Succinate ER 50 MG Oral Tablet Extended Release 24 Hour; Take 1 tablet   daily; Therapy: 52CXR8923 to (Evaluate:48Xad8076) Recorded   6  PredniSONE 10 MG Oral Tablet; 1 BID Recorded   7  Spiriva Respimat 2 5 MCG/ACT Inhalation Aerosol Solution; INHALE 2 PUFFS ONCE   DAILY;    Therapy: 35YXC2211 to (Last Rx:03Mar2016)  Requested for: 19YBR4097 Ordered   8  TraMADol HCl - 50 MG Oral Tablet; 1 po tid; Last Rx:20Clp5761 Ordered   9  Valsartan 160 MG Oral Tablet; Take 1 tablet daily; Therapy: 08VTR7399 to (Liaar Rape)  Requested for: 56IWU5291; Last   Rx:06Apr2016 Ordered    Allergies    1  Penicillins    2  No Known Environmental Allergies   3  No Known Food Allergies    Signatures   Electronically signed by :  Rolan Benavides, ; Oct 28 2016  5:47PM EST                       (Author)

## 2018-01-13 VITALS
TEMPERATURE: 99.5 F | HEIGHT: 62 IN | HEART RATE: 80 BPM | WEIGHT: 118 LBS | DIASTOLIC BLOOD PRESSURE: 80 MMHG | BODY MASS INDEX: 21.71 KG/M2 | SYSTOLIC BLOOD PRESSURE: 130 MMHG

## 2018-01-13 NOTE — MISCELLANEOUS
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)   3  ASCVD (arteriosclerotic cardiovascular disease) (429 2,440 9) (I25 10)   4  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 (440 21) (I70 211)   5  Benign hypertension (401 1) (I10)   6  Chronic obstructive pulmonary disease (496) (J44 9)   7  Claudication, intermittent (443 9) (I73 9)   8  Depression (311) (F32 9)   9  History of CVA (cerebrovascular accident) (V12 54) (Z86 73)   10  Hyperlipidemia (272 4) (E78 5)   11  Insomnia (780 52) (G47 00)    Current Meds   1  Atorvastatin Calcium 40 MG Oral Tablet (Lipitor); TAKE 1 TABLET DAILY AS DIRECTED; Therapy: 24Apr2015 to 797 9386)  Requested for: 24Apr2015; Last   Rx:24Apr2015 Ordered   2  Phoenix Aspirin 325 MG Oral Tablet; TAKE 1 TABLET DAILY; Last Rx:76Anc3202 Ordered   3  Citalopram Hydrobromide 20 MG Oral Tablet; TAKE ONE TABLET BY MOUTH ONCE   DAILY; Therapy: 20Apr2015 to (Evaluate:00Qqo4811)  Requested for: 20Apr2015; Last   Rx:79Ftw6498 Ordered   4  Levothyroxine Sodium 100 MCG Oral Tablet (Synthroid); TAKE 1 TABLET DAILY AS   DIRECTED; Therapy: 47Czr0225 to (857 9386)  Requested for: 07Iot2742; Last   Rx:75Vxp6123 Ordered   5  Lisinopril 20 MG Oral Tablet; Take 1 tablet daily; Therapy: 26Iqf3493 to 797 9386)  Requested for: ; Last   Rx:27Apr2015 Ordered   6  Metoprolol Tartrate 50 MG Oral Tablet; Take 1 tablet twice daily; Therapy: 15OSP4816 to (Evaluate:96Inp9229)  Requested for: 66PWG1521; Last   Rx:14Jan2016 Ordered   7   Zolpidem Tartrate 10 MG Oral Tablet (Ambien); TAKE 1 TABLET AT BEDTIME AS   NEEDED FOR SLEEP; Last Rx:14Jan2016 Ordered   8  Zostavax 69533 UNT/0 65ML Subcutaneous Solution Reconstituted (Zostavax 95463   UNT/0 65ML Subcutaneous Solution Reconstituted); inject as directed; Therapy: 83Egm1809 to (Last Rx:88Xfp1777) Ordered    Allergies    1  Penicillins    2  No Known Environmental Allergies   3  No Known Food Allergies    Signatures   Electronically signed by :  Agustina Brown, ; Feb 19 2016  3:35PM EST                       (Author)

## 2018-01-14 NOTE — MISCELLANEOUS
Message  mess from Mika Elizondo that ct dept at [address] called to state pt's cta sched for 11/1 cx due to elevated creat (1 53) and gfr of 34  she did not get name of person she s/w  called and s/w Froy Ortega in ct, he did not call, he transf me to [place] at   she did not call  she can see note in epic that someone called to notifiy us of results but it does not state cta was cx, it is still on sched as of now  she cannot tell in epic who wrote note as they did note sign their name after it  emailed Courtney Michael PA-C req she rev pt chart and advise on plan for pt due to abnormal labs  Active Problems    1  Aneurysm of infrarenal abdominal aorta (441 4) (I71 4)   2  Aneurysm, 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)   3  ASCVD (arteriosclerotic cardiovascular disease) (429 2,440 9) (I25 10)   4  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 (440 21) (I70 211)   5  Benign hypertension (401 1) (I10)   6  Chronic obstructive pulmonary disease (496) (J44 9)   7  Claudication, intermittent (443 9) (I73 9)   8  Depression (311) (F32 9)   9  History of CVA (cerebrovascular accident) (V12 54) (Z86 73)   10  Hyperlipidemia (272 4) (E78 5)   11  Insomnia (780 52) (G47 00)   12  Mediastinal lymphadenopathy (785 6) (R59 0)   13  Denied: History of Mental health problem   14  Sciatica of right side (724 3) (M54 31)   15  Denied: History of Substance abuse    Current Meds   1  Atorvastatin Calcium 40 MG Oral Tablet (Lipitor); TAKE 1 TABLET DAILY AS DIRECTED; Therapy: 24Apr2015 to (Evaluate:30Oct2016)  Requested for: 46PNT8670; Last   Rx:54Frp6115 Ordered   2  Phoenix Aspirin 325 MG Oral Tablet; TAKE 1 TABLET DAILY; Last Rx:09Fop6719 Ordered   3  Citalopram Hydrobromide 20 MG Oral Tablet; TAKE ONE TABLET BY MOUTH ONCE   DAILY  Requested for: 45QXZ1546; Last Rx:13Jun2016 Ordered   4  Levothyroxine Sodium 100 MCG Oral Tablet (Synthroid); TAKE 1 TABLET DAILY AS   DIRECTED;    Therapy: 20Apr2015 to Nael Iverson)  Requested for: 69Ioj9124; Last   Rx:98Ibn9395 Ordered   5  Metoprolol Succinate ER 50 MG Oral Tablet Extended Release 24 Hour; Take 1 tablet   daily; Therapy: 42RKI1831 to (Evaluate:52Kok3908) Recorded   6  PredniSONE 10 MG Oral Tablet; 1 BID Recorded   7  Spiriva Respimat 2 5 MCG/ACT Inhalation Aerosol Solution; INHALE 2 PUFFS ONCE   DAILY; Therapy: 56ZDQ6985 to (Last Rx:03Mar2016)  Requested for: 96ZYH9220 Ordered   8  TraMADol HCl - 50 MG Oral Tablet; 1 po tid; Last Rx:38Nxg2103 Ordered   9  Valsartan 160 MG Oral Tablet; Take 1 tablet daily; Therapy: 44PXA5557 to (Manolo Maxwell)  Requested for: 31YTP8115; Last   Rx:06Apr2016 Ordered    Allergies    1  Penicillins    2  No Known Environmental Allergies   3   No Known Food Allergies    Signatures   Electronically signed by : Braxton Heart, ; Oct 27 2016  4:48PM EST                       (Author)

## 2018-01-14 NOTE — RESULT NOTES
Message   need to increase the syntrhoid to 125mcg daily TSH in 3 months  Ask if she is taking the atorvastatin     Verified Results  (1) CBC/PLT/DIFF [id number] 11:11AM [facility] Order Number: [id number]     Test Name Result Flag Reference   WBC COUNT 8 04 Thousand/uL  4 31-10 16   RBC COUNT 4 58 Million/uL  3 81-5 12   HEMOGLOBIN 14 5 g/dL  11 5-15 4   HEMATOCRIT 44 7 %  34 8-46  1   MCV 98 fL  82-98   MCH 31 7 pg  26 8-34 3   MCHC 32 4 g/dL  31 4-37 4   RDW 14 4 %  11 6-15 1   MPV 11 6 fL  8 9-12 7   PLATELET COUNT 291 Thousands/uL  149-390   nRBC AUTOMATED 0 /100 WBCs     NEUTROPHILS RELATIVE PERCENT 51 %  43-75   LYMPHOCYTES RELATIVE PERCENT 33 %  14-44   MONOCYTES RELATIVE PERCENT 12 %  4-12   EOSINOPHILS RELATIVE PERCENT 3 %  0-6   BASOPHILS RELATIVE PERCENT 1 %  0-1   NEUTROPHILS ABSOLUTE COUNT 4 16 Thousands/?L  1 85-7 62   LYMPHOCYTES ABSOLUTE COUNT 2 64 Thousands/?L  0 60-4 47   MONOCYTES ABSOLUTE COUNT 0 95 Thousand/?L  0 17-1 22   EOSINOPHILS ABSOLUTE COUNT 0 21 Thousand/?L  0 00-0 61   BASOPHILS ABSOLUTE COUNT 0 06 Thousands/?L  0 00-0 10   - Patient Instructions: This bloodwork is non-fasting  Please drink two glasses of water morning of bloodwork  - Patient Instructions: This bloodwork is non-fasting  Please drink two glasses of water morning of bloodwork  (1) COMPREHENSIVE METABOLIC PANEL [id number] 66:15ZF Denise [facility] Order Number: [id number]     Test Name Result Flag Reference   GLUCOSE,RANDM 80 mg/dL     If the patient is fasting, the ADA then defines impaired fasting glucose as > 100 mg/dL and diabetes as > or equal to 123 mg/dL     SODIUM 143 mmol/L  136-145   POTASSIUM 4 2 mmol/L  3 5-5 3   CHLORIDE 107 mmol/L  100-108   CARBON DIOXIDE 29 mmol/L  21-32   ANION GAP (CALC) 7 mmol/L  4-13   BLOOD UREA NITROGEN 18 mg/dL  5-25   CREATININE 1 22 mg/dL  0 60-1 30   Standardized to IDMS reference method   CALCIUM 9 1 mg/dL  8 3-10 1   BILI, TOTAL 0 32 mg/dL 0  20-1 00   ALK PHOSPHATAS 101 U/L     ALT (SGPT) 16 U/L  12-78   AST(SGOT) 10 U/L  5-45   ALBUMIN 3 6 g/dL  3 5-5 0   TOTAL PROTEIN 6 8 g/dL  6 4-8 2   eGFR Non-African American 43 7 ml/min/1 73sq m     - Patient Instructions: This is a fasting blood test  Water,black tea or black  coffee only after 9:00pm the night before test Drink 2 glasses of water the morning of test - Patient Instructions: This bloodwork is non-fasting  Please drink two glasses of   water morning of bloodwork  National Kidney Disease Education Program recommendations are as follows:  GFR calculation is accurate only with a steady state creatinine  Chronic Kidney disease less than 60 ml/min/1 73 sq  meters  Kidney failure less than 15 ml/min/1 73 sq  meters  (1) HEMOGLOBIN A1C 13Dec2016 11:11AM Danya Boxer Order Number: [id number]     Test Name Result Flag Reference   HEMOGLOBIN A1C 5 9 %  4 2-6 3   EST  AVG  GLUCOSE 123 mg/dl       (1) LIPID PANEL, FASTING 13Dec2016 11:11AM Danya Boxer Order Number: [id number]     Test Name Result Flag Reference   CHOLESTEROL 224 mg/dL H    HDL,DIRECT 52 mg/dL  40-60   Specimen collection should occur prior to Metamizole administration due to the potential for falsely depressed results  LDL CHOLESTEROL CALCULATED 150 mg/dL H 0-100   - Patient Instructions: This is a fasting blood test  Water,black tea or black  coffee only after 9:00pm the night before test   Drink 2 glasses of water the morning of test     - Patient Instructions: This is a fasting blood test  Water,black tea or black  coffee only after 9:00pm the night before test Drink 2 glasses of water the morning of test - Patient Instructions: This bloodwork is non-fasting  Please drink two glasses of   water morning of bloodwork    Triglyceride:         Normal              <150 mg/dl       Borderline High    150-199 mg/dl       High               200-499 mg/dl       Very High          >499 mg/dl  Cholesterol:         Desirable        <200 mg/dl      Borderline High  200-239 mg/dl      High             >239 mg/dl  HDL Cholesterol:        High    >59 mg/dL      Low     <41 mg/dL  LDL CALCULATED:    This screening LDL is a calculated result  It does not have the accuracy of the Direct Measured LDL in the monitoring of patients with hyperlipidemia and/or statin therapy  Direct Measure LDL (CLC188) must be ordered separately in these patients  TRIGLYCERIDES 109 mg/dL  <=150   Specimen collection should occur prior to N-Acetylcysteine or Metamizole administration due to the potential for falsely depressed results  (1) TSH [id number] 11:11AM [facility] Order Number: [id number]     Test Name Result Flag Reference   TSH 12 100 uIU/mL H 0 358-3 740   - Patient Instructions: This bloodwork is non-fasting  Please drink two glasses of water morning of bloodwork  - Patient Instructions: This is a fasting blood test  Water,black tea or black  coffee only after 9:00pm the night before test Drink 2 glasses of water the morning of test - Patient Instructions: This bloodwork is non-fasting  Please drink two glasses of   water morning of bloodwork  Patients undergoing fluorescein dye angiography may retain small amounts of fluorescein in the body for 48-72 hours post procedure  Samples containing fluorescein can produce falsely depressed TSH values  If the patient had this procedure,a specimen should be resubmitted post fluorescein clearance            The recommended reference ranges for TSH during pregnancy are as follows:  First trimester 0 1 to 2 5 uIU/mL  Second trimester  0 2 to 3 0 uIU/mL  Third trimester 0 3 to 3 0 uIU/m     (1) HEP C ANTIBODY [id number] 11:11AM [facility] Order Number: [id number]     Test Name Result Flag Reference   HEPATITIS C ANTIBODY Non-reactive  Non-reactive

## 2018-01-17 NOTE — PROGRESS NOTES
Assessment    1  Benign hypertension (401 1) (I10)   2  ASCVD (arteriosclerotic cardiovascular disease) (429 2,440 9) (I25 10)   · 3 vessel med tx CAD   3  Exercise counseling (V65 41) (Z71 89)   4  Prediabetes (790 29) (R73 03)   5  Encounter for preventive health examination (V70 0) (Z00 00)   6  Chronic obstructive pulmonary disease (496) (J44 9)   7  Former smoker (V15 82) (G07 393)    Plan  ASCVD (arteriosclerotic cardiovascular disease)    · Valsartan 160 MG Oral Tablet; TAKE ONE TABLET BY MOUTH ONCE DAILY  Benign hypertension    · Metoprolol Succinate ER 50 MG Oral Tablet Extended Release 24 Hour; Take 1  tablet daily  Depression    · Citalopram Hydrobromide 20 MG Oral Tablet  PMH: History of hypothyroidism    · Levothyroxine Sodium 125 MCG Oral Tablet; TAKE 1 TABLET DAILY AS  DIRECTED  Vaginal candidiasis    · Fluconazole 150 MG Oral Tablet (Diflucan)    Discussion/Summary    Discussed medications at length  Discussed recent blood test  Recommend CoQ10 supplements, vitamin B complex, and Omega 3 fish oil  Discussed meal planning, recommend 3 meals daily or protein shakes, samples given to patient  Impression: Initial Annual Wellness Visit  Cardiovascular screening and counseling: screening is current  Diabetes screening and counseling: counseling was given on maintaining a healthy diet and counseling was given on maintaining a healthy weight  Colorectal cancer screening and counseling: due for a colonoscopy (low risk)  Patient Discussion: plan discussed with the patient  Possible side effects of new medications were reviewed with the patient/guardian today  The treatment plan was reviewed with the patient/guardian  The patient/guardian understands and agrees with the treatment plan      Chief Complaint  Medicare wellness  History of Present Illness  HPI: 81year old white female presents with boyfriend to have Medicare annual PE, and wellness visit   C/O right leg pain, and muscle cramps, admits to not eating a healthy, balanced diet  Has been out of medications for past few weeks, due to cost  Does not believe she needs Celexa, rarely uses Spiriva  Has recently stopped smoking, and per boyfriend, eating better  Welcome to [employer] and Wellness Visits: The patient is being seen for the initial annual wellness visit  Medicare Screening and Risk Factors   Hospitalizations: no previous hospitalizations  Medicare Screening Tests Risk Questions   Abdominal aortic aneurysm risk assessment: family history of AAA  Osteoporosis risk assessment: none indicated  HIV risk assessment: none indicated  Drug and Alcohol Use: The patient is a former cigarette smoker  The patient reports rare alcohol use  Alcohol concern:   The patient has no concerns about alcohol abuse  She has never used illicit drugs  Diet and Physical Activity: Current diet includes unhealthy food choices, limited junk food, 1 cups of coffee per day and 1 cans of regular soda per day  The patient does not exercise  Mood Disorder and Cognitive Impairment Screening:   Depression screening  negative for symptoms  She denies feeling down, depressed, or hopeless over the past two weeks  She denies feeling little interest or pleasure in doing things over the past two weeks  Cognitive impairment screening: denies difficulty learning/retaining new information, denies difficulty handling complex tasks, denies difficulty with reasoning, denies difficulty with spatial ability and orientation, denies difficulty with language and denies difficulty with behavior  Functional Ability/Level of Safety: Hearing is normal bilaterally, normal in the right ear, normal in the left ear and a hearing aid is not used  She denies hearing difficulties   The patient is currently able to do activities of daily living without limitations, able to do instrumental activities of daily living without limitations, able to participate in social activities without limitations and able to drive without limitations  Activities of daily living details: does not need help using the phone, no transportation help needed, does not need help shopping, no meal preparation help needed, does not need help doing housework, does not need help doing laundry, does not need help managing medications and does not need help managing money  Fall risk factors:  antihypertensive use, but The patient fell 0 times in the past 12 months , no polypharmacy, no alcohol use, no mobility impairment, no antidepressant use, no deconditioning, no postural hypotension, no sedative use, no visual impairment, no urinary incontinence, no cognitive impairment, up and go test was normal and no previous fall  Home safety risk factors:  no grab bars in the bathroom, but no unfamiliar surroundings, no loose rugs, no poor household lighting, no uneven floors, no household clutter and handrails on the stairs  Co-Managers and Medical Equipment/Suppliers: See Patient Care Team   Preventive Quality Program 65 and Older: The patient is currently asymptomatic Symptoms Include: leg weakness, but no confusion, no lightheadedness, no vertigo, no dizziness, no syncope, no impaired balance, no visual problems, no recurring falls and no recent fall  Associated symptoms:  No associated symptoms are reported  The patient currently has no urinary incontinence symptoms  Patient Care Team    Care Team Member Role Specialty Office Number   [id number] [facility]  Nurse Practitioner [phone]   Jaci PANDA  Attending Internal Medicine [phone] MD  Thoracic Surgery [phone]     Review of Systems    Constitutional: no fever, no chills and no fatigue  Head and Face: no facial pain and no facial pressure  ENT: no earache, no nasal congestion and no sore throat  Gastrointestinal: no abdominal pain, no nausea, no vomiting, no diarrhea and no constipation     Musculoskeletal: generalized muscle aches, but no diffuse joint pain  Yes, the patient is satisfied with Her life  No, the patient has not dropped any activities or interests  No, the patient does not feel that their life is empty  No, the patient does not often get bored  Yes, the patient is hopeful about the future  Yes, the patient is bothered by thoughts they cant get out of their head  Point = 1  No, the patient is not in good spirits most of the time  Point = 1  No, the patient is not afraid something bad is going to happen to them  Yes, the patient feels happy most of the time  No, the patient does not often feel helpless  No, the patient stevenson not often get restless and fidgety  Yes, the patient prefers to stay home rather then try new things  Point = 1  No, the patient does not worry about the future  No, the patient does not feel that they have more problems with their memory than most    Yes, the patient thinks its wonderful to be alive now  No, the patient does not feel downhearted or blue  No, the patient does not feel worthless the way they are currently  Yes, the patient finds that life is very exciting  No, the patient does not worry a lot about the past    No, it is not hard for the patient to get started on new projects  Yes, the patient feels full of energy  No, the patient does not feel their situation is hopeless  No, the patient does not feel that most people are better off then they are  No, the patient does not frequently get upset about the little things  No, the patient does not frequently feel like crying  No, the patient does not have any problems concentrating  Yes, the patient enjoys getting up in the morning  No, the patient enjoys social gatherings  Yes, the patient finds it easy to make decisions  No, the patient does not feel thier mind is clear as it used to be  Point = 1      Normal 0 - 9, Mild Depression 10 - 19, Severe Depression 20 - 30     Over the past 2 weeks, how often have you been bothered by the following problems? 1 ) Little interest or pleasure in doing things? Not at all    2 ) Feeling down, depressed or hopeless? Not at all    3 ) Trouble falling asleep or sleeping too much? Not at all    4 ) Feeling tired or having little energy? Several days  5 ) Poor appetite or overeating? Not at all    6 ) Feeling bad about yourself, or that you are a failure, or have let yourself or your family down? Not at all    7 ) Trouble concentrating on things, such as reading a newspaper or watching television? Not at all    8 ) Moving or speaking so slowly that other people could have noticed, or the opposite, moving or speaking faster than usual? Not at all    9 ) Thoughts that you would be better off dead or of hurting yourself in some way? 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)   3  ASCVD (arteriosclerotic cardiovascular disease) (429 2,440 9) (I25 10)   4  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 (440 21) (I70 211)   5  Benign hypertension (401 1) (I10)   6  Chronic obstructive pulmonary disease (496) (J44 9)   7  Depression (311) (F32 9)   8  Exercise counseling (V65 41) (Z71 89)   9  History of CVA (cerebrovascular accident) (V12 54) (Z86 73)   10  Hyperlipidemia (272 4) (E78 5)   11  Left breast lump (611 72) (N63)   12  Denied: History of Mental health problem   13  No advance directives (V49 89) (Z78 9)   14  Prediabetes (790 29) (R73 03)   15   Denied: History of Substance abuse    Past Medical History    · History of Blood type B- (V49 89) (Z67 21)   · History of acute bronchitis (V12 69) (Z87 09)   · History of cataract (V12 49) (Z86 69)   · Denied: History of Mental health problem   · History of Screening for breast cancer (V76 10) (Z12 31)   · Denied: History of Substance abuse   · History of Transient insomnia (307 41) (F51 02)    Surgical History    · History of PTA Subclavian Artery   · History of Tubal Ligation    Family History  Mother    · Denied: Family history of substance abuse   · Denied: Family history of Mental problem  Father    · Family history of hepatic failure (V18 59) (Z83 79)   · Denied: Family history of substance abuse   · Denied: Family history of Mental problem  Daughter    · Family history of malignant neoplasm of breast (V16 3) (Z80 3)    The family history was reviewed and updated today  Social History  The social history was reviewed and updated today  The social history was reviewed and is unchanged  Current Meds   1  Atorvastatin Calcium 10 MG Oral Tablet; TAKE 1 TABLET DAILY AS DIRECTED; Therapy: 24Ahm4310 to (Evaluate:09Jun2018)  Requested for: 32ELR1720; Last   Rx:14Jun2017 Ordered   2  [place] Aspirin 325 MG Oral Tablet; TAKE 1 TABLET DAILY; Last Rx:98Vlr8842 Ordered   3  Calcium 500 TABS; Therapy: (Recorded:45Xqp9942) to Recorded   4  Citalopram Hydrobromide 20 MG Oral Tablet; TAKE ONE TABLET BY MOUTH ONCE   DAILY  Requested for: 22NDW4833; Last Rx:13Jun2016 Ordered   5  Fluconazole 150 MG Oral Tablet; TAKE 1 TABLET 1 TIME ONLY; Therapy: 96IGX4306 to (Evaluate:19Jun2017)  Requested for: 95OBN5818; Last   Rx:07Jun2017 Ordered   6  Levothyroxine Sodium 125 MCG Oral Tablet; TAKE 1 TABLET DAILY AS DIRECTED; Therapy: 94Afr4148 to (Evaluate:12Oct2017)  Requested for: 42TCF7786; Last   Rx:14Jun2017 Ordered   7  Metoprolol Succinate ER 50 MG Oral Tablet Extended Release 24 Hour; Take 1 tablet   daily; Therapy: 64MNE3533 to (Evaluate:21Cqv5714) Recorded   8  PredniSONE 10 MG Oral Tablet; Therapy: 57EAB1022 to Recorded   9  Spiriva Respimat 2 5 MCG/ACT Inhalation Aerosol Solution; INHALE 2 PUFFS ONCE   DAILY; Therapy: 18XRK1497 to (Last Rx:03Mar2016)  Requested for: 60WKG8863 Ordered   10  Valsartan 160 MG Oral Tablet; TAKE ONE TABLET BY MOUTH ONCE DAILY;     Therapy: 58Nlj6352 to (Bishop Liu)  Requested for: 48VNS0009; Last    Rx:08Jun2017 Ordered    The medication list was reviewed and updated today  Allergies    1  Penicillins    2  No Known Environmental Allergies   3  No Known Food Allergies    Immunizations   1 2    Influenza  01-Oct-2012 01-Oct-2013    Tdap  Temporarily Deferred: Pt refuses      Vitals  Signs    Systolic: 013, LUE, Sitting  Diastolic: 90, LUE, Sitting  Weight: 121 lb   BMI Calculated: 22 13  BSA Calculated: 1 19  Systolic: 847, LUE, Sitting  Diastolic: 90, LUE, Sitting    Physical Exam    Constitutional   General appearance: No acute distress, well appearing and well nourished  Head and Face   Head and face: Normal     Eyes   Conjunctiva and lids: No swelling, erythema or discharge  Pupils and irises: Equal, round, reactive to light  Ears, Nose, Mouth, and Throat   External inspection of ears and nose: Normal     Hearing: Normal     Nasal mucosa, septum, and turbinates: Normal without edema or erythema  Lips, teeth, and gums: Normal, good dentition  Neck   Neck: Supple, symmetric, trachea midline, no masses  Pulmonary   Respiratory effort: No increased work of breathing or signs of respiratory distress  Auscultation of lungs: Clear to auscultation  Cardiovascular   Auscultation of heart: Normal rate and rhythm, normal S1 and S2, no murmurs  Pedal pulses: 2+ bilaterally  Examination of extremities for edema and/or varicosities: Normal     Musculoskeletal   Gait and station: Normal     Digits and nails: Normal without clubbing or cyanosis  Joints, bones, and muscles: Normal     Range of motion: Normal     Stability: Normal     Muscle strength/tone: Normal     Neurologic   Cranial nerves: Cranial nerves II-XII intact  Cortical function: Normal mental status  Reflexes: 2+ and symmetric  Sensation: No sensory loss  Coordination: Normal finger to nose and heel to shin      Psychiatric   Judgment and insight: Normal  Orientation to person, place, and time: Normal     Recent and remote memory: Intact      Mood and affect: Normal        Future Appointments    Date/Time Provider Specialty Site   10/25/2017 01:00 PM Ned Fang [facility] Internal Medicine [facility]     Signatures   Electronically signed by : Mariela Norman [facility]; Jul 25 2017  3:25PM EST                       (Author)    Electronically signed by : JASVIR Garcia ; Jul 25 2017  3:38PM EST

## 2018-01-22 VITALS — SYSTOLIC BLOOD PRESSURE: 130 MMHG | DIASTOLIC BLOOD PRESSURE: 90 MMHG | WEIGHT: 121 LBS | BODY MASS INDEX: 22.13 KG/M2

## 2018-01-22 VITALS
SYSTOLIC BLOOD PRESSURE: 122 MMHG | HEART RATE: 80 BPM | DIASTOLIC BLOOD PRESSURE: 68 MMHG | HEIGHT: 62 IN | WEIGHT: 119.03 LBS | BODY MASS INDEX: 21.9 KG/M2
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
## 2018-01-25 ENCOUNTER — OFFICE VISIT (OUTPATIENT)
Dept: URGENT CARE | Facility: CLINIC | Age: 73
End: 2018-01-25
Payer: COMMERCIAL

## 2018-01-25 VITALS
HEART RATE: 72 BPM | HEIGHT: 62 IN | DIASTOLIC BLOOD PRESSURE: 80 MMHG | WEIGHT: 127 LBS | RESPIRATION RATE: 14 BRPM | OXYGEN SATURATION: 94 % | SYSTOLIC BLOOD PRESSURE: 138 MMHG | TEMPERATURE: 98 F | BODY MASS INDEX: 23.37 KG/M2

## 2018-01-25 DIAGNOSIS — J06.9 ACUTE URI: Primary | ICD-10-CM

## 2018-01-25 PROCEDURE — 99213 OFFICE O/P EST LOW 20 MIN: CPT

## 2018-01-25 RX ORDER — BENZONATATE 100 MG/1
100 CAPSULE ORAL 3 TIMES DAILY PRN
Qty: 20 CAPSULE | Refills: 0 | Status: SHIPPED | OUTPATIENT
Start: 2018-01-25 | End: 2018-03-12 | Stop reason: CLARIF

## 2018-01-25 NOTE — PROGRESS NOTES
Assessment/Plan:      There are no diagnoses linked to this encounter  Subjective:     Patient ID: Duane Bustos is a 81 y o  female  Patient is a 27-year-old female ex cigarette smoker who presents with a several day history of a cough  She has no fever she has no wheezing she has no sinus symptoms or sore throat  Her smoking history remote at this time  Cough   Pertinent negatives include no sore throat, shortness of breath or wheezing  Review of Systems   HENT: Negative for sinus pain, sore throat and trouble swallowing  Respiratory: Positive for cough  Negative for shortness of breath and wheezing  Musculoskeletal: Negative  Objective:     Physical Exam   Constitutional: She appears well-developed  HENT:   Head: Normocephalic and atraumatic  Mouth/Throat: No oropharyngeal exudate  Eyes: Pupils are equal, round, and reactive to light  Neck: Normal range of motion  Cardiovascular: Normal rate  Pulmonary/Chest: Effort normal and breath sounds normal    Musculoskeletal: Normal range of motion

## 2018-01-25 NOTE — PATIENT INSTRUCTIONS
This upper respiratory infection appears to be viral   Your exam is negative  Used the clock medication as written  Consider vitamin D3 5000 units daily for a week or so  This may help to to boost your immune system

## 2018-01-29 ENCOUNTER — TELEPHONE (OUTPATIENT)
Dept: FAMILY MEDICINE CLINIC | Facility: HOSPITAL | Age: 73
End: 2018-01-29

## 2018-01-30 ENCOUNTER — TELEPHONE (OUTPATIENT)
Dept: FAMILY MEDICINE CLINIC | Facility: HOSPITAL | Age: 73
End: 2018-01-30

## 2018-01-30 ENCOUNTER — OFFICE VISIT (OUTPATIENT)
Dept: FAMILY MEDICINE CLINIC | Facility: HOSPITAL | Age: 73
End: 2018-01-30
Payer: COMMERCIAL

## 2018-01-30 VITALS
WEIGHT: 129 LBS | SYSTOLIC BLOOD PRESSURE: 128 MMHG | TEMPERATURE: 98.2 F | BODY MASS INDEX: 23.74 KG/M2 | HEART RATE: 68 BPM | DIASTOLIC BLOOD PRESSURE: 68 MMHG | RESPIRATION RATE: 16 BRPM | HEIGHT: 62 IN

## 2018-01-30 DIAGNOSIS — J42 CHRONIC BRONCHITIS, UNSPECIFIED CHRONIC BRONCHITIS TYPE (HCC): Primary | Chronic | ICD-10-CM

## 2018-01-30 DIAGNOSIS — J01.90 ACUTE SINUSITIS, RECURRENCE NOT SPECIFIED, UNSPECIFIED LOCATION: ICD-10-CM

## 2018-01-30 DIAGNOSIS — M79.18 MUSCLE PAIN, MYOFACIAL: ICD-10-CM

## 2018-01-30 DIAGNOSIS — E78.5 HYPERLIPIDEMIA, UNSPECIFIED HYPERLIPIDEMIA TYPE: ICD-10-CM

## 2018-01-30 PROBLEM — R05.8 DRY COUGH: Status: ACTIVE | Noted: 2018-01-30

## 2018-01-30 PROBLEM — R73.03 PREDIABETES: Status: ACTIVE | Noted: 2017-06-07

## 2018-01-30 PROBLEM — N63.20 LEFT BREAST LUMP: Status: ACTIVE | Noted: 2017-06-23

## 2018-01-30 PROCEDURE — 99214 OFFICE O/P EST MOD 30 MIN: CPT | Performed by: PHYSICIAN ASSISTANT

## 2018-01-30 RX ORDER — PREDNISONE 10 MG/1
10 TABLET ORAL DAILY
Qty: 5 TABLET | Refills: 2 | Status: SHIPPED | OUTPATIENT
Start: 2018-01-30 | End: 2018-03-12 | Stop reason: CLARIF

## 2018-01-30 RX ORDER — FLUTICASONE PROPIONATE 50 MCG
1 SPRAY, SUSPENSION (ML) NASAL DAILY
Qty: 16 G | Refills: 1 | Status: SHIPPED | OUTPATIENT
Start: 2018-01-30 | End: 2018-04-27 | Stop reason: ALTCHOICE

## 2018-01-30 RX ORDER — AZITHROMYCIN 250 MG/1
250 TABLET, FILM COATED ORAL EVERY 24 HOURS
Qty: 6 TABLET | Refills: 0 | Status: SHIPPED | OUTPATIENT
Start: 2018-01-30 | End: 2018-02-04

## 2018-01-30 NOTE — PROGRESS NOTES
Assessment/Plan:    Dry cough  Dry cough past one to 2  Weeks  Muscle pain, myofacial  Muscle pain affecting right leg past few years, affecting walking  Subjective:      Patient ID: Chris Gregorio is a 81 y o  female  81year old white female c/o dry cough past one to two weeks, went to urgent care 1/25/18 and was given perls  Cough worse after lying down  Has cut down on smoking, now only 5 cigs  Weekly  Also c/o right leg, muscle cramps, past few years  Used to work at [employer], retired last year, July  Plans to travel/go on cruise with boyfriend  Has no  runny nose, and no headaches  Muscle Pain   This is a chronic problem  The current episode started more than 1 year ago  The problem occurs constantly  The problem has been gradually worsening since onset  Pertinent negatives include no fatigue, fever, shortness of breath or wheezing  Review of Systems   Constitutional: Negative for chills, diaphoresis, fatigue and fever  HENT: Positive for sore throat  Negative for ear pain, facial swelling, postnasal drip, rhinorrhea, sinus pain, sinus pressure and voice change  Respiratory: Positive for cough  Negative for shortness of breath and wheezing  Musculoskeletal: Positive for myalgias  Negative for arthralgias  Neurological: Negative for numbness  Objective:     Physical Exam   Constitutional: She is oriented to person, place, and time  She appears well-developed and well-nourished  No distress  HENT:   Head: Normocephalic and atraumatic  Right Ear: External ear normal    Left Ear: External ear normal    Mouth/Throat: Oropharynx is clear and moist  No oropharyngeal exudate  TM bulging and erythematous, with congestion noted in middle ears  Turbinates inflamed, bilaterally  Pulmonary/Chest: Effort normal  She has no wheezes  She has no rales  Diminished breath sounds noted, all over  Musculoskeletal: Normal range of motion  She exhibits tenderness  She exhibits no edema  Patient did have stiffness when ambulating and moving around, ex  Getting on exam table, was straining somewhat  Had tenderness when palpating right lower leg  Neurological: She is alert and oriented to person, place, and time  Skin: She is not diaphoretic  Psychiatric: She has a normal mood and affect   Her behavior is normal  Judgment and thought content normal

## 2018-01-30 NOTE — PATIENT INSTRUCTIONS
Patient to start Zithromax and short course of prednisone  Can also start using fluticasone at bedtime once a day  Reyes Matute for complete blood work fasting  Reviewed and discussed all medications patient unsure of what meds she is currently taking  Stop statin for 2 weeks then restart every other day and start Co Q10 200 mg once a day  Written instructions given to patient as well as blood work orders  Patient to call office or return if symptoms worsen or persist after taking medication

## 2018-02-28 LAB
ALBUMIN SERPL-MCNC: 4.1 G/DL (ref 3.6–5.1)
ALBUMIN/GLOB SERPL: 1.6 (CALC) (ref 1–2.5)
ALP SERPL-CCNC: 110 U/L (ref 33–130)
ALT SERPL-CCNC: 20 U/L (ref 6–29)
AST SERPL-CCNC: 19 U/L (ref 10–35)
BASOPHILS # BLD AUTO: 53 CELLS/UL (ref 0–200)
BASOPHILS NFR BLD AUTO: 0.7 %
BILIRUB SERPL-MCNC: 0.5 MG/DL (ref 0.2–1.2)
BUN SERPL-MCNC: 23 MG/DL (ref 7–25)
BUN/CREAT SERPL: 17 (CALC) (ref 6–22)
CALCIUM SERPL-MCNC: 9.3 MG/DL (ref 8.6–10.4)
CHLORIDE SERPL-SCNC: 105 MMOL/L (ref 98–110)
CHOLEST SERPL-MCNC: 255 MG/DL
CHOLEST/HDLC SERPL: 4.9 (CALC)
CO2 SERPL-SCNC: 31 MMOL/L (ref 20–31)
CREAT SERPL-MCNC: 1.39 MG/DL (ref 0.6–0.93)
EOSINOPHIL # BLD AUTO: 220 CELLS/UL (ref 15–500)
EOSINOPHIL NFR BLD AUTO: 2.9 %
ERYTHROCYTE [DISTWIDTH] IN BLOOD BY AUTOMATED COUNT: 13.3 % (ref 11–15)
GLOBULIN SER CALC-MCNC: 2.5 G/DL (CALC) (ref 1.9–3.7)
GLUCOSE SERPL-MCNC: 90 MG/DL (ref 65–99)
HBA1C MFR BLD: 5.5 % OF TOTAL HGB
HCT VFR BLD AUTO: 41.5 % (ref 35–45)
HDLC SERPL-MCNC: 52 MG/DL
HGB BLD-MCNC: 13.8 G/DL (ref 11.7–15.5)
LDLC SERPL CALC-MCNC: 178 MG/DL (CALC)
LYMPHOCYTES # BLD AUTO: 2911 CELLS/UL (ref 850–3900)
LYMPHOCYTES NFR BLD AUTO: 38.3 %
MCH RBC QN AUTO: 31.4 PG (ref 27–33)
MCHC RBC AUTO-ENTMCNC: 33.3 G/DL (ref 32–36)
MCV RBC AUTO: 94.3 FL (ref 80–100)
MONOCYTES # BLD AUTO: 851 CELLS/UL (ref 200–950)
MONOCYTES NFR BLD AUTO: 11.2 %
NEUTROPHILS # BLD AUTO: 3564 CELLS/UL (ref 1500–7800)
NEUTROPHILS NFR BLD AUTO: 46.9 %
NONHDLC SERPL-MCNC: 203 MG/DL (CALC)
PLATELET # BLD AUTO: 217 THOUSAND/UL (ref 140–400)
PMV BLD REES-ECKER: 11.1 FL (ref 7.5–12.5)
POTASSIUM SERPL-SCNC: 4.5 MMOL/L (ref 3.5–5.3)
PROT SERPL-MCNC: 6.6 G/DL (ref 6.1–8.1)
RBC # BLD AUTO: 4.4 MILLION/UL (ref 3.8–5.1)
SL AMB EGFR AFRICAN AMERICAN: 44 ML/MIN/1.73M2
SL AMB EGFR NON AFRICAN AMERICAN: 38 ML/MIN/1.73M2
SODIUM SERPL-SCNC: 141 MMOL/L (ref 135–146)
T4 FREE SERPL-MCNC: 1 NG/DL (ref 0.8–1.8)
TRIGL SERPL-MCNC: 119 MG/DL
TSH SERPL-ACNC: 10.72 MIU/L (ref 0.4–4.5)
VIT B12 SERPL-MCNC: 501 PG/ML (ref 200–1100)
WBC # BLD AUTO: 7.6 THOUSAND/UL (ref 3.8–10.8)

## 2018-03-08 ENCOUNTER — TELEPHONE (OUTPATIENT)
Dept: FAMILY MEDICINE CLINIC | Facility: HOSPITAL | Age: 73
End: 2018-03-08

## 2018-03-09 ENCOUNTER — TELEPHONE (OUTPATIENT)
Dept: FAMILY MEDICINE CLINIC | Facility: HOSPITAL | Age: 73
End: 2018-03-09

## 2018-03-09 DIAGNOSIS — E03.9 HYPOTHYROIDISM, UNSPECIFIED TYPE: ICD-10-CM

## 2018-03-09 DIAGNOSIS — I10 ESSENTIAL HYPERTENSION: ICD-10-CM

## 2018-03-09 DIAGNOSIS — E78.00 PURE HYPERCHOLESTEROLEMIA: Primary | ICD-10-CM

## 2018-03-09 RX ORDER — LEVOTHYROXINE SODIUM 0.1 MG/1
100 TABLET ORAL DAILY
Qty: 30 TABLET | Refills: 3 | Status: SHIPPED | OUTPATIENT
Start: 2018-03-09 | End: 2019-01-01 | Stop reason: SDUPTHER

## 2018-03-09 RX ORDER — LISINOPRIL 10 MG/1
10 TABLET ORAL DAILY
Qty: 30 TABLET | Refills: 3 | Status: SHIPPED | OUTPATIENT
Start: 2018-03-09 | End: 2018-04-05 | Stop reason: SDUPTHER

## 2018-03-09 RX ORDER — ATORVASTATIN CALCIUM 40 MG/1
40 TABLET, FILM COATED ORAL DAILY
Qty: 30 TABLET | Refills: 3 | Status: SHIPPED | OUTPATIENT
Start: 2018-03-09 | End: 2018-03-09 | Stop reason: SDUPTHER

## 2018-03-09 RX ORDER — ATORVASTATIN CALCIUM 40 MG/1
40 TABLET, FILM COATED ORAL DAILY
Qty: 30 TABLET | Refills: 0 | Status: SHIPPED | OUTPATIENT
Start: 2018-03-09 | End: 2018-04-05 | Stop reason: SDUPTHER

## 2018-03-09 NOTE — TELEPHONE ENCOUNTER
----- Message from Deanne Leon sent at 3/8/2018  4:02 PM EST -----  Regarding: LILIYA GARCIA    PT WANTS A CALL ABOUT HER 2/26/18 B/W --SHE NEVER GOT RESULTS     # [phone]

## 2018-03-09 NOTE — TELEPHONE ENCOUNTER
Please advise   pt is requesting the results of her recent labs     ALSO     she needs another ABX SENT TO PHARMACY, for the past 2 days she started with a bad cough sore throat low grade fever and body aches   DD

## 2018-03-12 ENCOUNTER — OFFICE VISIT (OUTPATIENT)
Dept: FAMILY MEDICINE CLINIC | Facility: HOSPITAL | Age: 73
End: 2018-03-12
Payer: COMMERCIAL

## 2018-03-12 VITALS
HEIGHT: 62 IN | RESPIRATION RATE: 16 BRPM | TEMPERATURE: 97.1 F | SYSTOLIC BLOOD PRESSURE: 118 MMHG | HEART RATE: 64 BPM | BODY MASS INDEX: 23.92 KG/M2 | WEIGHT: 130 LBS | DIASTOLIC BLOOD PRESSURE: 70 MMHG

## 2018-03-12 DIAGNOSIS — J06.9 UPPER RESPIRATORY TRACT INFECTION, UNSPECIFIED TYPE: ICD-10-CM

## 2018-03-12 DIAGNOSIS — M25.551 PAIN OF RIGHT HIP JOINT: Primary | ICD-10-CM

## 2018-03-12 DIAGNOSIS — M79.604 RIGHT LEG PAIN: ICD-10-CM

## 2018-03-12 PROCEDURE — 99214 OFFICE O/P EST MOD 30 MIN: CPT | Performed by: PHYSICIAN ASSISTANT

## 2018-03-12 RX ORDER — OXYCODONE HYDROCHLORIDE AND ACETAMINOPHEN 5; 325 MG/1; MG/1
1 TABLET ORAL EVERY 4 HOURS PRN
Qty: 18 TABLET | Refills: 0 | Status: SHIPPED | OUTPATIENT
Start: 2018-03-12 | End: 2018-04-23

## 2018-03-12 RX ORDER — AZITHROMYCIN 250 MG/1
250 TABLET, FILM COATED ORAL EVERY 24 HOURS
Qty: 6 TABLET | Refills: 0 | Status: SHIPPED | OUTPATIENT
Start: 2018-03-12 | End: 2018-03-17

## 2018-03-12 NOTE — PROGRESS NOTES
Assessment/Plan:         Diagnoses and all orders for this visit:    Pain of right hip joint  -     Ambulatory referral to Pain Management; Future  -     oxyCODONE-acetaminophen (PERCOCET) 5-325 mg per tablet; Take 1 tablet by mouth every 4 (four) hours as needed for moderate pain Max Daily Amount: 6 tablets    Right leg pain  -     Ambulatory referral to Pain Management; Future  -     oxyCODONE-acetaminophen (PERCOCET) 5-325 mg per tablet; Take 1 tablet by mouth every 4 (four) hours as needed for moderate pain Max Daily Amount: 6 tablets    Upper respiratory tract infection, unspecified type  -     azithromycin (ZITHROMAX) 250 mg tablet; Take 1 tablet (250 mg total) by mouth every 24 hours for 5 days Take 2 tabs  Day one then one daily for 5 days  Subjective:      Patient ID: Lupe Boyd is a 81 y o  female  81year old white female c/o sore throat and congestion  Tried otc meds  Including cough syrup, and Mucinex, but nothing is helping  Has a cough productive of phlegm  Per daughter, who is a therapist, and patient has severe, chronic right leg pains  Was in physical therapy in the past, helped somewhat  Review of Systems   Constitutional: Positive for fatigue  Negative for chills, diaphoresis and fever  HENT: Positive for postnasal drip and rhinorrhea  Negative for ear pain  Respiratory: Positive for cough and shortness of breath  Objective:      /70   Pulse 64   Temp (!) 97 1 °F (36 2 °C) (Tympanic)   Resp 16   Ht 5' 2" (1 575 m)   Wt 59 kg (130 lb)   BMI 23 78 kg/m²          Physical Exam   Constitutional: She is oriented to person, place, and time  She appears well-developed and well-nourished  No distress  HENT:   Head: Normocephalic and atraumatic  Right Ear: External ear normal    Left Ear: External ear normal    Mouth/Throat: No oropharyngeal exudate  TM erythematous      Eyes: Conjunctivae and EOM are normal  Right eye exhibits no discharge  Left eye exhibits no discharge  Pulmonary/Chest: Breath sounds normal  No respiratory distress  She has no wheezes  She has no rales  Musculoskeletal: Normal range of motion  She exhibits no edema, tenderness or deformity  Neurological: She is alert and oriented to person, place, and time  Skin: She is not diaphoretic

## 2018-03-12 NOTE — PATIENT INSTRUCTIONS
Patient given a few days worth of Percocet for leg pains to be used p r n  Referred to pain management  Start Zithromax for upper respiratory infection recommend increasing water intake  Recommend taking Co Q10 to prevent side effects of statin

## 2018-03-20 ENCOUNTER — CONSULT (OUTPATIENT)
Dept: PAIN MEDICINE | Facility: CLINIC | Age: 73
End: 2018-03-20
Payer: COMMERCIAL

## 2018-03-20 VITALS
DIASTOLIC BLOOD PRESSURE: 68 MMHG | HEIGHT: 62 IN | WEIGHT: 131.2 LBS | TEMPERATURE: 97.5 F | BODY MASS INDEX: 24.14 KG/M2 | SYSTOLIC BLOOD PRESSURE: 118 MMHG | HEART RATE: 74 BPM

## 2018-03-20 DIAGNOSIS — M54.16 RIGHT LUMBAR RADICULITIS: Primary | ICD-10-CM

## 2018-03-20 DIAGNOSIS — M25.551 PAIN OF RIGHT HIP JOINT: ICD-10-CM

## 2018-03-20 DIAGNOSIS — M79.604 RIGHT LEG PAIN: ICD-10-CM

## 2018-03-20 PROCEDURE — 99204 OFFICE O/P NEW MOD 45 MIN: CPT | Performed by: ANESTHESIOLOGY

## 2018-03-20 RX ORDER — METHYLPREDNISOLONE 4 MG/1
TABLET ORAL
Qty: 21 TABLET | Refills: 0 | Status: SHIPPED | OUTPATIENT
Start: 2018-03-20 | End: 2018-04-23

## 2018-03-20 NOTE — PROGRESS NOTES
Assessment:  1  Right lumbar radiculitis    2  Pain of right hip joint    3  Right leg pain        Plan: At this point the patient's pain persists despite time, relative rest, activity modification, and nonsteroidal anti-inflammatories  She has undergone both chiropractic treatment and physical therapy  Her pain is significantly interfering with her daily living activities  At this point, I believe is appropriate to order an MRI of the lumbar spine to rule out any significant etiology of her symptoms  I will start her on a titrating dose of oral methylprednisone to address any inflammatory component of the patient's pain  She understands she should not take nonsteroidal anti-inflammatories until she is finished with this steroid  If she has any problems or questions she will give us a call  Also but the patient had blood clots in the past and she does have tenderness on long her right calf will obtain ultrasound trial any significant issues  My impressions and treatment recommendations were discussed in detail with the patient who verbalized understanding and had no further questions  Discharge instructions were provided  I personally saw and examined the patient and I agree with the above discussed plan of care  Orders Placed This Encounter   Procedures    MRI lumbar spine without contrast     Standing Status:   Future     Standing Expiration Date:   3/20/2022     Scheduling Instructions: There is no preparation for this test  Please leave your jewelry and valuables at home, wedding rings are the exception  Please bring your insurance cards, a form of photo ID and a list of your medications with you  Arrive 15 minutes prior to your appointment time in order to register  Please bring any prior CT or MRI studies of this area that were not performed at a [facility]  To schedule this appointment, please contact Central Scheduling at [phone]       Order Specific Question:   What is the patient's sedation requirement? Answer:   No Sedation     New Medications Ordered This Visit   Medications    Methylprednisolone 4 MG TBPK     Sig: Use as directed on package     Dispense:  21 tablet     Refill:  0       History of Present Illness:    Yenifer Leiva is a 81 y o  female with the whole longstanding history of low back pain 6 month history of right lower extremity pain  She is unaware of any clear precipitating event denies any trauma or injury  She has undergone physical therapy and chiropractic treatment short-term relief  She rates her pain to a 9 to 10/10 on visual analog scale to nearly constant described as burning and shooting with weakness of her lower limbs  I have personally reviewed and/or updated the patient's past medical history, past surgical history, family history, social history, current medications, allergies, and vital signs today  Review of Systems:    Review of Systems   Constitutional: Positive for unexpected weight change  Negative for fever  HENT: Negative for trouble swallowing  Eyes: Negative for visual disturbance  Respiratory: Negative for shortness of breath and wheezing  Cardiovascular: Negative for chest pain and palpitations  Gastrointestinal: Positive for diarrhea  Negative for constipation, nausea and vomiting  Endocrine: Negative for cold intolerance, heat intolerance and polydipsia  Genitourinary: Positive for frequency  Negative for difficulty urinating  Musculoskeletal: Positive for gait problem (DIFFICULTY WALKING ) and joint swelling (STIFFNESS)  Negative for arthralgias and myalgias  Skin: Negative for rash  Neurological: Positive for weakness (MUSCLE WEAKNESS)  Negative for dizziness, seizures, syncope and headaches  Hematological: Does not bruise/bleed easily  Psychiatric/Behavioral: Negative for dysphoric mood  All other systems reviewed and are negative        Patient Active Problem List Diagnosis    Acute respiratory failure with hypoxia (HCC)    Chronic obstructive pulmonary disease (HCC)    Hypothyroidism    Aortic aneurysm (HCC)    Hyperlipidemia    Hypertension, uncontrolled    Anxiety    ASCVD (arteriosclerotic cardiovascular disease)    Lymphadenopathy, mediastinal    Aneurysm of infrarenal abdominal aorta (HCC)    Aneurysm, thoracoabdominal aortic (HCC)    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)    Benign hypertension    Mental health problem    Substance abuse    Depression    Left breast lump    Prediabetes    Dry cough    Muscle pain, myofacial       Past Medical History:   Diagnosis Date    [facility]     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     Blood type B-     [facility] obstructive [facility]) [facility]     Coronary artery disease     [facility]     [facility]) [facility]     Depression     Disease of thyroid gland     History of cataract     Hyperlipidemia     Hypertension     Prediabetes     PVD (peripheral vascular disease) ([facility])        Past Surgical History:   Procedure Laterality Date    CAROTID STENT Left     [place] COLONOSCOPY FLX DX W/COLLJ SPEC WHEN PFRMD N/A 9/27/2017    Procedure: EGD AND COLONOSCOPY;  Surgeon: Kandace Castillo MD;  Location: [facility] OR;  Service: Gastroenterology       Family History   Problem Relation Age of Onset    Hypertension Mother     Heart disease Mother     Diabetes Father     Breast cancer Daughter        Social History     Occupational History    Not on file       Social History Main Topics    Smoking status: Former Smoker     Packs/day: 0 50     Years: 45 00     Types: Cigarettes     Quit date: 08/2017    Smokeless tobacco: Never Used      Comment: Quit 3 weeks ago    Alcohol use No      Comment: rare    Drug use: No    Sexual activity: Not Currently       Current Outpatient Prescriptions on File Prior to Visit   Medication Sig    aspirin (ECOTRIN) 325 mg EC tablet Take 325 mg by mouth daily   atorvastatin (LIPITOR) 40 mg tablet Take 1 tablet (40 mg total) by mouth daily    levothyroxine 100 mcg tablet Take 1 tablet (100 mcg total) by mouth daily    lisinopril (ZESTRIL) 10 mg tablet Take 1 tablet (10 mg total) by mouth daily    metoprolol succinate (TOPROL-XL) 50 mg 24 hr tablet Take 50 mg by mouth 2 (two) times a day   oxyCODONE-acetaminophen (PERCOCET) 5-325 mg per tablet Take 1 tablet by mouth every 4 (four) hours as needed for moderate pain Max Daily Amount: 6 tablets    fluticasone (FLONASE) 50 mcg/act nasal spray 1 spray into each nostril daily     No current facility-administered medications on file prior to visit  Allergies   Allergen Reactions    Penicillins Rash       Physical Exam:    /68   Pulse 74   Temp 97 5 °F (36 4 °C) (Oral)   Ht 5' 2" (1 575 m)   Wt 59 5 kg (131 lb 3 2 oz)   BMI 24 00 kg/m²     Constitutional: normal, well developed, well nourished, alert, in no distress and non-toxic and no overt pain behavior  Eyes: anicteric  HEENT: grossly intact  Neck: supple, symmetric, trachea midline and no masses   Pulmonary:even and unlabored  Cardiovascular:No edema or pitting edema present  Skin:Normal without rashes or lesions and well hydrated  Psychiatric:Mood and affect appropriate  Neurologic:Cranial Nerves II-XII grossly intact  Musculoskeletal:  Difficulty going from sitting to standing sitting position, no tenderness to palpation lumbar sacral spine spinous process sacroiliac joint or greater trochanter bilateral, positive tenderness along the right calf positive straight leg raising on the right deep tendon reflexes are symmetrical bilateral patella absent right Achilles 1+ left Achilles, decreased sensation right L5 distribution to pinwheel, no focal motor deficit appreciated the lower limbs she has some difficulty with heel walking      Imaging  LUMBAR SPINE     INDICATION:  Right posterior low back pain that goes down to the toes since Thursday      COMPARISON:   views from CT study 3/5/2016     VIEWS:  AP, lateral and coned-down projections; 3 images     FINDINGS:     Lumbar dextroscoliosis      There is no radiographic evidence of acute fracture or destructive osseous lesion      Degenerative disc disease L5-S1 with small marginal osteophytes in the mid lumbar spine      The pedicles are intact      Aneurysmal dilatation of the thoracoabdominal aorta, better appreciated on recent CT imaging      IMPRESSION:     No acute findings      Lumbar dextroscoliosis with discogenic disease L5-S1      Aneurysmal dilatation of the thoracoabdominal aorta, better appreciated on recent CT imaging  I have personally reviewed pertinent films in PACS

## 2018-03-22 ENCOUNTER — HOSPITAL ENCOUNTER (OUTPATIENT)
Dept: NON INVASIVE DIAGNOSTICS | Facility: HOSPITAL | Age: 73
Discharge: HOME | End: 2018-03-22
Attending: ANESTHESIOLOGY
Payer: COMMERCIAL

## 2018-03-22 DIAGNOSIS — M79.604 RIGHT LEG PAIN: ICD-10-CM

## 2018-03-22 PROCEDURE — 93971 EXTREMITY STUDY: CPT | Performed by: INTERNAL MEDICINE

## 2018-03-22 PROCEDURE — 93971 EXTREMITY STUDY: CPT

## 2018-03-23 ENCOUNTER — TELEPHONE (OUTPATIENT)
Dept: PAIN MEDICINE | Facility: CLINIC | Age: 73
End: 2018-03-23

## 2018-03-23 NOTE — TELEPHONE ENCOUNTER
S/w pt, advised of above  Pt questioned the cause of her pain  Advised pt to proceed w/ MRI as ordered and continue w/ medications as prescribed / directed  This office will cb when mri results are available   Pt verbalized understanding and appreciation        ----- Message from Torey Jean-Baptiste DO sent at 3/23/2018  7:53 AM EDT -----  PAtients Dopplers were negative for DVT

## 2018-04-02 ENCOUNTER — TELEPHONE (OUTPATIENT)
Dept: PAIN MEDICINE | Facility: CLINIC | Age: 73
End: 2018-04-02

## 2018-04-02 ENCOUNTER — TRANSCRIBE ORDERS (OUTPATIENT)
Dept: ADMINISTRATIVE | Facility: HOSPITAL | Age: 73
End: 2018-04-02

## 2018-04-02 DIAGNOSIS — I71.4 ABDOMINAL AORTIC ANEURYSM WITHOUT RUPTURE (HCC): Primary | ICD-10-CM

## 2018-04-02 DIAGNOSIS — M79.18 MUSCLE PAIN, MYOFACIAL: Primary | ICD-10-CM

## 2018-04-02 RX ORDER — GABAPENTIN 100 MG/1
CAPSULE ORAL
Qty: 90 CAPSULE | Refills: 0 | Status: SHIPPED | OUTPATIENT
Start: 2018-04-02 | End: 2018-04-23 | Stop reason: SDUPTHER

## 2018-04-02 NOTE — TELEPHONE ENCOUNTER
S/w pt, states she has approx 25% improvement in her pain w/ oral steroid  Pt c/o shooting pain w/ muscle cramping in her foot and leg  States she has some relief with OTC "leg cramp" medication  Also drinking pickle juice with some relief  Pt states she is scheduled for MRI on Thursday  Questioning if there is anything she can do in the meantime to get some relief  Advised pt, will d/w DG and cb to advise  Pt verbalized understanding  Note: pt states she has 6 refills per her medrol dose pack  Will fu w/ Pharmacy  Advised pt, do not refill medrol dose pack  Pt verbalized understanding and appreciation

## 2018-04-02 NOTE — TELEPHONE ENCOUNTER
Pt called and stated she received Methylpred 4mg and they don't seem to be helping  Would like to know if she can get something stronger  Please call [phone]

## 2018-04-02 NOTE — TELEPHONE ENCOUNTER
As per Dr Shad Martinez recommendations I e-scribed a script for gabapentin 100 mg, 1 PO HS x 5 days, then 1 PO BID x 5 days, then 1 PO TID  She is to see how it affects her before she drives or operates machinery and should not abruptly stop it, but rather call our office is he has any issues or side effects  Thank you

## 2018-04-03 NOTE — TELEPHONE ENCOUNTER
S/w the patient and reviewed the instructions for starting the gabapentin  She was able to write the instructions down and reviewed the possible SE and she will notify the office of any issues or SE's  She stated she is having her MRI done in [facility] for the MRI  Pt  Aware that once we receive the results, SL or DG will f/u c/ further recommendations   Just JOHN hernandez

## 2018-04-05 ENCOUNTER — HOSPITAL ENCOUNTER (OUTPATIENT)
Dept: MRI IMAGING | Facility: HOSPITAL | Age: 73
Discharge: HOME | End: 2018-04-05
Attending: ANESTHESIOLOGY
Payer: COMMERCIAL

## 2018-04-05 DIAGNOSIS — I10 ESSENTIAL HYPERTENSION: ICD-10-CM

## 2018-04-05 DIAGNOSIS — E78.00 PURE HYPERCHOLESTEROLEMIA: ICD-10-CM

## 2018-04-05 DIAGNOSIS — M54.16 RIGHT LUMBAR RADICULITIS: ICD-10-CM

## 2018-04-05 PROCEDURE — 72148 MRI LUMBAR SPINE W/O DYE: CPT

## 2018-04-05 RX ORDER — LISINOPRIL 10 MG/1
10 TABLET ORAL DAILY
Qty: 90 TABLET | Refills: 3 | Status: SHIPPED | OUTPATIENT
Start: 2018-04-05 | End: 2018-01-01 | Stop reason: SDUPTHER

## 2018-04-05 RX ORDER — ATORVASTATIN CALCIUM 40 MG/1
40 TABLET, FILM COATED ORAL DAILY
Qty: 90 TABLET | Refills: 3 | Status: SHIPPED | OUTPATIENT
Start: 2018-04-05 | End: 2019-01-01 | Stop reason: SDUPTHER

## 2018-04-05 RX ORDER — METOPROLOL SUCCINATE 50 MG/1
50 TABLET, EXTENDED RELEASE ORAL 2 TIMES DAILY
Qty: 60 TABLET | Refills: 5 | Status: SHIPPED | OUTPATIENT
Start: 2018-04-05 | End: 2018-01-01 | Stop reason: SDUPTHER

## 2018-04-05 NOTE — TELEPHONE ENCOUNTER
Yes I would think so-I haven't seen her for 2 years-I should she is too complicated for Dionisio Guzman

## 2018-04-06 ENCOUNTER — TELEPHONE (OUTPATIENT)
Dept: PAIN MEDICINE | Facility: CLINIC | Age: 73
End: 2018-04-06

## 2018-04-06 NOTE — TELEPHONE ENCOUNTER
----- Message from Fiorella Hurtado DO sent at 4/5/2018  3:00 PM EDT -----  Patient's MRI reveals a disc affecting the right L5 nerve root would recommend right L5 transforaminal epidural steroid injection x2

## 2018-04-06 NOTE — TELEPHONE ENCOUNTER
Attempted to reach pt at home #  Per male, please try her cell [phone]  Attempted to reach pt at cell # provided  LMOM to cb  Provided cb number and office hours

## 2018-04-09 NOTE — TELEPHONE ENCOUNTER
S/w pt, advised of above  Confirmed asa - pt is not aware of any other blood thinning medications  Advised pt, [facility]  will cb to schedule procedures  Pt verbalized understanding and appreciation

## 2018-04-09 NOTE — TELEPHONE ENCOUNTER
patient called to get the results for this  Please call back when available     Call back number: [phone]

## 2018-04-10 ENCOUNTER — TELEPHONE (OUTPATIENT)
Dept: PAIN MEDICINE | Facility: CLINIC | Age: 73
End: 2018-04-10

## 2018-04-10 NOTE — TELEPHONE ENCOUNTER
Proc #1 scheduled for 4/13/2018, #2 scheduled on 4/27/2018, pt aware need for , npo 1 hr prior, wear sweatpants/knitpants, if become ill/fever/antbx call to r/s, pt verbalized understanding

## 2018-04-10 NOTE — TELEPHONE ENCOUNTER
M of 4/9/2018 @   Pt calling, please fax a copy of the most recent lumbar MRI to [facility] at [address]   Fax # [phone]    Cb# [phone]

## 2018-04-13 ENCOUNTER — HOSPITAL ENCOUNTER (OUTPATIENT)
Dept: RADIOLOGY | Facility: CLINIC | Age: 73
Discharge: HOME | End: 2018-04-13
Attending: ANESTHESIOLOGY | Admitting: ANESTHESIOLOGY
Payer: COMMERCIAL

## 2018-04-13 VITALS
OXYGEN SATURATION: 97 % | DIASTOLIC BLOOD PRESSURE: 90 MMHG | TEMPERATURE: 98.1 F | SYSTOLIC BLOOD PRESSURE: 170 MMHG | HEART RATE: 93 BPM | RESPIRATION RATE: 18 BRPM

## 2018-04-13 DIAGNOSIS — M51.26 DISPLACEMENT OF LUMBAR INTERVERTEBRAL DISC WITHOUT MYELOPATHY: ICD-10-CM

## 2018-04-13 DIAGNOSIS — M54.16 LUMBAR RADICULITIS: ICD-10-CM

## 2018-04-13 PROCEDURE — 64483 NJX AA&/STRD TFRM EPI L/S 1: CPT | Performed by: ANESTHESIOLOGY

## 2018-04-13 RX ORDER — PAPAVERINE HCL 150 MG
20 CAPSULE, EXTENDED RELEASE ORAL ONCE
Status: COMPLETED | OUTPATIENT
Start: 2018-04-13 | End: 2018-04-13

## 2018-04-13 RX ORDER — LIDOCAINE HYDROCHLORIDE 10 MG/ML
5 INJECTION, SOLUTION EPIDURAL; INFILTRATION; INTRACAUDAL; PERINEURAL ONCE
Status: COMPLETED | OUTPATIENT
Start: 2018-04-13 | End: 2018-04-13

## 2018-04-13 RX ADMIN — LIDOCAINE HYDROCHLORIDE 3 ML: 10 INJECTION, SOLUTION EPIDURAL; INFILTRATION; INTRACAUDAL; PERINEURAL at 11:19

## 2018-04-13 RX ADMIN — DEXAMETHASONE SODIUM PHOSPHATE 20 MG: 10 INJECTION, SOLUTION INTRAMUSCULAR; INTRAVENOUS at 11:19

## 2018-04-13 RX ADMIN — IOHEXOL 1 ML: 300 INJECTION, SOLUTION INTRAVENOUS at 11:30

## 2018-04-13 NOTE — H&P
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     [facility]     Depression     Disease of thyroid gland     History of cataract     Hyperlipidemia     Hypertension     Prediabetes     [facility]ipheral [facility]        Past Surgical History:   Procedure Laterality Date    CAROTID STENT Left     ME COLONOSCOPY FLX DX W/COLLJ SPEC WHEN PFRMD N/A 9/27/2017    Procedure: EGD AND COLONOSCOPY;  Surgeon: Flako Powell MD;  Location:  MAIN OR;  Service: Gastroenterology         Current Outpatient Prescriptions:     aspirin (ECOTRIN) 325 mg EC tablet, Take 325 mg by mouth daily  , Disp: , Rfl:     atorvastatin (LIPITOR) 40 mg tablet, Take 1 tablet (40 mg total) by mouth daily, Disp: 90 tablet, Rfl: 3    fluticasone (FLONASE) 50 mcg/act nasal spray, 1 spray into each nostril daily, Disp: 16 g, Rfl: 1    gabapentin (NEURONTIN) 100 mg capsule, Take 1 PO HS x 5 days, then 1 PO BID x  5 days, then 1 PO TID , Disp: 90 capsule, Rfl: 0    levothyroxine 100 mcg tablet, Take 1 tablet (100 mcg total) by mouth daily, Disp: 30 tablet, Rfl: 3    lisinopril (ZESTRIL) 10 mg tablet, Take 1 tablet (10 mg total) by mouth daily, Disp: 90 tablet, Rfl: 3    Methylprednisolone 4 MG TBPK, Use as directed on package, Disp: 21 tablet, Rfl: 0    metoprolol succinate (TOPROL-XL) 50 mg 24 hr tablet, Take 1 tablet (50 mg total) by mouth 2 (two) times a day, Disp: 60 tablet, Rfl: 5    oxyCODONE-acetaminophen (PERCOCET) 5-325 mg per tablet, Take 1 tablet by mouth every 4 (four) hours as needed for moderate pain Max Daily Amount: 6 tablets, Disp: 18 tablet, Rfl: 0    Allergies   Allergen Reactions    Penicillins Rash       Physical Exam:   Vitals:    04/13/18 1105   BP: 141/89   Pulse: 84   Resp: 20   Temp: 98 1 °F (36 7 °C)   SpO2: 98%     General: Awake, Alert, Oriented x 3, Mood and affect appropriate  Respiratory: Respirations even and unlabored  Cardiovascular: Peripheral pulses intact; no edema  Musculoskeletal Exam:  Positive straight leg raising on the right    ASA Score: II    Assessment:   1  Displacement of lumbar intervertebral disc without myelopathy    2   Lumbar radiculitis        Plan: RT L5 TFESI #1

## 2018-04-13 NOTE — DISCHARGE INSTRUCTIONS
Epidural Steroid Injection   WHAT YOU NEED TO KNOW:   An epidural steroid injection (PENNY) is a procedure to inject steroid medicine into the epidural space  The epidural space is between your spinal cord and vertebrae  Steroids reduce inflammation and fluid buildup in your spine that may be causing pain  You may be given pain medicine along with the steroids  ACTIVITY  · Do not drive or operate machinery today  · No strenuous activity today - bending, lifting, etc   · You may resume normal activites starting tomorrow - start slowly and as tolerated  · You may shower today, but no tub baths or hot tubs  · You may have numbness for several hours from the local anesthetic  Please use caution and common sense, especially with weight-bearing activities  CARE OF THE INJECTION SITE  · If you have soreness or pain, apply ice to the area today (20 minutes on/20 minutes off)  · Starting tomorrow, you may use warm, moist heat or ice if needed  · You may have an increase or change in your discomfort for 36-48 hours after your treatment  · Apply ice and continue with any pain medication you have been prescribed  · Notify [facility] if you have any of the following: redness, drainage, swelling, headache, stiff neck or fever above 100°F     SPECIAL INSTRUCTIONS  · Our office will contact you in approximately 7 days for a progress report  MEDICATIONS  · Continue to take all routine medications  · Our office may have instructed you to hold some medications  If you have a problem specifically related to your procedure, please call our office at [phone]  Problems not related to your procedure should be directed to your primary care physician

## 2018-04-17 ENCOUNTER — TELEPHONE (OUTPATIENT)
Dept: PAIN MEDICINE | Facility: MEDICAL CENTER | Age: 73
End: 2018-04-17

## 2018-04-17 NOTE — TELEPHONE ENCOUNTER
S/w the patient and she had an injection on 4/13  Reiterated the postop instructions and that she has to give it some more time for the shot to help  Reviewed that we would be calling her at the end of the week to f/u post opro  The patient verbalized understanding

## 2018-04-17 NOTE — TELEPHONE ENCOUNTER
Phone call from patient stating that she is still having a lot of pain since procedure  Please call patient at [phone]

## 2018-04-20 ENCOUNTER — TELEPHONE (OUTPATIENT)
Dept: PAIN MEDICINE | Facility: CLINIC | Age: 73
End: 2018-04-20

## 2018-04-20 NOTE — TELEPHONE ENCOUNTER
1st attempt LM to COB needing %of relief and pain level        S/P RT L5 TFESI #1 on 04/13/18 w/SL [place]  RT L5 TFESI #2 scheduled on 04/27/18

## 2018-04-20 NOTE — TELEPHONE ENCOUNTER
Pt returning call  Pt states pain level is a 6 and has a relief of 50%  Pt stated that now she is having pain in both legs   PT can be reached at 21

## 2018-04-23 ENCOUNTER — HOSPITAL ENCOUNTER (OUTPATIENT)
Dept: NON INVASIVE DIAGNOSTICS | Facility: HOSPITAL | Age: 73
Discharge: HOME | End: 2018-04-23
Attending: SURGERY
Payer: COMMERCIAL

## 2018-04-23 ENCOUNTER — OFFICE VISIT (OUTPATIENT)
Dept: PAIN MEDICINE | Facility: CLINIC | Age: 73
End: 2018-04-23
Payer: COMMERCIAL

## 2018-04-23 VITALS
DIASTOLIC BLOOD PRESSURE: 82 MMHG | BODY MASS INDEX: 24.29 KG/M2 | HEIGHT: 62 IN | SYSTOLIC BLOOD PRESSURE: 142 MMHG | WEIGHT: 132 LBS | HEART RATE: 72 BPM | TEMPERATURE: 97.9 F

## 2018-04-23 DIAGNOSIS — M54.41 CHRONIC BILATERAL LOW BACK PAIN WITH RIGHT-SIDED SCIATICA: Primary | ICD-10-CM

## 2018-04-23 DIAGNOSIS — M54.16 LUMBAR RADICULOPATHY: ICD-10-CM

## 2018-04-23 DIAGNOSIS — M48.062 SPINAL STENOSIS OF LUMBAR REGION WITH NEUROGENIC CLAUDICATION: ICD-10-CM

## 2018-04-23 DIAGNOSIS — G89.29 CHRONIC BILATERAL LOW BACK PAIN WITH RIGHT-SIDED SCIATICA: Primary | ICD-10-CM

## 2018-04-23 DIAGNOSIS — I71.4 ABDOMINAL AORTIC ANEURYSM WITHOUT RUPTURE (HCC): ICD-10-CM

## 2018-04-23 DIAGNOSIS — M79.18 MUSCLE PAIN, MYOFACIAL: ICD-10-CM

## 2018-04-23 DIAGNOSIS — G89.4 CHRONIC PAIN SYNDROME: ICD-10-CM

## 2018-04-23 PROCEDURE — 93978 VASCULAR STUDY: CPT | Performed by: SURGERY

## 2018-04-23 PROCEDURE — 93978 VASCULAR STUDY: CPT

## 2018-04-23 PROCEDURE — 99214 OFFICE O/P EST MOD 30 MIN: CPT | Performed by: NURSE PRACTITIONER

## 2018-04-23 RX ORDER — GABAPENTIN 100 MG/1
CAPSULE ORAL
Qty: 180 CAPSULE | Refills: 1 | Status: SHIPPED | OUTPATIENT
Start: 2018-04-23 | End: 2018-05-24

## 2018-04-23 NOTE — PROGRESS NOTES
Assessment:  1  Chronic bilateral low back pain with right-sided sciatica    2  Spinal stenosis of lumbar region with neurogenic claudication    3  Lumbar radiculopathy    4  Chronic pain syndrome    5  Muscle pain, myofacial        Plan:  While the patient was in the office today, I discussed with the patient that at this point time since I do feel there is still a significant inflammatory component and she did note at least moderate relief from the 1st injection, that it would be in her best interest to proceed with a repeat transforaminal epidural steroid injection as scheduled later on this week with Dr Ronak Earl  The patient was agreeable and verbalized an understanding  However, in the meantime since she is on a low and subtherapeutic dose of gabapentin, with some improvement, but denies side effects, I do feel that it would be beneficial to slowly titrate her up to 600 mg a day over the next week or so and see how she does  I did review with her the type of medication gabapentin is, how it works, however requires a titration process that is specific each individual to reach a therapeutic dosing level  The patient will follow-up in 8 weeks for medication prescription refill and reevaluation  The patient was advised to contact the office should their symptoms worsen in the interim  The patient was agreeable and verbalized an understanding  History of Present Illness: The patient is a 81 y o  female last seen on 4/13/18 Right L5 TFESI who presents for a follow up office visit in regards to chronic pain secondary to lumbar spondylosis and stenosis  The patient currently reports that she does report that although initially right afterwards she had what seemed like an acute neuritis, that did to subside and she was noting at least moderate relief up until this morning    She reports that she woke up 3 times this morning with significant radicular leg pain and although her pain is currently not as bad as it was this morning she presents today to discuss her medication regimen and treatment plan  She is scheduled for her repeat right L5 transforaminal epidural steroid injection on April 27, 2018 with Dr Katey Montiel and is hoping that will provide more stable and significant overall relief  She is taking the gabapentin 100 mg t i d  as prescribed and does feel it is somewhat helpful, without side effects, but again is still having breakthrough pain and days were her pain is worse  Current pain medications includes:  Gabapentin 300 mg daily  The patient reports that this regimen is providing 10% pain relief  The patient is reporting no side effects from this pain medication regimen  I have personally reviewed and/or updated the patient's past medical history, past surgical history, family history, social history, current medications, allergies, and vital signs today  Review of Systems:    Review of Systems   Musculoskeletal: Positive for gait problem  Past Medical History:   Diagnosis Date    Aortic aneurysm ([facility]     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)     Blood type B-     COPD (chronic obstructive pulmonary disease) ([facility])     Coronary artery disease     CVA [facility]cerebral vascular accident) ([facility] [address] [facility]     CVA (cerebral vascular accident) ([facility] [address] [facility]     Depression     Disease of thyroid gland     History of cataract     Hyperlipidemia     Hypertension     Prediabetes     PVD (peripheral vascular disease) ([facility])        Past Surgical History:   Procedure Laterality Date    CAROTID STENT Left     AR COLONOSCOPY FLX DX W/COLLJ SPEC WHEN PFRMD N/A 9/27/2017    Procedure: EGD AND COLONOSCOPY;  Surgeon: Marcos Rasmussen MD;  Location:  MAIN OR;  Service: Gastroenterology       Family History   Problem Relation Age of Onset    Hypertension Mother     Heart disease Mother     Diabetes Father     Breast cancer Daughter        Social History     Occupational History    Not on file       Social History Main Topics    Smoking status: Former Smoker     Packs/day: 0 50     Years: 45 00     Types: Cigarettes     Quit date: 08/2017    Smokeless tobacco: Never Used      Comment: Quit 3 weeks ago    Alcohol use No      Comment: rare    Drug use: No    Sexual activity: Not Currently         Current Outpatient Prescriptions:     aspirin (ECOTRIN) 325 mg EC tablet, Take 325 mg by mouth daily  , Disp: , Rfl:     atorvastatin (LIPITOR) 40 mg tablet, Take 1 tablet (40 mg total) by mouth daily, Disp: 90 tablet, Rfl: 3    fluticasone (FLONASE) 50 mcg/act nasal spray, 1 spray into each nostril daily, Disp: 16 g, Rfl: 1    gabapentin (NEURONTIN) 100 mg capsule, Take 1 PO HS x 5 days, then 1 PO BID x  5 days, then 1 PO TID , Disp: 90 capsule, Rfl: 0    levothyroxine 100 mcg tablet, Take 1 tablet (100 mcg total) by mouth daily, Disp: 30 tablet, Rfl: 3    lisinopril (ZESTRIL) 10 mg tablet, Take 1 tablet (10 mg total) by mouth daily, Disp: 90 tablet, Rfl: 3    Methylprednisolone 4 MG TBPK, Use as directed on package, Disp: 21 tablet, Rfl: 0    metoprolol succinate (TOPROL-XL) 50 mg 24 hr tablet, Take 1 tablet (50 mg total) by mouth 2 (two) times a day, Disp: 60 tablet, Rfl: 5    oxyCODONE-acetaminophen (PERCOCET) 5-325 mg per tablet, Take 1 tablet by mouth every 4 (four) hours as needed for moderate pain Max Daily Amount: 6 tablets, Disp: 18 tablet, Rfl: 0    Allergies   Allergen Reactions    Penicillins Rash       Physical Exam:    There were no vitals taken for this visit  Constitutional:normal, well developed, well nourished, alert, in no distress and non-toxic and no overt pain behavior    Eyes:anicteric  HEENT:grossly intact  Neck:supple, symmetric, trachea midline and no masses   Pulmonary:even and unlabored  Cardiovascular:No edema or pitting edema present  Skin:Normal without rashes or lesions and well hydrated  Psychiatric:Mood and affect appropriate  Neurologic:Cranial Nerves II-XII grossly intact  Musculoskeletal:normal      Imaging  No orders to display         No orders of the defined types were placed in this encounter

## 2018-04-23 NOTE — TELEPHONE ENCOUNTER
Pt returning call  Pt states pain level is a 6 and has a relief of 50%  Pt stated that now she is having pain in both legs   PT can be reached at 21       S/P RT L5 TFESI #1 on 04/13/18 w/SL [place]  RT L5 TFESI #2 scheduled on 04/27/18

## 2018-04-23 NOTE — PATIENT INSTRUCTIONS
Gabapentin 100 mg: Take 1 pill in AM & Afternoon and 2 pills at bed time x 3 days, then  Take 2 pills in Am, 1 pill in afternoon, and 2 pills at bed time x 3 days, then 2 pills 3 x daily

## 2018-04-27 ENCOUNTER — HOSPITAL ENCOUNTER (OUTPATIENT)
Dept: RADIOLOGY | Facility: CLINIC | Age: 73
Discharge: HOME | End: 2018-04-27
Attending: ANESTHESIOLOGY | Admitting: ANESTHESIOLOGY
Payer: COMMERCIAL

## 2018-04-27 VITALS
SYSTOLIC BLOOD PRESSURE: 174 MMHG | DIASTOLIC BLOOD PRESSURE: 94 MMHG | OXYGEN SATURATION: 96 % | HEART RATE: 83 BPM | TEMPERATURE: 97.4 F | RESPIRATION RATE: 20 BRPM

## 2018-04-27 DIAGNOSIS — M51.26 DISPLACEMENT OF LUMBAR INTERVERTEBRAL DISC WITHOUT MYELOPATHY: ICD-10-CM

## 2018-04-27 DIAGNOSIS — M54.16 LUMBAR RADICULITIS: ICD-10-CM

## 2018-04-27 PROCEDURE — 62323 NJX INTERLAMINAR LMBR/SAC: CPT | Performed by: ANESTHESIOLOGY

## 2018-04-27 RX ORDER — LIDOCAINE HYDROCHLORIDE 10 MG/ML
5 INJECTION, SOLUTION EPIDURAL; INFILTRATION; INTRACAUDAL; PERINEURAL ONCE
Status: COMPLETED | OUTPATIENT
Start: 2018-04-27 | End: 2018-04-27

## 2018-04-27 RX ORDER — METHYLPREDNISOLONE ACETATE 80 MG/ML
80 INJECTION, SUSPENSION INTRA-ARTICULAR; INTRALESIONAL; INTRAMUSCULAR; PARENTERAL; SOFT TISSUE ONCE
Status: COMPLETED | OUTPATIENT
Start: 2018-04-27 | End: 2018-04-27

## 2018-04-27 RX ADMIN — IOHEXOL 1 ML: 300 INJECTION, SOLUTION INTRAVENOUS at 11:15

## 2018-04-27 RX ADMIN — LIDOCAINE HYDROCHLORIDE 5 ML: 20 INJECTION, SOLUTION EPIDURAL; INFILTRATION; INTRACAUDAL at 11:09

## 2018-04-27 RX ADMIN — METHYLPREDNISOLONE ACETATE 80 MG: 80 INJECTION, SUSPENSION INTRA-ARTICULAR; INTRALESIONAL; INTRAMUSCULAR; SOFT TISSUE at 11:09

## 2018-04-27 RX ADMIN — LIDOCAINE HYDROCHLORIDE 3 ML: 10 INJECTION, SOLUTION EPIDURAL; INFILTRATION; INTRACAUDAL; PERINEURAL at 11:09

## 2018-04-27 NOTE — H&P
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 ([facility])    Benign hypertension    Mental health problem    Substance abuse    Depression    Left breast lump    Prediabetes    Dry cough    Muscle pain, myofacial    Chronic bilateral low back pain with right-sided sciatica    Spinal stenosis of lumbar region with neurogenic claudication    Lumbar radiculopathy    Chronic pain syndrome       Past Medical History:   Diagnosis Date    Aortic aneurysm ([facility] [address] [facility]     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[facility])     Blood type B-     COPD (chronic obstructive pulmonary disease) ([facility])     Coronary artery disease     CVA (cerebral vascular accident) ([facility] [address] [facility]     CVA (cerebral vascular accident) ([facility] [address] [facility]     Depression     Disease of thyroid gland     History of cataract     Hyperlipidemia     Hypertension     Prediabetes     PVD (peripheral vascular disease) ([facility])        Past Surgical History:   Procedure Laterality Date    CAROTID STENT Left     LA COLONOSCOPY FLX DX W/COLLJ SPEC WHEN PFRMD N/A 9/27/2017    Procedure: EGD AND COLONOSCOPY;  Surgeon: Kris Burrows MD;  Location:  MAIN OR;  Service: Gastroenterology         Current Outpatient Prescriptions:     aspirin (ECOTRIN) 325 mg EC tablet, Take 325 mg by mouth daily  , Disp: , Rfl:     atorvastatin (LIPITOR) 40 mg tablet, Take 1 tablet (40 mg total) by mouth daily, Disp: 90 tablet, Rfl: 3   gabapentin (NEURONTIN) 100 mg capsule, Take 2 PO TID, Disp: 180 capsule, Rfl: 1    levothyroxine 100 mcg tablet, Take 1 tablet (100 mcg total) by mouth daily, Disp: 30 tablet, Rfl: 3    lisinopril (ZESTRIL) 10 mg tablet, Take 1 tablet (10 mg total) by mouth daily, Disp: 90 tablet, Rfl: 3    metoprolol succinate (TOPROL-XL) 50 mg 24 hr tablet, Take 1 tablet (50 mg total) by mouth 2 (two) times a day, Disp: 60 tablet, Rfl: 5    Allergies   Allergen Reactions    Penicillins Rash       Physical Exam:   Vitals:    04/27/18 1055   BP: 162/98   Pulse: 85   Resp: 20   Temp: (!) 97 4 °F (36 3 °C)   SpO2: 97%     General: Awake, Alert, Oriented x 3, Mood and affect appropriate  Respiratory: Respirations even and unlabored  Cardiovascular: Peripheral pulses intact; no edema  Musculoskeletal Exam:  Decreased range of motion lumbar spine    ASA Score: II    Assessment:   1  Displacement of lumbar intervertebral disc without myelopathy    2   Lumbar radiculitis        Plan: RT L5 TFESI #2

## 2018-04-27 NOTE — DISCHARGE INSTRUCTIONS
Epidural Steroid Injection   WHAT YOU NEED TO KNOW:   An epidural steroid injection (PENNY) is a procedure to inject steroid medicine into the epidural space  The epidural space is between your spinal cord and vertebrae  Steroids reduce inflammation and fluid buildup in your spine that may be causing pain  You may be given pain medicine along with the steroids  ACTIVITY  · Do not drive or operate machinery today  · No strenuous activity today - bending, lifting, etc   · You may resume normal activites starting tomorrow - start slowly and as tolerated  · You may shower today, but no tub baths or hot tubs  · You may have numbness for several hours from the local anesthetic  Please use caution and common sense, especially with weight-bearing activities  CARE OF THE INJECTION SITE  · If you have soreness or pain, apply ice to the area today (20 minutes on/20 minutes off)  · Starting tomorrow, you may use warm, moist heat or ice if needed  · You may have an increase or change in your discomfort for 36-48 hours after your treatment  · Apply ice and continue with any pain medication you have been prescribed  · Notify the [facility] if you have any of the following: redness, drainage, swelling, headache, stiff neck or fever above 100°F     SPECIAL INSTRUCTIONS  · Our office will contact you in approximately 7 days for a progress report  MEDICATIONS  · Continue to take all routine medications  · Our office may have instructed you to hold some medications  If you have a problem specifically related to your procedure, please call our office at [phone]  Problems not related to your procedure should be directed to your primary care physician

## 2018-05-04 ENCOUNTER — TELEPHONE (OUTPATIENT)
Dept: PAIN MEDICINE | Facility: CLINIC | Age: 73
End: 2018-05-04

## 2018-05-04 NOTE — TELEPHONE ENCOUNTER
S/w male friend who lives with her  He states she told him to tell us that she still having significant pain with little to no relief from injection   States the medication we prescribed for her (gabapentin) is not helping at all and requests something more for pain because she is in a lot of pain    S/p Right L5 TFESI #2 on 4/27/18 w/SL in [place]  F/u scheduled for 6/18/18 w/DG

## 2018-05-04 NOTE — TELEPHONE ENCOUNTER
----- Message from [address] [facility] sent at 4/27/2018  1:21 PM EDT -----  S/p Right L5 TFESI #2 on 4/27/18 w/SL in [place]  F/u scheduled for 6/18/18 w/DG

## 2018-05-07 NOTE — TELEPHONE ENCOUNTER
LMOM to cb, provided cb number and office hours       Will confirm:  gabapentin (NEURONTIN) 100 mg capsule [[id number]]   Order Details   Dose, Route, Frequency: As Directed    Dispense Quantity:  180 capsule Refills:  1 Fills remaining:  --           Sig: Take 2 PO TID          Written Date:  04/23/18 Expiration Date:  04/23/19     Start Date:  04/23/18 End Date:  --            Ordering Provider:  -- SANTIAGO #:  -- NPI:  --    Authorizing Provider:  Nadiya Hazel [address] [place] [address] SANTIAGO #:  [id number] NPI:  [id number]    Ordering User:  Lloyd Duong

## 2018-05-07 NOTE — TELEPHONE ENCOUNTER
[address] [facility]- patient called back stating she is still in pain and wants to know what to do  Patients olivia has been moved up and r/s to see G06 05/22/18  Patient appreciative

## 2018-05-07 NOTE — TELEPHONE ENCOUNTER
[address] [facility]- patient called back stating she is still in pain and wants to know what to do  Patients olivia has been moved up and r/s to see G06 05/22/18  Patient appreciative       S/p Right L5 TFESI #2 on 4/27/18 w/SL in [place]  F/u scheduled for 6/18/18 w/DG

## 2018-05-11 NOTE — TELEPHONE ENCOUNTER
s/w pt, confirmed gabapentin 100 mg, 2 pills po tid w/ min relief, no se's  Advised pt per SL, increase to 3 pills po tid  Cb if se's or questions arise  Do not drive or operate machinery until you are familiar with how this increase will affect you  Fu w/ DG on 5/22 as scheduled  Pt verbalized understanding  Will fu as directed

## 2018-05-16 ENCOUNTER — TRANSCRIBE ORDERS (OUTPATIENT)
Dept: FAMILY MEDICINE CLINIC | Facility: HOSPITAL | Age: 73
End: 2018-05-16

## 2018-05-16 DIAGNOSIS — I71.4 ABDOMINAL AORTIC ANEURYSM (AAA) WITHOUT RUPTURE (HCC): Primary | ICD-10-CM

## 2018-05-17 ENCOUNTER — OFFICE VISIT (OUTPATIENT)
Dept: VASCULAR SURGERY | Facility: CLINIC | Age: 73
End: 2018-05-17
Payer: COMMERCIAL

## 2018-05-17 VITALS
DIASTOLIC BLOOD PRESSURE: 74 MMHG | SYSTOLIC BLOOD PRESSURE: 132 MMHG | HEIGHT: 62 IN | HEART RATE: 72 BPM | BODY MASS INDEX: 24.66 KG/M2 | WEIGHT: 134 LBS | RESPIRATION RATE: 20 BRPM

## 2018-05-17 DIAGNOSIS — I71.4 ABDOMINAL AORTIC ANEURYSM (AAA) WITHOUT RUPTURE (HCC): ICD-10-CM

## 2018-05-17 DIAGNOSIS — I65.22 STENOSIS OF LEFT CAROTID ARTERY: ICD-10-CM

## 2018-05-17 DIAGNOSIS — I70.211 ATHEROSCLER OF NATIVE ARTERY OF RIGHT LEG WITH INTERMIT CLAUDICATION (HCC): ICD-10-CM

## 2018-05-17 DIAGNOSIS — I71.6 THORACOABDOMINAL AORTIC ANEURYSM (TAAA) WITHOUT RUPTURE (HCC): Primary | ICD-10-CM

## 2018-05-17 PROBLEM — I73.9 PAD (PERIPHERAL ARTERY DISEASE) (HCC): Status: ACTIVE | Noted: 2018-05-17

## 2018-05-17 PROCEDURE — 99214 OFFICE O/P EST MOD 30 MIN: CPT | Performed by: NURSE PRACTITIONER

## 2018-05-17 NOTE — PATIENT INSTRUCTIONS
Thoracoabdominal aortic aneurysm  -You will have a CT scan chest/abdomen/pelvis for further evaluation of your aneurysm    Do to your kidney function we will plan to hydrate you before and after your CT scan  -Followup with Dr Dominguez Means after imaging  -Complete smoking cessation is optimal to help slow aneurysm growth rate    Carotid stenosis h/o carotid endarterectomy vs stent  -Will evaluate further with CV duplex    Lower extremity pain/cramping  -Will evaluate further with arterial study of the legs

## 2018-05-17 NOTE — PROGRESS NOTES
Assessment/Plan:    1  Thoracoabdominal aortic aneurysm  CTA 10/2017 with TAAA with max diameter 4 8cm  Recent AOIL done with note of 5 8cm suprarenal aneurysm, prior 4 5cm which corresponded with CTA  Will chest CTA chest/abdomen/pelvis for further evaluation  CKD will check creatine and plan for IV hydration pre/post   Followup with Dr Juancarlos Lawrence after imaging  Counseled on full smoking cessation in relation to aneurysm growth rate  2   Atherosclerotic peripheral arterial disease  Denies claudication symptoms, but complains of near nightly cramping to right leg  Will check HAILEE for further evaluation  3   Left carotid stenosis  Left neck incision consistent with carotid endarterectomy, however patient reports stent placement complicated by stroke  No recent imaging  Will evaluate further with CV duplex  Diagnoses and all orders for this visit:    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)  -     CTA chest abdomen pelvis w wo contrast; Future  -     Basic metabolic panel; Future  -     VAS lower limb arterial duplex, complete bilateral; Future    Abdominal aortic aneurysm (AAA) without rupture (HCC)  -     Ambulatory referral to Vascular Surgery    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)    Stenosis of left carotid artery  -     VAS carotid complete study; Future          Subjective:      Patient ID: Ashia Melo is a 81 y o  female  Patient is seen for followup after recent AOIL  She has known TAAA, which was being monitored with CTA on annual basis  She had AOIL done on 4/23/18 with note of increase in aneurysm size  Patient reports years of chronic lower back pain that comes and goes  She states she has disk problems and recently had back injections in April and May 2017  Denies any acute changes in back pain or worsening of pain  She has known PAD  She denies any claudication, but complains of nightly cramping to the right leg  She has cut down to less than 10 cigarettes per day  Left neck incision consistent with carotid endarterectomy  However, patient states that she had stent placement at [facility] and it was complicated by stroke  The following portions of the patient's history were reviewed and updated as appropriate: allergies, current medications, past family history, past medical history, past social history, past surgical history and problem list     Review of Systems   Constitutional: Negative  HENT: Negative  Eyes: Negative  Respiratory: Positive for shortness of breath (COPD)  Cardiovascular: Negative  Gastrointestinal: Negative  Endocrine: Negative  Genitourinary: Negative  Musculoskeletal: Positive for arthralgias and myalgias  Chronic lower back pain   Skin: Negative  Allergic/Immunologic: Negative  Neurological: Negative  Hematological: Negative  Psychiatric/Behavioral: Negative  Objective:      /74 (BP Location: Right arm, Patient Position: Sitting, Cuff Size: Standard)   Pulse 72   Resp 20   Ht 5' 2" (1 575 m)   Wt 60 8 kg (134 lb)   BMI 24 51 kg/m²          Physical Exam   Constitutional: She is oriented to person, place, and time  No distress  HENT:   Head: Normocephalic and atraumatic  Eyes: EOM are normal    Left eyelid droop   Neck: Neck supple  No JVD present  Left neck surgical scar consistent with CEA   Cardiovascular: Normal rate and regular rhythm  Pulses:       Femoral pulses are 0 on the right side, and 2+ on the left side  Dorsalis pedis pulses are 0 on the right side, and 0 on the left side  Posterior tibial pulses are 0 on the right side, and 0 on the left side  Pulmonary/Chest: Effort normal  No respiratory distress  Abdominal: Soft  Bowel sounds are normal  She exhibits no distension  There is no tenderness  Musculoskeletal: Normal range of motion  She exhibits no edema     Neurological: She is alert and oriented to person, place, and time  Skin: Skin is warm and dry  Psychiatric: She has a normal mood and affect  Vitals:    05/17/18 1310   BP: 132/74   BP Location: Right arm   Patient Position: Sitting   Cuff Size: Standard   Pulse: 72   Resp: 20   Weight: 60 8 kg (134 lb)   Height: 5' 2" (1 575 m)       Patient Active Problem List   Diagnosis    Acute respiratory failure with hypoxia (HCC)    Chronic obstructive pulmonary disease (HCC)    Hypothyroidism    Aortic aneurysm (HCC)    Hyperlipidemia    Hypertension, uncontrolled    Anxiety    ASCVD (arteriosclerotic cardiovascular disease)    Lymphadenopathy, mediastinal    Aneurysm of infrarenal abdominal aorta (HCC)    Aneurysm, thoracoabdominal aortic (HCC)    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)    Benign hypertension    Mental health problem    Substance abuse    Depression    Left breast lump    Prediabetes    Dry cough    Muscle pain, myofacial    Chronic bilateral low back pain with right-sided sciatica    Spinal stenosis of lumbar region with neurogenic claudication    Lumbar radiculopathy    Chronic pain syndrome    [facility]    Stenosis of left carotid artery       Past Surgical History:   Procedure Laterality Date    CAROTID STENT Left     CA COLONOSCOPY FLX DX W/COLLJ SPEC WHEN PFRMD N/A 9/27/2017    Procedure: EGD AND COLONOSCOPY;  Surgeon: Abbi Ram MD;  Location:  MAIN OR;  Service: Gastroenterology       Family History   Problem Relation Age of Onset    Hypertension Mother     Heart disease Mother     Diabetes Father     Breast cancer Daughter        Social History     Social History    Marital status:      Spouse name: N/A    Number of children: N/A    Years of education: N/A     Occupational History    Not on file       Social History Main Topics    Smoking status: Former Smoker     Packs/day: 0 50     Years: 45 00 Types: Cigarettes     Quit date: 08/2017    Smokeless tobacco: Never Used      Comment: Quit 3 weeks ago    Alcohol use No      Comment: rare    Drug use: No    Sexual activity: Not Currently     Other Topics Concern    Not on file     Social History Narrative    Lives with family  Feels safe at home  Sees dentist occas  No living will  Allergies   Allergen Reactions    Penicillins Rash         Current Outpatient Prescriptions:     aspirin (ECOTRIN) 325 mg EC tablet, Take 325 mg by mouth daily  , Disp: , Rfl:     atorvastatin (LIPITOR) 40 mg tablet, Take 1 tablet (40 mg total) by mouth daily, Disp: 90 tablet, Rfl: 3    gabapentin (NEURONTIN) 100 mg capsule, Take 2 PO TID, Disp: 180 capsule, Rfl: 1    levothyroxine 100 mcg tablet, Take 1 tablet (100 mcg total) by mouth daily, Disp: 30 tablet, Rfl: 3    lisinopril (ZESTRIL) 10 mg tablet, Take 1 tablet (10 mg total) by mouth daily, Disp: 90 tablet, Rfl: 3    metoprolol succinate (TOPROL-XL) 50 mg 24 hr tablet, Take 1 tablet (50 mg total) by mouth 2 (two) times a day, Disp: 60 tablet, Rfl: 5

## 2018-05-22 ENCOUNTER — HOSPITAL ENCOUNTER (EMERGENCY)
Facility: HOSPITAL | Age: 73
Discharge: HOME | End: 2018-05-22
Attending: EMERGENCY MEDICINE | Admitting: EMERGENCY MEDICINE
Payer: COMMERCIAL

## 2018-05-22 ENCOUNTER — TELEPHONE (OUTPATIENT)
Dept: PAIN MEDICINE | Facility: CLINIC | Age: 73
End: 2018-05-22

## 2018-05-22 ENCOUNTER — APPOINTMENT (EMERGENCY)
Dept: CT IMAGING | Facility: HOSPITAL | Age: 73
End: 2018-05-22
Payer: COMMERCIAL

## 2018-05-22 ENCOUNTER — TELEPHONE (OUTPATIENT)
Dept: VASCULAR SURGERY | Facility: CLINIC | Age: 73
End: 2018-05-22

## 2018-05-22 VITALS
DIASTOLIC BLOOD PRESSURE: 86 MMHG | TEMPERATURE: 96.9 F | OXYGEN SATURATION: 94 % | RESPIRATION RATE: 18 BRPM | HEART RATE: 84 BPM | SYSTOLIC BLOOD PRESSURE: 181 MMHG | WEIGHT: 130 LBS | BODY MASS INDEX: 23.78 KG/M2

## 2018-05-22 DIAGNOSIS — I71.4 ABDOMINAL AORTIC ANEURYSM (AAA) GREATER THAN 5.0 CM IN DIAMETER IN FEMALE (HCC): Primary | ICD-10-CM

## 2018-05-22 DIAGNOSIS — I71.6 THORACOABDOMINAL AORTIC ANEURYSM (TAAA) WITHOUT RUPTURE (HCC): Primary | ICD-10-CM

## 2018-05-22 DIAGNOSIS — G89.29 ACUTE EXACERBATION OF CHRONIC LOW BACK PAIN: ICD-10-CM

## 2018-05-22 DIAGNOSIS — M54.50 ACUTE EXACERBATION OF CHRONIC LOW BACK PAIN: ICD-10-CM

## 2018-05-22 LAB
ALBUMIN SERPL BCP-MCNC: 3.7 G/DL (ref 3.5–5)
ALP SERPL-CCNC: 102 U/L (ref 46–116)
ALT SERPL W P-5'-P-CCNC: 23 U/L (ref 12–78)
ANION GAP SERPL CALCULATED.3IONS-SCNC: 8 MMOL/L (ref 4–13)
AST SERPL W P-5'-P-CCNC: 18 U/L (ref 5–45)
BASOPHILS # BLD AUTO: 0.04 THOUSANDS/ΜL (ref 0–0.1)
BASOPHILS NFR BLD AUTO: 1 % (ref 0–1)
BILIRUB SERPL-MCNC: 0.5 MG/DL (ref 0.2–1)
BUN SERPL-MCNC: 24 MG/DL (ref 5–25)
CALCIUM SERPL-MCNC: 9.6 MG/DL (ref 8.3–10.1)
CHLORIDE SERPL-SCNC: 103 MMOL/L (ref 100–108)
CLARITY, POC: CLEAR
CO2 SERPL-SCNC: 30 MMOL/L (ref 21–32)
COLOR, POC: YELLOW
CREAT SERPL-MCNC: 1.44 MG/DL (ref 0.6–1.3)
EOSINOPHIL # BLD AUTO: 0.1 THOUSAND/ΜL (ref 0–0.61)
EOSINOPHIL NFR BLD AUTO: 1 % (ref 0–6)
ERYTHROCYTE [DISTWIDTH] IN BLOOD BY AUTOMATED COUNT: 15.4 % (ref 11.6–15.1)
EXT BILIRUBIN, UA: NORMAL
EXT BLOOD URINE: NORMAL
EXT GLUCOSE, UA: NORMAL
EXT KETONES: NORMAL
EXT NITRITE, UA: NORMAL
EXT PH, UA: 6.5
EXT PROTEIN, UA: NORMAL
EXT SPECIFIC GRAVITY, UA: 1
EXT UROBILINOGEN: 0.2
GFR SERPL CREATININE-BSD FRML MDRD: 37 ML/MIN/1.73SQ M
GLUCOSE SERPL-MCNC: 121 MG/DL (ref 65–140)
HCT VFR BLD AUTO: 43.6 % (ref 34.8–46.1)
HGB BLD-MCNC: 14 G/DL (ref 11.5–15.4)
LIPASE SERPL-CCNC: 175 U/L (ref 73–393)
LYMPHOCYTES # BLD AUTO: 2.63 THOUSANDS/ΜL (ref 0.6–4.47)
LYMPHOCYTES NFR BLD AUTO: 31 % (ref 14–44)
MCH RBC QN AUTO: 31.7 PG (ref 26.8–34.3)
MCHC RBC AUTO-ENTMCNC: 32.1 G/DL (ref 31.4–37.4)
MCV RBC AUTO: 99 FL (ref 82–98)
MONOCYTES # BLD AUTO: 0.78 THOUSAND/ΜL (ref 0.17–1.22)
MONOCYTES NFR BLD AUTO: 9 % (ref 4–12)
NEUTROPHILS # BLD AUTO: 4.85 THOUSANDS/ΜL (ref 1.85–7.62)
NEUTS SEG NFR BLD AUTO: 58 % (ref 43–75)
PLATELET # BLD AUTO: 201 THOUSANDS/UL (ref 149–390)
PMV BLD AUTO: 10.5 FL (ref 8.9–12.7)
POTASSIUM SERPL-SCNC: 4.1 MMOL/L (ref 3.5–5.3)
PROT SERPL-MCNC: 7.9 G/DL (ref 6.4–8.2)
RBC # BLD AUTO: 4.42 MILLION/UL (ref 3.81–5.12)
SODIUM SERPL-SCNC: 141 MMOL/L (ref 136–145)
WBC # BLD AUTO: 8.4 THOUSAND/UL (ref 4.31–10.16)
WBC # BLD EST: NORMAL 10*3/UL

## 2018-05-22 PROCEDURE — 96361 HYDRATE IV INFUSION ADD-ON: CPT

## 2018-05-22 PROCEDURE — 83690 ASSAY OF LIPASE: CPT | Performed by: EMERGENCY MEDICINE

## 2018-05-22 PROCEDURE — 74175 CTA ABDOMEN W/CONTRAST: CPT

## 2018-05-22 PROCEDURE — 96376 TX/PRO/DX INJ SAME DRUG ADON: CPT

## 2018-05-22 PROCEDURE — 85025 COMPLETE CBC W/AUTO DIFF WBC: CPT | Performed by: EMERGENCY MEDICINE

## 2018-05-22 PROCEDURE — 36415 COLL VENOUS BLD VENIPUNCTURE: CPT | Performed by: EMERGENCY MEDICINE

## 2018-05-22 PROCEDURE — 96374 THER/PROPH/DIAG INJ IV PUSH: CPT

## 2018-05-22 PROCEDURE — 81002 URINALYSIS NONAUTO W/O SCOPE: CPT | Performed by: EMERGENCY MEDICINE

## 2018-05-22 PROCEDURE — 99284 EMERGENCY DEPT VISIT MOD MDM: CPT

## 2018-05-22 PROCEDURE — 80053 COMPREHEN METABOLIC PANEL: CPT | Performed by: EMERGENCY MEDICINE

## 2018-05-22 PROCEDURE — 71275 CT ANGIOGRAPHY CHEST: CPT

## 2018-05-22 PROCEDURE — 96375 TX/PRO/DX INJ NEW DRUG ADDON: CPT

## 2018-05-22 RX ORDER — ONDANSETRON 2 MG/ML
4 INJECTION INTRAMUSCULAR; INTRAVENOUS ONCE
Status: COMPLETED | OUTPATIENT
Start: 2018-05-22 | End: 2018-05-22

## 2018-05-22 RX ORDER — MORPHINE SULFATE 10 MG/ML
6 INJECTION, SOLUTION INTRAMUSCULAR; INTRAVENOUS ONCE
Status: DISCONTINUED | OUTPATIENT
Start: 2018-05-22 | End: 2018-05-22

## 2018-05-22 RX ORDER — SODIUM CHLORIDE 9 MG/ML
500 INJECTION, SOLUTION INTRAVENOUS ONCE
Status: COMPLETED | OUTPATIENT
Start: 2018-05-22 | End: 2018-05-22

## 2018-05-22 RX ORDER — TRAMADOL HYDROCHLORIDE 50 MG/1
50 TABLET ORAL EVERY 8 HOURS PRN
Qty: 12 TABLET | Refills: 0 | Status: SHIPPED | OUTPATIENT
Start: 2018-05-22 | End: 2018-05-24

## 2018-05-22 RX ORDER — ONDANSETRON 4 MG/1
4 TABLET, ORALLY DISINTEGRATING ORAL EVERY 8 HOURS PRN
Qty: 12 TABLET | Refills: 0 | Status: SHIPPED | OUTPATIENT
Start: 2018-05-22 | End: 2018-05-31 | Stop reason: ALTCHOICE

## 2018-05-22 RX ORDER — MORPHINE SULFATE 4 MG/ML
4 INJECTION, SOLUTION INTRAMUSCULAR; INTRAVENOUS ONCE
Status: COMPLETED | OUTPATIENT
Start: 2018-05-22 | End: 2018-05-22

## 2018-05-22 RX ORDER — ONDANSETRON 4 MG/1
4 TABLET, ORALLY DISINTEGRATING ORAL ONCE
Status: COMPLETED | OUTPATIENT
Start: 2018-05-22 | End: 2018-05-22

## 2018-05-22 RX ORDER — MORPHINE SULFATE 2 MG/ML
2 INJECTION, SOLUTION INTRAMUSCULAR; INTRAVENOUS ONCE
Status: COMPLETED | OUTPATIENT
Start: 2018-05-22 | End: 2018-05-22

## 2018-05-22 RX ADMIN — SODIUM CHLORIDE 1000 ML: 0.9 INJECTION, SOLUTION INTRAVENOUS at 10:24

## 2018-05-22 RX ADMIN — MORPHINE SULFATE 4 MG: 4 INJECTION, SOLUTION INTRAMUSCULAR; INTRAVENOUS at 10:59

## 2018-05-22 RX ADMIN — ONDANSETRON 4 MG: 4 TABLET, ORALLY DISINTEGRATING ORAL at 13:21

## 2018-05-22 RX ADMIN — ONDANSETRON 4 MG: 2 INJECTION INTRAMUSCULAR; INTRAVENOUS at 12:23

## 2018-05-22 RX ADMIN — MORPHINE SULFATE 2 MG: 2 INJECTION, SOLUTION INTRAMUSCULAR; INTRAVENOUS at 10:59

## 2018-05-22 RX ADMIN — SODIUM CHLORIDE 500 ML/HR: 0.9 INJECTION, SOLUTION INTRAVENOUS at 11:42

## 2018-05-22 RX ADMIN — MORPHINE SULFATE 4 MG: 4 INJECTION, SOLUTION INTRAMUSCULAR; INTRAVENOUS at 10:31

## 2018-05-22 RX ADMIN — IODIXANOL 80 ML: 320 INJECTION, SOLUTION INTRAVASCULAR at 11:41

## 2018-05-22 RX ADMIN — ONDANSETRON 4 MG: 2 INJECTION INTRAMUSCULAR; INTRAVENOUS at 10:28

## 2018-05-22 NOTE — ED NOTES
Patient assisted with dressing  While sitting up, patient began to vomit again  Emesis appears to be gastric contents  Physician notified        Dale Rehman RN  05/22/18 [id number]

## 2018-05-22 NOTE — TELEPHONE ENCOUNTER
Later on today or tomorrow can we call this patient and see if she can come in one day this week with me or DR Luciano  Use the emergency slot if you have to  Thank you

## 2018-05-22 NOTE — ED NOTES
Physician requests this RN assist patient with ambulation to restroom  Patient sat up in bed and began to vomit  Physician notified  Proceed with med administration per physician order        Juancho Christianson RN  05/22/18 [id number]

## 2018-05-22 NOTE — TELEPHONE ENCOUNTER
Patient missed her appt  5/22/18 @ 10:15 AM with Jorge  I called the patient and left a message on the voicemail concerning this appt  I asked the patient to call the office to reschedule

## 2018-05-22 NOTE — ED NOTES
Patient aware ct scan ordered, call bell within reach     Palmira Awad, [address]  05/22/18 [id number]

## 2018-05-22 NOTE — DISCHARGE INSTRUCTIONS
Return for any worsening pain, incontinence, lightheadedness / passing out or for any concerns    Chronic Back Pain   WHAT YOU NEED TO KNOW:   Chronic back pain is back pain that lasts 3 months or longer  This may include pain that has not been controlled or does not improve with treatment  Your back pain may cause weakness or pain that spreads to your arms or legs  DISCHARGE INSTRUCTIONS:   Return to the emergency department if:   · You have severe pain  · You have new signs of numbness or weakness, especially in your lower back, legs, arms, or genital area  · You lose control of your bladder or bowel movements  · You have a fever or sudden weight loss  Contact your healthcare provider if:   · You have new or worsened pain  · You have questions or concerns about your condition or care  Medicines:   · NSAIDs  help decrease swelling and pain  This medicine can be bought with or without a doctor's order  This medicine can cause stomach bleeding or kidney problems in certain people  If you take blood thinner medicine, always ask your healthcare provider if NSAIDs are safe for you  Always read the medicine label and follow the directions on it before using this medicine  · Acetaminophen  decreases pain  It is available without a doctor's order  Ask how much to take and how often to take it  Follow directions  Acetaminophen can cause liver damage if not taken correctly  · Prescription pain medicine  may also be given to decrease pain  Do not wait until the pain is severe before you take this medicine  · Muscle relaxers  help decrease muscle spasms and back pain  · Take your medicine as directed  Contact your healthcare provider if you think your medicine is not helping or if you have side effects  Tell him if you are allergic to any medicine  Keep a list of the medicines, vitamins, and herbs you take  Include the amounts, and when and why you take them   Bring the list or the pill bottles to follow-up visits  Carry your medicine list with you in case of an emergency  Follow up with your healthcare provider as directed: You may be referred to a sports medicine or spine specialist  Write down your questions so you remember to ask them during your visits  Manage your chronic back pain:   · Heat  helps decrease pain and muscle spasms  Apply heat on your back for 15 to 20 minutes every 2 hours or as often as directed  · Stay active  as much as you can without causing more pain  Ask your healthcare provider what exercises are right for you  Do not sit or lie down for long periods  This could make your back pain worse  Avoid heavy lifting until your pain is gone  · A physical therapist  can teach you exercises to help improve movement and strength, and to decrease pain  © 2017 [employer]  Information is for End User's use only and may not be sold, redistributed or otherwise used for commercial purposes  All illustrations and images included in CareNotes® are the copyrighted property of A D A M , Inc  or Manolo Gualberto  The above information is an  only  It is not intended as medical advice for individual conditions or treatments  Talk to your doctor, nurse or pharmacist before following any medical regimen to see if it is safe and effective for you  Nonruptured Abdominal Aortic Aneurysm   AMBULATORY CARE:   What you need to know about an abdominal aortic aneurysm (AAA): The aorta is a large blood vessel that extends from your heart to your abdomen  The part of the aorta that extends into your abdomen is called your abdominal aorta  Your abdominal aorta brings blood to your stomach, pelvis, and legs  An AAA is a bulging or weak area in your abdominal aorta  Over time, the bulge may grow and is at risk for tearing or rupturing  An AAA that ruptures is a life-threatening emergency  Signs and symptoms:   An AAA usually does not have signs or symptoms if it has not ruptured  If the AAA starts to leak or ruptures, you may have any of the following:  · Sudden pain in your abdomen, groin, back, legs, or buttocks    · Nausea and vomiting    · A lump or swelling in your abdomen    · Stiff abdominal muscles    · Numbness or tingling in your legs    · Pale, sweaty, or clammy skin    · Dizziness, fainting or loss of consciousness  Call 911 or have someone else call for any of the following:   · You faint or lose consciousness  · You cannot be woken  Seek care immediately if:  The following signs or symptoms may mean the AAA is at risk of rupturing:  · You have sudden sharp pain in your abdomen, groin, back, legs, or buttocks  · You have nausea and vomiting  · You feel dizzy  · You have stiffness or swelling in your abdomen, or a lump in your abdomen  · You have numbness or tingling in your legs  · Your skin is pale, sweaty, or clammy  Contact your healthcare provider if:   · You have questions or concerns about your condition or care  Treatment of an AAA  may not be needed  Your healthcare provider may monitor the size of your AAA with tests, such as an ultrasound  You may be given medicines to prevent the AAA from growing  Examples include blood pressure medicine and medicine to lower your cholesterol  If your AAA gets bigger, starts to leak, or ruptures, you may need any of the following:  · Endovascular repair  is a procedure that uses a graft to repair your AAA  The graft stops blood flow to the aneurysm and protects your abdominal aorta  You may need to have more than 1 endovascular repair  · Open repair  is surgery to repair or remove an AAA  Manage a nonruptured AAA:  You can help prevent your AAA from growing or rupturing by doing the following:  · Do not smoke  Nicotine and other chemicals in cigarettes and cigars can increase your blood pressure  It can also damage your aorta and increase the size of your AAA   Ask your healthcare provider for information if you currently smoke and need help to quit  E-cigarettes or smokeless tobacco still contain nicotine  Talk to your healthcare provider before you use these products  · Exercise as directed  Exercise can help control your blood pressure and cholesterol level  Ask your healthcare provider how much exercise you need each day and which exercises are best for you  · Follow the meal plan recommended by your healthcare provider  Talk to your dietitian about a heart-healthy or low-sodium eating plan  Meal plans will help you lower your cholesterol and blood pressure  They will also help you reach a healthy weight  · Do not lift anything heavier than 10 pounds  Heavy lifting can increase pressure in your abdominal aorta  This can increase your risk for a ruptured AAA  Follow up with your healthcare provider as directed: You will need regular tests and follow-up visits to monitor the size of your AAA  Keep all appointments  Write down your questions so you remember to ask them during your visits  © 2017 [employer]  Information is for End User's use only and may not be sold, redistributed or otherwise used for commercial purposes  All illustrations and images included in CareNotes® are the copyrighted property of [employer]  or Manolo Burciaga  The above information is an  only  It is not intended as medical advice for individual conditions or treatments  Talk to your doctor, nurse or pharmacist before following any medical regimen to see if it is safe and effective for you

## 2018-05-22 NOTE — ED NOTES
Called pharmacy regarding morphine order not showing in accudose  Pharmacy addressing       Adan Damon RN  05/22/18 5380

## 2018-05-22 NOTE — ED NOTES
Patient returned from [email or web address]  Patient denies pain and nausea at present  Requesting ice chips  Informed patient that CT results must be obtained prior to PO intake  Patient verbalized understanding        Jenn Garcia, KIM  05/22/18 9121

## 2018-05-22 NOTE — ED PROVIDER NOTES
History  Chief Complaint   Patient presents with    Back Pain     Low back pain, pain down side of right leg x several months  Reports seeing pain management and joint and having injections  Here w/ mult complaints  Has chronic back pain and has been undergoing injections for the pain w/ the last one near the end of April  Pt states she was doing well, but a few days ago started again w/ worsening pain  Notes right sided low back pain w/ radiation down the leg to the foot  Denies b/b/ incont, no saddle/perineal anesthesia, no numbness or weakness  Also c/o of vague abd discomfort and mild nausea  abd discomfort also started a couple of days ago  Denies f/c/s, no n/v/d, notes mild urinary frequency, no dysuria or urgency  Has hx of AAA and follows w/ vascular  Is supposed to have an outpt study to eval the aorta and hasn't had done yet  History provided by:  Patient and medical records   used: No    Back Pain   Location:  Lumbar spine  Quality:  Aching  Radiates to:  R posterior upper leg, R knee and R foot  Pain severity:  Moderate  Pain is:  Same all the time  Onset quality:  Gradual  Timing:  Intermittent  Progression:  Waxing and waning  Chronicity:  Chronic  Context: not recent illness and not recent injury    Relieved by:  Nothing  Worsened by: Movement and twisting  Ineffective treatments:  None tried  Associated symptoms: abdominal pain and leg pain    Associated symptoms: no abdominal swelling, no bladder incontinence, no bowel incontinence, no chest pain, no dysuria, no fever, no headaches, no numbness, no paresthesias, no perianal numbness, no tingling and no weakness    Risk factors: vascular disease    Risk factors: no recent surgery        Prior to Admission Medications   Prescriptions Last Dose Informant Patient Reported? Taking?   aspirin (ECOTRIN) 325 mg EC tablet  Self Yes Yes   Sig: Take 325 mg by mouth daily     atorvastatin (LIPITOR) 40 mg tablet Self No Yes   Sig: Take 1 tablet (40 mg total) by mouth daily   gabapentin (NEURONTIN) 100 mg capsule  Self No Yes   Sig: Take 2 PO TID   levothyroxine 100 mcg tablet  Self No Yes   Sig: Take 1 tablet (100 mcg total) by mouth daily   lisinopril (ZESTRIL) 10 mg tablet  Self No Yes   Sig: Take 1 tablet (10 mg total) by mouth daily   metoprolol succinate (TOPROL-XL) 50 mg 24 hr tablet  Self No Yes   Sig: Take 1 tablet (50 mg total) by mouth 2 (two) times a day      Facility-Administered Medications: None       Past Medical History:   Diagnosis Date    [facility] aneurysm ([facility] )     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     ASCVD (arteriosclerotic cardiovascular disease)     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 ([facility])     Back problem     Blood type B-     Cataract     COPD (chronic obstructive pulmonary disease) ([facility])     Coronary artery disease     CVA [facility]cerebral vascular accident) ([facility] )     CVA (cerebral vascular accident) ([place] [address] )     Depression     Disease of thyroid gland     History of cataract     Hyperlipidemia     Hypertension     Prediabetes     PVD (peripheral vascular disease) ([facility])        Past Surgical History:   Procedure Laterality Date    CAROTID STENT Left     TN COLONOSCOPY FLX DX W/COLLJ SPEC WHEN PFRMD N/A 9/27/2017    Procedure: EGD AND COLONOSCOPY;  Surgeon: Girish Mejia MD;  [facility];  Service: Gastroenterology    TUBAL LIGATION         Family History   Problem Relation Age of Onset    Hypertension Mother     Heart disease Mother     Diabetes Father     Breast cancer Daughter      I have reviewed and agree with the history as documented  Social History   Substance Use Topics    Smoking status: Former Smoker     Packs/day: 0 50     Years: 45 00     Quit date: 08/2017    Smokeless tobacco: Never Used      Comment: Quit 3 weeks ago    Alcohol use No      Comment: rare        Review of Systems   Constitutional: Negative for fever  Cardiovascular: Negative for chest pain  Gastrointestinal: Positive for abdominal pain  Negative for bowel incontinence  Genitourinary: Negative for bladder incontinence and dysuria  Musculoskeletal: Positive for back pain  Neurological: Negative for tingling, weakness, numbness, headaches and paresthesias  All other systems reviewed and are negative  Physical Exam  Physical Exam   Constitutional: She is oriented to person, place, and time  She appears well-developed and well-nourished  HENT:   Nose: Nose normal    Mouth/Throat: Oropharynx is clear and moist    Eyes: Conjunctivae are normal    Neck: Neck supple  Cardiovascular: Normal rate and regular rhythm  Pulmonary/Chest: Effort normal and breath sounds normal    Abdominal: Soft  She exhibits no distension  There is tenderness (mild) in the epigastric area  There is no rigidity, no rebound and no guarding  Musculoskeletal: She exhibits no deformity  Lumbar back: She exhibits bony tenderness, pain and spasm  She exhibits normal range of motion and no tenderness  Back:    Neurological: She is alert and oriented to person, place, and time  Skin: Skin is warm  Psychiatric: She has a normal mood and affect  Nursing note and vitals reviewed        Vital Signs  ED Triage Vitals   Temperature Pulse Respirations Blood Pressure SpO2   05/22/18 1012 05/22/18 1012 05/22/18 1012 05/22/18 1012 05/22/18 1012   (!) 96 9 °F (36 1 °C) 99 18 150/77 98 %      Temp Source Heart Rate Source Patient Position - Orthostatic VS BP Location FiO2 (%)   05/22/18 1012 05/22/18 1012 05/22/18 1145 05/22/18 1012 --   Tympanic Monitor Sitting Right arm       Pain Score       05/22/18 1012       5           Vitals:    05/22/18 1115 05/22/18 1145 05/22/18 1200 05/22/18 1245   BP:  132/67 140/72 (!) 181/86   Pulse: 77 83 79 84   Patient Position - Orthostatic VS:  Sitting Sitting Sitting       Visual Acuity      ED Medications  Medications   sodium chloride 0 9 % bolus 1,000 mL (0 mL Intravenous Stopped 5/22/18 1141)   ondansetron (ZOFRAN) injection 4 mg (4 mg Intravenous Given 5/22/18 1028)   morphine (PF) 4 mg/mL injection 4 mg (4 mg Intravenous Given 5/22/18 1031)   morphine injection 2 mg (2 mg Intravenous Given 5/22/18 1059)   morphine (PF) 4 mg/mL injection 4 mg (4 mg Intravenous Given 5/22/18 1059)   iodixanol (VISIPAQUE) 320 MG/ML injection 80 mL (80 mL Intravenous Given 5/22/18 1141)   sodium chloride 0 9 % infusion (0 mL/hr Intravenous Stopped 5/22/18 1219)   ondansetron (ZOFRAN) injection 4 mg (4 mg Intravenous Given 5/22/18 1223)   ondansetron (ZOFRAN-ODT) dispersible tablet 4 mg (4 mg Oral Given 5/22/18 1321)       Diagnostic Studies  Results Reviewed     Procedure Component Value Units Date/Time    POCT urinalysis dipstick [[id number]]  (Normal) Resulted:  05/22/18 1230    Lab Status:  Final result Specimen:  Urine Updated:  05/22/18 1302     Color, UA YELLOW     Clarity, UA CLEAR     EXT Glucose, UA (Ref: Negative) NEG     EXT Bilirubin, UA (Ref: Negative) NEG     EXT Ketones, UA (Ref: Negative) NEG     EXT Spec Grav, UA 1 005     EXT Blood, UA (Ref: Negative) NEG     EXT pH, UA 6 5     EXT Protein, UA (Ref: Negative) NEG     EXT Urobilinogen, UA (Ref: 0 2- 1 0) 0 2     EXT Leukocytes, UA (Ref: Negative) NEG     EXT Nitrite, UA (Ref: Negative) NEG    Comprehensive metabolic panel [[id number]]  (Abnormal) Collected:  05/22/18 1021    Lab Status:  Final result Specimen:  Blood from Arm, Left Updated:  05/22/18 1054     Sodium 141 mmol/L      Potassium 4 1 mmol/L      Chloride 103 mmol/L      CO2 30 mmol/L      Anion Gap 8 mmol/L      BUN 24 mg/dL      Creatinine 1 44 (H) mg/dL      Glucose 121 mg/dL      Calcium 9 6 mg/dL      AST 18 U/L      ALT 23 U/L      Alkaline Phosphatase 102 U/L      Total Protein 7 9 g/dL      Albumin 3 7 g/dL      Total Bilirubin 0 50 mg/dL      eGFR 37 ml/min/1 73sq m     Narrative:         National Kidney Disease Education Program recommendations are as follows:  GFR calculation is accurate only with a steady state creatinine  Chronic Kidney disease less than 60 ml/min/1 73 sq  meters  Kidney failure less than 15 ml/min/1 73 sq  meters  Lipase [[id number]]  (Normal) Collected:  05/22/18 1021    Lab Status:  Final result Specimen:  Blood from Arm, Left Updated:  05/22/18 1054     Lipase 175 u/L     CBC and differential [[id number]]  (Abnormal) Collected:  05/22/18 1021    Lab Status:  Final result Specimen:  Blood from Arm, Left Updated:  05/22/18 1037     WBC 8 40 Thousand/uL      RBC 4 42 Million/uL      Hemoglobin 14 0 g/dL      Hematocrit 43 6 %      MCV 99 (H) fL      MCH 31 7 pg      MCHC 32 1 g/dL      RDW 15 4 (H) %      MPV 10 5 fL      Platelets 378 Thousands/uL      Neutrophils Relative 58 %      Lymphocytes Relative 31 %      Monocytes Relative 9 %      Eosinophils Relative 1 %      Basophils Relative 1 %      Neutrophils Absolute 4 85 Thousands/µL      Lymphocytes Absolute 2 63 Thousands/µL      Monocytes Absolute 0 78 Thousand/µL      Eosinophils Absolute 0 10 Thousand/µL      Basophils Absolute 0 04 Thousands/µL                  CTA dissection protocol chest and abdomen   ED Interpretation by Deepali Jade DO (05/22 1218)   Abnormal   See below      Final Result by Mario Romero MD (05/22 1200)      1  Extensive thoracoabdominal descending aortic aneurysm with prominent mural thrombus  The aneurysm measures up to 5 6 cm in AP dimension at the diaphragmatic hiatus, previously 5 0 cm and reflecting a critical value  Penetrating ulcer redemonstrated    at the aortic arch measuring up to 28 mm, stable  No dissection evident  No evidence of leak  2   Mediastinal and left hilar adenopathy, nonspecific               I personally discussed this study with Inge Kamara on 5/22/2018 at 11:58 AM                            Workstation performed: [id number]                    Procedures  Procedures       Phone Contacts  ED Phone Contact    ED Course  ED Course as of May 22 1750   Tue May 22, 2018   1049 No improvement w/ morphine, requesting additional meds    1216 Spoke w/ Vascular  Will call her to schedule an outpt f/u to discuss findings and options    1218 Pt now w/ mult episodes nbnb emesis in department - given zofran    1226 Spoke w/ Parag Crenshaw, [facility] w/ pain management  Pt was supposed to be in their office at 1015 this am  Will likely need to be referred to neurosurg for eval given she has failed the injections  1300 Feels much better, no longer nauseated  Awaiting urine dip results prior to d/c                                MDM  Number of Diagnoses or Management Options  Abdominal aortic aneurysm (AAA) greater than 5 0 cm in diameter in female [facility]): established and worsening  Acute exacerbation of chronic low back pain: new and requires workup     Amount and/or Complexity of Data Reviewed  Clinical lab tests: reviewed and ordered  Tests in the radiology section of CPT®: reviewed and ordered  Discuss the patient with other providers: yes  Independent visualization of images, tracings, or specimens: yes      CritCare Time    Disposition  Final diagnoses:   Abdominal aortic aneurysm (AAA) greater than 5 0 cm in diameter in female [facility])   Acute exacerbation of chronic low back pain     Time reflects when diagnosis was documented in both MDM as applicable and the Disposition within this note     Time User Action Codes Description Comment    5/22/2018  1:01 PM Caitlyn De La Rosa Add [I71 4] Abdominal aortic aneurysm (AAA) greater than 5 0 cm in diameter in female ([facility] )     5/22/2018  1:01 PM Caitlyn De La Rosa Add [M54 5,  G89 29] Acute exacerbation of chronic low back pain       ED Disposition     ED Disposition Condition Comment    Discharge  [facility] discharge to home/self care      Condition at discharge: Good        Follow-up Information     Follow up With Specialties Details Why Contact Info Dylon Santana MD Vascular Surgery, Radiology  will call you to discuss a follow up appointment  If you do not hear from them in the next 1-2 days, call the office [facility]  [phone]      Mal Amin, [address] [place] [address], Nurse Practitioner Call today to discuss a follow up appointment [address]  [phone]            Discharge Medication List as of 5/22/2018  1:06 PM      START taking these medications    Details   traMADol (ULTRAM) 50 mg tablet Take 1 tablet (50 mg total) by mouth every 8 (eight) hours as needed for moderate pain for up to 10 days, Starting Tue 5/22/2018, Until Fri 6/1/2018, Normal         CONTINUE these medications which have NOT CHANGED    Details   aspirin (ECOTRIN) 325 mg EC tablet Take 325 mg by mouth daily  , Historical Med      atorvastatin (LIPITOR) 40 mg tablet Take 1 tablet (40 mg total) by mouth daily, Starting Thu 4/5/2018, Normal      gabapentin (NEURONTIN) 100 mg capsule Take 2 PO TID, Normal      levothyroxine 100 mcg tablet Take 1 tablet (100 mcg total) by mouth daily, Starting Fri 3/9/2018, Normal      lisinopril (ZESTRIL) 10 mg tablet Take 1 tablet (10 mg total) by mouth daily, Starting Thu 4/5/2018, Normal      metoprolol succinate (TOPROL-XL) 50 mg 24 hr tablet Take 1 tablet (50 mg total) by mouth 2 (two) times a day, Starting Thu 4/5/2018, Normal           No discharge procedures on file      ED Provider  Electronically Signed by           Daniel Mcginnis DO  05/22/18 9615

## 2018-05-22 NOTE — TELEPHONE ENCOUNTER
Dr Sheryn Goon called to report that our pt is in [facility] ED with reports of abd pain and has a hx of TAAA  She said a CTA of chest and abd where done and it showed the aneurysm has grown to 5 6cm  She asked what the f/u was  I told her she is scheduled for an ov on 6/28 with Dr Edward Nathan to discuss TAAA repair  She said that she can not wait that long  I told her I would check with scheduling to move up the appt and call the pt back  Scheduling moved her appt up to 6/6 @ 3:30pm   Scheduling attempted to call pt but no answer,lmom  I have a message to Dr Megha Parker asking if she still needs a CTA of the pelvis since he originally ordered a CTA chest/abd/pelvis and only a cta chest and abd where done

## 2018-05-23 ENCOUNTER — VBI (OUTPATIENT)
Dept: ADMINISTRATIVE | Facility: OTHER | Age: 73
End: 2018-05-23

## 2018-05-23 LAB
BUN SERPL-MCNC: 25 MG/DL (ref 7–25)
BUN/CREAT SERPL: 18 (CALC) (ref 6–22)
CALCIUM SERPL-MCNC: 9.6 MG/DL (ref 8.6–10.4)
CHLORIDE SERPL-SCNC: 105 MMOL/L (ref 98–110)
CO2 SERPL-SCNC: 29 MMOL/L (ref 20–31)
CREAT SERPL-MCNC: 1.38 MG/DL (ref 0.6–0.93)
GLUCOSE SERPL-MCNC: 92 MG/DL (ref 65–99)
POTASSIUM SERPL-SCNC: 4.6 MMOL/L (ref 3.5–5.3)
SL AMB EGFR AFRICAN AMERICAN: 45 ML/MIN/1.73M2
SL AMB EGFR NON AFRICAN AMERICAN: 39 ML/MIN/1.73M2
SODIUM SERPL-SCNC: 141 MMOL/L (ref 135–146)

## 2018-05-23 NOTE — TELEPHONE ENCOUNTER
Charlotte Dewitt    ED Visit Information     Ed visit date: 05/22/2018  Diagnosis Description: Abdominal aortic aneurysm (AAA) greater than 5 0 cm in diameter in female [facility]); Acute exacerbation of chronic low back pain  In Network? Yes [facility]  Discharge status: Home  Discharged with meds ? Yes  Number of ED visits to date: 1  ED Severity:3     Outreach Information    Outreach successful: Yes 1  Date letter mailed:N/a  Date Finalized:05/23/2018    Care Coordination    Follow up appointment with pcp: yes 05/31/2018, Follow up appointment with vascular on 06/06 and appt with pain management on 05/24  Transportation issues ? No    Value [employer] type:  7 Day Outreach  Emergent necessity warranted by diagnosis:  No  [facility] PCP:  Yes  Transportation:  Friend/Family Transport  Called PCP first?:  No  Feels able to call PCP for urgent problems ?:  Yes  Understands what emergencies can be handled by PCP ?:  Yes  Ever any problems getting appointment with PCP for minor emergency/urgency problems?:  No  Practice Contacted Patient ?:  No  Pt had ED follow up with pcp/staff ?:  No    Seen for follow-up out of network ?:  No  Reason Patient went to ED instead of Urgent Care or PCP?:  Perceived Severity of Illness  Urgent care Education?:  Yes    05/23/2018 10:48 AM Phone (Danny Smith) Jeaneth Das (Self) [phone] (H) Remove  Call Complete - Personal communication with patient:    Sd Fonseca stated that she is doing well s/p ED visit on 05/22/2018 for Abdominal aortic aneurysm (AAA) greater than 5 0 cm in diameter in female [facility]); Acute exacerbation of chronic low back pain  Patient states that she is feeling better today after resting  She has a f/u appt with Dr Makayla Regalado on 05/31 and will be seeing vascular surgeon on 06/06 and pain management on 05/24  Patient was not aware of her nearest [place] [id number] urgent care and what conditions can be treated  She was receptive to [facility] education   She denies any transportation issues

## 2018-05-24 ENCOUNTER — OFFICE VISIT (OUTPATIENT)
Dept: PAIN MEDICINE | Facility: CLINIC | Age: 73
End: 2018-05-24
Payer: COMMERCIAL

## 2018-05-24 VITALS
HEIGHT: 62 IN | DIASTOLIC BLOOD PRESSURE: 78 MMHG | SYSTOLIC BLOOD PRESSURE: 136 MMHG | TEMPERATURE: 98.3 F | BODY MASS INDEX: 24.48 KG/M2 | WEIGHT: 133 LBS | HEART RATE: 76 BPM

## 2018-05-24 DIAGNOSIS — M48.062 SPINAL STENOSIS OF LUMBAR REGION WITH NEUROGENIC CLAUDICATION: ICD-10-CM

## 2018-05-24 DIAGNOSIS — G89.4 CHRONIC PAIN SYNDROME: ICD-10-CM

## 2018-05-24 DIAGNOSIS — M54.16 LUMBAR RADICULOPATHY: ICD-10-CM

## 2018-05-24 DIAGNOSIS — M54.41 CHRONIC BILATERAL LOW BACK PAIN WITH RIGHT-SIDED SCIATICA: Primary | ICD-10-CM

## 2018-05-24 DIAGNOSIS — G89.29 CHRONIC BILATERAL LOW BACK PAIN WITH RIGHT-SIDED SCIATICA: Primary | ICD-10-CM

## 2018-05-24 PROCEDURE — 99214 OFFICE O/P EST MOD 30 MIN: CPT | Performed by: NURSE PRACTITIONER

## 2018-05-24 RX ORDER — DULOXETIN HYDROCHLORIDE 30 MG/1
CAPSULE, DELAYED RELEASE ORAL
Qty: 60 CAPSULE | Refills: 1 | Status: SHIPPED | OUTPATIENT
Start: 2018-05-24 | End: 2018-05-31

## 2018-05-24 NOTE — PROGRESS NOTES
Assessment:  1  Chronic bilateral low back pain with right-sided sciatica    2  Spinal stenosis of lumbar region with neurogenic claudication    3  Chronic pain syndrome    4  Lumbar radiculopathy        Plan:  While the patient was in the office today, I did have a thorough conversation with the patient regarding her medication regimen and treatment plan  I explained to the patient that at this point time since she has had 2 epidural steroid injections without even moderate or any kind of lasting relief, I do not feel it would be in her best interest to proceed with any repeat injections  The patient was agreeable and verbalized an understanding  However, I did explain to the patient that I do feel that she may want to give some series consideration towards a surgical consultation to see if she does have any surgical options  However, at this point the patient is not interested in a surgical consultation and would like to exhaust every other nonsurgical option before she would even consider surgery  I discussed with the patient that at this point time since I feel there is definitely significant neuropathic, osteoarthritic, and myofascial component to her pain symptoms and she has previously tried and failed gabapentin, that we can try different neuropathic medications such as Cymbalta  The patient denied being prescribed any anti-depressant and/or psychiatric medications  I reviewed with the patient that it may take 3-4 weeks for the medication's effects to be noticed and that it should never be abruptly stopped  Possible side effects include but are not limited to; vertigo, lethargy, nausea, worsening depression/anxiety, and confusion  I advised the patient to call our office if they experience any side effects  We will start her on 30 milligrams and then work up to 60 milligrams over the next 5-6 weeks and see how she does  The patient verbalized an understanding       With regards to the tramadol, I explained to the patient that at this point since she does not feel the tramadol provides any significant relief, I feel would be in her best interest just to discontinue the tramadol and we will hold off any other medication options at this time  The patient was agreeable and verbalized an understanding  The patient will follow-up in 8 weeks for medication prescription refill and reevaluation  The patient was advised to contact the office should their symptoms worsen in the interim  The patient was agreeable and verbalized an understanding  I attest that I have spent at least 25 minutes face to face with the patient and that at least 50% of the time was educating and/or discussing the patient's symptoms and treatment plan options until the patient was satisfied with the plan  History of Present Illness: The patient is a 81 y o  female last seen on 4/27/18 Right L5 TFESI #2 who presents for a follow up office visit in regards to chronic pain secondary to lumbar stenosis and spondylosis  The patient currently reports that her right-sided greater than left low back and right lower extremity radicular pain symptoms have not improved significantly since her last office visit and she does not feel that the repeat epidural steroid injection provided any significant relief as she only noted relief for 3-4 days at best   This past Tuesday the patient was supposed to be seen in the office, however, her pain was so severe she went to the emergency room and they did give her some tramadol which is providing very minimal relief  She reports that she had previously run out of the gabapentin and decided to stay off of the gabapentin because it was not providing any significant relief presents today to discuss her medication regimen and treatment plan      I have personally reviewed and/or updated the patient's past medical history, past surgical history, family history, social history, current medications, allergies, and vital signs today  Review of Systems:    Review of Systems   Respiratory: Negative for shortness of breath  Cardiovascular: Negative for chest pain  Gastrointestinal: Positive for vomiting  Negative for constipation, diarrhea and nausea  Musculoskeletal: Negative for arthralgias, gait problem, joint swelling and myalgias  Skin: Negative for rash  Neurological: Positive for dizziness  Negative for seizures and weakness  All other systems reviewed and are negative  Past Medical History:   Diagnosis Date    [facility]ortic aneurysm ([facility] )     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     ASCVD (arteriosclerotic cardiovascular disease)     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 ([facility])     Back problem     Blood type B-     Cataract     COPD (chronic obstructive pulmonary disease) ([facility]     Coronary artery disease     CVA (cerebral vascular accident) ([facility] )     CVA (cerebral vascular accident) ([place] [address] )     Depression     Disease of thyroid gland     History of cataract     Hyperlipidemia     Hypertension     Prediabetes     PVD (peripheral vascular disease) ([facility])        Past Surgical History:   Procedure Laterality Date    CAROTID STENT Left     CT COLONOSCOPY FLX DX W/COLLJ SPEC WHEN PFRMD N/A 9/27/2017    Procedure: EGD AND COLONOSCOPY;  Surgeon: Elaina Sotomayor MD;  Location: [facility];  Service: Gastroenterology    TUBAL LIGATION         Family History   Problem Relation Age of Onset    Hypertension Mother     Heart disease Mother     Diabetes Father     Breast cancer Daughter        Social History     Occupational History    Not on file       Social History Main Topics    Smoking status: Former Smoker     Packs/day: 0 50     Years: 45 00     Quit date: 08/2017    Smokeless tobacco: Never Used      Comment: Quit 3 weeks ago    Alcohol use No      Comment: rare    Drug use: No    Sexual activity: Not Currently Current Outpatient Prescriptions:     aspirin (ECOTRIN) 325 mg EC tablet, Take 325 mg by mouth daily  , Disp: , Rfl:     atorvastatin (LIPITOR) 40 mg tablet, Take 1 tablet (40 mg total) by mouth daily, Disp: 90 tablet, Rfl: 3    gabapentin (NEURONTIN) 100 mg capsule, Take 2 PO TID, Disp: 180 capsule, Rfl: 1    levothyroxine 100 mcg tablet, Take 1 tablet (100 mcg total) by mouth daily, Disp: 30 tablet, Rfl: 3    lisinopril (ZESTRIL) 10 mg tablet, Take 1 tablet (10 mg total) by mouth daily, Disp: 90 tablet, Rfl: 3    metoprolol succinate (TOPROL-XL) 50 mg 24 hr tablet, Take 1 tablet (50 mg total) by mouth 2 (two) times a day, Disp: 60 tablet, Rfl: 5    ondansetron (ZOFRAN-ODT) 4 mg disintegrating tablet, Take 1 tablet (4 mg total) by mouth every 8 (eight) hours as needed for nausea or vomiting, Disp: 12 tablet, Rfl: 0    traMADol (ULTRAM) 50 mg tablet, Take 1 tablet (50 mg total) by mouth every 8 (eight) hours as needed for moderate pain for up to 10 days, Disp: 12 tablet, Rfl: 0    Allergies   Allergen Reactions    Penicillins Rash       Physical Exam:    There were no vitals taken for this visit  Constitutional:normal, well developed, well nourished, alert, in no distress and non-toxic and no overt pain behavior  Eyes:anicteric  HEENT:grossly intact  Neck:supple, symmetric, trachea midline and no masses   Pulmonary:even and unlabored  Cardiovascular:No edema or pitting edema present  Skin:Normal without rashes or lesions and well hydrated  Psychiatric:Mood and affect appropriate  Neurologic:Cranial Nerves II-XII grossly intact  Musculoskeletal:Slightly antalgic, but steady gait with the use of a single cane  Imaging  No orders to display         No orders of the defined types were placed in this encounter

## 2018-05-28 PROBLEM — R73.01 IMPAIRED FASTING BLOOD SUGAR: Status: ACTIVE | Noted: 2017-06-07

## 2018-05-28 PROBLEM — N18.30 STAGE 3 CHRONIC KIDNEY DISEASE (HCC): Status: ACTIVE | Noted: 2018-05-28

## 2018-05-28 PROBLEM — G89.4 CHRONIC PAIN SYNDROME: Status: RESOLVED | Noted: 2018-04-23 | Resolved: 2018-05-28

## 2018-05-28 PROBLEM — M79.18 MUSCLE PAIN, MYOFACIAL: Status: RESOLVED | Noted: 2018-01-30 | Resolved: 2018-05-28

## 2018-05-28 PROBLEM — M54.41 CHRONIC BILATERAL LOW BACK PAIN WITH RIGHT-SIDED SCIATICA: Status: RESOLVED | Noted: 2018-04-23 | Resolved: 2018-05-28

## 2018-05-28 PROBLEM — R73.01 IMPAIRED FASTING BLOOD SUGAR: Status: RESOLVED | Noted: 2017-06-07 | Resolved: 2018-05-28

## 2018-05-28 PROBLEM — G89.29 CHRONIC BILATERAL LOW BACK PAIN WITH RIGHT-SIDED SCIATICA: Status: RESOLVED | Noted: 2018-04-23 | Resolved: 2018-05-28

## 2018-05-28 PROBLEM — M54.16 LUMBAR RADICULOPATHY: Status: RESOLVED | Noted: 2018-04-23 | Resolved: 2018-05-28

## 2018-05-28 PROBLEM — R05.8 DRY COUGH: Status: RESOLVED | Noted: 2018-01-30 | Resolved: 2018-05-28

## 2018-05-29 ENCOUNTER — TELEPHONE (OUTPATIENT)
Dept: PAIN MEDICINE | Facility: MEDICAL CENTER | Age: 73
End: 2018-05-29

## 2018-05-29 DIAGNOSIS — M48.062 SPINAL STENOSIS OF LUMBAR REGION WITH NEUROGENIC CLAUDICATION: Primary | ICD-10-CM

## 2018-05-29 DIAGNOSIS — G89.29 CHRONIC BILATERAL LOW BACK PAIN WITH RIGHT-SIDED SCIATICA: ICD-10-CM

## 2018-05-29 DIAGNOSIS — G89.4 CHRONIC PAIN SYNDROME: ICD-10-CM

## 2018-05-29 DIAGNOSIS — M54.41 CHRONIC BILATERAL LOW BACK PAIN WITH RIGHT-SIDED SCIATICA: ICD-10-CM

## 2018-05-29 DIAGNOSIS — M79.18 MUSCLE PAIN, MYOFACIAL: ICD-10-CM

## 2018-05-29 DIAGNOSIS — M54.16 LUMBAR RADICULOPATHY: ICD-10-CM

## 2018-05-29 NOTE — TELEPHONE ENCOUNTER
Pt left message in voicemail this morning, stating that she cannot afford the medication that Jorge prescribed for her  Asking if there is something else that she can take instead  Pt left a call back number of [phone]

## 2018-05-29 NOTE — TELEPHONE ENCOUNTER
Attempted to call the patient and left a detailed mom in regards to the previous task  Pt  To clarify

## 2018-05-31 ENCOUNTER — OFFICE VISIT (OUTPATIENT)
Dept: FAMILY MEDICINE CLINIC | Facility: HOSPITAL | Age: 73
End: 2018-05-31
Payer: COMMERCIAL

## 2018-05-31 VITALS
BODY MASS INDEX: 24.2 KG/M2 | DIASTOLIC BLOOD PRESSURE: 80 MMHG | OXYGEN SATURATION: 94 % | SYSTOLIC BLOOD PRESSURE: 130 MMHG | WEIGHT: 131.5 LBS | HEIGHT: 62 IN | HEART RATE: 88 BPM

## 2018-05-31 DIAGNOSIS — H25.011 CORTICAL AGE-RELATED CATARACT OF RIGHT EYE: ICD-10-CM

## 2018-05-31 DIAGNOSIS — I10 HYPERTENSION, UNCONTROLLED: Chronic | ICD-10-CM

## 2018-05-31 DIAGNOSIS — Z01.818 PRE-OP EXAMINATION: ICD-10-CM

## 2018-05-31 DIAGNOSIS — Z12.31 SCREENING MAMMOGRAM, ENCOUNTER FOR: Primary | ICD-10-CM

## 2018-05-31 DIAGNOSIS — E78.00 PURE HYPERCHOLESTEROLEMIA: ICD-10-CM

## 2018-05-31 DIAGNOSIS — I73.9 PAD (PERIPHERAL ARTERY DISEASE) (HCC): ICD-10-CM

## 2018-05-31 DIAGNOSIS — N18.30 STAGE 3 CHRONIC KIDNEY DISEASE (HCC): ICD-10-CM

## 2018-05-31 LAB — QRS AXIS: NORMAL DEGREES

## 2018-05-31 PROCEDURE — 93000 ELECTROCARDIOGRAM COMPLETE: CPT | Performed by: INTERNAL MEDICINE

## 2018-05-31 PROCEDURE — 99214 OFFICE O/P EST MOD 30 MIN: CPT | Performed by: INTERNAL MEDICINE

## 2018-05-31 PROCEDURE — 3725F SCREEN DEPRESSION PERFORMED: CPT | Performed by: INTERNAL MEDICINE

## 2018-05-31 RX ORDER — NORTRIPTYLINE HYDROCHLORIDE 10 MG/1
CAPSULE ORAL
Qty: 60 CAPSULE | Refills: 1 | Status: ON HOLD | OUTPATIENT
Start: 2018-05-31 | End: 2018-01-01

## 2018-05-31 NOTE — TELEPHONE ENCOUNTER
LM w/ male at home #, requested pt cb  Per male, will make pt aware, phone number is not needed  Will await pt's call back       2nd attempt

## 2018-05-31 NOTE — TELEPHONE ENCOUNTER
She is NOT to take the Cymbalta  She can try Nortriptyline 10 mg, 1 PO HS x 2 weeks, then 2 PO HS with a refill to get her to her next Ov  She should call us if she has any side effects and she should not drive or operate machinery until she sees how it affects her  Thank you

## 2018-05-31 NOTE — TELEPHONE ENCOUNTER
s/w pt, advised of above  Pt verbalized understanding and appreciation  Will cb as directed / if she cannot afford the medication

## 2018-05-31 NOTE — PROGRESS NOTES
[facility]      PREOPERATIVE EVALUATION     Sahara Ramires is a 81 y o  female who presents to the office today for a preoperative consultation at the request of surgeon HÉCTOR Cao who plans on performing cataract on June 7  Prior anesthesia adverse reactions - None    Exercise capacity - Able to climb 2 flights of stairs without symptoms - Yes    Easy bleeding/bruising - No    Chest pain - No    Dyspnea, wheezing, cough - No    Sleep apnea - No    HPI     Pt for cataract surgery       Past Medical History:   Diagnosis Date    [facility]     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[facility])     ASCVD (arteriosclerotic cardiovascular disease)     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 ([facility])     Back problem     Blood type B-     Cataract     COPD [facility]chronic obstructive pulmonary disease) ([facility])     Coronary artery disease     [facility]     CVA (cere[facility] vascular accident) ([facility] )     Depression     Disease of thyroid gland     History of cataract     Hyperlipidemia     Hypertension     Prediabetes     PVD (peripheral vascular disease) ([facility])          Past Surgical History:   Procedure Laterality Date    CAROTID STENT Left     UT COLONOSCOPY FLX DX W/COLLJ SPEC WHEN PFRMD N/A 9/27/2017    Procedure: EGD AND COLONOSCOPY;  Surgeon: Raj Guadarrama MD;  Location:  MAIN OR;  Service: Gastroenterology    TUBAL LIGATION           Family History   Problem Relation Age of Onset    Hypertension Mother     Heart disease Mother     Diabetes Father     Breast cancer Daughter     Substance Abuse Neg Hx     Mental illness Neg Hx          Social History   Substance Use Topics    Smoking status: Former Smoker     Packs/day: 0 50     Years: 45 00     Quit date: 08/2017    Smokeless tobacco: Never Used      Comment: Quit 3 weeks ago    Alcohol use No      Comment: rare       Current Outpatient Prescriptions   Medication Sig Dispense Refill    aspirin (ECOTRIN) 325 mg EC tablet Take 325 mg by mouth daily   atorvastatin (LIPITOR) 40 mg tablet Take 1 tablet (40 mg total) by mouth daily 90 tablet 3    co-enzyme Q-10 30 MG capsule Take 30 mg by mouth 3 (three) times a day      DULoxetine (CYMBALTA) 30 mg delayed release capsule Take 1 pill every other night x 4 days, then 1 pill every night x 2 weeks, then 2 PO HS  60 capsule 1    levothyroxine 100 mcg tablet Take 1 tablet (100 mcg total) by mouth daily 30 tablet 3    lisinopril (ZESTRIL) 10 mg tablet Take 1 tablet (10 mg total) by mouth daily 90 tablet 3    Methylcobalamin (B12-ACTIVE PO) Take by mouth      metoprolol succinate (TOPROL-XL) 50 mg 24 hr tablet Take 1 tablet (50 mg total) by mouth 2 (two) times a day 60 tablet 5    Omega-3 Fatty Acids (FISH OIL PO) Take by mouth       No current facility-administered medications for this visit  Penicillins      Review of Systems   Constitutional: Negative  Respiratory: Negative  Smokes about 2 cigs per day   Cardiovascular: Negative  Gastrointestinal: Negative  Genitourinary: Negative  Objective      /80 (BP Location: Left arm, Patient Position: Sitting, Cuff Size: Standard)   Pulse 88   Ht 5' 2" (1 575 m)   Wt 59 6 kg (131 lb 8 oz)   SpO2 94%   BMI 24 05 kg/m²     Physical Exam   Constitutional: She appears well-developed and well-nourished  Neck: No JVD present  bruits   Cardiovascular: Normal rate and regular rhythm  Pulmonary/Chest: Effort normal and breath sounds normal    Abdominal: Soft  Bowel sounds are normal  She exhibits no mass  Musculoskeletal: She exhibits no edema  Dec pulses   Vitals reviewed          Cardiographics  ECG: Normal EKG      Imaging  Chest x-ray: not indicated      Office Visit on 05/31/2018   Component Date Value Ref Range Status    QRS Axis 05/31/2018 EKG shows normal sinus rhythm with nonspecific interventricular conduction delay which is basically normal otherwise  degrees Final   Orders Only on 05/22/2018   Component Date Value Ref Range Status    SL AMB GLUCOSE 05/22/2018 92  65 - 99 mg/dL Final    Comment:               Fasting reference interval         BUN 05/22/2018 25  7 - 25 mg/dL Final    Creatinine, Serum 05/22/2018 1 38* 0 60 - 0 93 mg/dL Final    Comment: For patients >52years of age, the reference limit  for Creatinine is approximately 13% higher for people  identified as -American           eGFR Non  05/22/2018 39* > OR = 60 mL/min/1 73m2 Final    SL AMB EGFR  05/22/2018 45* > OR = 60 mL/min/1 73m2 Final    SL AMB BUN/CREATININE RATIO 05/22/2018 18  6 - 22 (calc) Final    SL AMB SODIUM 05/22/2018 141  135 - 146 mmol/L Final    SL AMB POTASSIUM 05/22/2018 4 6  3 5 - 5 3 mmol/L Final    SL AMB CHLORIDE 05/22/2018 105  98 - 110 mmol/L Final    SL AMB CARBON DIOXIDE 05/22/2018 29  20 - 31 mmol/L Final    SL AMB CALCIUM 05/22/2018 9 6  8 6 - 10 4 mg/dL Final   Admission on 05/22/2018, Discharged on 05/22/2018   Component Date Value Ref Range Status    WBC 05/22/2018 8 40  4 31 - 10 16 Thousand/uL Final    RBC 05/22/2018 4 42  3 81 - 5 12 Million/uL Final    Hemoglobin 05/22/2018 14 0  11 5 - 15 4 g/dL Final    Hematocrit 05/22/2018 43 6  34 8 - 46 1 % Final    MCV 05/22/2018 99* 82 - 98 fL Final    MCH 05/22/2018 31 7  26 8 - 34 3 pg Final    MCHC 05/22/2018 32 1  31 4 - 37 4 g/dL Final    RDW 05/22/2018 15 4* 11 6 - 15 1 % Final    MPV 05/22/2018 10 5  8 9 - 12 7 fL Final    Platelets 22/67/5444 201  149 - 390 Thousands/uL Final    Neutrophils Relative 05/22/2018 58  43 - 75 % Final    Lymphocytes Relative 05/22/2018 31  14 - 44 % Final    Monocytes Relative 05/22/2018 9  4 - 12 % Final    Eosinophils Relative 05/22/2018 1  0 - 6 % Final    Basophils Relative 05/22/2018 1  0 - 1 % Final    Neutrophils Absolute 05/22/2018 4 85  1 85 - 7 62 Thousands/µL Final    Lymphocytes Absolute 05/22/2018 2 63  0 60 - 4 47 Thousands/µL Final    Monocytes Absolute 05/22/2018 0 78  0 17 - 1 22 Thousand/µL Final    Eosinophils Absolute 05/22/2018 0 10  0 00 - 0 61 Thousand/µL Final    Basophils Absolute 05/22/2018 0 04  0 00 - 0 10 Thousands/µL Final    Sodium 05/22/2018 141  136 - 145 mmol/L Final    Potassium 05/22/2018 4 1  3 5 - 5 3 mmol/L Final    Chloride 05/22/2018 103  100 - 108 mmol/L Final    CO2 05/22/2018 30  21 - 32 mmol/L Final    Anion Gap 05/22/2018 8  4 - 13 mmol/L Final    BUN 05/22/2018 24  5 - 25 mg/dL Final    Creatinine 05/22/2018 1 44* 0 60 - 1 30 mg/dL Final    Standardized to IDMS reference method    Glucose 05/22/2018 121  65 - 140 mg/dL Final      If the patient is fasting, the ADA then defines impaired fasting glucose as > 100 mg/dL and diabetes as > or equal to 123 mg/dL  Specimen collection should occur prior to Sulfasalazine administration due to the potential for falsely depressed results  Specimen collection should occur prior to Sulfapyridine administration due to the potential for falsely elevated results   Calcium 05/22/2018 9 6  8 3 - 10 1 mg/dL Final    AST 05/22/2018 18  5 - 45 U/L Final      Specimen collection should occur prior to Sulfasalazine administration due to the potential for falsely depressed results   ALT 05/22/2018 23  12 - 78 U/L Final      Specimen collection should occur prior to Sulfasalazine administration due to the potential for falsely depressed results       Alkaline Phosphatase 05/22/2018 102  46 - 116 U/L Final    Total Protein 05/22/2018 7 9  6 4 - 8 2 g/dL Final    Albumin 05/22/2018 3 7  3 5 - 5 0 g/dL Final    Total Bilirubin 05/22/2018 0 50  0 20 - 1 00 mg/dL Final    eGFR 05/22/2018 37  ml/min/1 73sq m Final    Lipase 05/22/2018 175  73 - 393 u/L Final    Color, UA 05/22/2018 YELLOW   Final    Clarity, UA 05/22/2018 CLEAR   Final    EXT Glucose, UA (Ref: Negative) 05/22/2018 NEG   Final    EXT Bilirubin, UA (Ref: Negative) 05/22/2018 NEG   Final    EXT Ketones, UA (Ref: Negative) 05/22/2018 NEG   Final    EXT Spec Grav, UA 05/22/2018 1 005   Final    EXT Blood, UA (Ref: Negative) 05/22/2018 NEG   Final    EXT pH, UA 05/22/2018 6 5   Final    EXT Protein, UA (Ref: Negative) 05/22/2018 NEG   Final    EXT Urobilinogen, UA (Ref: 0 2- 1 * 05/22/2018 0 2   Final    EXT Leukocytes, UA (Ref: Negative) 05/22/2018 NEG   Final    EXT Nitrite, UA (Ref: Negative) 05/22/2018 NEG   Final           Lab Review   not applicable      Diagnoses and all orders for this visit:    Screening mammogram, encounter for  -     Mammo screening bilateral w cad;  Future    Pre-op examination  -     POCT ECG    Cortical age-related cataract of right eye    Hypertension, uncontrolled    Pure hypercholesterolemia    PAD (peripheral artery disease) (HCC)    Stage 3 chronic kidney disease                 Plan:         Surgical Clearance - Cleared    Risk - low    Discussion/Summary: Only metoprolol the day of surgery           Tori Corrales MD

## 2018-05-31 NOTE — TELEPHONE ENCOUNTER
s/w pt, stated that cymbalta is $89 / month and she cannot afford that  Per pt, tried gabapentin and tramadol in the past  Advised pt, will make DG aware and cb to advise  Pt verbalized understanding and appreciation

## 2018-05-31 NOTE — TELEPHONE ENCOUNTER
Please advise her that we can try nortriptyline instead as that is a similar medication and is generic as well, but may be cheaper  Let me know what she thinks

## 2018-05-31 NOTE — TELEPHONE ENCOUNTER
s/w pt, advised of above  Pt verbalized understanding, requested rx be sent to the pharmacy  Advised pt, will make DG aware

## 2018-06-04 RX ORDER — SODIUM CHLORIDE 9 MG/ML
100 INJECTION, SOLUTION INTRAVENOUS CONTINUOUS
Status: DISCONTINUED | OUTPATIENT
Start: 2018-06-11 | End: 2018-06-14 | Stop reason: HOSPADM

## 2018-06-06 ENCOUNTER — OFFICE VISIT (OUTPATIENT)
Dept: VASCULAR SURGERY | Facility: CLINIC | Age: 73
End: 2018-06-06
Payer: COMMERCIAL

## 2018-06-06 VITALS
DIASTOLIC BLOOD PRESSURE: 72 MMHG | HEIGHT: 62 IN | TEMPERATURE: 98.2 F | BODY MASS INDEX: 24.11 KG/M2 | HEART RATE: 64 BPM | RESPIRATION RATE: 18 BRPM | WEIGHT: 131 LBS | SYSTOLIC BLOOD PRESSURE: 136 MMHG

## 2018-06-06 DIAGNOSIS — I70.211 ATHEROSCLER OF NATIVE ARTERY OF RIGHT LEG WITH INTERMIT CLAUDICATION (HCC): ICD-10-CM

## 2018-06-06 DIAGNOSIS — I71.6 THORACOABDOMINAL AORTIC ANEURYSM (TAAA) WITHOUT RUPTURE (HCC): Primary | ICD-10-CM

## 2018-06-06 DIAGNOSIS — I65.23 BILATERAL CAROTID ARTERY STENOSIS: Chronic | ICD-10-CM

## 2018-06-06 PROCEDURE — 99215 OFFICE O/P EST HI 40 MIN: CPT | Performed by: SURGERY

## 2018-06-06 RX ORDER — BESIFLOXACIN 6 MG/ML
SUSPENSION OPHTHALMIC
COMMUNITY
Start: 2018-06-02 | End: 2018-06-21

## 2018-06-06 RX ORDER — PREDNISOLONE ACETATE 10 MG/ML
SUSPENSION/ DROPS OPHTHALMIC
COMMUNITY
Start: 2018-06-02 | End: 2018-01-01 | Stop reason: HOSPADM

## 2018-06-06 NOTE — ASSESSMENT & PLAN NOTE
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

## 2018-06-06 NOTE — PATIENT INSTRUCTIONS
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  We discussed the findings on CT angiogram and the treatment options available and their associated risks and benefits  At this time we will refer to CT surgery with possible multi disciplinary intervention  We will also obtain cardiac evaluation for risk stratification  Following this she will return to the office for further treatment recommendations  Evidently the patient has discussed with Dr Noelle Palomo previous option of referral to [facility] for which I will query Dr Noelle Palomo  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)  Aortoiliac and infrainguinal arterial occlusive disease with bilateral, right greater than left claudication  She is able walk approximately 2 blocks before having to stop  This is not cause any major limitations in her activity  Baseline arterial studies will be ordered  Bilateral carotid artery stenosis  Bilateral carotid occlusive disease with previous left carotid surgery  She states she had a stroke on the table wall this procedure was being done at [facility]   She was subsequently transferred to [facility] at which time she is unclear as to what exactly was done  A duplex will be ordered in follow-up

## 2018-06-06 NOTE — ASSESSMENT & PLAN NOTE
Bilateral carotid occlusive disease with previous left carotid surgery  She states she had a stroke on the table wall this procedure was being done at [facility]   She was subsequently transferred to [facility] at which time she is unclear as to what exactly was done  A duplex will be ordered in follow-up

## 2018-06-06 NOTE — LETTER
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  We discussed the findings on CT angiogram and the treatment options available and their associated risks and benefits  At this time we will refer to CT surgery with possible multi disciplinary intervention  We will also obtain cardiac evaluation for risk stratification  Following this she will return to the office for further treatment recommendations  Evidently the patient has discussed with Dr Francia Aschoff previous option of referral to [facility] for which I will query Dr Francia Aschoff  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   She was subsequently transferred to [facility] at which time she is unclear as to what exactly was done  A duplex will be ordered in follow-up  If you have questions, please do not hesitate to call me  I look forward to following Reina Jorgensen along with you           Sincerely,        Spring Moser MD        CC: Dylon Santana MD

## 2018-06-06 NOTE — PROGRESS NOTES
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  We discussed the findings on CT angiogram and the treatment options available and their associated risks and benefits  At this time we will refer to CT surgery with possible multi disciplinary intervention  We will also obtain cardiac evaluation for risk stratification  Following this she will return to the office for further treatment recommendations  Evidently the patient has discussed with Dr Deysi Thompson previous option of referral to [facility] for which I will query Dr Deysi Thompson  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)  Aortoiliac and infrainguinal arterial occlusive disease with bilateral, right greater than left claudication  She is able walk approximately 2 blocks before having to stop  This is not cause any major limitations in her activity  Baseline arterial studies will be ordered  Bilateral carotid artery stenosis  Bilateral carotid occlusive disease with previous left carotid surgery  She states she had a stroke on the table wall this procedure was being done at [facility]   She was subsequently transferred to [facility] at which time she is unclear as to what exactly was done  A duplex will be ordered in follow-up  Diagnoses and all orders for this visit:    Thoracoabdominal aortic aneurysm (TAAA) without rupture ([facility] [address] )  -     Ambulatory referral to Cardiology; Future  -     VAS carotid complete study; Future  -     VAS lower limb arterial duplex, complete bilateral; Future  -     Ambulatory referral to Cardiovascular Surgery; Future    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 ([facility] [address] )  -     Ambulatory referral to Cardiology; Future  -     VAS carotid complete study;  Future  -     VAS lower limb arterial duplex, complete bilateral; Future  -     Ambulatory referral to Cardiovascular Surgery; Future    Bilateral carotid artery stenosis  -     Ambulatory referral to Cardiology; Future  -     VAS carotid complete study; Future  -     VAS lower limb arterial duplex, complete bilateral; Future  -     Ambulatory referral to Cardiovascular Surgery; Future    Other orders  -     BESIVANCE 0 6 % SUSP;   -     prednisoLONE acetate (PRED FORTE) 1 % ophthalmic suspension;           Subjective:      Patient ID: Librado Rojas is a 72 y o  female  Patient had CTA abd/pelvis 5/22  Patient has known TAAA  Patient has back pain that is severe and getting worse over the last few weeks  She admits to post prandial pain  She is also c/o RLE leg and foot pain in bed at night  She gets out of bed and walks around  Symptoms resolve  She denies any open wounds or sores  She admits to prior tobacco use  She takes ASA and statin daily  68-year-old followed by Dr Deysi Thompson for a thoraco abdominal aortic aneurysm repair  Evidently on last evaluation with duplex there was evidence of progression of the aneurysm for which she was scheduled for CT chest/abdomen and pelvis  She subsequently was seen in the emergency room for exacerbation of chronic back pain at which time a CT of the chest and abdomen was performed  There was no evidence of acute change in regard to her aortic disease  She now presents for further evaluation and treatment in this regard  She states her abdominal pain has resolved and her back pain has become intermittent  She notes she has a history of 2 herniated discs  She does also complain of lower extremity claudication on the right side at approximately 2 blocks which resolved with approximately 2 min of rest   This is unchanged  She has a history of smoking but has quit  She denies chest pain or shortness of breath  CT abdomen and pelvis 05/22/2018 is reviewed    This shows increase in size of thoraco abdominal aortic aneurysm which is now measured at maximum diameter at the diaphragm at 6 1 cm   The infrarenal aorta maximum size is 4 8 cm  There is disease of her visceral and renal vessels to include a 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 and mild stenosis of both the SMA and right renal artery  There is severe occlusive disease of the distal aorta and bilateral common iliac arteries though the remainder of the iliac vessels are not visualized since a pelvic image was not performed  Previous aortic duplex 04/23/2018 shows bilateral common iliac artery stenosis 50-75%  Previous lower extremity arterial duplex 2016 with ankle-brachial index is 0 5 on the right and 0 7 on the left  Carotid duplex 10/29/2015 with less than 50% bilateral carotid stenosis  The following portions of the patient's history were reviewed and updated as appropriate: allergies, current medications, past family history, past medical history, past social history, past surgical history and problem list     Review of Systems   Constitutional: Negative  HENT: Negative  Eyes: Negative  Respiratory: Negative  Cardiovascular: Negative  Gastrointestinal: Negative  Endocrine: Negative  Genitourinary: Negative  Musculoskeletal:        R leg pain and foot   Skin: Negative  Allergic/Immunologic: Negative  Neurological: Negative  Hematological: Negative  Psychiatric/Behavioral: Negative  Objective:      /72 (BP Location: Right arm, Patient Position: Sitting, Cuff Size: Adult)   Pulse 64   Temp 98 2 °F (36 8 °C) (Tympanic)   Resp 18   Ht 5' 2" (1 575 m)   Wt 59 4 kg (131 lb)   BMI 23 96 kg/m²          Physical Exam   Constitutional: She is oriented to person, place, and time  She appears well-developed and well-nourished  HENT:   Head: Normocephalic and atraumatic  Eyes: Conjunctivae and EOM are normal    Neck: Normal range of motion  Neck supple  No JVD present  Carotid bruit is not present  Healed left cervical incision consistent with carotid endarterectomy   Cardiovascular: Normal rate, regular rhythm, S1 normal, S2 normal and normal heart sounds  No murmur heard  Pulses:       Carotid pulses are 2+ on the right side, and 2+ on the left side  Radial pulses are 2+ on the right side, and 2+ on the left side  Femoral pulses are 0 on the right side, and 1+ on the left side  Popliteal pulses are 0 on the right side, and 0 on the left side  Dorsalis pedis pulses are 0 on the right side, and 0 on the left side  Posterior tibial pulses are 0 on the right side, and 0 on the left side  Pulmonary/Chest: Effort normal and breath sounds normal    Abdominal: Soft  She exhibits pulsatile midline mass  She exhibits no abdominal bruit  There is no tenderness  No hernia  Aortic pulsation   Musculoskeletal: Normal range of motion  She exhibits no edema, tenderness or deformity  Neurological: She is alert and oriented to person, place, and time  No cranial nerve deficit  Skin: Skin is warm, dry and intact  Psychiatric: She has a normal mood and affect

## 2018-06-06 NOTE — ASSESSMENT & PLAN NOTE
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

## 2018-06-07 ENCOUNTER — TELEPHONE (OUTPATIENT)
Dept: VASCULAR SURGERY | Facility: CLINIC | Age: 73
End: 2018-06-07

## 2018-06-07 NOTE — TELEPHONE ENCOUNTER
Patient has cardiac clearance with Dr Dalton Gil on 07/03/2018 @ 1020 am for Throcoabdominal aortic aneurysm repair

## 2018-06-11 ENCOUNTER — HOSPITAL ENCOUNTER (OUTPATIENT)
Dept: CT IMAGING | Facility: HOSPITAL | Age: 73
Discharge: HOME | End: 2018-06-11
Payer: COMMERCIAL

## 2018-06-11 ENCOUNTER — HOSPITAL ENCOUNTER (OUTPATIENT)
Dept: INFUSION CENTER | Facility: HOSPITAL | Age: 73
Discharge: HOME | End: 2018-06-11
Payer: COMMERCIAL

## 2018-06-11 ENCOUNTER — TRANSCRIBE ORDERS (OUTPATIENT)
Dept: FAMILY MEDICINE CLINIC | Facility: HOSPITAL | Age: 73
End: 2018-06-11

## 2018-06-11 VITALS
TEMPERATURE: 96.5 F | RESPIRATION RATE: 16 BRPM | HEART RATE: 78 BPM | DIASTOLIC BLOOD PRESSURE: 80 MMHG | OXYGEN SATURATION: 97 % | SYSTOLIC BLOOD PRESSURE: 128 MMHG

## 2018-06-11 DIAGNOSIS — I71.6 THORACOABDOMINAL AORTIC ANEURYSM (TAAA) WITHOUT RUPTURE (HCC): ICD-10-CM

## 2018-06-11 PROCEDURE — 74174 CTA ABD&PLVS W/CONTRAST: CPT

## 2018-06-11 PROCEDURE — 71275 CT ANGIOGRAPHY CHEST: CPT

## 2018-06-11 RX ADMIN — SODIUM CHLORIDE 100 ML/HR: 0.9 INJECTION, SOLUTION INTRAVENOUS at 08:15

## 2018-06-11 RX ADMIN — IOHEXOL 100 ML: 350 INJECTION, SOLUTION INTRAVENOUS at 11:09

## 2018-06-11 NOTE — PLAN OF CARE
Problem: Potential for Falls  Goal: Patient will remain free of falls  INTERVENTIONS:  - Assess patient frequently for physical needs  -  Identify cognitive and physical deficits and behaviors that affect risk of falls    -  Fleming fall precautions as indicated by assessment   - Educate patient/family on patient safety including physical limitations  - Instruct patient to call for assistance with activity based on assessment  - Modify environment to reduce risk of injury  - Consider OT/PT consult to assist with strengthening/mobility   Outcome: Progressing

## 2018-06-14 ENCOUNTER — HOSPITAL ENCOUNTER (OUTPATIENT)
Dept: NON INVASIVE DIAGNOSTICS | Facility: CLINIC | Age: 73
Discharge: HOME | End: 2018-06-14
Payer: COMMERCIAL

## 2018-06-14 DIAGNOSIS — I65.22 STENOSIS OF LEFT CAROTID ARTERY: ICD-10-CM

## 2018-06-14 DIAGNOSIS — I71.6 THORACOABDOMINAL AORTIC ANEURYSM (TAAA) WITHOUT RUPTURE (HCC): ICD-10-CM

## 2018-06-14 PROCEDURE — 93880 EXTRACRANIAL BILAT STUDY: CPT

## 2018-06-14 PROCEDURE — 93925 LOWER EXTREMITY STUDY: CPT

## 2018-06-14 PROCEDURE — 93923 UPR/LXTR ART STDY 3+ LVLS: CPT

## 2018-06-15 ENCOUNTER — TELEPHONE (OUTPATIENT)
Dept: FAMILY MEDICINE CLINIC | Facility: HOSPITAL | Age: 73
End: 2018-06-15

## 2018-06-15 ENCOUNTER — TELEPHONE (OUTPATIENT)
Dept: VASCULAR SURGERY | Facility: CLINIC | Age: 73
End: 2018-06-15

## 2018-06-15 DIAGNOSIS — D49.1 LUNG NEOPLASM: Primary | ICD-10-CM

## 2018-06-15 NOTE — TELEPHONE ENCOUNTER
gricel asked me to call patient with resutls  I called Dr Tigist Bryan office first and spoke with Marty Carey and told her that I was going to call patient and tell her to be in touch  with Dr Tigist Bryan  , so I wanted her to ask dr Nadya Neal to read results and advise

## 2018-06-15 NOTE — TELEPHONE ENCOUNTER
----- Message from Perla Traore, [address] sent at 6/15/2018 12:49 PM EDT -----  I spoke with Gary Lezama about this patient  Please notify patient's PCP of above and also let patient know that she had abnormal findings and needs to followup with PCP    Thanks

## 2018-06-15 NOTE — PROGRESS NOTES
I received a call from the vascular surgery office asking me, to contact the patient, regarding the CTA of the thoracic aorta today ordered  Apparently there is an expanding mass in the chest with adenopathy suspicious for lung carcinoma  I did contact the patient told her these unfortunate findings  I have referred her to thoracic surgery to obtain a tissue diagnosis

## 2018-06-19 PROCEDURE — 93922 UPR/L XTREMITY ART 2 LEVELS: CPT | Performed by: SURGERY

## 2018-06-19 PROCEDURE — 93925 LOWER EXTREMITY STUDY: CPT | Performed by: SURGERY

## 2018-06-19 PROCEDURE — 93880 EXTRACRANIAL BILAT STUDY: CPT | Performed by: SURGERY

## 2018-06-21 ENCOUNTER — OFFICE VISIT (OUTPATIENT)
Dept: CARDIAC SURGERY | Facility: CLINIC | Age: 73
End: 2018-06-21
Payer: COMMERCIAL

## 2018-06-21 VITALS
SYSTOLIC BLOOD PRESSURE: 144 MMHG | DIASTOLIC BLOOD PRESSURE: 82 MMHG | BODY MASS INDEX: 23.55 KG/M2 | HEART RATE: 76 BPM | TEMPERATURE: 97.6 F | RESPIRATION RATE: 14 BRPM | WEIGHT: 128 LBS | OXYGEN SATURATION: 99 % | HEIGHT: 62 IN

## 2018-06-21 DIAGNOSIS — I65.23 BILATERAL CAROTID ARTERY STENOSIS: Chronic | ICD-10-CM

## 2018-06-21 DIAGNOSIS — I71.6 THORACOABDOMINAL AORTIC ANEURYSM (TAAA) WITHOUT RUPTURE (HCC): ICD-10-CM

## 2018-06-21 DIAGNOSIS — I70.211 ATHEROSCLER OF NATIVE ARTERY OF RIGHT LEG WITH INTERMIT CLAUDICATION (HCC): ICD-10-CM

## 2018-06-21 PROCEDURE — 99205 OFFICE O/P NEW HI 60 MIN: CPT | Performed by: THORACIC SURGERY (CARDIOTHORACIC VASCULAR SURGERY)

## 2018-06-21 RX ORDER — NEPAFENAC 0.3 %
SUSPENSION, DROPS(FINAL DOSAGE FORM)(ML) OPHTHALMIC (EYE)
COMMUNITY
Start: 2018-06-08 | End: 2018-06-21

## 2018-06-21 NOTE — LETTER
June 21, 2018     Daivd Sam MD  [facility]  [address] [address]    Patient: Megan Lewis   YOB: 1945   Date of Visit: 6/21/2018       Dear Dr Alaina Ochoa:    Thank you for referring Ursula Rodriguez to me for evaluation  Below are my notes for this consultation  If you have questions, please do not hesitate to call me  I look forward to following your patient along with you  Sincerely,        Lokesh Chawla MD        CC: MD Mary Barrett MD  [place], [facility]  6/21/2018  1:53 PM  Attested  Consultation - Cardiothoracic Surgery   Megan Lewis 72 y o  female MRN: [id number]    Physician Requesting Consult: Dr Alaina Ochoa    Reason for Consult / Principal Problem: Thoracoabdominal aortic aneurysm    History of Present Illness: Megan Lewis is a 72y o  year old female referred to our service by Dr Alaina Ochoa  for consultation for a thoracoabdominal aortic aneurysm  She reports her aneurysm was discovered  4-5 years ago when she had collapsed at work and was evaluated for possible CVA  She had been followed by Dr Rosaura Berry since that time  In May she developed severe back and abdominal pain as well as complaints of intermittent left leg pain  She was evaluated in the ER with CTA chest/abdomen/pelvis which demonstrated progression of her aneurysm when compared to prior imaging studies  She was seen by Dr Alaina Ochoa for vascular surgery follow up and surgical intervention recommended  She is now referred to our service for combined surgical intervention for her thoracoabdominal aneurysm  In addition, the CTA demonstrates an increasing LAURIE pulmonary mass with mediastinal and hilar lymph adenopathy  She has an appt with Dr Ludivina Chavez next week for thoracic surgical consultation  She also has lower extremity arterial occlusive disease noted on recent lower extremity arterial duplex, right worse than left    She has a history of intra op CVA during a Left CEA procedure [facility]) with transfer to [facility] for a higher level of care  She recovered from her CVA and only has minimal residual left eyelid droop  Upon interview today, she reports the back, abdominal and left leg pain is much improved  She is ambulatory and denies SOB, angina, lightheadedness, numbness, tingling, limb weakness or coolness  She has a long standing history of tobacco abuse, 1 ppd since age 17, quit 1 year ago  She rarely drinks alcohol       Past Medical History:  Past Medical History:   Diagnosis Date    Aortic aneurysm ([facility] [address] )     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[facility]     ASCVD (arteriosclerotic cardiovascular disease)     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 ([facility])     Back problem     Blood type B-     Cataract     COPD (chronic obstructive pulmonary disease) ([facility])     Coronary artery disease     [facility]re[facility] vascular accident) [facility] [address] [facility]     [facility]     Depression     Disease of thyroid gland     History of cataract     Hyperlipidemia     Hypertension     Prediabetes     PVD (peripheral vascular disease) ([facility])          Past Surgical History:   Past Surgical History:   Procedure Laterality Date    CAROTID STENT Left     [place] COLONOSCOPY FLX DX W/COLLJ SPEC WHEN PFRMD N/A 9/27/2017    Procedure: EGD AND COLONOSCOPY;  Surgeon: Colin Zhang MD;  Location:  MAIN OR;  Service: Gastroenterology    TUBAL LIGATION           Family History:  Family History   Problem Relation Age of Onset    Hypertension Mother     Heart disease Mother     Diabetes Father     Breast cancer Daughter     Substance Abuse Neg Hx     Mental illness Neg Hx          Social History:    History   Alcohol Use No     Comment: rare     History   Drug Use No     History   Smoking Status    Former Smoker    Packs/day: 0 50    Years: 45 00    Quit date: 3/6/2018   Smokeless Tobacco    Never Used       Home Medications: Prior to Admission medications    Medication Sig Start Date End Date Taking? Authorizing Provider   aspirin (ECOTRIN) 325 mg EC tablet Take 325 mg by mouth daily  Yes Historical Provider, MD   atorvastatin (LIPITOR) 40 mg tablet Take 1 tablet (40 mg total) by mouth daily 4/5/18  Yes Jewel Liz MD   co-enzyme Q-10 30 MG capsule Take 30 mg by mouth 3 (three) times a day   Yes Historical Provider, MD   levothyroxine 100 mcg tablet Take 1 tablet (100 mcg total) by mouth daily 3/9/18  Yes Maryam Rangel PA-C   lisinopril (ZESTRIL) 10 mg tablet Take 1 tablet (10 mg total) by mouth daily 4/5/18  Yes Jewel Liz MD   Methylcobalamin (B12-ACTIVE PO) Take by mouth   Yes Historical Provider, MD   metoprolol succinate (TOPROL-XL) 50 mg 24 hr tablet Take 1 tablet (50 mg total) by mouth 2 (two) times a day 4/5/18  Yes Jewel Liz MD   Omega-3 Fatty Acids (FISH OIL PO) Take by mouth   Yes Historical Provider, MD   prednisoLONE acetate (PRED FORTE) 1 % ophthalmic suspension  6/2/18  Yes Historical Provider, MD   nortriptyline (PAMELOR) 10 mg capsule Take 1 PO HS x 2 weeks, then 2 PO HS  5/31/18   TRACY Mercado   BESIVANCE 0 6 % SUSP  6/2/18 6/21/18  Historical Provider, MD   ILEVRO 0 3 % SUSP  6/8/18 6/21/18  Historical Provider, MD       Allergies:   Allergies   Allergen Reactions    Penicillins Rash       Review of Systems:  Review of Systems - History obtained from chart review and the patient  General ROS: negative  Psychological ROS: negative  Ophthalmic ROS: no visual disturbances   Hematological and Lymphatic ROS: positive for Right DVT 3 years ago; negative for - bleeding problems or bruising  Respiratory ROS: no cough, shortness of breath, or wheezing  Cardiovascular ROS: no chest pain or dyspnea on exertion  Gastrointestinal ROS: no abdominal pain, change in bowel habits, or black or bloody stools  Musculoskeletal ROS: negative  Neurological ROS: no TIA or stroke symptoms    Vital Signs:     Vitals: 06/21/18 1200 06/21/18 1253   BP: 128/72 144/82   BP Location: Left arm Right arm   Cuff Size: Adult    Pulse: 76    Resp: 14    Temp: 97 6 °F (36 4 °C)    TempSrc: Oral    SpO2: 99%    Weight: 58 1 kg (128 lb)    Height: 5' 2" (1 575 m)        Physical Exam:    General: well developed, no acute distress  HEENT/NECK:  PERRLA  No jugular venous distention  Cardiac:Regular rate and rhythm, No murmurs, rubs or gallops  Carotid arteries: 2+ pulses, no bruits  Left CEA scar present  Pulmonary:  Breath sounds clear bilaterally  Abdomen:  Obese, Non-tender, Non-distended  Positive bowel sounds  Upper extremities: 2+ radial pulses, brisk capillary refill  Lower extremities: Extremities warm/dry  Radial/PT/DP pulses 1+ bilaterally  No edema B/L  Neuro: Alert and oriented X 3  Sensation is grossly intact  No focal deficits  Musculoskeletal: MAEE, stable gait  Skin: Warm/Dry, without rashes or lesions      Lab Results:   Lab Results   Component Value Date    WBC 8 40 05/22/2018    HGB 14 0 05/22/2018    HCT 43 6 05/22/2018    MCV 99 (H) 05/22/2018     05/22/2018     Lab Results   Component Value Date     05/22/2018    K 4 1 05/22/2018     05/22/2018    CO2 30 05/22/2018    ANIONGAP 8 05/22/2018    BUN 24 05/22/2018    CREATININE 1 44 (H) 05/22/2018    GLUCOSE 121 05/22/2018    GLUF 90 06/12/2017    CALCIUM 9 6 05/22/2018    AST 18 05/22/2018    ALT 23 05/22/2018    ALKPHOS 102 05/22/2018    PROT 7 9 05/22/2018    BILITOT 0 50 05/22/2018    EGFR 37 05/22/2018     Lab Results   Component Value Date    CHOL 174 06/12/2017    CHOL 224 (H) 12/13/2016    CHOL 226 04/23/2015     Lab Results   Component Value Date    HDL 38 (L) 06/12/2017    HDL 52 12/13/2016    HDL 44 04/23/2015     Lab Results   Component Value Date    LDLCALC 110 (H) 06/12/2017    LDLCALC 150 (H) 12/13/2016    LDLCALC 150 (H) 04/23/2015     Lab Results   Component Value Date    TRIG 119 02/27/2018    TRIG 130 06/12/2017    TRIG 109 12/13/2016     No components found for: CHOLHDL    Lab Results   Component Value Date    HGBA1C 5 5 02/27/2018     No results found for: CKTOTAL, CKMB, CKMBINDEX, TROPONINI    Imaging Studies:       Echocardiogram:   SUMMARY     LEFT VENTRICLE:  Size was normal   Systolic function was normal  Ejection fraction was estimated to be 65 %  Wall thickness was normal   Doppler parameters were consistent with abnormal left ventricular relaxation  (grade 1 diastolic dysfunction)      RIGHT VENTRICLE:  The size was normal   Systolic function was normal      TRICUSPID VALVE:  There was trace regurgitation  CTA Scan:   FINDINGS:     VASCULAR STRUCTURES:  The ascending aorta is nonaneurysmal measuring 30 mm in transverse diameter  The aortic arch is diffusely atherosclerotic and ectatic with an aneurysm in the distal portion measuring 34 mm  The descending thoracic aorta and   proximal abdominal aorta are diffusely aneurysmal measuring a maximal 61 x 60 mm which tapers at the celiac artery measuring 36 mm  The aneurysm as circumferential mural thrombus with circumferential calcified walls  The aneurysm is slightly increased   in size in comparison to the previous study measuring 60 x 57 mm)  Classic branching anatomy of the aortic arch is present  A stent is present at the origin of the left subclavian artery which has intimal hyperplasia resulting and a 50-60% stenosis      The thoracoabdominal aneurysm terminates at the celiac artery with a bilobed suprarenal aneurysm beginning just distal to the celiac artery  The aneurysm measures a maximal 48 x 44 mm, unchanged  The aneurysm terminates at the aortic bifurcation  The   right and left iliofemoral segments are diffusely atherosclerotic and atretic  The internal iliac arteries are patent bilaterally  The celiac artery has a high-grade ostial stenosis  The superior mesenteric artery is patent  The inferior mesenteric   artery is patent    The renal arteries are patent bilaterally      OTHER FINDINGS:      CHEST:      HEART:  Normal cardiac size  No pericardial effusion      LUNGS:  3 3 x 1 5 x 2 7 cm soft tissue mass adjacent to the aortic arch which was felt to be scarring or atelectasis on previous studies  However, there has been progressive enlargement of the mass when comparing to studies dating back to March 14, 2016  There is new lymphadenopathy in the left hilum (1 6 x 1 3 cm) and in the AP window (2 2 x 1 9 cm)  This progressive enlargement and development of lymphadenopathy is concerning for malignancy and further evaluation with PET/CT is recommended      Stable mild emphysematous changes and calcified left upper lobe granuloma  Linear atelectasis in the right lower lobe and chronic atelectasis in the left lower lobe and lingula are unchanged      PLEURA: No pleural effusion      MEDIASTINUM AND RADHA:  No mass or significant lymphadenopathy      CHEST WALL AND LOWER NECK: Normal      ABDOMEN     LIVER/BILIARY TREE:  Unremarkable      GALLBLADDER:  No calcified gallstones  No pericholecystic inflammatory change      SPLEEN:  Unremarkable  Normal size      PANCREAS:  Unremarkable      ADRENAL GLANDS:  Unremarkable      KIDNEYS/URETERS:  No solid renal mass  No hydronephrosis  No urinary tract calculi  Stable cortical thinning in the lower pole the right kidney consistent with a chronic infarct      PELVIS     REPRODUCTIVE ORGANS:  Unremarkable for patient's age      URINARY BLADDER:  Unremarkable      ADDITIONAL ABDOMINAL AND PELVIC STRUCTURES:      STOMACH AND BOWEL:  Unremarkable      ABDOMINOPELVIC CAVITY:  No pathologically enlarged mesenteric or retroperitoneal lymph nodes    No ascites or free intraperitoneal air      ABDOMINAL WALL/INGUINAL REGIONS:  Unremarkable      OSSEOUS STRUCTURES:  No acute fracture or destructive osseous lesion      IMPRESSION:     Progressively enlarging left upper lobe pulmonary mass with mediastinal and hilar lymphadenopathy  Based on current Fleischner Society 2017 Guidelines on incidental pulmonary nodule, either PET/CT scan evaluation or tissue sampling      Slight enlargement of the thoracoabdominal aortic aneurysm now measuring 61 x 57 mm      Bilobed suprarenal abdominal aortic aneurysm stable measuring 48 x 44 mm      Prior granulomatous infection        Carotid Duplex:   Impression  RIGHT:  There is <50% stenosis noted in the internal carotid artery  Plaque is  heterogenous/homogenous and irregular/smooth  Vertebral artery flow is antegrade  There is no significant subclavian artery  disease  LEFT:  There is <50% stenosis noted in endarterectomized internal carotid artery     Vertebral artery flow is bidirectional  There is no significant subclavian  artery disease  I have personally reviewed pertinent reports        Assessment:  Patient Active Problem List    Diagnosis Date Noted    Stage 3 chronic kidney disease 05/28/2018    PAD [facility]peripheral artery disease) ([facility] [address] ) 05/17/2018    Bilateral carotid artery stenosis 05/17/2018    Chronic bilateral low back pain with right-sided sciatica 04/23/2018    Spinal stenosis of lumbar region with neurogenic claudication 04/23/2018    Lumbar radiculopathy 04/23/2018    Chronic pain syndrome 04/23/2018    Muscle pain, myofacial 01/30/2018    Left breast lump 06/23/2017    ASCVD (arteriosclerotic cardiovascular disease) 02/19/2016    Chronic obstructive pulmonary disease ([facility] [address] ) 02/18/2016    Hypothyroidism 02/18/2016    Pure hypercholesterolemia 02/18/2016    Hypertension, uncontrolled 02/18/2016    Anxiety 02/18/2016    Aneurysm of infrarenal abdominal aorta ([facility] [address] ) 05/27/2015    Aneurysm, thoracoabdominal aortic ([facility] [address] ) 05/27/2015    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 ([facility] [address] ) 05/27/2015    Benign hypertension 04/20/2015    Minor depression 04/20/2015     Thoracoabdominal aneurysm    Plan:  Risks and benefits of open thoracoabdominal surgical repair was discussed in detail today with the patient  She has a  LAURIE pulmonary mass that needs further work up prior to proceeding with aneurysm surgery  She will be seen by thoracic surgery and when that work up is complete, she should follow up with Dr Michelle Baron and subsequently with our office when she is cleared for aneurysm surgery  Ezra Walter was comfortable with our recommendations, and her questions were answered to her satisfaction  Thank you for allowing us to participate in the care of this patient  SIGNATURE: TRACY Jauregui  DATE: June 21, 2018  TIME: 12:59 PM  Attestation signed by Vargas Arriaga MD at 6/21/2018  2:20 PM:  Patient seen and evaluated with 10 Deidra Hernández / CESAR  I agree with the above assessment and plan with the following additions  Jazmin Mancia is a 74-year-old female with a descending thoracic aorta and abdominal aorta aneurysm, maximum diameter is 6 1 cm at the supra celiac level  She also has large amount of atheroma in the aortic wall  CT also reveals a left upper lobe mass with some mediastinal adenopathy concerning for malignancy  I have explained the diagnosis to the patient, she has an indication for surgery for her thoracic aorta aneurysm but in the light of a possible malignancy this should be worked out 1st, she already has an appointment with thoracic surgery  I have instructed her to follow up with us once this situations it is resolved    If she has a malignancy I would not recommend surgery for the aneurysm

## 2018-06-21 NOTE — PROGRESS NOTES
Consultation - Cardiothoracic Surgery   Liu Bentley 72 y o  female MRN: [id number]    Physician Requesting Consult: Dr Angelica Buitrago    Reason for Consult / Principal Problem: Thoracoabdominal aortic aneurysm    History of Present Illness: Liu Bentley is a 72y o  year old female referred to our service by Dr Angelica Buitrago  for consultation for a thoracoabdominal aortic aneurysm  She reports her aneurysm was discovered  4-5 years ago when she had collapsed at work and was evaluated for possible CVA  She had been followed by Dr Jorge Brady since that time  In May she developed severe back and abdominal pain as well as complaints of intermittent left leg pain  She was evaluated in the ER with CTA chest/abdomen/pelvis which demonstrated progression of her aneurysm when compared to prior imaging studies  She was seen by Dr Angelica Buitrago for vascular surgery follow up and surgical intervention recommended  She is now referred to our service for combined surgical intervention for her thoracoabdominal aneurysm  In addition, the CTA demonstrates an increasing LAURIE pulmonary mass with mediastinal and hilar lymph adenopathy  She has an appt with Dr Michelle Calle next week for thoracic surgical consultation  She also has lower extremity arterial occlusive disease noted on recent lower extremity arterial duplex, right worse than left  She has a history of intra op CVA during a Left CEA procedure [facility]) with transfer to [facility] for a higher level of care  She recovered from her CVA and only has minimal residual left eyelid droop  Upon interview today, she reports the back, abdominal and left leg pain is much improved  She is ambulatory and denies SOB, angina, lightheadedness, numbness, tingling, limb weakness or coolness  She has a long standing history of tobacco abuse, 1 ppd since age 17, quit 1 year ago  She rarely drinks alcohol       Past Medical History:  Past Medical History:   Diagnosis Date    Aortic aneurysm ([place] [address] )     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[facility])     ASCVD (arteriosclerotic cardiovascular disease)     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 ([place] [address] )     Back problem     Blood type B-     Cataract     COPD (chronic obstructive pulmonary disease) ([facility])     Coronary artery disease     CVA (cerebral vascular accident) ([place] [address] )     CVA (cerebral vascular accident) ([place] [address] )     Depression     Disease of thyroid gland     History of cataract     Hyperlipidemia     Hypertension     Prediabetes     PVD (peripheral vascular disease) ([facility])          Past Surgical History:   Past Surgical History:   Procedure Laterality Date    CAROTID STENT Left     ND COLONOSCOPY FLX DX W/COLLJ SPEC WHEN PFRMD N/A 9/27/2017    Procedure: EGD AND COLONOSCOPY;  Surgeon: Vee Encinas MD;  Location: [facility] OR;  Service: Gastroenterology    TUBAL LIGATION           Family History:  Family History   Problem Relation Age of Onset    Hypertension Mother     Heart disease Mother     Diabetes Father     Breast cancer Daughter     Substance Abuse Neg Hx     Mental illness Neg Hx          Social History:    History   Alcohol Use No     Comment: rare     History   Drug Use No     History   Smoking Status    Former Smoker    Packs/day: 0 50    Years: 45 00    Quit date: 3/6/2018   Smokeless Tobacco    Never Used       Home Medications:   Prior to Admission medications    Medication Sig Start Date End Date Taking? Authorizing Provider   aspirin (ECOTRIN) 325 mg EC tablet Take 325 mg by mouth daily     Yes Historical Provider, MD   atorvastatin (LIPITOR) 40 mg tablet Take 1 tablet (40 mg total) by mouth daily 4/5/18  Yes Prashant Hadley MD   co-enzyme Q-10 30 MG capsule Take 30 mg by mouth 3 (three) times a day   Yes Historical Provider, MD   levothyroxine 100 mcg tablet Take 1 tablet (100 mcg total) by mouth daily 3/9/18  Yes Ursula Reyes PA-C   lisinopril (ZESTRIL) 10 mg tablet Take 1 tablet (10 mg total) by mouth daily 4/5/18  Yes Michael Rivera MD   Methylcobalamin (B12-ACTIVE PO) Take by mouth   Yes Historical Provider, MD   metoprolol succinate (TOPROL-XL) 50 mg 24 hr tablet Take 1 tablet (50 mg total) by mouth 2 (two) times a day 4/5/18  Yes Michael Rivera MD   Omega-3 Fatty Acids (FISH OIL PO) Take by mouth   Yes Historical Provider, MD   prednisoLONE acetate (PRED FORTE) 1 % ophthalmic suspension  6/2/18  Yes Historical Provider, MD   nortriptyline (PAMELOR) 10 mg capsule Take 1 PO HS x 2 weeks, then 2 PO HS  5/31/18   TRACY Bejarano   BESIVANCE 0 6 % SUSP  6/2/18 6/21/18  Historical Provider, MD   ILEVRO 0 3 % SUSP  6/8/18 6/21/18  Historical Provider, MD       Allergies: Allergies   Allergen Reactions    Penicillins Rash       Review of Systems:  Review of Systems - History obtained from chart review and the patient  General ROS: negative  Psychological ROS: negative  Ophthalmic ROS: no visual disturbances   Hematological and Lymphatic ROS: positive for Right DVT 3 years ago; negative for - bleeding problems or bruising  Respiratory ROS: no cough, shortness of breath, or wheezing  Cardiovascular ROS: no chest pain or dyspnea on exertion  Gastrointestinal ROS: no abdominal pain, change in bowel habits, or black or bloody stools  Musculoskeletal ROS: negative  Neurological ROS: no TIA or stroke symptoms    Vital Signs:     Vitals:    06/21/18 1200 06/21/18 1253   BP: 128/72 144/82   BP Location: Left arm Right arm   Cuff Size: Adult    Pulse: 76    Resp: 14    Temp: 97 6 °F (36 4 °C)    TempSrc: Oral    SpO2: 99%    Weight: 58 1 kg (128 lb)    Height: 5' 2" (1 575 m)        Physical Exam:    General: well developed, no acute distress  HEENT/NECK:  PERRLA  No jugular venous distention  Cardiac:Regular rate and rhythm, No murmurs, rubs or gallops  Carotid arteries: 2+ pulses, no bruits  Left CEA scar present  Pulmonary:  Breath sounds clear bilaterally    Abdomen:  Obese, Non-tender, Non-distended  Positive bowel sounds  Upper extremities: 2+ radial pulses, brisk capillary refill  Lower extremities: Extremities warm/dry  Radial/PT/DP pulses 1+ bilaterally  No edema B/L  Neuro: Alert and oriented X 3  Sensation is grossly intact  No focal deficits  Musculoskeletal: MAEE, stable gait  Skin: Warm/Dry, without rashes or lesions  Lab Results:   Lab Results   Component Value Date    WBC 8 40 05/22/2018    HGB 14 0 05/22/2018    HCT 43 6 05/22/2018    MCV 99 (H) 05/22/2018     05/22/2018     Lab Results   Component Value Date     05/22/2018    K 4 1 05/22/2018     05/22/2018    CO2 30 05/22/2018    ANIONGAP 8 05/22/2018    BUN 24 05/22/2018    CREATININE 1 44 (H) 05/22/2018    GLUCOSE 121 05/22/2018    GLUF 90 06/12/2017    CALCIUM 9 6 05/22/2018    AST 18 05/22/2018    ALT 23 05/22/2018    ALKPHOS 102 05/22/2018    PROT 7 9 05/22/2018    BILITOT 0 50 05/22/2018    EGFR 37 05/22/2018     Lab Results   Component Value Date    CHOL 174 06/12/2017    CHOL 224 (H) 12/13/2016    CHOL 226 04/23/2015     Lab Results   Component Value Date    HDL 38 (L) 06/12/2017    HDL 52 12/13/2016    HDL 44 04/23/2015     Lab Results   Component Value Date    LDLCALC 110 (H) 06/12/2017    LDLCALC 150 (H) 12/13/2016    LDLCALC 150 (H) 04/23/2015     Lab Results   Component Value Date    TRIG 119 02/27/2018    TRIG 130 06/12/2017    TRIG 109 12/13/2016     No components found for: CHOLHDL    Lab Results   Component Value Date    HGBA1C 5 5 02/27/2018     No results found for: CKTOTAL, CKMB, CKMBINDEX, TROPONINI    Imaging Studies:       Echocardiogram:   SUMMARY     LEFT VENTRICLE:  Size was normal   Systolic function was normal  Ejection fraction was estimated to be 65 %    Wall thickness was normal   Doppler parameters were consistent with abnormal left ventricular relaxation  (grade 1 diastolic dysfunction)      RIGHT VENTRICLE:  The size was normal   Systolic function was normal      TRICUSPID VALVE:  There was trace regurgitation  CTA Scan:   FINDINGS:     VASCULAR STRUCTURES:  The ascending aorta is nonaneurysmal measuring 30 mm in transverse diameter  The aortic arch is diffusely atherosclerotic and ectatic with an aneurysm in the distal portion measuring 34 mm  The descending thoracic aorta and   proximal abdominal aorta are diffusely aneurysmal measuring a maximal 61 x 60 mm which tapers at the celiac artery measuring 36 mm  The aneurysm as circumferential mural thrombus with circumferential calcified walls  The aneurysm is slightly increased   in size in comparison to the previous study measuring 60 x 57 mm)  Classic branching anatomy of the aortic arch is present  A stent is present at the origin of the left subclavian artery which has intimal hyperplasia resulting and a 50-60% stenosis      The thoracoabdominal aneurysm terminates at the celiac artery with a bilobed suprarenal aneurysm beginning just distal to the celiac artery  The aneurysm measures a maximal 48 x 44 mm, unchanged  The aneurysm terminates at the aortic bifurcation  The   right and left iliofemoral segments are diffusely atherosclerotic and atretic  The internal iliac arteries are patent bilaterally  The celiac artery has a high-grade ostial stenosis  The superior mesenteric artery is patent  The inferior mesenteric   artery is patent  The renal arteries are patent bilaterally      OTHER FINDINGS:      CHEST:      HEART:  Normal cardiac size  No pericardial effusion      LUNGS:  3 3 x 1 5 x 2 7 cm soft tissue mass adjacent to the aortic arch which was felt to be scarring or atelectasis on previous studies  However, there has been progressive enlargement of the mass when comparing to studies dating back to March 14, 2016  There is new lymphadenopathy in the left hilum (1 6 x 1 3 cm) and in the AP window (2 2 x 1 9 cm)    This progressive enlargement and development of lymphadenopathy is concerning for malignancy and further evaluation with PET/CT is recommended      Stable mild emphysematous changes and calcified left upper lobe granuloma  Linear atelectasis in the right lower lobe and chronic atelectasis in the left lower lobe and lingula are unchanged      PLEURA: No pleural effusion      MEDIASTINUM AND RADHA:  No mass or significant lymphadenopathy      CHEST WALL AND LOWER NECK: Normal      ABDOMEN     LIVER/BILIARY TREE:  Unremarkable      GALLBLADDER:  No calcified gallstones  No pericholecystic inflammatory change      SPLEEN:  Unremarkable  Normal size      PANCREAS:  Unremarkable      ADRENAL GLANDS:  Unremarkable      KIDNEYS/URETERS:  No solid renal mass  No hydronephrosis  No urinary tract calculi  Stable cortical thinning in the lower pole the right kidney consistent with a chronic infarct      PELVIS     REPRODUCTIVE ORGANS:  Unremarkable for patient's age      URINARY BLADDER:  Unremarkable      ADDITIONAL ABDOMINAL AND PELVIC STRUCTURES:      STOMACH AND BOWEL:  Unremarkable      ABDOMINOPELVIC CAVITY:  No pathologically enlarged mesenteric or retroperitoneal lymph nodes  No ascites or free intraperitoneal air      ABDOMINAL WALL/INGUINAL REGIONS:  Unremarkable      OSSEOUS STRUCTURES:  No acute fracture or destructive osseous lesion      IMPRESSION:     Progressively enlarging left upper lobe pulmonary mass with mediastinal and hilar lymphadenopathy  Based on current Fleischner Society 2017 Guidelines on incidental pulmonary nodule, either PET/CT scan evaluation or tissue sampling      Slight enlargement of the thoracoabdominal aortic aneurysm now measuring 61 x 57 mm      Bilobed suprarenal abdominal aortic aneurysm stable measuring 48 x 44 mm      Prior granulomatous infection        Carotid Duplex:   Impression  RIGHT:  There is <50% stenosis noted in the internal carotid artery  Plaque is  heterogenous/homogenous and irregular/smooth  Vertebral artery flow is antegrade   There is no significant subclavian artery  disease  LEFT:  There is <50% stenosis noted in endarterectomized internal carotid artery     Vertebral artery flow is bidirectional  There is no significant subclavian  artery disease  I have personally reviewed pertinent reports  Assessment:  Patient Active Problem List    Diagnosis Date Noted    Stage 3 chronic kidney disease 05/28/2018    PAD [facility]peripheral artery disease) ([facility] [address] ) 05/17/2018    Bilateral carotid artery stenosis 05/17/2018    Chronic bilateral low back pain with right-sided sciatica 04/23/2018    Spinal stenosis of lumbar region with neurogenic claudication 04/23/2018    Lumbar radiculopathy 04/23/2018    Chronic pain syndrome 04/23/2018    Muscle pain, myofacial 01/30/2018    Left breast lump 06/23/2017    ASCVD (arteriosclerotic cardiovascular disease) 02/19/2016    Chronic obstructive pulmonary disease ([facility] [address] ) 02/18/2016    Hypothyroidism 02/18/2016    Pure hypercholesterolemia 02/18/2016    Hypertension, uncontrolled 02/18/2016    Anxiety 02/18/2016    Aneurysm of infrarenal abdominal aorta ([facility] [address] ) 05/27/2015    Aneurysm, thoracoabdominal aortic ([facility] [address] ) 05/27/2015    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 ([facility] [address] ) 05/27/2015    Benign hypertension 04/20/2015    Minor depression 04/20/2015     Thoracoabdominal aneurysm    Plan:  Risks and benefits of open thoracoabdominal surgical repair was discussed in detail today with the patient  She has a  LAURIE pulmonary mass that needs further work up prior to proceeding with aneurysm surgery  She will be seen by thoracic surgery and when that work up is complete, she should follow up with Dr Alaina Ochoa and subsequently with our office when she is cleared for aneurysm surgery  Megan Lewis was comfortable with our recommendations, and her questions were answered to her satisfaction  Thank you for allowing us to participate in the care of this patient       SIGNATURE: Zach Ye TRACY  DATE: June 21, 2018  TIME: 12:59 PM

## 2018-06-26 NOTE — ASSESSMENT & PLAN NOTE
Ms Calvin Mckee has multiple findings on her recent CTA  There is evidence of left upper medial lobe consolidation, concerning for a lung mass, along with adenopathy adjacent to her aortic arch  For further evaluation, Dr Lety Soto is recommending a PET scan  She will return to our office after the test is completed to determine the most efficient way to biopsy one of these areas, if necessary  The patient is in agreement with the above plan

## 2018-06-26 NOTE — LETTER
June 26, 2018     MD Kaelyn Dela Cruz  [address] [id number]    Patient: Sandra Sanchez   YOB: 1945   Date of Visit: 6/26/2018       Dear Dr Mack Kirkland:    Thank you for referring Randall Lewis to me for evaluation  Below are my notes for this consultation  If you have questions, please do not hesitate to call me  I look forward to following your patient along with you  Sincerely,        Savage Ford MD        CC: No Recipients  Jesenia Ghosh PA-C  6/26/2018 10:40 AM  Sign at close encounter  Thoracic Follow-Up  Assessment/Plan:    Mediastinal adenopathy  Ms Jaydon Wallace has multiple findings on her recent CTA  There is evidence of left upper medial lobe consolidation, concerning for a lung mass, along with adenopathy adjacent to her aortic arch  For further evaluation, Dr Colin Dutton is recommending a PET scan  She will return to our office after the test is completed to determine the most efficient way to biopsy one of these areas, if necessary  The patient is in agreement with the above plan  Diagnoses and all orders for this visit:    Mediastinal adenopathy  -     NM PET CT skull base to mid thigh; Future    Lung neoplasm  -     Ambulatory referral to Thoracic Surgery          Thoracic History        Diagnosis: mediastinal adenopathy      Patient ID: Sandra Sanchez is a 72 y o  female  HPI   Ms Jaydon Wallace is a 71 yo female with a history of carotid artery stenosis, HTN, and COPD who was originally seen in our office by Dr Miguel Mcarthur in June 2016 for mediastinal and hilar adenopathy  A CT scan from 2/16 revealed a 2 9 x 1 6 x 1 3 cm in the subcarinal area  She returned in 3 months with a repeat CT scan  This was performed on 5/31/16 and did not reveal any lymphadenopathy or pulmonary nodules  She was referred back to our office by Dr Mack Kirkland, secondary to findings on a CT scan from 6/11/18  This was performed for follow up of a thoracic aortic aneurysm  This illustrated a 3 3 x 1 5 x 2  7 cm soft tissue mass adjacent to the aortic arch, with progressive enlargement since 3/2016  There is also new left hilar, 1 6 x 1 3 cm and AP window adenopathy, 2 2 x 1 9 cm  On discussion, she officially quit smoking 3 months ago  She was a previous 62 ppy smoker  She denies fever, chills, cough, shortness of breath, hemoptysis, or recent weight loss  She is scheduled to undergo cataract surgery  Review of Systems   Constitutional: Negative for chills, fever and unexpected weight change  HENT: Negative for trouble swallowing and voice change  Respiratory: Negative for cough, shortness of breath and wheezing  Cardiovascular: Negative for chest pain  Gastrointestinal: Negative for nausea and vomiting  Musculoskeletal: Negative for back pain and gait problem  Neurological: Negative for headaches  Psychiatric/Behavioral: Negative for behavioral problems and confusion  Objective:   Physical Exam   Constitutional: She is oriented to person, place, and time  She appears well-developed and well-nourished  HENT:   Head: Normocephalic and atraumatic  Eyes: EOM are normal  No scleral icterus  Neck: Normal range of motion  Neck supple  Cardiovascular: Normal rate and regular rhythm  Pulmonary/Chest: Effort normal and breath sounds normal  She has no wheezes  Abdominal: Soft  Bowel sounds are normal    Musculoskeletal: Normal range of motion  Lymphadenopathy:     She has no cervical adenopathy  Neurological: She is alert and oriented to person, place, and time  Skin: Skin is warm and dry  Psychiatric: She has a normal mood and affect  Her behavior is normal    Vitals reviewed        /67 (BP Location: Right arm, Patient Position: Sitting, Cuff Size: Adult)   Pulse 83   Temp (!) 97 3 °F (36 3 °C)   Ht 5' 2" (1 575 m)   Wt 58 4 kg (128 lb 12 8 oz)   SpO2 90%   BMI 23 56 kg/m²

## 2018-06-26 NOTE — PROGRESS NOTES
Thoracic Follow-Up  Assessment/Plan:    Mediastinal adenopathy  Ms Chago Bran has multiple findings on her recent CTA  There is evidence of left upper medial lobe consolidation, concerning for a lung mass, along with adenopathy adjacent to her aortic arch  For further evaluation, Dr Marietta Mo is recommending a PET scan  She will return to our office after the test is completed to determine the most efficient way to biopsy one of these areas, if necessary  The patient is in agreement with the above plan  Diagnoses and all orders for this visit:    Mediastinal adenopathy  -     NM PET CT skull base to mid thigh; Future    Lung neoplasm  -     Ambulatory referral to Thoracic Surgery          Thoracic History        Diagnosis: mediastinal adenopathy      Patient ID: Stef Christina is a 72 y o  female  HPI   Ms Chago Bran is a 71 yo female with a history of carotid artery stenosis, HTN, and COPD who was originally seen in our office by Dr Zurdo Patel in June 2016 for mediastinal and hilar adenopathy  A CT scan from 2/16 revealed a 2 9 x 1 6 x 1 3 cm in the subcarinal area  She returned in 3 months with a repeat CT scan  This was performed on 5/31/16 and did not reveal any lymphadenopathy or pulmonary nodules  She was referred back to our office by Dr Joanne Luciano, secondary to findings on a CT scan from 6/11/18  This was performed for follow up of a thoracic aortic aneurysm  This illustrated a 3 3 x 1 5 x 2  7 cm soft tissue mass adjacent to the aortic arch, with progressive enlargement since 3/2016  There is also new left hilar, 1 6 x 1 3 cm and AP window adenopathy, 2 2 x 1 9 cm  On discussion, she officially quit smoking 3 months ago  She was a previous 62 ppy smoker  She denies fever, chills, cough, shortness of breath, hemoptysis, or recent weight loss  She is scheduled to undergo cataract surgery  Review of Systems   Constitutional: Negative for chills, fever and unexpected weight change     HENT: Negative for trouble swallowing and voice change  Respiratory: Negative for cough, shortness of breath and wheezing  Cardiovascular: Negative for chest pain  Gastrointestinal: Negative for nausea and vomiting  Musculoskeletal: Negative for back pain and gait problem  Neurological: Negative for headaches  Psychiatric/Behavioral: Negative for behavioral problems and confusion  Objective:   Physical Exam   Constitutional: She is oriented to person, place, and time  She appears well-developed and well-nourished  HENT:   Head: Normocephalic and atraumatic  Eyes: EOM are normal  No scleral icterus  Neck: Normal range of motion  Neck supple  Cardiovascular: Normal rate and regular rhythm  Pulmonary/Chest: Effort normal and breath sounds normal  She has no wheezes  Abdominal: Soft  Bowel sounds are normal    Musculoskeletal: Normal range of motion  Lymphadenopathy:     She has no cervical adenopathy  Neurological: She is alert and oriented to person, place, and time  Skin: Skin is warm and dry  Psychiatric: She has a normal mood and affect  Her behavior is normal    Vitals reviewed        /67 (BP Location: Right arm, Patient Position: Sitting, Cuff Size: Adult)   Pulse 83   Temp (!) 97 3 °F (36 3 °C)   Ht 5' 2" (1 575 m)   Wt 58 4 kg (128 lb 12 8 oz)   SpO2 90%   BMI 23 56 kg/m²

## 2018-07-02 NOTE — TELEPHONE ENCOUNTER
LM to call us back, to discuss a consult by Dr Preston Ricci for an EUS to biopsy these hilar/ mediastinal lymph nodes

## 2018-07-03 PROBLEM — Z01.810 PREOPERATIVE CARDIOVASCULAR EXAMINATION: Status: ACTIVE | Noted: 2018-01-01

## 2018-07-03 NOTE — TELEPHONE ENCOUNTER
FYI: Pt recently had CTA which incidentally showed L upper lobe lung mass concerning for malignancy  Pt is following up with thoracic surgery for this  Dr Marquis Redman advised that pt take care of possible malignant mass before having aneurysm repair  If mass is malignant, Dr Marquis Redman would not recommend sx  Pt will have PET scan 7/10 and then return to Dr Rowdy Lopez office to discuss

## 2018-07-03 NOTE — PROGRESS NOTES
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 [facility])  Ambulatory referral to Cardiology   4  Bilateral carotid artery stenosis  Ambulatory referral to Cardiology   5  Preoperative cardiovascular examination     6  Aneurysm of infrarenal abdominal aorta (HCC)     7  Chronic obstructive pulmonary disease, unspecified COPD type ([facility] [address] )     8  Pure hypercholesterolemia     9  Benign hypertension       HPI:    Mrs Darlene Ontiveros comes in for preoperative risk assessment for possible upcoming surgery on a descending thoracic and abdominal aortic aneurysm with a maximum diameter of 6 1 cm  She also has peripheral vascular disease, with prior carotid endarterectomy and bilateral carotid artery disease along with right lower extremity vascular disease and claudication  She follows with vascular surgery closely  She saw both vascular surgery and cardiothoracic surgery for a combined approach assessment for her thoracic and abdominal aortic aneurysm  However CT scan during her workup also revealed left upper lobe mass and some mediastinal adenopathy concerning for malignancy  She is a former smoker and quit about 3 months ago  Therefore she is having an oncologic workup 1st, before proceeding with her aneurysm surgery  She has no specific cardiac history  She had an echocardiogram a few years ago which was normal     Fabiola's large complaint is her right lower extremity pain  She appears to have both claudication type symptoms along with radiculopathy    She does have low back pain and lumbar disc disease, and which she has had prior injections to her back   She our also does describe intermittent claudication  Lately it has been worse  Her  noticed this she even with just walking from the house to the car  She denies any cardiac symptoms  No chest pain or since angina she denies any shortness of breath or any signs /symptoms of CHF  No palpitations, lightheadedness or any history of syncope  Her functional status that was rather low        Patient Active Problem List   Diagnosis    Chronic obstructive pulmonary disease ([facility] )    Hypothyroidism    Pure hypercholesterolemia    Hypertension, uncontrolled    Anxiety    ASCVD (arteriosclerotic cardiovascular disease)    Mediastinal adenopathy    Aneurysm of infrarenal abdominal aorta (HCC)    Aneurysm, thoracoabdominal aortic (HCC)    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 ([facility])    Benign hypertension    Minor depression    Left breast lump    Muscle pain, myofacial    Chronic bilateral low back pain with right-sided sciatica    Spinal stenosis of lumbar region with neurogenic claudication    Lumbar radiculopathy    Chronic pain syndrome    PAD (peripheral artery disease) ([facility] )    Bilateral carotid artery stenosis    Stage 3 chronic kidney disease    Preoperative cardiovascular examination     Past Medical History:   Diagnosis Date    Aortic aneurysm ([facility] )     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[facility])     ASCVD (arteriosclerotic cardiovascular disease)     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 ([facility] )     Back problem     Blood type B-     Cataract     COPD (chronic obstructive pulmonary disease) ([facility] )     Coronary artery disease     CVA (cerebral vascular accident) ([facility] )     CVA (cerebral vascular accident) ([facility] )     Depression     Disease of thyroid gland     History of cataract     Hyperlipidemia     Hypertension     Lung mass     Prediabetes     PVD (peripheral vascular disease) ([facility] )     Thoracic aortic aneurysm ([address] )     Transient insomnia      Social History     Social History    Marital status:      Spouse name: N/A    Number of children: N/A    Years of education: N/A     Occupational History    Not on file  Social History Main Topics    Smoking status: Former Smoker     Packs/day: 0 50     Years: 45 00     Quit date: 3/6/2018    Smokeless tobacco: Never Used    Alcohol use No      Comment: rare    Drug use: No    Sexual activity: Not Currently     Other Topics Concern    Not on file     Social History Narrative    Lives with family  Feels safe at home  Sees dentist occas  No living will  Dental care, occasionally     Does not exercise     Living alone    Living situation: supportive and safe    No advance directives     No caffeine use       Family History   Problem Relation Age of Onset    Hypertension Mother     Heart disease Mother     Diabetes Father     Liver disease Father         hepatic failure     Breast cancer Daughter     Substance Abuse Neg Hx     Mental illness Neg Hx      Past Surgical History:   Procedure Laterality Date    ANGIOPLASTY Left 03/2013    PTA Subclavian artery     CAROTID STENT Left     MI COLONOSCOPY FLX DX W/COLLJ SPEC WHEN PFRMD N/A 9/27/2017    Procedure: EGD AND COLONOSCOPY;  Surgeon: Suzette Alicia MD;  Location: [facility];  Service: Gastroenterology    TUBAL LIGATION         Current Outpatient Prescriptions:     aspirin (ECOTRIN) 325 mg EC tablet, Take 325 mg by mouth daily  , Disp: , Rfl:     atorvastatin (LIPITOR) 40 mg tablet, Take 1 tablet (40 mg total) by mouth daily, Disp: 90 tablet, Rfl: 3    co-enzyme Q-10 30 MG capsule, Take 30 mg by mouth 3 (three) times a day, Disp: , Rfl:     levothyroxine 100 mcg tablet, Take 1 tablet (100 mcg total) by mouth daily, Disp: 30 tablet, Rfl: 3    lisinopril (ZESTRIL) 10 mg tablet, Take 1 tablet (10 mg total) by mouth daily, Disp: 90 tablet, Rfl: 1    Methylcobalamin (B12-ACTIVE PO), Take by mouth, Disp: , Rfl:     metoprolol succinate (TOPROL-XL) 50 mg 24 hr tablet, Take 1 tablet (50 mg total) by mouth 2 (two) times a day, Disp: 60 tablet, Rfl: 5    nortriptyline (PAMELOR) 10 mg capsule, Take 1 PO HS x 2 weeks, then 2 PO HS , Disp: 60 capsule, Rfl: 1    Omega-3 Fatty Acids (FISH OIL PO), Take by mouth, Disp: , Rfl:     prednisoLONE acetate (PRED FORTE) 1 % ophthalmic suspension, , Disp: , Rfl:   Allergies   Allergen Reactions    Penicillins Rash     Vitals:    07/03/18 1014   BP: 100/78   BP Location: Right arm   Patient Position: Sitting   Cuff Size: Adult   Weight: 57 2 kg (126 lb 3 2 oz)   Height: 5' 2" (1 575 m)       Labs:  Lab Results   Component Value Date     05/22/2018     04/23/2015    K 4 1 05/22/2018    K 4 0 04/23/2015     05/22/2018     04/23/2015    CO2 30 05/22/2018    CO2 32 04/23/2015    BUN 24 05/22/2018    BUN 25 05/22/2018    CREATININE 1 44 (H) 05/22/2018    CREATININE 1 25 04/23/2015    GLUCOSE 121 05/22/2018    GLUCOSE 98 03/05/2016    GLUCOSE 82 04/23/2015    CALCIUM 9 6 05/22/2018    CALCIUM 9 6 05/22/2018     Lab Results   Component Value Date    WBC 8 40 05/22/2018    WBC 10 36 (H) 05/04/2015    HGB 14 0 05/22/2018    HGB 13 3 05/04/2015    HGB 13 2 05/04/2015    HCT 43 6 05/22/2018    HCT 40 6 05/04/2015    MCV 99 (H) 05/22/2018    MCV 96 05/04/2015     05/22/2018     05/04/2015     Lab Results   Component Value Date    CHOL 174 06/12/2017    CHOL 226 04/23/2015    TRIG 119 02/27/2018    HDL 52 02/27/2018     Imaging: Cta Chest Abdomen Pelvis W Wo Contrast    Result Date: 6/15/2018  Narrative: CT ANGIOGRAM OF THE CHEST, ABDOMEN AND PELVIS WITH AND WITHOUT IV CONTRAST INDICATION:  Thoracic aortic aneurysm COMPARISON: May 22, 2018 TECHNIQUE:  CT angiogram examination of the chest, abdomen and pelvis was performed according to standard protocol    This examination, like all CT scans performed in the [facility], was performed utilizing techniques to minimize radiation dose exposure, including the use of iterative reconstruction and automated exposure control  Contrast as well as noncontrast images were obtained  3D reconstructions were performed an independent workstation, and are supplied for review  Rad dose 694 mGy-cm IV Contrast:  100 mL of iohexol (OMNIPAQUE) Enteric Contrast:  Not given FINDINGS: VASCULAR STRUCTURES:  The ascending aorta is nonaneurysmal measuring 30 mm in transverse diameter  The aortic arch is diffusely atherosclerotic and ectatic with an aneurysm in the distal portion measuring 34 mm  The descending thoracic aorta and proximal abdominal aorta are diffusely aneurysmal measuring a maximal 61 x 60 mm which tapers at the celiac artery measuring 36 mm  The aneurysm as circumferential mural thrombus with circumferential calcified walls  The aneurysm is slightly increased in size in comparison to the previous study measuring 60 x 57 mm)  Classic branching anatomy of the aortic arch is present  A stent is present at the origin of the left subclavian artery which has intimal hyperplasia resulting and a 50-60% stenosis  The thoracoabdominal aneurysm terminates at the celiac artery with a bilobed suprarenal aneurysm beginning just distal to the celiac artery  The aneurysm measures a maximal 48 x 44 mm, unchanged  The aneurysm terminates at the aortic bifurcation  The right and left iliofemoral segments are diffusely atherosclerotic and atretic  The internal iliac arteries are patent bilaterally  The celiac artery has a high-grade ostial stenosis  The superior mesenteric artery is patent  The inferior mesenteric artery is patent  The renal arteries are patent bilaterally  OTHER FINDINGS: CHEST: HEART:  Normal cardiac size  No pericardial effusion  LUNGS:  3 3 x 1 5 x 2 7 cm soft tissue mass adjacent to the aortic arch which was felt to be scarring or atelectasis on previous studies  However, there has been progressive enlargement of the mass when comparing to studies dating back to March 14, 2016  There is new lymphadenopathy in the left hilum (1 6 x 1 3 cm) and in the AP window (2 2 x 1 9 cm)  This progressive enlargement and development of lymphadenopathy is concerning for malignancy and further evaluation with PET/CT is recommended  Stable mild emphysematous changes and calcified left upper lobe granuloma  Linear atelectasis in the right lower lobe and chronic atelectasis in the left lower lobe and lingula are unchanged  PLEURA: No pleural effusion  MEDIASTINUM AND RADHA:  No mass or significant lymphadenopathy  CHEST WALL AND LOWER NECK: Normal  ABDOMEN LIVER/BILIARY TREE:  Unremarkable  GALLBLADDER:  No calcified gallstones  No pericholecystic inflammatory change  SPLEEN:  Unremarkable  Normal size  PANCREAS:  Unremarkable  ADRENAL GLANDS:  Unremarkable  KIDNEYS/URETERS:  No solid renal mass  No hydronephrosis  No urinary tract calculi  Stable cortical thinning in the lower pole the right kidney consistent with a chronic infarct  PELVIS REPRODUCTIVE ORGANS:  Unremarkable for patient's age  URINARY BLADDER:  Unremarkable  ADDITIONAL ABDOMINAL AND PELVIC STRUCTURES: STOMACH AND BOWEL:  Unremarkable  ABDOMINOPELVIC CAVITY:  No pathologically enlarged mesenteric or retroperitoneal lymph nodes  No ascites or free intraperitoneal air  ABDOMINAL WALL/INGUINAL REGIONS:  Unremarkable  OSSEOUS STRUCTURES:  No acute fracture or destructive osseous lesion  Impression: Progressively enlarging left upper lobe pulmonary mass with mediastinal and hilar lymphadenopathy  Based on current Fleischner Society 2017 Guidelines on incidental pulmonary nodule, either PET/CT scan evaluation or tissue sampling  Slight enlargement of the thoracoabdominal aortic aneurysm now measuring 61 x 57 mm  Bilobed suprarenal abdominal aortic aneurysm stable measuring 48 x 44 mm  Prior granulomatous infection   * I personally telephoned this result to [facility] on 6/15/2018 9:43 AM  Workstation performed: [id number]     Vas Carotid Complete Study    Result Date: 6/19/2018  Narrative:  THE [facility]ASCULAR CENTER REPORT CLINICAL: Indications:  Carotid disease w/o CVA [I65 29]  Patient presents for follow up of known carotid artery occlusive disease s/p CEA  Patient is currently asymptomatic at this time  Risk Factors The patient has history of AAA, HTN, hyperlipidemia and previous smoking (quit <1yr ago)  Clinical Right Brachial Pressure:  102/ mmHg, Left Brachial Pressure:  96/ mmHg  FINDINGS:  Right        Impression  PSV  EDV (cm/s)  Ratio  Dist  ICA                 57          26   1 27  Mid  ICA                  99          36   2 22  Prox  ICA    1 - 49%     116          51   2 60  Dist CCA                  32          12         Mid CCA                   45          16   0 89  Prox CCA                  50          17         Ext Carotid               97          12   2 17  Prox Vert                 53          20         Subclavian               100           0          Left         Impression  PSV  EDV (cm/s)  Ratio  Dist  ICA                 59          25   0 97  Mid  ICA                  56          25   0 91  Prox  ICA    1 - 49%      55          19   0 90  Dist CCA                  45          19         Mid CCA                   61          22   1 05  Prox CCA                  58          25         Ext Carotid               35           6   0 58  Prox Vert                 16           1         Subclavian                69           0            CONCLUSION:  Impression RIGHT: There is <50% stenosis noted in the internal carotid artery  Plaque is heterogenous/homogenous and irregular/smooth  Vertebral artery flow is antegrade  There is no significant subclavian artery disease  LEFT: There is <50% stenosis noted in endarterectomized internal carotid artery    Vertebral artery flow is bidirectional  There is no significant subclavian artery disease  Compared to previous study on 10/29/2015 , there is the left vertebral artery is now bidirectional  Recommend repeat testing in 1 year as per protocol unless otherwise indicated  Internal carotid artery stenosis determination by consensus criteria from: Vinton Skiff , et al  Carotid Artery Stenosis: Gray-Scale and Doppler US Diagnosis - Society of Radiologists in [facility] [address], Radiology 2003; [phone]  SIGNATURE: Electronically Signed by: Barbra President on 2018-06-19 09:29:05 AM    Vas Lower Limb Arterial Duplex, Complete Bilateral    Result Date: 6/19/2018  Narrative:  THE VASCULAR CENTER REPORT CLINICAL: Indications: Atherosclerosis with Claudication [I70 219]  Patient presents with pain in her legs after walking <1 block (right worse than left)  She complains of bilateral leg pain when lying in bed and is relieved with standing/walking around  She denies any open wounds or ulcers at this time  Risk Factors: The patient has history of AAA, Hypertension, Hyperlipidemia, remote smoker (<1 year)  Right Brachial Pressure:  102/ mmHg, Left Brachial Pressure:  96/ mmHg  FINDINGS:  Right                  Impression  Waveform    PSV  EDV  Post  Tibial                       Monophasic            Ant  Tibial                        Monophasic            Common Femoral Artery                           97   29  Prox Profunda          50-75%                  209   64  Prox SFA               50-75%                  180   43  Mid SFA                Occluded                 35       Dist SFA               Occluded                  0       Proximal Pop           Occluded                  0       Distal Pop                                      15    9  Dist Post Tibial                                23   14  Dist  Ant   Tibial                               14    7   Left                   Impression  Waveform    PSV  EDV  Post  Tibial Monophasic            Ant  Tibial                        Monophasic            Common Femoral Artery                           64   16  Prox Profunda                                   41   14  Prox SFA               <50%                    107   28  Mid SFA                                         80   12  Dist SFA                                        30    0  Proximal Pop                                    14    0  Distal Pop                                      22    0  Dist Post Tibial                                35       Dist  Ant  Tibial                               19    0     CONCLUSION: Impression: RIGHT LOWER LIMB: Monophasic waveforms are noted common femoral artery suggestive of inflow disease  Occlusion in the mid superficial femoral artery with a brief reconstitution noted but then occludes again in distal superficial femoral artery and the proximal popliteal artery  50-75% stenoses in the proximal profunda femoral and proximal superficial femoral arteries  Ankle/Brachial index: 0 49, Prior 0 55  PPG tracings are normal or dampened  Metatarsal pressure of 40mmHg Great toe pressure of 20mmHg, below the healing range LEFT LOWER LIMB: Evaluation shows diffuse atherosclerotic disease with a <50% stenosis in the proximal superficial femoral artery  Ankle/Brachial index: 0 61 , Prior 0 64  PPG tracings are dampened  Metatarsal pressure of 46mmHg Great toe pressure of 40mmHg, within the healing range  Compared to previous study on 2/19/2016 , there is progression of disease on the right  Recommend repeat testing in 1year as per protocol unless otherwise indicated  SIGNATURE: Electronically Signed by: Yoon Valles on 2018-06-19 09:28:40 AM    Vascular Results    Result Date: 6/16/2018  Narrative: Ordered by an unspecified provider  ECHO:  LEFT VENTRICLE:  Size was normal   Systolic function was normal  Ejection fraction was estimated to be 65 %    Wall thickness was normal   Doppler parameters were consistent with abnormal left ventricular relaxation  (grade 1 diastolic dysfunction)      RIGHT VENTRICLE:  The size was normal   Systolic function was normal      TRICUSPID VALVE:  There was trace regurgitation        Review of Systems:  Review of Systems   Constitutional: Negative  HENT: Negative  Eyes: Negative  Respiratory: Negative  Gastrointestinal: Negative  Musculoskeletal: Positive for back pain and myalgias  Skin: Negative  Allergic/Immunologic: Negative  Neurological: Negative  Hematological: Negative  Psychiatric/Behavioral: Negative  All other systems reviewed and are negative  Physical Exam:  Physical Exam   Constitutional: She is oriented to person, place, and time  She appears well-developed and well-nourished  HENT:   Head: Normocephalic and atraumatic  Eyes: EOM are normal  Pupils are equal, round, and reactive to light  Right eye exhibits no discharge  Left eye exhibits no discharge  No scleral icterus  Neck: Normal range of motion  Neck supple  No JVD present  No thyromegaly present  Cardiovascular: Normal rate, regular rhythm, S1 normal, S2 normal, normal heart sounds and intact distal pulses  No extrasystoles are present  Exam reveals decreased pulses  Exam reveals no gallop and no friction rub  No murmur heard  Pulmonary/Chest: Effort normal  No respiratory distress  She has decreased breath sounds  She has no wheezes  She has no rales  Abdominal: Soft  Bowel sounds are normal  She exhibits no distension  There is no tenderness  Musculoskeletal: Normal range of motion  She exhibits no edema, tenderness or deformity  Neurological: She is alert and oriented to person, place, and time  No cranial nerve deficit  Skin: Skin is warm and dry  No rash noted  Psychiatric: She has a normal mood and affect  Judgment and thought content normal    Nursing note and vitals reviewed  Discussion/Summary:    1    Preoperative risk assessment for vascular surgery -   Benji Hayes is being worked up for a high risk procedure involving a descending thoracic and abdominal aortic aneurysm  She is being assessed by both CT surgery and vascular surgery for a combined procedure  However, she was also found to have a lung mass with adenopathy suspicious for malignancy shows she is going through and oncologic workup 1st   Her overall risk is intermediate, because this is a high risk surgery and ischemic evaluation is recommended  We ordered a pharmacologic nuclear stress test   We will call her with the results  It is also recommended that she continue beta-blocker perioperatively  2   Peripheral arterial disease -  She has a history of lower extremity claudication along with bilateral carotid artery disease  She has had a prior carotid endarterectomy  It appears that her claudication is getting worse  Arterial duplex did show significant vascular disease of the right lower extremity  She is also having symptoms of radiculopathy as well, clubbing how much of this is claudication  I have asked her to call her vascular surgeon about treatment and any procedures needed for this  She will remain on the same medical therapy  3   Hypercholesterolemia -  She will remain on the same dose of atorvastatin  Cholesterol is controlled  4   Hypertension -  Blood pressure controlled on his current regimen  No changes made from this perspective  She will continue to follow closely with her PCP  Counseling / Coordination of Care  Total floor / unit time spent today 40 minutes  Greater than 50% of total time was spent with the patient and / or family counseling and / or coordination of care

## 2018-07-03 NOTE — TELEPHONE ENCOUNTER
Patient called back, new plan was discussed  I will have our office have GI schedule an appointment for her  She is in agreement

## 2018-07-10 NOTE — PATIENT INSTRUCTIONS
Take the metoprolol with a sip of water the day of surgery    Please be sure that evaluation for lung problem gets done by the end of July

## 2018-07-10 NOTE — TELEPHONE ENCOUNTER
PET does not appear sched and ov w/ Dr Acuna Smoker cx  Note from CT PA Olegario Read "discuss a consult by Dr Aamir Sanchez for an EUS to biopsy these hilar/ mediastinal lymph nodes "  Emailed Dr Mora Grew to notify of this as pt has f/u ov w/ him 7/12

## 2018-07-10 NOTE — PROGRESS NOTES
ELVIRA LEAL [facility] Internal Medicine      PREOPERATIVE EVALUATION     Henry Mckeon is a 72 y o  female who presents to the office today for a preoperative consultation at the request of surgeon HÉCTOR Cao who plans on performing cataract on August 2      Prior anesthesia adverse reactions - None    Exercise capacity - Able to walk 4 blocks or climb 2 flights of stairs without symptoms - Yes    Easy bleeding/bruising - No    Chest pain - No    Dyspnea, wheezing, cough - No    Sleep apnea - No    HPI     For 2nd cataract surgery    Past Medical History:   Diagnosis Date    [facility] )     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     ASCVD (arteriosclerotic cardiovascular disease)     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 )     CVA (cerebral vascular accident) ([facility] )     Depression     Disease of thyroid gland     History of cataract     Hyperlipidemia     Hypertension     Lung mass     Prediabetes     PVD (peripheral vascular disease) ([facility] )     Thoracic aortic aneurysm ([facility])     Transient insomnia          Past Surgical History:   Procedure Laterality Date    ANGIOPLASTY Left 03/2013    PTA Subclavian artery     CAROTID STENT Left     NH COLONOSCOPY FLX DX W/COLLJ SPEC WHEN PFRMD N/A 9/27/2017    Procedure: EGD AND COLONOSCOPY;  Surgeon: Elaina Sotomayor MD;  Location:  MAIN OR;  Service: Gastroenterology    TUBAL LIGATION           Family History   Problem Relation Age of Onset    Hypertension Mother     Heart disease Mother     Diabetes Father     Liver disease Father         hepatic failure     Breast cancer Daughter     Substance Abuse Neg Hx     Mental illness Neg Hx          Social History   Substance Use Topics    Smoking status: Former Smoker     Packs/day: 0 50     Years: 37 1     Quit date: 3/6/2018    Smokeless tobacco: Never Used    Alcohol use No      Comment: rare       Current Outpatient Prescriptions   Medication Sig Dispense Refill    aspirin (ECOTRIN) 325 mg EC tablet Take 325 mg by mouth daily   atorvastatin (LIPITOR) 40 mg tablet Take 1 tablet (40 mg total) by mouth daily 90 tablet 3    co-enzyme Q-10 30 MG capsule Take 30 mg by mouth 3 (three) times a day      gabapentin (NEURONTIN) 100 mg capsule Take 200 mg by mouth 3 (three) times a day      levothyroxine 100 mcg tablet Take 1 tablet (100 mcg total) by mouth daily 30 tablet 3    lisinopril (ZESTRIL) 10 mg tablet Take 1 tablet (10 mg total) by mouth daily 90 tablet 1    Methylcobalamin (B12-ACTIVE PO) Take by mouth      metoprolol succinate (TOPROL-XL) 50 mg 24 hr tablet Take 1 tablet (50 mg total) by mouth 2 (two) times a day 60 tablet 5    nortriptyline (PAMELOR) 10 mg capsule Take 1 PO HS x 2 weeks, then 2 PO HS  60 capsule 1    Omega-3 Fatty Acids (FISH OIL PO) Take by mouth      prednisoLONE acetate (PRED FORTE) 1 % ophthalmic suspension        No current facility-administered medications for this visit  Penicillins      Review of Systems   Respiratory:        Getting w/u for  Abnormal CT of the chest   All other systems reviewed and are negative  Objective      /60 (BP Location: Left arm, Patient Position: Sitting, Cuff Size: Standard)   Pulse 72   Ht 5' 2" (1 575 m)   Wt 58 3 kg (128 lb 8 oz)   SpO2 97%   BMI 23 50 kg/m²     Physical Exam   Constitutional: She appears well-developed and well-nourished  Neck: No JVD present  Cardiovascular: Normal rate and regular rhythm  Pulmonary/Chest: Effort normal and breath sounds normal    Abdominal: Soft  Bowel sounds are normal    Musculoskeletal: She exhibits no edema  Dec pulses   Vitals reviewed          Cardiographics  ECG: Not indicated -2nd procedure      Imaging  Chest x-ray: Not indicated       No visits with results within 2 Week(s) from this visit     Latest known visit with results is:   Office Visit on 05/31/2018   Component Date Value Ref Range Status    QRS Axis 05/31/2018 EKG shows normal sinus rhythm with nonspecific interventricular conduction delay which is basically normal otherwise  degrees Final           Lab Review   Office Visit on 05/31/2018   Component Date Value    QRS Axis 05/31/2018 EKG shows normal sinus rhythm with nonspecific interventricular conduction delay which is basically normal otherwise    Orders Only on 05/22/2018   Component Date Value    SL AMB GLUCOSE 05/22/2018 92     BUN 05/22/2018 25     Creatinine, Serum 05/22/2018 1 38*    eGFR Non  05/22/2018 39*    SL AMB EGFR  AMER* 05/22/2018 45*    SL AMB BUN/CREATININE RA* 05/22/2018 18     SL AMB SODIUM 05/22/2018 141     SL AMB POTASSIUM 05/22/2018 4 6     SL AMB CHLORIDE 05/22/2018 105     SL AMB CARBON DIOXIDE 05/22/2018 29     SL AMB CALCIUM 05/22/2018 9 6    Admission on 05/22/2018, Discharged on 05/22/2018   Component Date Value    WBC 05/22/2018 8 40     RBC 05/22/2018 4 42     Hemoglobin 05/22/2018 14 0     Hematocrit 05/22/2018 43 6     MCV 05/22/2018 99*    MCH 05/22/2018 31 7     MCHC 05/22/2018 32 1     RDW 05/22/2018 15 4*    MPV 05/22/2018 10 5     Platelets 69/92/8042 201     Neutrophils Relative 05/22/2018 58     Lymphocytes Relative 05/22/2018 31     Monocytes Relative 05/22/2018 9     Eosinophils Relative 05/22/2018 1     Basophils Relative 05/22/2018 1     Neutrophils Absolute 05/22/2018 4 85     Lymphocytes Absolute 05/22/2018 2 63     Monocytes Absolute 05/22/2018 0 78     Eosinophils Absolute 05/22/2018 0 10     Basophils Absolute 05/22/2018 0 04     Sodium 05/22/2018 141     Potassium 05/22/2018 4 1     Chloride 05/22/2018 103     CO2 05/22/2018 30     Anion Gap 05/22/2018 8     BUN 05/22/2018 24     Creatinine 05/22/2018 1 44*    Glucose 05/22/2018 121     Calcium 05/22/2018 9 6     AST 05/22/2018 18     ALT 05/22/2018 23     Alkaline Phosphatase 05/22/2018 102     Total Protein 05/22/2018 7 9     Albumin 05/22/2018 3 7     Total Bilirubin 05/22/2018 0 50     eGFR 05/22/2018 37     Lipase 05/22/2018 175     Color, UA 05/22/2018 YELLOW     Clarity, UA 05/22/2018 CLEAR     EXT Glucose, UA (Ref: Ne* 05/22/2018 NEG     EXT Bilirubin, UA (Ref: * 05/22/2018 NEG     EXT Ketones, UA (Ref: Ne* 05/22/2018 NEG     EXT Spec Grav, UA 05/22/2018 1 005     EXT Blood, UA (Ref: Nega* 05/22/2018 NEG     EXT pH, UA 05/22/2018 6 5     EXT Protein, UA (Ref: Ne* 05/22/2018 NEG     EXT Urobilinogen, UA (Re* 05/22/2018 0 2     EXT Leukocytes, UA (Ref:* 05/22/2018 NEG     EXT Nitrite, UA (Ref: Ne* 05/22/2018 NEG          Diagnoses and all orders for this visit:    Preoperative cardiovascular examination    Cortical age-related cataract of left eye    ASCVD (arteriosclerotic cardiovascular disease)    Hypertension, uncontrolled    Pure hypercholesterolemia    Other orders  -     gabapentin (NEURONTIN) 100 mg capsule;  Take 200 mg by mouth 3 (three) times a day                 Plan:         Surgical Clearance - Cleared    Risk - Low    Discussion/Summary: Told to take the metoprolol the day of surgery           Aashish Holland MD

## 2018-07-11 NOTE — TELEPHONE ENCOUNTER
Pt called back  She will wait until after her thoracic w/u to see Dr John Garcia  OV for tomorrow cancelled  Dr John Garcia aware

## 2018-08-01 NOTE — OP NOTE
**** GI/ENDOSCOPY REPORT ****     PATIENT NAME: Tom Madden - VISIT ID:  Patient ID: [id number]   YOB: 1945     INTRODUCTION: Upper Endoscopic [ULTRASOUND] - A 72 female patient presents   for an outpatient Upper Endoscopic [ULTRASOUND] at [facility]  INDICATIONS: Mediastinal lymphadenopathy and mass adjacent to the aortic   arch  CONSENT: The benefits, risks, and alternatives to the procedure were   discussed and informed consent was obtained from the patient  PREPARATION:  EKG, pulse, pulse oximetry and blood pressure were monitored   throughout the procedure  ASA Classification: Class 3 - Patient has severe   systemic disturbance that may or may not be related to the disorder   requiring surgery  MEDICATIONS: Anesthesia-check records     PROCEDURE:  The ultrasound gastroscope was passed with ease through the   mouth under direct visualization and advanced to the 3rd portion of the   duodenum  The scope was withdrawn and the mucosa was carefully examined  The views were good  The patient's toleration of the procedure was good  FINDINGS: Visual Exam: Limited endoscopic view with oblique viewing   echoendoscopes was unremarkable  EUS EXAM: There was a [id number] hypoechoic   lymph node in the aortopulmonary window  FNA x 3 was performed using a 25   gauge [employer] needle and sent for cytology  The soft tissue mass   adjacent to the aortic mass could not be visualized because of air   artifact from the lung  There were no celiac or gastrohepatic nodes seen  The head, body, and tail of the pancreas were unremarkable  The CBD and PD   were not dilated  There were no stones seen in the gallbladder  There were   no lesions seen in the examined part of the liver  COMPLICATIONS: There were no complications  IMPRESSIONS: Aortopulmonary window lymph node biopsied       RECOMMENDATIONS: Follow-up on the results of the cytology specimens  Follow-up appointment with endoscopist in 1 week to discuss cytology   results  ESTIMATED BLOOD LOSS:     PATHOLOGY SPECIMENS:     PROCEDURE CODES:     ICD-9 Codes:     ICD-10 Codes:     PERFORMED BY: JASVIR Auguste  on 08/01/2018  Version 1, electronically signed by JASVIR Figueroa  on 08/01/2018   at 11:01

## 2018-08-01 NOTE — H&P
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)     Back problem     Blood type B-     Cataract     COPD (chronic obstructive pulmonary disease) (HCC)     Coronary artery disease     CVA (cerebral vascular accident) ([facility] )     CVA (cerebral vascular accident) ([place] [address] )     Depression     Disease of thyroid gland     History of cataract     Hyperlipidemia     Hypertension     Lung mass     Prediabetes     PVD (peripheral vascular disease) ([place] [address] )     Thoracic aortic aneurysm (HCC)     Transient insomnia      Past Surgical History:   Procedure Laterality Date    CAROTID STENT Left     SD COLONOSCOPY FLX DX W/COLLJ SPEC WHEN PFRMD N/A 9/27/2017    Procedure: EGD AND COLONOSCOPY;  Surgeon: Sonya Porter MD;  Location: [facility];  Service: Gastroenterology    TUBAL LIGATION       Social History   History   Alcohol Use No     Comment: rare     History   Drug Use No     History   Smoking Status    Former Smoker    Packs/day: 0 50    Years: 37 1    Quit date: 3/6/2018   Smokeless Tobacco    Never Used     Family History   Problem Relation Age of Onset    Hypertension Mother     Heart disease Mother     Diabetes Father     Liver disease Father         hepatic failure     Breast cancer Daughter     Substance Abuse Neg Hx     Mental illness Neg Hx        Meds/Allergies     Prescriptions Prior to Admission   Medication    aspirin (ECOTRIN) 325 mg EC tablet    atorvastatin (LIPITOR) 40 mg tablet    co-enzyme Q-10 30 MG capsule    gabapentin (NEURONTIN) 100 mg capsule    levothyroxine 100 mcg tablet    lisinopril (ZESTRIL) 10 mg tablet    Methylcobalamin (B12-ACTIVE PO)    metoprolol succinate (TOPROL-XL) 50 mg 24 hr tablet    Omega-3 Fatty Acids (FISH OIL PO)    prednisoLONE acetate (PRED FORTE) 1 % ophthalmic suspension       Allergies   Allergen Reactions    Penicillins Rash       Objective     Blood pressure (!) 180/81, pulse 92, temperature (!) 97 °F (36 1 °C), temperature source Tympanic, resp  rate 16, height 5' 2" (1 575 m), weight 57 2 kg (126 lb), SpO2 98 %  PHYSICAL EXAM    Gen: NAD  CV: RRR  CHEST: Clear  ABD: soft, NT/ND  EXT: no edema      ASSESSMENT/PLAN:  This is a 72y o  year old female here for endoscopic ultrasound with fine-needle aspiration, and she is stable and optimized for her procedure

## 2018-08-01 NOTE — ANESTHESIA PREPROCEDURE EVALUATION
Review of Systems/Medical History          Cardiovascular  Hyperlipidemia, Hypertension , CAD ,    Pulmonary  COPD ,        GI/Hepatic            Endo/Other  History of thyroid disease ,      GYN       Hematology   Musculoskeletal       Neurology    CVA ,    Psychology   Anxiety, Depression ,              Physical Exam    Airway    Mallampati score: II         Dental   No notable dental hx     Cardiovascular      Pulmonary      Other Findings        Anesthesia Plan  ASA Score- 3     Anesthesia Type- IV sedation with anesthesia with ASA Monitors  Additional Monitors:   Airway Plan:     Comment: I, Dr Nela Fenton, the attending physician, have personally seen and evaluated the patient prior to anesthetic care  I have reviewed the pre-anesthetic record, and other medical records if appropriate to the anesthetic care  If a CRNA is involved in the case, I have reviewed the CRNA assessment, if present, and agree  The patient is in a suitable condition to proceed with my formulated anesthetic plan        Plan Factors-    Induction- intravenous  Postoperative Plan-     Informed Consent- Anesthetic plan and risks discussed with patient  I personally reviewed this patient with the CRNA  Discussed and agreed on the Anesthesia Plan with the CRNA  Narciso Strickland

## 2018-08-03 NOTE — TELEPHONE ENCOUNTER
Dr Maurice Callejas (Radiologist) called stating the lymph node is positive and the report will be ready in Epic shortly   (also text)

## 2018-08-06 NOTE — TELEPHONE ENCOUNTER
Pt called requesting her results from her procedure  Pt have appt tomorrow but rather have the results by phone   Please advise if pt should keep appt

## 2018-08-06 NOTE — TELEPHONE ENCOUNTER
----- Message from Gia Roberts MD sent at 8/1/2018  2:30 PM EDT -----  Regarding: follow up appt  Please offer her a follow up appt with me on 8/7 in am to discuss her cytology results  I could see her in between patients at [address]   Thanks, [employer]

## 2018-08-07 PROBLEM — C34.90: Status: ACTIVE | Noted: 2018-01-01

## 2018-08-07 NOTE — PROGRESS NOTES
Thoracic Follow-Up  Assessment/Plan:    Nonsquamous nonsmall cell neoplasm of lung ([facility] )  Ms Kerri Espinal underwent a recent biopsy of an N2 lymph node associated with a left upper lobe lung mass  This was positive for metastatic adenocarcinoma of pulmonary origin  She has a pathologic stage IIIA lung cancer and we are going to order a PET-CT scan  We have made a referral to Medical Oncology and Radiation Oncology for combined modality therapy  Diagnoses and all orders for this visit:    Nonsquamous nonsmall cell neoplasm of left lung [facility])  -     Ambulatory referral to Hematology / Oncology; Future  -     Ambulatory referral to Radiation Oncology; Future  -     NM PET CT skull base to mid thigh; Future          Thoracic History    Diagnosis:  Stage IIIA left upper lobe adenocarcinoma  Procedure:  EUS guided biopsy of AP window lymph node  Pathology:  Metastatic adenocarcinoma of pulmonary origin         Subjective:    Patient ID: Henry Mckeon is a 72 y o  female  Ms Kerri Espinal returns to the office today to discuss the results of her recent EUS guided biopsy  She was found to have a left hilar mass with what appeared to be adenopathy extending under her aortic arch in the AP window  An initial PET-CT scan was denied by her insurance company without a tissue diagnosis and she was therefore sent to Dr Olinda Montes De Oca for an EUS guided biopsy of this adenopathy  Pathology is consistent with adenocarcinoma of pulmonary primary  This is clinical stage IIIA lung cancer  She tolerated the biopsy very well          The following portions of the patient's history were reviewed and updated as appropriate: allergies, current medications, past family history, past medical history, past social history, past surgical history and problem list     Review of Systems      Objective:   Physical ExamBP 148/77 (BP Location: Left arm, Patient Position: Sitting, Cuff Size: Adult)   Pulse 98   Temp (!) 97 °F (36 1 °C)   Ht 5' 2" (1 575 m)   Wt 59 5 kg (131 lb 3 2 oz)   SpO2 97%   BMI 24 00 kg/m²     No Chest XR results available for this patient  No CT Chest results available for this patient  No CT Chest,Abdomen,Pelvis results available for this patient  No NM PET CT results available for this patient  No Barium Swallow results available for this patient

## 2018-08-07 NOTE — LETTER
August 7, 2018     MD Kaelyn Mar  [address] [address]    Patient: Rebecca Stiles   YOB: 1945   Date of Visit: 8/7/2018       Dear Dr Ramirez Lobe:    Thank you for referring Giovanny Foster to me for evaluation  Below are my notes for this consultation  If you have questions, please do not hesitate to call me  I look forward to following your patient along with you  Sincerely,        Jimbo Carmona MD        CC: MD Jimbo Day MD  8/7/2018  1:15 PM  Sign at close encounter  [facility] - Outpatient Follow-up Note  Rebecca Stiles 72 y o  female MRN: [id number]  Encounter: [id number]          ASSESSMENT AND PLAN:      1  Mediastinal adenopathy  2  Nonsquamous nonsmall cell neoplasm of lung, unspecified laterality ([facility] )   She appears to have non-small cell carcinoma most likely from a lung primary  Her positive lymph node was located in the aortopulmonary window  She will follow up with Dr Luna Anton to discuss her management options later today  She will also most meet with Medical Oncology  I have asked her to follow up as needed and to call any time if she has questions  ______________________________________________________________________    SUBJECTIVE:   She presents for follow-up after recent endoscopic ultrasound for evaluation of mediastinal lymphadenopathy  She was found to have a 22 mm x 19 mm hypoechoic lymph node in the aortopulmonary window  Fine-needle aspiration x3 was performed using a 25 gauge [employer] needle  Cytology was positive for non-small cell carcinoma  She presents for follow-up today and denies any new complaints such as shortness of breath, chest pain cough, bleeding, or weight loss      REVIEW OF SYSTEMS IS OTHERWISE NEGATIVE        Historical Information   Past Medical History:   Diagnosis Date    Aortic aneurysm ([facility] )     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[facility] )     ASCVD (arteriosclerotic cardiovascular disease)     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 ([place] [address] )     Back problem     Blood type B-     Cataract     COPD (chronic obstructive pulmonary disease) (HCC)     Coronary artery disease     [facility] ([facility] )     Depression     Disease of thyroid gland     History of cataract     Hyperlipidemia     Hypertension     Lung mass     Mediastinal adenopathy     Muscle pain     Prediabetes     PVD (peripheral vascular disease) ([place] [address] )     Thoracic aortic aneurysm (HCC)     Transient insomnia      Past Surgical History:   Procedure Laterality Date    CAROTID STENT Left     [place] COLONOSCOPY FLX DX W/COLLJ SPEC WHEN PFRMD N/A 9/27/2017    Procedure: EGD AND COLONOSCOPY;  Surgeon: Bogdan Ferguson MD;  Location: [facility];  Service: Gastroenterology    TN EDG US EXAM SURGICAL ALTER STOM DUODENUM/JEJUNUM N/A 8/1/2018    Procedure: LINEAR ENDOSCOPIC U/S;  Surgeon: Raymond Castillo MD;  Location: [facility];   Service: Gastroenterology    TUBAL LIGATION       Social History   History   Alcohol Use No     Comment: rare     History   Drug Use No     History   Smoking Status    Former Smoker    Packs/day: 0 50    Years: 45 00    Quit date: 3/6/2018   Smokeless Tobacco    Never Used     Family History   Problem Relation Age of Onset    Hypertension Mother     Heart disease Mother     Diabetes Father     Liver disease Father         hepatic failure     Breast cancer Daughter     Substance Abuse Neg Hx     Mental illness Neg Hx        Meds/Allergies       Current Outpatient Prescriptions:     aspirin (ECOTRIN) 325 mg EC tablet    atorvastatin (LIPITOR) 40 mg tablet    co-enzyme Q-10 30 MG capsule    gabapentin (NEURONTIN) 100 mg capsule    levothyroxine 100 mcg tablet    lisinopril (ZESTRIL) 10 mg tablet    Methylcobalamin (B12-ACTIVE PO)    metoprolol succinate (TOPROL-XL) 50 mg 24 hr tablet    Omega-3 Fatty Acids (FISH OIL PO)    prednisoLONE acetate (PRED FORTE) 1 % ophthalmic suspension    Allergies   Allergen Reactions    Penicillins Rash           Objective     Blood pressure 118/82, pulse 65, temperature 97 7 °F (36 5 °C), temperature source Tympanic, height 5' 2" (1 575 m), weight 59 kg (130 lb)  Body mass index is 23 78 kg/m²  PHYSICAL EXAM:      General Appearance:   Alert, cooperative, no distress   HEENT:   Normocephalic, atraumatic, anicteric      Neck:  Supple, symmetrical, trachea midline   Lungs:   Few crackles on right side but no rhonchi or wheezing; respirations unlabored    Heart[de-identified]   Regular rate and rhythm; no murmur, rub, or gallop  Abdomen:   Soft, non-tender, non-distended; normal bowel sounds; no masses, no organomegaly    Genitalia:   Deferred    Rectal:   Deferred    Extremities:  No cyanosis, clubbing or edema    Pulses:  2+ and symmetric    Skin:  No jaundice, rashes, or lesions    Lymph nodes:  No palpable cervical lymphadenopathy        Lab Results:   No visits with results within 1 Day(s) from this visit     Latest known visit with results is:   Admission on 08/01/2018, Discharged on 08/01/2018   Component Date Value    Case Report 08/01/2018                      Value:Non-gynecologic Cytology                          Case: [id number]                                  Authorizing Provider:  Roxanne Covington MD           Collected:           08/01/2018 1024              Ordering Location:     [facility]      Received:            08/01/2018 [address]  Endoscopy                                                           Pathologist:           Rosi Sandoval MD                                                                Specimens:   A) - Lymph Node, mediastinal                                                                        B) - Lymph Node, mediastinal, cell block                                                   Final Diagnosis 08/01/2018                      Value: This result contains rich text formatting which cannot be displayed here   Note 08/01/2018                      Value: This result contains rich text formatting which cannot be displayed here   Intraoperative Consultat* 08/01/2018                      Value: This result contains rich text formatting which cannot be displayed here  Elizabeth Jefferson Gross Description 08/01/2018                      Value: This result contains rich text formatting which cannot be displayed here   Clinical Information 08/01/2018                      Value:Mediastinal adenopathy    Additional Information 08/01/2018                      Value: This result contains rich text formatting which cannot be displayed here  Radiology Results:   No results found

## 2018-08-07 NOTE — ASSESSMENT & PLAN NOTE
Ms Soheila Bond underwent a recent biopsy of an N2 lymph node associated with a left upper lobe lung mass  This was positive for metastatic adenocarcinoma of pulmonary origin  She has a pathologic stage IIIA lung cancer and we are going to order a PET-CT scan  We have made a referral to Medical Oncology and Radiation Oncology for combined modality therapy

## 2018-08-07 NOTE — TELEPHONE ENCOUNTER
JOHN Per OV note from Dr Gonzalez [place] today, biopsy was positive for metastatic adenocarcinoma of pulmonary origin  He is ordering a PET-CT scan and made referral to medical oncology and radiation oncology

## 2018-08-07 NOTE — PROGRESS NOTES
[facility] - Outpatient Follow-up Note  Mike Macdonald 72 y o  female MRN: [id number]  Encounter: [id number]          ASSESSMENT AND PLAN:      1  Mediastinal adenopathy  2  Nonsquamous nonsmall cell neoplasm of lung, unspecified laterality ([facility] )   She appears to have non-small cell carcinoma most likely from a lung primary  Her positive lymph node was located in the aortopulmonary window  She will follow up with Dr Aspen Wolf to discuss her management options later today  She will also most meet with Medical Oncology  I have asked her to follow up as needed and to call any time if she has questions  ______________________________________________________________________    SUBJECTIVE:   She presents for follow-up after recent endoscopic ultrasound for evaluation of mediastinal lymphadenopathy  She was found to have a 22 mm x 19 mm hypoechoic lymph node in the aortopulmonary window  Fine-needle aspiration x3 was performed using a 25 gauge [employer] needle  Cytology was positive for non-small cell carcinoma  She presents for follow-up today and denies any new complaints such as shortness of breath, chest pain cough, bleeding, or weight loss      REVIEW OF SYSTEMS IS OTHERWISE NEGATIVE        Historical Information   Past Medical History:   Diagnosis Date    Aortic aneurysm ([facility] )     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HCC)     ASCVD (arteriosclerotic cardiovascular disease)     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)     Back problem     Blood type B-     Cataract     COPD (chronic obstructive pulmonary disease) (HCC)     Coronary artery disease     CVA (cerebral vascular accident) ([facility] )     Depression     Disease of thyroid gland     History of cataract     Hyperlipidemia     Hypertension     Lung mass     Mediastinal adenopathy     Muscle pain     Prediabetes     PVD (peripheral vascular disease) ([facility] )     Thoracic aortic aneurysm ([facility] )     Transient insomnia      Past Surgical History:   Procedure Laterality Date    CAROTID STENT Left     PA COLONOSCOPY FLX DX W/COLLJ SPEC WHEN PFRMD N/A 9/27/2017    Procedure: EGD AND COLONOSCOPY;  Surgeon: Sally Adame MD;  [facility];  Service: Gastroenterology    PA EDG US EXAM SURGICAL ALTER STOM DUODENUM/JEJUNUM N/A 8/1/2018    Procedure: LINEAR ENDOSCOPIC U/S;  Surgeon: Mary Connor MD;  [facility]; Service: Gastroenterology    TUBAL LIGATION       Social History   History   Alcohol Use No     Comment: rare     History   Drug Use No     History   Smoking Status    Former Smoker    Packs/day: 0 50    Years: 45 00    Quit date: 3/6/2018   Smokeless Tobacco    Never Used     Family History   Problem Relation Age of Onset    Hypertension Mother     Heart disease Mother     Diabetes Father     Liver disease Father         hepatic failure     Breast cancer Daughter     Substance Abuse Neg Hx     Mental illness Neg Hx        Meds/Allergies       Current Outpatient Prescriptions:     aspirin (ECOTRIN) 325 mg EC tablet    atorvastatin (LIPITOR) 40 mg tablet    co-enzyme Q-10 30 MG capsule    gabapentin (NEURONTIN) 100 mg capsule    levothyroxine 100 mcg tablet    lisinopril (ZESTRIL) 10 mg tablet    Methylcobalamin (B12-ACTIVE PO)    metoprolol succinate (TOPROL-XL) 50 mg 24 hr tablet    Omega-3 Fatty Acids (FISH OIL PO)    prednisoLONE acetate (PRED FORTE) 1 % ophthalmic suspension    Allergies   Allergen Reactions    Penicillins Rash           Objective     Blood pressure 118/82, pulse 65, temperature 97 7 °F (36 5 °C), temperature source Tympanic, height 5' 2" (1 575 m), weight 59 kg (130 lb)  Body mass index is 23 78 kg/m²        PHYSICAL EXAM:      General Appearance:   Alert, cooperative, no distress   HEENT:   Normocephalic, atraumatic, anicteric      Neck:  Supple, symmetrical, trachea midline   Lungs:   Few crackles on right side but no rhonchi or wheezing; respirations unlabored    Heart[de-identified]   Regular rate and rhythm; no murmur, rub, or gallop  Abdomen:   Soft, non-tender, non-distended; normal bowel sounds; no masses, no organomegaly    Genitalia:   Deferred    Rectal:   Deferred    Extremities:  No cyanosis, clubbing or edema    Pulses:  2+ and symmetric    Skin:  No jaundice, rashes, or lesions    Lymph nodes:  No palpable cervical lymphadenopathy        Lab Results:   No visits with results within 1 Day(s) from this visit  Latest known visit with results is:   Admission on 08/01/2018, Discharged on 08/01/2018   Component Date Value    Case Report 08/01/2018                      Value:Non-gynecologic Cytology                          Case: [id number]                                  Authorizing Provider:  Raiza Lopez MD           Collected:           08/01/2018 1024              Ordering Location:     [address] [place] [address]      Received:            08/01/2018 [address]  Endoscopy                                                           Pathologist:           Eugenio Rees MD                                                                Specimens:   A) - Lymph Node, mediastinal                                                                        B) - Lymph Node, mediastinal, cell block                                                   Final Diagnosis 08/01/2018                      Value: This result contains rich text formatting which cannot be displayed here   Note 08/01/2018                      Value: This result contains rich text formatting which cannot be displayed here   Intraoperative Consultat* 08/01/2018                      Value: This result contains rich text formatting which cannot be displayed here  Zabrina Castellon Gross Description 08/01/2018                      Value: This result contains rich text formatting which cannot be displayed here      Clinical Information 08/01/2018 Value: Mediastinal adenopathy    Additional Information 08/01/2018                      Value: This result contains rich text formatting which cannot be displayed here  Radiology Results:   No results found

## 2018-08-16 NOTE — SOCIAL WORK
MSW received call from Med Onc that pt completed Distress Thermometer and self scored at a 6  Pt marked off transportation, sadness, worry and loss of interest as her psychosocial stressors  The pt marked off 8 out of 21 physical stressors  The pt was present with her daughter and wanted to meet with her  MSW introduced self and the role of the cancer counselor  The pt shared that she had fear as this was "all new" to her  Pt was newly diagnosed and was recently simmed for radiation was learning today what the plan would be for chemo  The pt shared that her daughter and best friend were cancer survivors and this gave her great hope  The pt's daughter shared with the pt that the treatment would be very difficult and the pt began to cry  MSW provided emotional support and shared that her daughter didn't want to sugar coat anything and it was probably best to know what to expect  The pt has a significant other, who she describes as being very supportive  MSW encouraged the pt to lean on her support system  Transportation was discussed and the [address] system was explained  Pt's daughter does not feel this will be necessary at this time  Pt lives int he [facility] area but will be receiving her chemo in the [address] [facility] due to receiving her radiation there  MSW offered for the pt to go to the cancer support group run out of the [address] and pt appeared interested in this  MSW provided the pt information and Radha Drew's contact number  Emotional support provided and this MSW will continue to follow

## 2018-08-16 NOTE — PROGRESS NOTES
Oncology Outpatient Consult Note  Mike Macdonald 72 y o  female MRN: @ Encounter: [id number]        Date:  8/16/2018        CC:  Stage IIIa left upper lobe adenocarcinoma of the lung  HPI:  Mike Macdonald is seen for initial consultation 8/16/2018 regarding Newly diagnosed stage IIIa adenocarcinoma of the lung  The patient initially was found to have a left hilar mass with what appeared to be adenopathy extending under her aortic arch in the AP window  PET/CT scan was denies so she had an EUS guided biopsy of the adenopathy pathology of which was consistent with adenocarcinoma of pulmonary primary  This made it a stage IIIa lung cancer  She was seen by her colleagues in cardiothoracic surgery who referred her to see us and her colleagues in radiation oncology to consider concurrent chemoradiation  Her last CMP does show a creatinine that is elevated so standard of care chemotherapy with cisplatin and -16 may carry the risk of toxicity along with renal dysfunction especially at 72years of age so carboplatin and Taxol concurrently with radiation will be considered  As far as symptoms are concerned she has a slight cough  Denies any nausea denies any vomiting denies any diarrhea  The rest of her 14 point review of systems today was negative  Previous Hematologic/ Oncologic History:       Nonsquamous nonsmall cell neoplasm of lung ([facility] )    8/2018 Initial Diagnosis     Nonsquamous nonsmall cell neoplasm of lung ([facility] )         8/1/2018 Biopsy     Lymph node, mediastinal (fine needle aspiration with cell block preparation):     - Involvement by non-small cell carcinoma, most compatible with pulmonary adenocarcinoma     - Positive: Emmett-Ep4, MOC-31, TTF1, CKAE1/3     - Negative: Edinburgh-8, CDX2, synaptophysin, CD56, HMB-45, P40                Test Results:    Imaging: No results found      Labs:   Lab Results   Component Value Date    WBC 8 40 05/22/2018    HGB 14 0 05/22/2018    HCT 43 6 05/22/2018    MCV 99 (H) 05/22/2018     05/22/2018     Lab Results   Component Value Date     05/22/2018    K 4 1 05/22/2018     05/22/2018    CO2 30 05/22/2018    ANIONGAP 8 05/22/2018    BUN 24 05/22/2018    CREATININE 1 44 (H) 05/22/2018    GLUCOSE 121 05/22/2018    GLUF 90 06/12/2017    CALCIUM 9 6 05/22/2018    AST 18 05/22/2018    ALT 23 05/22/2018    ALKPHOS 102 05/22/2018    PROT 7 9 05/22/2018    BILITOT 0 50 05/22/2018    EGFR 37 05/22/2018       Lab Results   Component Value Date    VCUBSYTS12 501 02/27/2018       ROS: As stated in history of present illness otherwise her 14 point review of systems today was negative          Active Problems:   Patient Active Problem List   Diagnosis    Chronic obstructive pulmonary disease ([facility] )    Hypothyroidism    Pure hypercholesterolemia    Hypertension, uncontrolled    Anxiety    ASCVD (arteriosclerotic cardiovascular disease)    Mediastinal adenopathy    Aneurysm of infrarenal abdominal aorta (HCC)    Aneurysm, thoracoabdominal aortic (HCC)    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 ([facility])    Benign hypertension    Minor depression    Left breast lump    Muscle pain, myofacial    Chronic bilateral low back pain with right-sided sciatica    Spinal stenosis of lumbar region with neurogenic claudication    Lumbar radiculopathy    Chronic pain syndrome    PAD (peripheral artery disease) ([address] )    Bilateral carotid artery stenosis    Stage 3 chronic kidney disease    Preoperative cardiovascular examination    Nonsquamous nonsmall cell neoplasm of lung (HCC)       Past Medical History:   Past Medical History:   Diagnosis Date    Aortic aneurysm ([address] )     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HCC)     ASCVD (arteriosclerotic cardiovascular disease)     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 ([facility])     Back problem     Blood type B-     Cataract     COPD (chronic obstructive pulmonary disease) ([address] )  Coronary artery disease     CVA [facility]cerebral vascular accident) ([facility] ) 2012    Depression     Disease of thyroid gland     History of cataract     Hyperlipidemia     Hypertension     Lung mass     Mediastinal adenopathy     Muscle pain     Prediabetes     [facility]) [facility]     Thoracic aortic aneurysm (HCC)     Transient insomnia        Surgical History:   Past Surgical History:   Procedure Laterality Date    CAROTID STENT Left     TX COLONOSCOPY FLX DX W/COLLJ SPEC WHEN PFRMD N/A 9/27/2017    Procedure: EGD AND COLONOSCOPY;  Surgeon: Abdi Bull MD;  [facility];  Service: Gastroenterology    TX EDG US EXAM SURGICAL ALTER STOM DUODENUM/JEJUNUM N/A 8/1/2018    Procedure: LINEAR ENDOSCOPIC U/S;  Surgeon: Jade Pablo MD;  [facility]; Service: Gastroenterology    TUBAL LIGATION         Family History:    Family History   Problem Relation Age of Onset    Hypertension Mother     Heart disease Mother     Diabetes Father     Liver disease Father         hepatic failure     Breast cancer Daughter     Substance Abuse Neg Hx     Mental illness Neg Hx        Cancer-related family history includes Breast cancer in her daughter  Social History:   Social History     Social History    Marital status:      Spouse name: N/A    Number of children: N/A    Years of education: N/A     Occupational History    Not on file  Social History Main Topics    Smoking status: Former Smoker     Packs/day: 0 50     Years: 45 00     Quit date: 3/6/2018    Smokeless tobacco: Never Used    Alcohol use No      Comment: rare    Drug use: No    Sexual activity: Not Currently     Other Topics Concern    Not on file     Social History Narrative    Lives with family  Feels safe at home  Sees dentist occas  No living will       Dental care, occasionally     Does not exercise     Living alone    Living situation: supportive and safe    No advance directives No caffeine use        Current Medications:   Current Outpatient Prescriptions   Medication Sig Dispense Refill    aspirin (ECOTRIN) 325 mg EC tablet Take 325 mg by mouth daily   atorvastatin (LIPITOR) 40 mg tablet Take 1 tablet (40 mg total) by mouth daily 90 tablet 3    co-enzyme Q-10 30 MG capsule Take 30 mg by mouth 3 (three) times a day      gabapentin (NEURONTIN) 100 mg capsule Take 200 mg by mouth 3 (three) times a day      levothyroxine 100 mcg tablet Take 1 tablet (100 mcg total) by mouth daily 30 tablet 3    lisinopril (ZESTRIL) 10 mg tablet Take 1 tablet (10 mg total) by mouth daily 90 tablet 1    Methylcobalamin (B12-ACTIVE PO) Take by mouth      metoprolol succinate (TOPROL-XL) 50 mg 24 hr tablet Take 1 tablet (50 mg total) by mouth 2 (two) times a day 60 tablet 5    Omega-3 Fatty Acids (FISH OIL PO) Take by mouth      prednisoLONE acetate (PRED FORTE) 1 % ophthalmic suspension        No current facility-administered medications for this visit  Allergies: Allergies   Allergen Reactions    Penicillins Rash         Physical Exam:    Body surface area is 1 58 meters squared  Wt Readings from Last 3 Encounters:   08/16/18 58 5 kg (129 lb)   08/09/18 59 1 kg (130 lb 3 2 oz)   08/07/18 59 5 kg (131 lb 3 2 oz)        Temp Readings from Last 3 Encounters:   08/16/18 (!) 97 1 °F (36 2 °C) (Tympanic)   08/09/18 97 5 °F (36 4 °C) (Temporal)   08/07/18 (!) 97 °F (36 1 °C)        BP Readings from Last 3 Encounters:   08/16/18 144/90   08/09/18 134/88   08/07/18 148/77         Pulse Readings from Last 3 Encounters:   08/16/18 94   08/09/18 93   08/07/18 98       Physical Exam     Constitutional   General appearance: No acute distress, well appearing and well nourished  Eyes   Conjunctiva and lids: No swelling, erythema or discharge  Pupils and irises: Equal, round and reactive to light      Ears, Nose, Mouth, and Throat   External inspection of ears and nose: Normal     Nasal mucosa, septum, and turbinates: Normal without edema or erythema  Oropharynx: Normal with no erythema, edema, exudate or lesions  Pulmonary   Respiratory effort: No increased work of breathing or signs of respiratory distress  Auscultation of lungs: Clear to auscultation  Cardiovascular   Palpation of heart: Normal PMI, no thrills  Auscultation of heart: Normal rate and rhythm, normal S1 and S2, without murmurs  Examination of extremities for edema and/or varicosities: Normal     Carotid pulses: Normal     Abdomen   Abdomen: Non-tender, no masses  Liver and spleen: No hepatomegaly or splenomegaly  Lymphatic   Palpation of lymph nodes in neck: No lymphadenopathy  Musculoskeletal   Gait and station: Normal     Digits and nails: Normal without clubbing or cyanosis  Inspection/palpation of joints, bones, and muscles: Normal     Skin   Skin and subcutaneous tissue: Normal without rashes or lesions  Neurologic   Cranial nerves: Cranial nerves 2-12 intact  Sensation: No sensory loss  Psychiatric   Orientation to person, place, and time: Normal     Mood and affect: Normal           Assessment/ Plan:    The patient is a pleasant 82-year-old female with newly diagnosed stage IIIa adenocarcinoma of the lung  She is getting a PET/CT scan tomorrow  She was seen by our colleagues in surgery who recommended concurrent chemoradiation  Based on her stage I agree with this  I am planning on giving her carboplatin at an AUC of 2 and Taxol at 48 m square with concurrent chemoradiation  Afterwards we will probably put her on immunotherapy for the newer studies/data  I spent all this to the patient and her daughter  I explained the risks benefits and alternatives of therapy  I explained the rationale behind concurrent chemoradiation and using carboplatin instead of cisplatin   The patient agreed and states she does have kidney issues and does not wish to get any nephrotoxic drug like cisplatin at this point  We went over the side effects which could possibly include but are not limited to nausea, vomiting, hair loss, increased susceptibility to infection, fatigue  The patient agreed to start treatment  She will sign a consent today and then start treatment within 2 weeks  She is getting symptoms weeks I suspect the week after on Monday she will start radiation  Her PET/CT scan is tomorrow and unlesst there are changes I e  Metastatic disease we will proceed with this plan  Goals and Barriers:  Current Goal:  Prolong Survival from lung Cancer  Barriers: None  Patient's Capacity to Self Care:  Patient  able to self care  Portions of the record may have been created with voice recognition software   Occasional wrong word or "sound a like" substitutions may have occurred due to the inherent limitations of voice recognition software   Read the chart carefully and recognize, using context, where substitutions have occurred

## 2018-08-16 NOTE — LETTER
August 16, 2018     MD Kaelyn Vega  [address] [address]    Patient: Alonso Banegas   YOB: 1945   Date of Visit: 8/16/2018       Dear Dr Jaylin Nunez:    Thank you for referring Suzette Brandt to me for evaluation  Below are my notes for this consultation  If you have questions, please do not hesitate to call me  I look forward to following your patient along with you  Sincerely,        Monik Merrill MD        CC: No Recipients  Monik Merrill MD  8/16/2018  9:56 AM  Sign at close encounter     Oncology Outpatient Consult Note  Alonso Banegas 72 y o  female MRN: @ Encounter: [id number]        Date:  8/16/2018        CC:  Stage IIIa left upper lobe adenocarcinoma of the lung  HPI:  Alonso Banegas is seen for initial consultation 8/16/2018 regarding Newly diagnosed stage IIIa adenocarcinoma of the lung  The patient initially was found to have a left hilar mass with what appeared to be adenopathy extending under her aortic arch in the AP window  PET/CT scan was denies so she had an EUS guided biopsy of the adenopathy pathology of which was consistent with adenocarcinoma of pulmonary primary  This made it a stage IIIa lung cancer  She was seen by her colleagues in cardiothoracic surgery who referred her to see us and her colleagues in radiation oncology to consider concurrent chemoradiation  Her last CMP does show a creatinine that is elevated so standard of care chemotherapy with cisplatin and -16 may carry the risk of toxicity along with renal dysfunction especially at 72years of age so carboplatin and Taxol concurrently with radiation will be considered  As far as symptoms are concerned she has a slight cough  Denies any nausea denies any vomiting denies any diarrhea  The rest of her 14 point review of systems today was negative      Previous Hematologic/ Oncologic History:       Nonsquamous nonsmall cell neoplasm of lung ([address] )    8/2018 Initial Diagnosis Nonsquamous nonsmall cell neoplasm of lung ([facility] )         8/1/2018 Biopsy     Lymph node, mediastinal (fine needle aspiration with cell block preparation):     - Involvement by non-small cell carcinoma, most compatible with pulmonary adenocarcinoma     - Positive: Emmett-Ep4, MOC-31, TTF1, CKAE1/3     - Negative: [place]-8, CDX2, synaptophysin, CD56, HMB-45, P40                Test Results:    Imaging: No results found  Labs:   Lab Results   Component Value Date    WBC 8 40 05/22/2018    HGB 14 0 05/22/2018    HCT 43 6 05/22/2018    MCV 99 (H) 05/22/2018     05/22/2018     Lab Results   Component Value Date     05/22/2018    K 4 1 05/22/2018     05/22/2018    CO2 30 05/22/2018    ANIONGAP 8 05/22/2018    BUN 24 05/22/2018    CREATININE 1 44 (H) 05/22/2018    GLUCOSE 121 05/22/2018    GLUF 90 06/12/2017    CALCIUM 9 6 05/22/2018    AST 18 05/22/2018    ALT 23 05/22/2018    ALKPHOS 102 05/22/2018    PROT 7 9 05/22/2018    BILITOT 0 50 05/22/2018    EGFR 37 05/22/2018       Lab Results   Component Value Date    HNGVUQVJ72 501 02/27/2018       ROS: As stated in history of present illness otherwise her 14 point review of systems today was negative          Active Problems:   Patient Active Problem List   Diagnosis    Chronic obstructive pulmonary disease ([facility] )    Hypothyroidism    Pure hypercholesterolemia    Hypertension, uncontrolled    Anxiety    ASCVD (arteriosclerotic cardiovascular disease)    Mediastinal adenopathy    Aneurysm of infrarenal abdominal aorta (HCC)    Aneurysm, thoracoabdominal aortic ([address] )    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)    Benign hypertension    Minor depression    Left breast lump    Muscle pain, myofacial    Chronic bilateral low back pain with right-sided sciatica    Spinal stenosis of lumbar region with neurogenic claudication    Lumbar radiculopathy    Chronic pain syndrome    PAD (peripheral artery disease) ([facility] )  Bilateral carotid artery stenosis    Stage 3 chronic kidney disease    Preoperative cardiovascular examination    Nonsquamous nonsmall cell neoplasm of lung (HCC)       Past Medical History:   Past Medical History:   Diagnosis Date    Aortic aneurysm ([facility] )     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HCC)     ASCVD (arteriosclerotic cardiovascular disease)     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 ([facility] )     Back problem     Blood type B-     Cataract     COPD (chronic obstructive pulmonary disease) (HCC)     Coronary artery disease     CVA (cerebral vascular accident) ([place] [address] ) 2012    Depression     Disease of thyroid gland     History of cataract     Hyperlipidemia     Hypertension     Lung mass     Mediastinal adenopathy     Muscle pain     Prediabetes     PVD (peripheral vascular disease) ([place] [address] )     Thoracic aortic aneurysm (HCC)     Transient insomnia        Surgical History:   Past Surgical History:   Procedure Laterality Date    CAROTID STENT Left     [place] COLONOSCOPY FLX DX W/COLLJ SPEC WHEN PFRMD N/A 9/27/2017    Procedure: EGD AND COLONOSCOPY;  Surgeon: Jaqueline Lombard, MD;  Location[facility];  Service: Gastroenterology    [place] EDG US EXAM SURGICAL ALTER STOM DUODENUM/JEJUNUM N/A 8/1/2018    Procedure: LINEAR ENDOSCOPIC U/S;  Surgeon: Zahraa Gleason MD;  Location: [facility]; Service: Gastroenterology    TUBAL LIGATION         Family History:    Family History   Problem Relation Age of Onset    Hypertension Mother     Heart disease Mother     Diabetes Father     Liver disease Father         hepatic failure     Breast cancer Daughter     Substance Abuse Neg Hx     Mental illness Neg Hx        Cancer-related family history includes Breast cancer in her daughter  Social History:   Social History     Social History    Marital status:       Spouse name: N/A    Number of children: N/A    Years of education: N/A     Occupational History    Not on file      Social History Main Topics    Smoking status: Former Smoker     Packs/day: 0 50     Years: 45 00     Quit date: 3/6/2018    Smokeless tobacco: Never Used    Alcohol use No      Comment: rare    Drug use: No    Sexual activity: Not Currently     Other Topics Concern    Not on file     Social History Narrative    Lives with family  Feels safe at home  Sees dentist occas  No living will  Dental care, occasionally     Does not exercise     Living alone    Living situation: supportive and safe    No advance directives     No caffeine use        Current Medications:   Current Outpatient Prescriptions   Medication Sig Dispense Refill    aspirin (ECOTRIN) 325 mg EC tablet Take 325 mg by mouth daily   atorvastatin (LIPITOR) 40 mg tablet Take 1 tablet (40 mg total) by mouth daily 90 tablet 3    co-enzyme Q-10 30 MG capsule Take 30 mg by mouth 3 (three) times a day      gabapentin (NEURONTIN) 100 mg capsule Take 200 mg by mouth 3 (three) times a day      levothyroxine 100 mcg tablet Take 1 tablet (100 mcg total) by mouth daily 30 tablet 3    lisinopril (ZESTRIL) 10 mg tablet Take 1 tablet (10 mg total) by mouth daily 90 tablet 1    Methylcobalamin (B12-ACTIVE PO) Take by mouth      metoprolol succinate (TOPROL-XL) 50 mg 24 hr tablet Take 1 tablet (50 mg total) by mouth 2 (two) times a day 60 tablet 5    Omega-3 Fatty Acids (FISH OIL PO) Take by mouth      prednisoLONE acetate (PRED FORTE) 1 % ophthalmic suspension        No current facility-administered medications for this visit  Allergies: Allergies   Allergen Reactions    Penicillins Rash         Physical Exam:    Body surface area is 1 58 meters squared      Wt Readings from Last 3 Encounters:   08/16/18 58 5 kg (129 lb)   08/09/18 59 1 kg (130 lb 3 2 oz)   08/07/18 59 5 kg (131 lb 3 2 oz)        Temp Readings from Last 3 Encounters:   08/16/18 (!) 97 1 °F (36 2 °C) (Tympanic)   08/09/18 97 5 °F (36 4 °C) (Temporal) 08/07/18 (!) 97 °F (36 1 °C)        BP Readings from Last 3 Encounters:   08/16/18 144/90   08/09/18 134/88   08/07/18 148/77         Pulse Readings from Last 3 Encounters:   08/16/18 94   08/09/18 93   08/07/18 98       Physical Exam     Constitutional   General appearance: No acute distress, well appearing and well nourished  Eyes   Conjunctiva and lids: No swelling, erythema or discharge  Pupils and irises: Equal, round and reactive to light  Ears, Nose, Mouth, and Throat   External inspection of ears and nose: Normal     Nasal mucosa, septum, and turbinates: Normal without edema or erythema  Oropharynx: Normal with no erythema, edema, exudate or lesions  Pulmonary   Respiratory effort: No increased work of breathing or signs of respiratory distress  Auscultation of lungs: Clear to auscultation  Cardiovascular   Palpation of heart: Normal PMI, no thrills  Auscultation of heart: Normal rate and rhythm, normal S1 and S2, without murmurs  Examination of extremities for edema and/or varicosities: Normal     Carotid pulses: Normal     Abdomen   Abdomen: Non-tender, no masses  Liver and spleen: No hepatomegaly or splenomegaly  Lymphatic   Palpation of lymph nodes in neck: No lymphadenopathy  Musculoskeletal   Gait and station: Normal     Digits and nails: Normal without clubbing or cyanosis  Inspection/palpation of joints, bones, and muscles: Normal     Skin   Skin and subcutaneous tissue: Normal without rashes or lesions  Neurologic   Cranial nerves: Cranial nerves 2-12 intact  Sensation: No sensory loss  Psychiatric   Orientation to person, place, and time: Normal     Mood and affect: Normal           Assessment/ Plan:    The patient is a pleasant 74-year-old female with newly diagnosed stage IIIa adenocarcinoma of the lung  She is getting a PET/CT scan tomorrow  She was seen by our colleagues in surgery who recommended concurrent chemoradiation   Based on her stage I agree with this  I am planning on giving her carboplatin at an AUC of 2 and Taxol at 48 m square with concurrent chemoradiation  Afterwards we will probably put her on immunotherapy for the newer studies/data  I spent all this to the patient and her daughter  I explained the risks benefits and alternatives of therapy  I explained the rationale behind concurrent chemoradiation and using carboplatin instead of cisplatin  The patient agreed and states she does have kidney issues and does not wish to get any nephrotoxic drug like cisplatin at this point  We went over the side effects which could possibly include but are not limited to nausea, vomiting, hair loss, increased susceptibility to infection, fatigue  The patient agreed to start treatment  She will sign a consent today and then start treatment within 2 weeks  She is getting symptoms weeks I suspect the week after on Monday she will start radiation  Her PET/CT scan is tomorrow and unlesst there are changes I e  Metastatic disease we will proceed with this plan  Goals and Barriers:  Current Goal:  Prolong Survival from lung Cancer  Barriers: None  Patient's Capacity to Self Care:  Patient  able to self care  Portions of the record may have been created with voice recognition software   Occasional wrong word or "sound a like" substitutions may have occurred due to the inherent limitations of voice recognition software   Read the chart carefully and recognize, using context, where substitutions have occurred

## 2018-08-21 NOTE — TELEPHONE ENCOUNTER
Dr Rafael Alas - Please efer to hem/onc ov note from 8/16 - do you need to f/u w/ pt or should we remove her from cardiac clearance board?

## 2018-08-27 NOTE — TELEPHONE ENCOUNTER
Pt called asking if needs labs before chemo because does not have slips  Starting on 8/29/18  Informed she does  Pt goes to [facility]  [facility]  Also asked if can eat prior to chemo and informed can

## 2018-08-29 NOTE — PLAN OF CARE
Problem: Potential for Falls  Goal: Patient will remain free of falls  INTERVENTIONS:  - Assess patient frequently for physical needs  -  Identify cognitive and physical deficits and behaviors that affect risk of falls    -  [place] fall precautions as indicated by assessment   - Educate patient/family on patient safety including physical limitations  - Instruct patient to call for assistance with activity based on assessment  - Modify environment to reduce risk of injury  - Consider OT/PT consult to assist with strengthening/mobility   Outcome: Progressing

## 2018-08-29 NOTE — SOCIAL WORK
MSW met with pt in the infusion center on this day as this was her first treatment  Pt was familiar to this MSW as we had met in Med Onc  Pt completed another Distress Thermometer and self scored a 6 to indicate her level of stress  The pt marked off nervousness as her psychosocial stressor  The pt did not catrachito off any physical problems  The pt was with her daughter on this day and reports that she was highly anxious about coming in for treatment and not knowing what to expect  The pt reports feeling less anxious now that she has been receiving her chemo and feeling acclimated to the treatment center  Pt reports that staff has been very welcoming and made her feel very comfortable  Emotional support was provided

## 2018-09-11 NOTE — TELEPHONE ENCOUNTER
pww pt  Has dx of copy and asthma  Is not on any inhalers currently  I tried calling pt without any answer for more info    thx  dk

## 2018-09-11 NOTE — TELEPHONE ENCOUNTER
AT this point, I will order albuter MDI,  #1,  2 puffs q6hr prn  SOB  1 refill  But patient really should see dr Kobe Lin next week to address this more fully

## 2018-09-13 NOTE — PLAN OF CARE
Problem: Potential for Falls  Goal: Patient will remain free of falls  INTERVENTIONS:  - Assess patient frequently for physical needs  -  Identify cognitive and physical deficits and behaviors that affect risk of falls    -  [place] fall precautions as indicated by assessment   - Educate patient/family on patient safety including physical limitations  - Instruct patient to call for assistance with activity based on assessment  - Modify environment to reduce risk of injury  - Consider OT/PT consult to assist with strengthening/mobility   Outcome: Progressing

## 2018-09-17 NOTE — TELEPHONE ENCOUNTER
Pt called  On chemo and RT  Asking if can get a flu shot and informed can but not to get nasal dose

## 2018-09-19 NOTE — PROGRESS NOTES
Hematology/Oncology Outpatient Follow- up Note  Forrest Trivedi 72 y o  female MRN: @ Encounter: [id number]        Date:  9/19/2018    Presenting Complaint/Diagnosis : Stage IIIa left upper lobe adenocarcinoma of the lung  HPI:      Forrest Trivedi is seen for initial consultation 8/16/2018 regarding Newly diagnosed stage IIIa adenocarcinoma of the lung  The patient initially was found to have a left hilar mass with what appeared to be adenopathy extending under her aortic arch in the AP window  PET/CT scan was denies so she had an EUS guided biopsy of the adenopathy pathology of which was consistent with adenocarcinoma of pulmonary primary  This made it a stage IIIa lung cancer  She was seen by her colleagues in cardiothoracic surgery who referred her to see us and her colleagues in radiation oncology to consider concurrent chemoradiation  Her last CMP does show a creatinine that is elevated so standard of care chemotherapy with cisplatin and -16 may carry the risk of toxicity along with renal dysfunction especially at 72years of age so carboplatin and Taxol concurrently with radiation was considered  Previous Hematologic/ Oncologic History:       Nonsquamous nonsmall cell neoplasm of lung ([facility] [address] )    8/2018 Initial Diagnosis     Nonsquamous nonsmall cell neoplasm of lung ([facility] [address] )         8/1/2018 Biopsy     Lymph node, mediastinal (fine needle aspiration with cell block preparation):     - Involvement by non-small cell carcinoma, most compatible with pulmonary adenocarcinoma     - Positive: Emmett-Ep4, MOC-31, TTF1, CKAE1/3     - Negative: [place]-8, CDX2, synaptophysin, CD56, HMB-45, P40            Current Hematologic/ Oncologic Treatment:      Carboplatin AUC of 2 with Taxol 50 mg/m² g with concurrent chemoradiation  Dose #4 will be given on 20 September  Interval History:       The patient returns for follow-up visit  She is tolerating her concurrent chemoradiation reasonably well   Her PET scan showed uptake in the lesion and mediastinal lymph nodes  She had some very mild skin uptake in the perineum and she states she had a boil there which is improving  Denies any nausea denies any vomiting  Does have some dysphagia which I suspect is from mucositis in her esophagus  This is probably from radiation  The rest of her 14 point review of systems today was negative  Test Results:    Imaging: No results found  Labs:   Lab Results   Component Value Date    WBC 6 0 09/04/2018    HGB 11 8 09/04/2018    HCT 35 0 09/04/2018    MCV 95 1 09/04/2018     09/04/2018     Lab Results   Component Value Date     05/22/2018    K 4 1 05/22/2018     09/04/2018    CO2 27 09/04/2018    ANIONGAP 14 05/04/2015    BUN 27 (H) 09/04/2018    CREATININE 1 48 (H) 09/04/2018    GLUCOSE 98 03/05/2016    GLUF 90 06/12/2017    CALCIUM 9 3 09/04/2018    AST 18 05/22/2018    ALT 23 05/22/2018    ALKPHOS 87 09/04/2018    PROT 6 9 04/23/2015    BILITOT 0 25 04/23/2015    EGFR 37 05/22/2018         Lab Results   Component Value Date    DRHLHZWA88 501 02/27/2018         ROS: As stated in the history of present illness otherwise his 14 point review of systems today was negative        Active Problems:   Patient Active Problem List   Diagnosis    Chronic obstructive pulmonary disease ([facility] [address] )    Hypothyroidism    Pure hypercholesterolemia    Hypertension, uncontrolled    Anxiety    ASCVD (arteriosclerotic cardiovascular disease)    Mediastinal adenopathy    Aneurysm of infrarenal abdominal aorta (HCC)    Aneurysm, thoracoabdominal aortic (HCC)    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)    Benign hypertension    Minor depression    Left breast lump    Muscle pain, myofacial    Chronic bilateral low back pain with right-sided sciatica    Spinal stenosis of lumbar region with neurogenic claudication    Lumbar radiculopathy    Chronic pain syndrome    P[facility]peripheral artery disease) [facility])    Bilateral carotid artery stenosis    Stage 3 chronic kidney disease    Preoperative cardiovascular examination    Nonsquamous nonsmall cell neoplasm of lung (HCC)       Past Medical History:   Past Medical History:   Diagnosis Date    Aortic aneurysm ([facility] )     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HCC)     ASCVD (arteriosclerotic cardiovascular disease)     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 ([facility] )     Back problem     Blood type B-     Cataract     COPD (chronic obstructive pulmonary disease) (HCC)     Coronary artery disease     CV[place] (cerebral vascular accident) ([place] [id number] ) 2012    Depression     Disease of thyroid gland     History of cataract     Hyperlipidemia     Hypertension     Lung mass     Mediastinal adenopathy     Muscle pain     Prediabetes     PVD (peripheral vascular disease) ([place] [address] )     Thoracic aortic aneurysm (HCC)     Transient insomnia        Surgical History:   Past Surgical History:   Procedure Laterality Date    CAROTID STENT Left     IL COLONOSCOPY FLX DX W/COLLJ SPEC WHEN PFRMD N/A 9/27/2017    Procedure: EGD AND COLONOSCOPY;  Surgeon: Sonya Porter MD;  Location: [facility];  Service: Gastroenterology    IL EDG US EXAM SURGICAL ALTER STOM DUODENUM/JEJUNUM N/A 8/1/2018    Procedure: LINEAR ENDOSCOPIC U/S;  Surgeon: Vida Kirkland MD;  Location: [facility]; Service: Gastroenterology    TUBAL LIGATION         Family History:    Family History   Problem Relation Age of Onset    Hypertension Mother     Heart disease Mother     Diabetes Father     Liver disease Father         hepatic failure     Breast cancer Daughter     Substance Abuse Neg Hx     Mental illness Neg Hx        Cancer-related family history includes Breast cancer in her daughter  Social History:   Social History     Social History    Marital status:       Spouse name: N/A    Number of children: N/A    Years of education: N/A     Occupational History    Not on file  Social History Main Topics    Smoking status: Former Smoker     Packs/day: 0 50     Years: 45 00     Quit date: 3/6/2018    Smokeless tobacco: Never Used    Alcohol use No      Comment: rare    Drug use: No    Sexual activity: Not Currently     Other Topics Concern    Not on file     Social History Narrative    Lives with family  Feels safe at home  Sees dentist occas  No living will  Dental care, occasionally     Does not exercise     Living alone    Living situation: supportive and safe    No advance directives     No caffeine use        Current Medications:   Current Outpatient Prescriptions   Medication Sig Dispense Refill    albuterol (VENTOLIN HFA) 90 mcg/act inhaler Inhale 2 puffs every 6 (six) hours as needed for wheezing or shortness of breath 18 g 1    aspirin (ECOTRIN) 325 mg EC tablet Take 325 mg by mouth daily   atorvastatin (LIPITOR) 40 mg tablet Take 1 tablet (40 mg total) by mouth daily 90 tablet 3    co-enzyme Q-10 30 MG capsule Take 30 mg by mouth 3 (three) times a day      gabapentin (NEURONTIN) 100 mg capsule Take 200 mg by mouth 3 (three) times a day      levothyroxine 100 mcg tablet Take 1 tablet (100 mcg total) by mouth daily 30 tablet 3    lisinopril (ZESTRIL) 10 mg tablet Take 1 tablet (10 mg total) by mouth daily 90 tablet 1    magic mouthwash oral suspension (mixture) Swish and swallow 10 mL 3 (three) times a day as needed (esophagitis) 12 oz 1    Methylcobalamin (B12-ACTIVE PO) Take by mouth      metoprolol succinate (TOPROL-XL) 50 mg 24 hr tablet Take 1 tablet (50 mg total) by mouth 2 (two) times a day 60 tablet 5    Omega-3 Fatty Acids (FISH OIL PO) Take by mouth      prednisoLONE acetate (PRED FORTE) 1 % ophthalmic suspension        No current facility-administered medications for this visit        Facility-Administered Medications Ordered in Other Visits   Medication Dose Route Frequency Provider Last Rate Last Dose    [START ON 9/20/2018] alteplase (CATHFLO) injection 2 mg  2 mg Intracatheter PRN MD Doreen Healy [START ON 9/20/2018] diphenhydrAMINE (BENADRYL) 25 mg, famotidine (PEPCID) 20 mg in sodium chloride 0 9 % 50 mL IVPB   Intravenous Once Joaquim Esteban MD        [START ON 9/20/2018] heparin lock flush 100 units/mL injection 300 Units  300 Units Intracatheter Q1H PRN MD Doreen Healy [START ON 9/20/2018] ondansetron (ZOFRAN) 16 mg, dexamethasone (DECADRON) 10 mg in sodium chloride 0 9 % 50 mL IVPB   Intravenous Once MD Musa Healyna Christopher Levo ON 9/20/2018] PACLitaxel (TAXOL) 79 mg in sodium chloride 0 9 % 250 mL chemo IVPB  79 mg Intravenous Once MD Doreen Healy Christopher Levo ON 9/20/2018] sodium chloride 0 9 % infusion  20 mL/hr Intravenous Once Joaquim Esteban MD           Allergies: Allergies   Allergen Reactions    Penicillins Rash       Physical Exam:    Body surface area is 1 58 meters squared  Wt Readings from Last 3 Encounters:   09/19/18 59 4 kg (131 lb)   09/13/18 59 kg (130 lb 1 1 oz)   09/06/18 59 kg (130 lb 1 1 oz)        Temp Readings from Last 3 Encounters:   09/19/18 98 2 °F (36 8 °C) (Tympanic)   09/13/18 97 8 °F (36 6 °C)   09/06/18 (!) 97 2 °F (36 2 °C) (Tympanic)        BP Readings from Last 3 Encounters:   09/19/18 138/82   09/13/18 144/97   09/06/18 124/66         Pulse Readings from Last 3 Encounters:   09/19/18 (!) 111   09/13/18 90   09/06/18 76     @LASTSAO2(3)@      Physical Exam     Constitutional   General appearance: No acute distress, well appearing and well nourished  Eyes   Conjunctiva and lids: No swelling, erythema or discharge  Pupils and irises: Equal, round and reactive to light  Ears, Nose, Mouth, and Throat   External inspection of ears and nose: Normal     Nasal mucosa, septum, and turbinates: Normal without edema or erythema  Oropharynx: Normal with no erythema, edema, exudate or lesions      Pulmonary   Respiratory effort: No increased work of breathing or signs of respiratory distress  Auscultation of lungs: Clear to auscultation  Cardiovascular   Palpation of heart: Normal PMI, no thrills  Auscultation of heart: Normal rate and rhythm, normal S1 and S2, without murmurs  Examination of extremities for edema and/or varicosities: Normal     Carotid pulses: Normal     Abdomen   Abdomen: Non-tender, no masses  Liver and spleen: No hepatomegaly or splenomegaly  Lymphatic   Palpation of lymph nodes in neck: No lymphadenopathy  Musculoskeletal   Gait and station: Normal     Digits and nails: Normal without clubbing or cyanosis  Inspection/palpation of joints, bones, and muscles: Normal     Skin   Skin and subcutaneous tissue: Normal without rashes or lesions  Neurologic   Cranial nerves: Cranial nerves 2-12 intact  Sensation: No sensory loss  Psychiatric   Orientation to person, place, and time: Normal     Mood and affect: Normal         Assessment / Plan:    The patient is a pleasant 77-year-old female with newly diagnosed stage IIIa adenocarcinoma of the lung  Her PET/CT scan showed disease in the lymph nodes and the primary mass  There was no metastatic lesions seen  She was seen by our colleagues in surgery who recommended concurrent chemoradiation  Based on her stage I agree with this  The patient was started on carboplatin at an AUC of 2 and Taxol at 48 m square with concurrent chemoradiation  She is tolerating this reasonably well  She is currently taking lidocaine for the dysphagia  Otherwise she is tolerating treatment well  She will continue on this  I'll see her back in 3-5 weeks  We will then discuss post concurrent chemoradiation adjuvant/consolidation therapy  Goals and Barriers:  Current Goal:  Prolong Survival from lung cancer  Barriers: None  Patient's Capacity to Self Care:  Patient  able to self care      Portions of the record may have been created with voice recognition software   Occasional wrong word or "sound a like" substitutions may have occurred due to the inherent limitations of voice recognition software   Read the chart carefully and recognize, using context, where substitutions have occurred

## 2018-09-21 PROBLEM — M54.16 LUMBAR RADICULOPATHY: Status: RESOLVED | Noted: 2018-04-23 | Resolved: 2018-01-01

## 2018-09-21 PROBLEM — M79.18 MUSCLE PAIN, MYOFACIAL: Status: RESOLVED | Noted: 2018-01-30 | Resolved: 2018-01-01

## 2018-09-21 PROBLEM — C34.92: Status: ACTIVE | Noted: 2018-01-01

## 2018-09-21 PROBLEM — R65.20 SEVERE SEPSIS (HCC): Status: ACTIVE | Noted: 2018-01-01

## 2018-09-21 PROBLEM — N63.20 LEFT BREAST LUMP: Status: RESOLVED | Noted: 2017-06-23 | Resolved: 2018-01-01

## 2018-09-21 PROBLEM — R50.9 FEVER: Status: ACTIVE | Noted: 2018-01-01

## 2018-09-21 PROBLEM — A41.9 SEVERE SEPSIS (HCC): Status: ACTIVE | Noted: 2018-01-01

## 2018-09-21 NOTE — ED PROVIDER NOTES
History  Chief Complaint   Patient presents with    Fever - 9 weeks to 74 years     Patient present sto the ER from dr Melanie Farfan office with a fever and tachycardia, patient receiving chemotherapy and radiation for lung ca diagnosed in june     This is 63-year-old female who is currently being treated for non small cell lung cancer of the left lung with radiation and chemotherapy  She was seen at the family doctor's office today and sent here for evaluation of tachycardia  She was noted to have a temperature of 101 1° with a heart rate of 120 which appears sinus on the monitor  She also has a cough with clear sputum production and feels shaky  Her oxygen saturation is approximately 90% on room air  History provided by:  Patient  Medical Problem   Location:  Generalized  Quality:  Jittery notice  Onset quality:  Unable to specify  Timing:  Constant  Progression:  Unchanged  Chronicity:  New  Context:  Fever and tachycardia referred from primary care office for evaluation  Relieved by:  Nothing  Associated symptoms: fever        Prior to Admission Medications   Prescriptions Last Dose Informant Patient Reported? Taking? Methylcobalamin (B12-ACTIVE PO)  Self Yes No   Sig: Take by mouth   Omega-3 Fatty Acids (FISH OIL PO)  Self Yes No   Sig: Take by mouth   albuterol (VENTOLIN HFA) 90 mcg/act inhaler  Self No No   Sig: Inhale 2 puffs every 6 (six) hours as needed for wheezing or shortness of breath   aspirin (ECOTRIN) 325 mg EC tablet  Self Yes No   Sig: Take 325 mg by mouth daily     atorvastatin (LIPITOR) 40 mg tablet  Self No No   Sig: Take 1 tablet (40 mg total) by mouth daily   co-enzyme Q-10 30 MG capsule  Self Yes No   Sig: Take 30 mg by mouth 3 (three) times a day   gabapentin (NEURONTIN) 100 mg capsule  Self Yes No   Sig: Take 200 mg by mouth 3 (three) times a day   levothyroxine 100 mcg tablet  Self No No   Sig: Take 1 tablet (100 mcg total) by mouth daily   lisinopril (ZESTRIL) 10 mg tablet  Self No No   Sig: Take 1 tablet (10 mg total) by mouth daily   magic mouthwash oral suspension (mixture)  Self No No   Sig: Swish and swallow 10 mL 3 (three) times a day as needed (esophagitis)   metoprolol succinate (TOPROL-XL) 50 mg 24 hr tablet  Self No No   Sig: Take 1 tablet (50 mg total) by mouth 2 (two) times a day   prednisoLONE acetate (PRED FORTE) 1 % ophthalmic suspension  Self Yes No      Facility-Administered Medications: None       Past Medical History:   Diagnosis Date    Aortic aneurysm ([place] [address] )     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[place] [address] )     ASCVD (arteriosclerotic cardiovascular disease)     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 ([place] [address] )     Back problem     Blood type B-     Cataract     [facility]     Coronary artery disease     CVA (cerebral vascular accident) ([place] [address] ) 2012    Depression     Disease of thyroid gland     History of cataract     Hyperlipidemia     Hypertension     Lung mass     Mediastinal adenopathy     Muscle pain     Prediabetes     PVD (peripheral vascular disease) ([place] [address] )     Thoracic aortic aneurysm (HCC)     Transient insomnia        Past Surgical History:   Procedure Laterality Date    CAROTID STENT Left     RI COLONOSCOPY FLX DX W/COLLJ SPEC WHEN PFRMD N/A 9/27/2017    Procedure: EGD AND COLONOSCOPY;  Surgeon: Robyn Payton MD;  Location:  MAIN OR;  Service: Gastroenterology    RI EDG US EXAM SURGICAL ALTER STOM DUODENUM/JEJUNUM N/A 8/1/2018    Procedure: LINEAR ENDOSCOPIC U/S;  Surgeon: Lei Espinoza MD;  Location: [facility];   Service: Gastroenterology    TUBAL LIGATION         Family History   Problem Relation Age of Onset    Hypertension Mother     Heart disease Mother     Diabetes Father     Liver disease Father         hepatic failure     Breast cancer Daughter     Substance Abuse Neg Hx     Mental illness Neg Hx      I have reviewed and agree with the history as documented  Social History   Substance Use Topics    Smoking status: Former Smoker     Packs/day: 0 50     Years: 45 00     Quit date: 3/6/2018    Smokeless tobacco: Never Used    Alcohol use No      Comment: rare        Review of Systems   Constitutional: Positive for fever  Neurological: Positive for tremors  All other systems reviewed and are negative  Physical Exam  Physical Exam   Constitutional: She is oriented to person, place, and time  She appears well-developed  No distress  HENT:   Head: Normocephalic and atraumatic  Right Ear: External ear normal    Left Ear: External ear normal    Nose: Nose normal    Mouth/Throat: Oropharynx is clear and moist    Eyes: EOM are normal  Pupils are equal, round, and reactive to light  No scleral icterus  Neck: Neck supple  No tracheal deviation present  Cardiovascular: Regular rhythm and intact distal pulses  Exam reveals no gallop and no friction rub  No murmur heard  Tachycardic   Pulmonary/Chest: No stridor  No respiratory distress  She has no wheezes  She has no rales  Decreased breath sounds bilateral   Abdominal: Soft  Bowel sounds are normal  She exhibits no distension  There is no tenderness  There is no rebound and no guarding  Musculoskeletal: Normal range of motion  She exhibits no edema, tenderness or deformity  Neurological: She is alert and oriented to person, place, and time  No cranial nerve deficit  Skin: Skin is warm and dry  No rash noted  She is not diaphoretic  Psychiatric: She has a normal mood and affect  Her behavior is normal  Thought content normal    Nursing note and vitals reviewed        Vital Signs  ED Triage Vitals [09/21/18 0902]   Temperature Pulse Respirations Blood Pressure SpO2   (!) 101 1 °F (38 4 °C) (!) 120 20 140/90 93 %      Temp Source Heart Rate Source Patient Position - Orthostatic VS BP Location FiO2 (%)   Temporal Monitor Lying Right arm --      Pain Score       No Pain           Vitals: 09/21/18 1106 09/21/18 1115 09/21/18 1145 09/21/18 1206   BP: 148/71 149/75 109/61 160/65   Pulse: 104 103 104 (!) 114   Patient Position - Orthostatic VS: Lying Lying Lying Lying       Visual Acuity  Visual Acuity      Most Recent Value   L Pupil Size (mm)  3   R Pupil Size (mm)  3          ED Medications  Medications   albuterol (PROVENTIL HFA,VENTOLIN HFA) inhaler 2 puff (not administered)   aspirin (ECOTRIN) EC tablet 325 mg (325 mg Oral Given 9/21/18 1228)   atorvastatin (LIPITOR) tablet 40 mg (40 mg Oral Given 9/21/18 1228)   gabapentin (NEURONTIN) capsule 200 mg (200 mg Oral Given 9/21/18 1228)   levothyroxine tablet 100 mcg (100 mcg Oral Given 9/21/18 1228)   lisinopril (ZESTRIL) tablet 10 mg (10 mg Oral Given 9/21/18 1228)   metoprolol succinate (TOPROL-XL) 24 hr tablet 50 mg (50 mg Oral Given 9/21/18 1228)   prednisoLONE acetate (PRED FORTE) 1 % ophthalmic suspension 1 drop (not administered)   ondansetron (ZOFRAN) injection 4 mg (not administered)   sodium chloride 0 9 % infusion (125 mL/hr Intravenous New Bag 9/21/18 1232)   heparin (porcine) subcutaneous injection 5,000 Units (5,000 Units Subcutaneous Given 9/21/18 1336)   acetaminophen (TYLENOL) tablet 650 mg (not administered)   vancomycin (VANCOCIN) IVPB (premix) 750 mg (not administered)   aztreonam (AZACTAM) 1,000 mg in sodium chloride 0 9 % 50 mL IVPB (not administered)   famotidine (PEPCID) tablet 20 mg (not administered)   diphenhydrAMINE (BENADRYL) tablet 25 mg (not administered)   pneumococcal 13-valent conjugate vaccine (PREVNAR-13) IM injection 0 5 mL (not administered)   sodium chloride 0 9 % bolus 1,000 mL (0 mL Intravenous Stopped 9/21/18 1026)   cefTAZidime (FORTAZ) 2,000 mg in sodium chloride 0 9 % 50 mL IVPB (0 mg Intravenous Stopped 9/21/18 1024)   vancomycin (VANCOCIN) IVPB (premix) 1,000 mg (0 mg/kg × 58 5 kg Intravenous Stopped 9/21/18 1115)   sodium chloride 0 9 % bolus 1,000 mL (1,000 mL Intravenous Continue to Inpatient Floor 9/21/18 1153)       Diagnostic Studies  Results Reviewed     Procedure Component Value Units Date/Time    Procalcitonin [[id number]]  (Normal) Collected:  09/21/18 0911    Lab Status:  Final result Specimen:  Blood from Arm, Left Updated:  09/21/18 1401     Procalcitonin 0 14 ng/ml     Lactic acid x2 Q2h [[id number]]  (Abnormal) Collected:  09/21/18 1131    Lab Status:  Final result Specimen:  Blood from Hand, Right Updated:  09/21/18 1212     LACTIC ACID 2 3 (HH) mmol/L     Narrative:         Result may be elevated if tourniquet was used during collection  UA w Reflex to Microscopic w Reflex to Culture [[id number]] Collected:  09/21/18 1131    Lab Status:  Final result Specimen:  Urine from Urine, Clean Catch Updated:  09/21/18 1141     Color, UA Yellow     Clarity, UA Clear     Specific Gravity, UA 1 015     pH, UA 5 5     Leukocytes, UA Negative     Nitrite, UA Negative     Protein, UA Negative mg/dl      Glucose, UA Negative mg/dl      Ketones, UA Negative mg/dl      Urobilinogen, UA 0 2 E U /dl      Bilirubin, UA Negative     Blood, UA Negative    TSH [[id number]]  (Normal) Collected:  09/21/18 0911    Lab Status:  Final result Specimen:  Blood from Arm, Left Updated:  09/21/18 1118     TSH 3RD GENERATON 1 623 uIU/mL     Narrative:         Patients undergoing fluorescein dye angiography may retain small amounts of fluorescein in the body for 48-72 hours post procedure  Samples containing fluorescein can produce falsely depressed TSH values  If the patient had this procedure,a specimen should be resubmitted post fluorescein clearance            The recommended reference ranges for TSH during pregnancy are as follows:  First trimester 0 1 to 2 5 uIU/mL  Second trimester  0 2 to 3 0 uIU/mL  Third trimester 0 3 to 3 0 uIU/m      Comprehensive metabolic panel [[id number]]  (Abnormal) Collected:  09/21/18 0911    Lab Status:  Final result Specimen:  Blood from Arm, Left Updated:  09/21/18 1054     Sodium 140 mmol/L Potassium 3 7 mmol/L      Chloride 103 mmol/L      CO2 26 mmol/L      ANION GAP 11 mmol/L      BUN 14 mg/dL      Creatinine 1 58 (H) mg/dL      Glucose 89 mg/dL      Calcium 8 5 mg/dL      AST 24 U/L      ALT 30 U/L      Alkaline Phosphatase 103 U/L      Total Protein 7 0 g/dL      Albumin 3 3 (L) g/dL      Total Bilirubin 0 30 mg/dL      eGFR 33 ml/min/1 73sq m     Narrative:         National Kidney Disease Education Program recommendations are as follows:  GFR calculation is accurate only with a steady state creatinine  Chronic Kidney disease less than 60 ml/min/1 73 sq  meters  Kidney failure less than 15 ml/min/1 73 sq  meters  Lactic acid x2 Q2h [[id number]]  (Abnormal) Collected:  09/21/18 0911    Lab Status:  Final result Specimen:  Blood from Arm, Left Updated:  09/21/18 1050     LACTIC ACID 2 4 (HH) mmol/L     Narrative:         Result may be elevated if tourniquet was used during collection      Protime-INR [[id number]]  (Normal) Collected:  09/21/18 0911    Lab Status:  Final result Specimen:  Blood from Arm, Left Updated:  09/21/18 0952     Protime 13 2 seconds      INR 1 06    APTT [[id number]]  (Normal) Collected:  09/21/18 0911    Lab Status:  Final result Specimen:  Blood from Arm, Left Updated:  09/21/18 0952     PTT 26 seconds     CBC and differential [[id number]]  (Abnormal) Collected:  09/21/18 0911    Lab Status:  Final result Specimen:  Blood from Arm, Left Updated:  09/21/18 0941     WBC 2 71 (L) Thousand/uL      RBC 3 58 (L) Million/uL      Hemoglobin 11 4 (L) g/dL      Hematocrit 35 8 %       (H) fL      MCH 31 8 pg      MCHC 31 8 g/dL      RDW 14 4 %      MPV 10 8 fL      Platelets 284 (L) Thousands/uL      nRBC 0 /100 WBCs      Neutrophils Relative 84 (H) %      Immat GRANS % 1 %      Lymphocytes Relative 6 (L) %      Monocytes Relative 7 %      Eosinophils Relative 2 %      Basophils Relative 0 %      Neutrophils Absolute 2 27 Thousands/µL      Immature Grans Absolute 0 03 Thousand/uL Lymphocytes Absolute 0 17 (L) Thousands/µL      Monocytes Absolute 0 19 Thousand/µL      Eosinophils Absolute 0 04 Thousand/µL      Basophils Absolute 0 01 Thousands/µL     Blood culture - Set 2 [[id number]] Collected:  09/21/18 0911    Lab Status: In process Specimen:  Blood from Hand, Left Updated:  09/21/18 0936    Blood culture - Set 1 [[id number]] Collected:  09/21/18 0911    Lab Status: In process Specimen:  Blood from Arm, Left Updated:  09/21/18 0936                 XR chest 2 views   Final Result by Arpit Christianson MD (09/21 1002)      1  The known left upper lobe lung lesion is not well-defined from the aortic arch  No new pulmonary findings  2  Aneurysmal descending thoracic aorta, known  Workstation performed: [id number]         XR chest pa & lateral    (Results Pending)              Procedures  ECG 12 Lead Documentation  Date/Time: 9/21/2018 9:17 AM  Performed by: Gino [place]  Authorized by: Mancil Cropsey     ECG reviewed by me, the ED Provider: yes    Patient location:  ED  Rhythm:     Rhythm: sinus tachycardia    T waves:     T waves: normal             Phone Contacts  ED Phone Contact    ED Course                         Initial Sepsis Screening     [address] Name 09/21/18 1112                Is the patient's history suggestive of a new or worsening infection? (!)  Yes (Proceed)  -DARELL        Suspected source of infection suspect infection, source unknown  -DARELL        Are two or more of the following signs & symptoms of infection both present and new to the patient? (!)  Yes (Proceed)  -DARELL        Indicate SIRS criteria Hyperthemia > 38 3C (100 9F); Tachycardia > 90 bpm  -DARELL        If the answer is yes to both questions, suspicion of sepsis is present          If severe sepsis is present AND tissue hypoperfusion perists in the hour after fluid resuscitation or lactate > 4, the patient meets criteria for SEPTIC SHOCK          Are any of the following organ dysfunction criteria present within 6 hours of suspected infection and SIRS criteria that are NOT considered to be chronic conditions? (!)  Yes  -DARELL        Organ dysfunction Lactate > 2 0 mmol/L  -DARELL        Date of presentation of severe sepsis 09/21/18  -DARELL        Time of presentation of severe sepsis 1113  -DARELL        Tissue hypoperfusion persists in the hour after crystalloid fluid administration, evidenced, by either:          Was hypotension present within one hour of the conclusion of crystalloid fluid administration?  No  -DARELL        Date of presentation of septic shock          Time of presentation of septic shock            User Key  (r) = Recorded By, (t) = Taken By, (c) = Cosigned By    [address] Name Provider Type    [address], DO Physician           Default Flowsheet Data (last 720 hours)      Sepsis Reassess     Row Name 09/21/18 1114                   Repeat Volume Status and Tissue Perfusion Assessment Performed    Repeat Volume Status and Tissue Perfusion Assessment Performed Yes  -DARELL           Volume Status and Tissue Perfusion Post Fluid Resuscitation- Must Document 5 of the Following 7:    Vital Signs Reviewed (HR, RR, BP, T) Yes  -DARELL        Arterial Oxygen Saturation Reviewed (POx, SaO2 or SpO2) Yes (comment %)  -DARELL        Cardio Regular rate and rhythm  -DARELL        Pulmonary Normal effort;Clear to auscultation  -DARELL        Capillary Refill Brisk  -DARELL        Peripheral Pulses Radial  -DARELL        Peripheral Pulse +4  -DARELL        Skin Warm;Dry  -DARELL        Urine output assessed             *OR*   Intensive Monitoring- Must Document One of the Following Four *:    Vital Signs Reviewed          * Central Venous Pressure (CVP or RAP)          * Central Venous Oxygen (SVO2, ScvO2 or Oxygen saturation via central catheter)          * Bedside Cardiovascular US in IVC diameter and % collapse          * Passive Leg Raise OR Crystalloid Challenge            User Key  (r) = Recorded By, (t) = Taken By, (c) = Cosigned By [address] Name Provider Type    [address], DO Physician                MDM  Number of Diagnoses or Management Options  Diagnosis management comments: Fever and tachycardia with cough differential includes acute bronchitis versus pneumonia will check labs EKG and chest x-ray for further evaluation patient currently undergoing chemotherapy most likely will need admission for IV antibiotics and broad coverage       Amount and/or Complexity of Data Reviewed  Clinical lab tests: ordered  Tests in the radiology section of CPT®: ordered      CritCare Time    Disposition  Final diagnoses:   Severe sepsis ([facility] )     Time reflects when diagnosis was documented in both MDM as applicable and the Disposition within this note     Time User Action Codes Description Comment    9/21/2018 11:09 AM [place] Add [A41 9,  R65 20] Severe sepsis ([place] [address] )     9/21/2018 12:04 PM Dewayne Fields Modify [A41 9,  R65 20] Severe sepsis ([facility] )     9/21/2018 12:13 PM Dewayne Fields Add [C34 92] Nonsquamous nonsmall cell neoplasm of left lung [facility])       ED Disposition     ED Disposition Condition Comment    Admit  Case was discussed with *Dr Maribel Dumont** and the patient's admission status was agreed to be Admission Status: inpatient status to the service of Dr Maribel Dumont   Follow-up Information    None         Current Discharge Medication List      CONTINUE these medications which have NOT CHANGED    Details   albuterol (VENTOLIN HFA) 90 mcg/act inhaler Inhale 2 puffs every 6 (six) hours as needed for wheezing or shortness of breath  Qty: 18 g, Refills: 1    Associated Diagnoses: COPD with asthma ([place] [address] )      aspirin (ECOTRIN) 325 mg EC tablet Take 325 mg by mouth daily        atorvastatin (LIPITOR) 40 mg tablet Take 1 tablet (40 mg total) by mouth daily  Qty: 90 tablet, Refills: 3    Associated Diagnoses: Pure hypercholesterolemia      co-enzyme Q-10 30 MG capsule Take 30 mg by mouth 3 (three) times a day      gabapentin (NEURONTIN) 100 mg capsule Take 200 mg by mouth 3 (three) times a day      levothyroxine 100 mcg tablet Take 1 tablet (100 mcg total) by mouth daily  Qty: 30 tablet, Refills: 3    Associated Diagnoses: Hypothyroidism, unspecified type      lisinopril (ZESTRIL) 10 mg tablet Take 1 tablet (10 mg total) by mouth daily  Qty: 90 tablet, Refills: 1    Associated Diagnoses: Essential hypertension      magic mouthwash oral suspension (mixture) Swish and swallow 10 mL 3 (three) times a day as needed (esophagitis)  Qty: 12 oz, Refills: 1    Associated Diagnoses: 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

## 2018-09-21 NOTE — PLAN OF CARE
Problem: PHYSICAL THERAPY ADULT  Goal: Performs mobility at highest level of function for planned discharge setting  See evaluation for individualized goals  Treatment/Interventions: Functional transfer training, LE strengthening/ROM, Elevations, Therapeutic exercise, Endurance training, Bed mobility, Gait training, Spoke to nursing, Spoke to case management, OT, Family  Equipment Recommended:  (none at this time)       See flowsheet documentation for full assessment, interventions and recommendations  Prognosis: Good  Problem List: Decreased strength, Decreased endurance, Impaired balance, Decreased mobility  Assessment: Pt is 72 y o  female admitted with hx of chills and tachycardia and Dx of severe sepsis  Pt 's comorbidities affecting POC include: back problem, COPD, CAD, CVA, depression, PVD, and history of adenocarcinoma of lung undergoing chemotherapy and radiation and personal factors of: RAYMUNDO and home alone during the day  Pt's clinical presentation is currently unstable/unpredictable which is evident in current suppl O2 needs, ongoing telem monitoring while in steps down unit, generally elevated HR and labs test results pending  Pt presents w/ generalized weakness, incl decreased overall LE strength, decreased functional endurance, min impaired amb balance w/ associated inconsistent gait patterns during limited amb trial at bedside (likely due to above) and fall risk  Will cont to follow pt in PT for progressive mobilization to address above functional deficits and to max level of (I), endurance, and safety  Otherwise, anticipate pt will return home w/ available support upon D/C provided she cont improving w/ mobility skills, safety, and endurance and when medically cleared; home PT follow up would be beneficial upon D/C but pt declines; will follow    Barriers to Discharge: Inaccessible home environment, Decreased caregiver support     Recommendation: Home with family support (1st floor set-up; home PT follow up (pt however declines))          See flowsheet documentation for full assessment

## 2018-09-21 NOTE — OCCUPATIONAL THERAPY NOTE
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)    Benign hypertension    Minor depression    Chronic bilateral low back pain with right-sided sciatica    Spinal stenosis of lumbar region with neurogenic claudication    Chronic pain syndrome    [facility]peripheral [facility]) [facility]    Bilateral carotid artery stenosis    Stage 3 chronic kidney disease    Preoperative cardiovascular examination    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)    Severe sepsis (HCC)    Fever     Past Medical History  Past Medical History:   Diagnosis Date    [facility]     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HCC)     ASCVD (arteriosclerotic cardiovascular disease)     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 ([facility] )     Back problem     Blood type B-     Cataract     COPD (chronic obstructive pulmonary disease) ([facility]     Coronary artery disease     CVA [facility]cerebral vascular accident) ([facility] ) 2012    Depression     Disease of thyroid gland     History of cataract     Hyperlipidemia     Hypertension     Lung mass     Mediastinal adenopathy     Muscle pain     Prediabetes     PVD [facility]peripheral vascular disease) ([facility]     Thoracic aortic aneurysm (HCC)     Transient insomnia      Past Surgical History  Past Surgical History:   Procedure Laterality Date    CAROTID STENT Left     MD COLONOSCOPY FLX DX W/COLLJ SPEC WHEN PFRMD N/A 9/27/2017    Procedure: EGD AND COLONOSCOPY;  Surgeon: Vee Encinas MD;  Location:  MAIN OR;  Service: Gastroenterology   Karlie Smith [place] EDG US EXAM SURGICAL ALTER STOM DUODENUM/JEJUNUM N/A 8/1/2018    Procedure: LINEAR ENDOSCOPIC U/S;  Surgeon: Nayla Jiang MD;  [facility]: [facility]; Service: Gastroenterology    TUBAL LIGATION           09/21/18 1351   Note Type   Note type Eval only   Restrictions/Precautions   Weight Bearing Precautions Per Order No   Other Precautions Fall Risk;Multiple lines;Telemetry;O2;Contact/isolation  (Rule out C  Diff)   Pain Assessment   Pain Assessment No/denies pain   Pain Score No Pain   Home Living   Type of Home House   Home Layout Two level; Able to live on main level with bedroom/bathroom;Stairs to enter without rails  (1 RAYMUNDO)   Bathroom Shower/Tub Walk-in shower   Bathroom Toilet Standard   Bathroom Equipment Other (Comment)  (none per pt report)   [address] Other (Comment)  (none per pt report)   Additional Comments Pt lives with friend in a two level house with 1 [address] and 1st floor setup available if needed  Prior Function   Level of [place] Independent with ADLs and functional mobility   Lives With Friend(s)  Nasrin Lone")   Receives Help From Family;Friend(s)   ADL Assistance Independent   IADLs Independent   Falls in the last 6 months 0   Vocational Retired   Comments At baseline, pt was I w/ ADLs, IADLs, and functional mobility/transfers w/o use of DME/AD, (+) , and reports 0 falls PTA  Lifestyle   Autonomy At baseline, pt was I w/ ADLs, IADLs, and functional mobility/transfers w/o use of DME/AD, (+) , and reports 0 falls PTA     Reciprocal Relationships Lives with friend, Charl    Service to Others Retired   Intrinsic Gratification Spending time with puppy   Psychosocial   Psychosocial (WDL) WDL   ADL   Eating Assistance 6  Modified independent   [address] [facility] 5  [address] [facility] 5  [address] [facility] 4  Minimal Assistance   [address] 3 Moderate Assistance   Toileting Assistance  4  Minimal Assistance   Bed Mobility   Supine to Sit 6  Modified independent   Additional items Increased time required   Sit to Supine 6  Modified independent   Additional items Increased time required   Additional Comments Pt lying supine at end of session with bed alarm activated  Call bell and phone within reach  All needs met and pt reports no further questions for OT at this time   Transfers   Sit to Stand 5  Supervision   Additional items Assist x 1;Verbal cues   Stand to Sit 5  Supervision   Additional items Assist x 1;Verbal cues   Functional Mobility   Functional Mobility 4  Minimal assistance   Additional Comments Assist x1 w/o use of AD   Balance   Static Sitting Fair +   Dynamic Sitting Fair   Static Standing Fair -   Dynamic Standing Poor +   Ambulatory Poor +   Activity Tolerance   Activity Tolerance Patient limited by fatigue;Patient tolerated treatment well   Nurse Made Aware Pt appropriate to be seen per Zoila Linton RN   RUE Assessment   RUE Assessment [facility]   RUE Strength   RUE Overall Strength Within Functional Limits - able to perform ADL tasks with strength   LUE Assessment   LUE Assessment WFL   LUE Strength   LUE Overall Strength Within Functional Limits - able to perform ADL tasks with strength   Hand Function   Gross Motor Coordination Functional   Fine Motor Coordination Functional   Sensation   Light Touch No apparent deficits   Sharp/Dull No apparent deficits   Proprioception   Proprioception No apparent deficits   Vision-Basic Assessment   Current Vision No visual deficits   Vision - Complex Assessment   Ocular Range of Motion WFL   Acuity Able to read clock/calendar on wall without difficulty   Perception   Inattention/Neglect Appears intact   Cognition   Overall Cognitive Status [facility]   Arousal/Participation Alert; Cooperative   Attention Within functional limits   Orientation Level Oriented X4   Memory Within functional limits   Following Commands Follows one step commands without difficulty   Comments Pt pleasant and cooperative; Engages in conversation appropriately and oriented x4   Assessment   Limitation Decreased ADL status; Decreased UE strength;Decreased endurance;Decreased self-care trans;Decreased high-level ADLs   Prognosis Good   Assessment Pt is a 72 y o  female seen for OT evaluation s/p adm to [facility] on 9/21/2018 w/ fever and tachycardia and dx'd w/ severe sepsis  Comorbidities affecting pts functional performance include a significant PMH of aortic aneurysm, arterial embolism of R leg, ASCVD, cataract, COPD, CAD, CVA, Depression, HLD, HTN, lung mass, mediastinal adenopathy, prediabetes, PVD, thoracic aortic aneurysm, and transient insomnia  Pt with active OT orders and activity orders for Up and OOB as tolerated   Pt lives with friend in a two level house with 1 [address] and 1st floor setup available if needed  At baseline, pt was I w/ ADLs, IADLs, and functional mobility/transfers w/o use of DME/AD, (+) , and reports 0 falls PTA  Upon evaluation, pt currently requires Mod-Min A for LB ADLs, Min A-Supervision for UB ADLs, Min A for toileting, Mod I for bed mobility, Supervision for transfers, and Min A for functional mobility 2* the following deficits impacting occupational performance: weakness, decreased strength, decreased balance and decreased tolerance  These impairments, as well at pts steps to enter environment, difficulty performing ADLS and difficulty performing IADLS  limit pts ability to safely engage in all baseline areas of occupation, including grooming, bathing, dressing, toileting, functional mobility/transfers, community mobility, laundry , house maintenance, meal prep, cleaning, social participation  and leisure activities   Pt scored overall 55/100 on the Barthel Index   Based on the aforementioned OT evaluation, functional performance deficits, and assessments, pt has been identified as a moderate complexity evaluation  Pt to continue to benefit from continued acute OT services during hospital stay to address defined deficits and to maximize level of functional independence in the following Occupational Performance areas: grooming, bathing/shower, toilet hygiene, dressing, socialization, health maintenance, functional mobility, community mobility, clothing management, cleaning, meal prep, household maintenance, social participation and transfers to common household surfaces  From OT standpoint, recommend home with family support upon D/C (pt currently declining home therapy services)  OT will continue to follow pt 3-5x/wk to address the following goals to  w/in 7-10 days:   Goals   Patient Goals to get stronger and go home   LTG Time Frame 7-10   Long Term Goal Please refer to LTGs listed below   Plan   Treatment Interventions ADL retraining;Functional transfer training;UE strengthening/ROM; Endurance training;Patient/family training;Equipment evaluation/education; Compensatory technique education; Energy conservation; Activityengagement   Goal Expiration Date 10/01/18   Treatment Day 0   OT Frequency 3-5x/wk   Recommendation   OT Discharge Recommendation Home with family support  (pt could benefit from Home OT, however currently declining )   OT - OK to Discharge Yes  (when medically cleared)   Barthel Index   Feeding 10   Bathing 0   Grooming Score 5   Dressing Score 5   Bladder Score 10   Bowels Score 10   Toilet Use Score 5   Transfers (Bed/Chair) Score 10   Mobility (Level Surface) Score 0   Stairs Score 0   Barthel Index Score 55   Modified Juan Scale   Modified Juan Scale 4       GOALS    1) Pt will improve activity tolerance to G for min 30 min txment sessions    2) Pt will complete UB/LB dressing/self care w/ mod I using adaptive device and DME as needed    3) Pt will complete bathing w/ Mod I w/ use of AE and DME as needed    4) Pt will complete toileting w/ mod I w/ G hygiene/thoroughness using DME as needed    5) Pt will improve functional transfers to Mod I on/off all surfaces using DME as needed w/ G balance/safety     6) Pt will improve functional mobility during ADL/IADL/leisure tasks to Mod I using DME as needed w/ G balance/safety     7) Pt will participate in simulated IADL management task to increase independence to Mod I w/ G safety and endurance    8) Pt will engage in ongoing cognitive assessment w/ G participation w/ mod I to assist w/ safe d/c planning/recommendations    9) Pt will demonstrate G carryover of pt/caregiver education and training as appropriate w/ mod I w/o cues w/ good tolerance    10) Pt will demonstrate 100% carryover of energy conservation techniques w/ mod I t/o functional I/ADL/leisure tasks w/o cues s/p skilled education     11) Pt will increase BUE strength by 1 MM grade to increase independence in ADLs and transfers    Charbel Raw, OTR/L

## 2018-09-21 NOTE — ED NOTES
Disconnected patient to ambulate to the bathroom, noted red rash on bilateral legs and chest and abdomen, patient denies itch or tenderness or difficulty breathing  This nurse notified Dr Hossein Smith of skin condition  Dr Hossein Smith visualized skin and ordered vancomycin to be stopped       Adi Celis RN  09/21/18 1986

## 2018-09-21 NOTE — H&P
History and Physical  Henry Mckeon 72 y o  female MRN: [id number]  Unit/Bed#: -01 Encounter: [id number]    Admitting Diagnosis:   Principal Problem:    Severe sepsis ([address] )  Active Problems:    Hypothyroidism    Pure hypercholesterolemia    Anxiety    Benign hypertension    Chronic pain syndrome    PAD (peripheral artery disease) (HCC)    Stage 3 chronic kidney disease    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)    Fever  Resolved Problems:    * No resolved hospital problems  *      Plan:  Admit to step-down unit as inpatient status  · Severe sepsis/Fever/diarrhea in setting of chemotherapy/neutropenia-possible pneumonia  Rule out C diff  · Adenocarcinoma of lung-on chemoradiation  Will start on Azactam   Continue on vancomycin  Infectious disease consult  Follow-up culture results  Stool studies including C diff  UA is negative  Will repeat two view chest x-ray in a m  IV fluids  Serial lactate  · Hypertension, PAD and hyperlipidemia-  On Toprol-XL, Zestril, aspirin and Lipitor  · Hypothyroidism-on Synthroid  · Chronic kidney disease stage 3- stable  Monitor renal function  Avoid nephrotoxins medications/hypotension/NSAIDs  · DVT prophylaxis  · Discussed with patient in detail  Anticipated length of stay greater than 2 midnights  Chief Complaint   Patient presents with    Fever - 9 weeks to 74 years     Patient present sto the ER from dr Madrid Horse office with a fever and tachycardia, patient receiving chemotherapy and radiation for lung ca diagnosed in june        HPI:  Henry Mckeon is a 72 y o  female with history of adenocarcinoma of lung undergoing chemotherapy and radiation who presented the emergency department after being sent from her PCPs office for evaluation of chills and tachycardia  Patient underwent chemotherapy yesterday and was doing fine  This morning she went to her PCPs office for routine checkup    Patient complained of having chills and was found to have tachycardia  Patient also noted to have temperature of 101 1° and heart rate of 120 and was sent to ER  Patient does complain of having moist cough for past 2 weeks which is productive of clearish sputum  She has mild associated shortness of breath  She does admit to having diarrhea 4-5 episodes of loose watery nonbloody over the last couple of days  She denies having any fever, urinary complaints, nausea, vomiting, abdominal pain  Patient in ER had a chest x-ray done which showed no acute pulmonary findings  Urine was negative  Patient was given Pamela Hans and vancomycin  Patient was found to have maculopapular rash over her body after she was done getting those 2 antibiotics  Upon reviewing the records patient has received Rocephin in the past for COPD but was getting IV Solu-Medrol along with it      Historical Information   Past Medical History:   Diagnosis Date    Aortic aneurysm ([address] )     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HCC)     ASCVD (arteriosclerotic cardiovascular disease)     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 ([address] )     Back problem     Blood type B-     Cataract     COPD (chronic obstructive pulmonary disease) (HCC)     Coronary artery disease     CVA (cerebral vascular accident) ([address] ) 2012    Depression     Disease of thyroid gland     History of cataract     Hyperlipidemia     Hypertension     Lung mass     Mediastinal adenopathy     Muscle pain     Prediabetes     PVD (peripheral vascular disease) ([address] )     Thoracic aortic aneurysm (HCC)     Transient insomnia      Past Surgical History:   Procedure Laterality Date    CAROTID STENT Left     [place] COLONOSCOPY FLX DX W/COLLJ SPEC WHEN PFRMD N/A 9/27/2017    Procedure: EGD AND COLONOSCOPY;  Surgeon: Jennifer Oliveros MD;  Location:  MAIN OR;  Service: Gastroenterology    OH EDG US EXAM SURGICAL ALTER STOM DUODENUM/JEJUNUM N/A 8/1/2018    Procedure: LINEAR ENDOSCOPIC U/S;  Surgeon: Diego Irving Pk Rothman MD;  [facility]: [facility]; Service: Gastroenterology    TUBAL LIGATION       Social History   History   Alcohol Use No     Comment: rare     History   Drug Use No     History   Smoking Status    Former Smoker    Packs/day: 0 50    Years: 45 00    Quit date: 3/6/2018   Smokeless Tobacco    Never Used     Family History   Problem Relation Age of Onset    Hypertension Mother     Heart disease Mother     Diabetes Father     Liver disease Father         hepatic failure     Breast cancer Daughter     Substance Abuse Neg Hx     Mental illness Neg Hx        Meds/Allergies   Allergies   Allergen Reactions    Penicillins Rash       Meds:    Current Facility-Administered Medications:     sodium chloride 0 9 % bolus 1,000 mL, 1,000 mL, Intravenous, Once, Lincandis Stokeseting, DO, Last Rate: 1,000 mL/hr at 09/21/18 1131, 1,000 mL at 09/21/18 1131    Facility-Administered Medications Ordered in Other Encounters:     alteplase (CATHFLO) injection 2 mg, 2 mg, Intracatheter, PRN, Kenrick Goins MD    heparin lock flush 100 units/mL injection 300 Units, 300 Units, Intracatheter, Q1H PRN, Kenrick Goins MD    Prescriptions Prior to Admission   Medication    albuterol (VENTOLIN HFA) 90 mcg/act inhaler    aspirin (ECOTRIN) 325 mg EC tablet    atorvastatin (LIPITOR) 40 mg tablet    co-enzyme Q-10 30 MG capsule    gabapentin (NEURONTIN) 100 mg capsule    levothyroxine 100 mcg tablet    lisinopril (ZESTRIL) 10 mg tablet    magic mouthwash oral suspension (mixture)    Methylcobalamin (B12-ACTIVE PO)    metoprolol succinate (TOPROL-XL) 50 mg 24 hr tablet    Omega-3 Fatty Acids (FISH OIL PO)    prednisoLONE acetate (PRED FORTE) 1 % ophthalmic suspension         Review of Systems   Constitutional: Positive for activity change, chills, fatigue and fever  HENT: Negative  Eyes: Negative  Respiratory: Positive for cough and shortness of breath  Cardiovascular: Negative  Gastrointestinal: Positive for diarrhea  Endocrine: Negative  Genitourinary: Negative  Musculoskeletal: Negative  Skin: Negative  Allergic/Immunologic: Negative  Neurological: Negative  Hematological: Negative  Psychiatric/Behavioral: Negative  Current Vitals:   Blood Pressure: 149/75 (09/21/18 1115)  Pulse: 103 (09/21/18 1115)  Temperature: (!) 101 1 °F (38 4 °C) (09/21/18 0902)  Temp Source: Temporal (09/21/18 0902)  Respirations: 15 (09/21/18 1115)  Height: 5' 1" (154 9 cm) (09/21/18 0902)  Weight - Scale: 58 5 kg (129 lb) (09/21/18 0902)  SpO2: 99 % (09/21/18 1115)  SPO2 RA Rest      ED to Hosp-Admission (Current) from 9/21/2018 in  Intensive Care Unit   SpO2  99 %   SpO2 Activity  At Rest   O2 Device  None (Room air)   O2 Flow Rate  4 L/min          Intake/Output Summary (Last 24 hours) at 09/21/18 1159  Last data filed at 09/21/18 1131   Gross per 24 hour   Intake             1000 ml   Output                0 ml   Net             1000 ml     Body mass index is 24 37 kg/m²  Physical Exam   Constitutional: She is oriented to person, place, and time  She appears well-developed and well-nourished  No distress  HENT:   Head: Normocephalic and atraumatic  Nose: Nose normal    Mouth/Throat: Oropharynx is clear and moist    Eyes: Conjunctivae and EOM are normal  Pupils are equal, round, and reactive to light  Neck: Normal range of motion  Neck supple  No JVD present  No tracheal deviation present  Cardiovascular: Normal rate, regular rhythm, normal heart sounds and intact distal pulses  Pulmonary/Chest: Effort normal and breath sounds normal  No respiratory distress  She has no wheezes  She has no rales  Abdominal: Soft  Bowel sounds are normal  There is no tenderness  There is no rebound and no guarding  Musculoskeletal: Normal range of motion  She exhibits no edema, tenderness or deformity  Neurological: She is alert and oriented to person, place, and time   No cranial nerve deficit  No focal deficits   Skin: Skin is warm  Rash noted  No erythema  Psychiatric: She has a normal mood and affect  Thought content normal    Nursing note and vitals reviewed        Lab Results:   CBC:   Lab Results   Component Value Date    WBC 2 71 (L) 09/21/2018    HGB 11 4 (L) 09/21/2018    HCT 35 8 09/21/2018     (H) 09/21/2018     (L) 09/21/2018    MCH 31 8 09/21/2018    MCHC 31 8 09/21/2018    RDW 14 4 09/21/2018    MPV 10 8 09/21/2018    NRBC 0 09/21/2018     CMP:  Lab Results   Component Value Date     09/21/2018     04/23/2015     09/21/2018     09/04/2018    CO2 26 09/21/2018    CO2 27 09/04/2018    ANIONGAP 14 05/04/2015    BUN 14 09/21/2018    BUN 27 (H) 09/04/2018    CREATININE 1 58 (H) 09/21/2018    CREATININE 1 25 04/23/2015    GLUCOSE 98 03/05/2016    GLUCOSE 82 04/23/2015    CALCIUM 8 5 09/21/2018    CALCIUM 9 3 09/04/2018    AST 24 09/21/2018    AST 26 04/23/2015    ALT 30 09/21/2018    ALT 24 04/23/2015    ALKPHOS 103 09/21/2018    ALKPHOS 87 09/04/2018    PROT 6 9 04/23/2015    BILITOT 0 25 04/23/2015    EGFR 33 09/21/2018    EGFR 34 5 03/05/2016     No results found for: TROPONINI, CKMB, CKTOTAL  Coagulation:   Lab Results   Component Value Date    INR 1 06 09/21/2018    Urinalysis:  Lab Results   Component Value Date    COLORU Yellow 09/21/2018    COLORU Yellow 05/04/2015    CLARITYU Clear 09/21/2018    CLARITYU Hazy 05/04/2015    SPECGRAV 1 015 09/21/2018    SPECGRAV 1 020 05/04/2015    PHUR 5 5 09/21/2018    PHUR 6 0 05/04/2015    LEUKOCYTESUR Negative 09/21/2018    LEUKOCYTESUR Negative 05/04/2015    NITRITE Negative 09/21/2018    NITRITE Negative 05/04/2015    PROTEINUA Negative 09/21/2018    PROTEINUA Negative 05/04/2015    GLUCOSEU Negative 09/21/2018    GLUCOSEU Negative 05/04/2015    KETONESU Negative 09/21/2018    KETONESU Negative 05/04/2015    BILIRUBINUR Negative 09/21/2018    BILIRUBINUR Negative 05/04/2015    BLOODU Negative 09/21/2018    BLOODU Negative 05/04/2015      Amylase: No results found for: AMYLASE  Lipase:   Lab Results   Component Value Date    LIPASE 175 05/22/2018    LIPASE 213 05/04/2015        Imaging: Xr Chest 2 Views    Result Date: 9/21/2018  Narrative: CHEST INDICATION:   Cough  COMPARISON:  Chest x-ray from 4/18/2015 EXAM PERFORMED/VIEWS:  XR CHEST PA & LATERAL FINDINGS:  Cardiac silhouette is unremarkable  Thoracic aorta is markedly tortuous  Aneurysmal descending thoracic aorta, known  The lungs are clear  The known left upper lobe lung lesion is not well-defined from the aortic arch  Calcified granuloma noted in the left midlung  No pneumothorax or pleural effusion  Osseous structures appear within normal limits for patient age  Impression: 1  The known left upper lobe lung lesion is not well-defined from the aortic arch  No new pulmonary findings  2  Aneurysmal descending thoracic aorta, known  Workstation performed: [id number]     EKG, Pathology, and Other Studies: I have personally reviewed the results  VTE Pharmacologic Prophylaxis: Heparin  VTE Mechanical Prophylaxis: sequential compression device    Code Status: Level 1 - Full Code    Counseling / Coordination of Care  Total floor / unit time spent today 75 minutes  Greater than 50% of total time was spent with the patient and / or family counseling and / or coordination of care       "This note has been constructed using a voice recognition system"      Ruchi Bauer MD  9/21/2018, 11:59 AM

## 2018-09-21 NOTE — PROGRESS NOTES
Assessment/Plan:       Diagnoses and all orders for this visit:    ASCVD (arteriosclerotic cardiovascular disease)    Simple chronic bronchitis (HCC)    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)    Benign hypertension    Tachycardia  -     POCT ECG          All of the above diagnoses have been assessed  Additional COMMENTS/PLAN: Labs reviewed and discussed with the patient  EKG shows a resting tachycardia therefore I sent her directly to the emergency room  I discussed the case personally with the emergency room physician  Will call radiation therapy at [facility] [place] min cancel her appointment    Subjective:      Patient ID: Jarek Sheehan is a 72 y o  female  HPI     Lung CA-getting chemoradiation    COPD-this has been stable-uses rescue inhaler  Coronary artery disease-The patient denies CP, SOB or palpitations  The patient is compliant with antiplatelet and secondary prevention regimens  The following portions of the patient's history were revised and updated as appropriate: Problem list, allergies, med list, FH, SH, Past medical and surgical histories  Current Outpatient Prescriptions   Medication Sig Dispense Refill    albuterol (VENTOLIN HFA) 90 mcg/act inhaler Inhale 2 puffs every 6 (six) hours as needed for wheezing or shortness of breath 18 g 1    aspirin (ECOTRIN) 325 mg EC tablet Take 325 mg by mouth daily        atorvastatin (LIPITOR) 40 mg tablet Take 1 tablet (40 mg total) by mouth daily 90 tablet 3    co-enzyme Q-10 30 MG capsule Take 30 mg by mouth 3 (three) times a day      gabapentin (NEURONTIN) 100 mg capsule Take 200 mg by mouth 3 (three) times a day      levothyroxine 100 mcg tablet Take 1 tablet (100 mcg total) by mouth daily 30 tablet 3    lisinopril (ZESTRIL) 10 mg tablet Take 1 tablet (10 mg total) by mouth daily 90 tablet 1    magic mouthwash oral suspension (mixture) Swish and swallow 10 mL 3 (three) times a day as needed (esophagitis) 12 oz 1    Methylcobalamin (B12-ACTIVE PO) Take by mouth      metoprolol succinate (TOPROL-XL) 50 mg 24 hr tablet Take 1 tablet (50 mg total) by mouth 2 (two) times a day 60 tablet 5    Omega-3 Fatty Acids (FISH OIL PO) Take by mouth      prednisoLONE acetate (PRED FORTE) 1 % ophthalmic suspension        No current facility-administered medications for this visit  Facility-Administered Medications Ordered in Other Visits   Medication Dose Route Frequency Provider Last Rate Last Dose    alteplase (CATHFLO) injection 2 mg  2 mg Intracatheter PRN Rajwinder Dillard MD        heparin lock flush 100 units/mL injection 300 Units  300 Units Intracatheter Q1H PRN Rajwinder Dillard MD           Review of Systems   Constitutional: Negative  HENT: Negative  Respiratory: Negative  Cardiovascular: Negative  Genitourinary: Negative  Neurological:        Had some dizziness this AM         Objective:    /60 (BP Location: Left arm, Patient Position: Sitting, Cuff Size: Standard)   Pulse (!) 120   Ht 5' 1" (1 549 m)   Wt 58 7 kg (129 lb 8 oz)   BMI 24 47 kg/m²      Physical Exam   Constitutional: She appears well-developed and well-nourished  No distress  Neck: No JVD present  No tracheal deviation present  Cardiovascular: Exam reveals no gallop  No murmur heard  Tachycardic   Pulmonary/Chest: Effort normal and breath sounds normal    Abdominal: Soft  Bowel sounds are normal    Musculoskeletal: Normal range of motion  She exhibits no edema  Vitals reviewed  No visits with results within 2 Week(s) from this visit     Latest known visit with results is:   Orders Only on 09/04/2018   Component Date Value Ref Range Status    Glucose 09/04/2018 93  65 - 99 mg/dL Final    Comment:               Fasting reference interval         BUN 09/04/2018 27* 7 - 25 mg/dL Final    Creatinine 09/04/2018 1 48* 0 60 - 0 93 mg/dL Final    Comment: For patients >52years of age, the reference limit  for Creatinine is approximately 13% higher for people  identified as -American           eGFR Non African American 09/04/2018 35* > OR = 60 mL/min/1 73m2 Final    SL AMB EGFR  09/04/2018 41* > OR = 60 mL/min/1 73m2 Final    SL AMB BUN/CREATININE RATIO 09/04/2018 18  6 - 22 (calc) Final    Sodium 09/04/2018 140  135 - 146 mmol/L Final    SL AMB POTASSIUM 09/04/2018 4 1  3 5 - 5 3 mmol/L Final    Chloride 09/04/2018 104  98 - 110 mmol/L Final    CO2 09/04/2018 27  20 - 32 mmol/L Final    SL AMB CALCIUM 09/04/2018 9 3  8 6 - 10 4 mg/dL Final    SL AMB PROTEIN, TOTAL 09/04/2018 6 2  6 1 - 8 1 g/dL Final    Albumin 09/04/2018 3 8  3 6 - 5 1 g/dL Final    Globulin 09/04/2018 2 4  1 9 - 3 7 g/dL (calc) Final    SL AMB ALBUMIN/GLOBULIN RATIO 09/04/2018 1 6  1 0 - 2 5 (calc) Final    TOTAL BILIRUBIN 09/04/2018 0 3  0 2 - 1 2 mg/dL Final    Alkaline Phosphatase 09/04/2018 87  33 - 130 U/L Final    SL AMB AST 09/04/2018 15  10 - 35 U/L Final    SL AMB ALT 09/04/2018 12  6 - 29 U/L Final    White Blood Cell Count 09/04/2018 6 0  3 8 - 10 8 Thousand/uL Final    Red Blood Cell Count 09/04/2018 3 68* 3 80 - 5 10 Million/uL Final    Hemoglobin 09/04/2018 11 8  11 7 - 15 5 g/dL Final    HCT 09/04/2018 35 0  35 0 - 45 0 % Final    MCV 09/04/2018 95 1  80 0 - 100 0 fL Final    MCH 09/04/2018 32 1  27 0 - 33 0 pg Final    MCHC 09/04/2018 33 7  32 0 - 36 0 g/dL Final    RDW 09/04/2018 12 9  11 0 - 15 0 % Final    Platelet Count 38/15/1680 192  140 - 400 Thousand/uL Final    SL AMB MPV 09/04/2018 11 5  7 5 - 12 5 fL Final    Neutrophils (Absolute) 09/04/2018 2982  1,500 - 7,800 cells/uL Final    Lymphocytes (Absolute) 09/04/2018 2340  850 - 3,900 cells/uL Final    Monocytes (Absolute) 09/04/2018 534  200 - 950 cells/uL Final    Eosinophils (Absolute) 09/04/2018 102  15 - 500 cells/uL Final    Basophils (Absolute) 09/04/2018 42  0 - 200 cells/uL Final    Neutrophils 09/04/2018 49 7  % Final    Lymphocytes 09/04/2018 39 0  % Final    Monocytes 09/04/2018 8 9  % Final    Eosinophils 09/04/2018 1 7  % Final    Basophils Relative 09/04/2018 0 7  % Final         Ephriam MD Radha

## 2018-09-21 NOTE — CONSULTS
Oncology Consult Note  Stef Christina 72 y o  female MRN: [id number]  Unit/Bed#: -01 Encounter: [id number]      Presenting Complaint:  Non-small cell lung cancer with adenocarcinoma, stage IIIA disease  History of Presenting Illness:  A 79-year-old female who has extensive history of smoking until 3 months ago  She was recently diagnosed with non-small cell lung cancer with adenocarcinoma histology  This is stage IIIA disease  Therefore, she started concurrent chemo radiation in late August 2018  So far, she had 3 weekly doses of carboplatin and paclitaxel  She was hospitalized with chill and found to have fever up to 101  Blood culture is pending at this time  She is not neutropenic  Chest x-ray showed no new infiltrate  She has a complaint of fatigue  However, she has no chills at this moment  She has usual exertional shortness of breath as well as cough  She has no hemoptysis  She has no complaint of pain  Since she started radiation therapy, she has no weight loss  She is fully ambulatory  Review of Systems - As stated in the HPI otherwise the fourteen point review of systems was negative  Past Medical History:   Diagnosis Date    [facility] )     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HCC)     ASCVD (arteriosclerotic cardiovascular disease)     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)     Back problem     Blood type B-     Cataract     COPD (chronic obstructive pulmonary disease) (HCC)     Coronary artery disease     CVA (cerebral vascular accident) ([facility] [address] ) 2012    Depression     Disease of thyroid gland     History of cataract     Hyperlipidemia     Hypertension     Lung mass     Mediastinal adenopathy     Muscle pain     Prediabetes     PVD [facility]peripheral vascular disease) ([facility] [address] )     Thoracic aortic aneurysm (HCC)     Transient insomnia        Social History     Social History    Marital status:       Spouse name: N/A  Number of children: N/A    Years of education: N/A     Social History Main Topics    Smoking status: Former Smoker     Packs/day: 0 50     Years: 45 00     Quit date: 3/6/2018    Smokeless tobacco: Never Used    Alcohol use No      Comment: rare    Drug use: No    Sexual activity: Not Currently     Other Topics Concern    None     Social History Narrative    Lives with family  Feels safe at home  Sees dentist occas  No living will       Dental care, occasionally     Does not exercise     Living alone    Living situation: supportive and safe    No advance directives     No caffeine use        Family History   Problem Relation Age of Onset    Hypertension Mother     Heart disease Mother     Diabetes Father     Liver disease Father         hepatic failure     Breast cancer Daughter     Substance Abuse Neg Hx     Mental illness Neg Hx        Allergies   Allergen Reactions    Penicillins Rash         Current Facility-Administered Medications:     acetaminophen (TYLENOL) tablet 650 mg, 650 mg, Oral, Q6H PRN, Shae Townsend MD    albuterol (PROVENTIL HFA,VENTOLIN HFA) inhaler 2 puff, 2 puff, Inhalation, Q6H PRN, Shae Townsend MD, 2 puff at 09/21/18 1441    aspirin (ECOTRIN) EC tablet 325 mg, 325 mg, Oral, Daily, Shae Townsend MD, 325 mg at 09/21/18 1228    atorvastatin (LIPITOR) tablet 40 mg, 40 mg, Oral, Daily, Shae Townsend MD, 40 mg at 09/21/18 1228    aztreonam (AZACTAM) 1,000 mg in sodium chloride 0 9 % 50 mL IVPB, 1,000 mg, Intravenous, Q8H, Shae Townsend MD, Last Rate: 100 mL/hr at 09/21/18 1444, 1,000 mg at 09/21/18 1444    diphenhydrAMINE (BENADRYL) tablet 25 mg, 25 mg, Oral, Q6H PRN, Shae Townsend MD    famotidine (PEPCID) tablet 20 mg, 20 mg, Oral, BID, Shae Townsend MD    gabapentin (NEURONTIN) capsule 200 mg, 200 mg, Oral, TID, Shae Townsend MD, 200 mg at 09/21/18 1228    heparin (porcine) subcutaneous injection 5,000 Units, 5,000 Units, Subcutaneous, Q8H [address], 5,000 Units at 09/21/18 1336 **AND** [CANCELED] Platelet count, , , Once, Becky Lopez MD    levothyroxine tablet 100 mcg, 100 mcg, Oral, Daily, Becky Lopez MD, 100 mcg at 09/21/18 1228    lisinopril (ZESTRIL) tablet 10 mg, 10 mg, Oral, Daily, Becky Lopez MD, 10 mg at 09/21/18 1228    metoprolol succinate (TOPROL-XL) 24 hr tablet 50 mg, 50 mg, Oral, BID, Becky Lopez MD, 50 mg at 09/21/18 1228    ondansetron (ZOFRAN) injection 4 mg, 4 mg, Intravenous, Q6H PRN, Becky Lopez MD    pneumococcal 13-valent conjugate vaccine (PREVNAR-13) IM injection 0 5 mL, 0 5 mL, Intramuscular, Prior to discharge, Becky Lopez MD    sodium chloride 0 9 % infusion, 125 mL/hr, Intravenous, Continuous, Becky Lopez MD, Last Rate: 125 mL/hr at 09/21/18 1232, 125 mL/hr at 09/21/18 1232    [START ON 9/22/2018] vancomycin (VANCOCIN) IVPB (premix) 750 mg, 12 5 mg/kg, Intravenous, Q24H, Becky Lopez MD    Facility-Administered Medications Ordered in Other Encounters:     alteplase (CATHFLO) injection 2 mg, 2 mg, Intracatheter, PRN, Bora Ford MD    heparin lock flush 100 units/mL injection 300 Units, 300 Units, Intracatheter, Q1H PRN, Bora Ford MD      /58 (BP Location: Right arm)   Pulse 90   Temp 98 4 °F (36 9 °C) (Oral)   Resp 18   Ht 5' 1" (1 549 m)   Wt 65 2 kg (143 lb 11 8 oz)   SpO2 93%   BMI 27 16 kg/m²     General Appearance:    Alert, oriented        Eyes:    PERRL   Ears:    Normal external ear canals, both ears   Nose:   Nares normal, septum midline   Throat:   Mucosa moist  Pharynx without injection  Neck:   Supple       Lungs:     Clear to auscultation bilaterally   Chest Wall:    No tenderness or deformity    Heart:    Regular rate and rhythm       Abdomen:     Soft, non-tender, bowel sounds +, no organomegaly           Extremities:   Extremities no cyanosis or edema       Skin:   no rash or icterus      Lymph nodes:   Cervical, supraclavicular, and axillary nodes normal   Neurologic:   CNII-XII intact, normal strength, sensation and reflexes     Throughout               Recent Results (from the past 48 hour(s))   POCT ECG    Collection Time: 09/21/18  8:49 AM   Result Value Ref Range    QRS Axis  degrees     EKG shows sinus tachycardia 120 beats per minute there is no ischemic changes     Lactic acid x2 Q2h    Collection Time: 09/21/18  9:11 AM   Result Value Ref Range    LACTIC ACID 2 4 (HH) 0 5 - 2 0 mmol/L   Procalcitonin    Collection Time: 09/21/18  9:11 AM   Result Value Ref Range    Procalcitonin 0 14 <=0 25 ng/ml   CBC and differential    Collection Time: 09/21/18  9:11 AM   Result Value Ref Range    WBC 2 71 (L) 4 31 - 10 16 Thousand/uL    RBC 3 58 (L) 3 81 - 5 12 Million/uL    Hemoglobin 11 4 (L) 11 5 - 15 4 g/dL    Hematocrit 35 8 34 8 - 46 1 %     (H) 82 - 98 fL    MCH 31 8 26 8 - 34 3 pg    MCHC 31 8 31 4 - 37 4 g/dL    RDW 14 4 11 6 - 15 1 %    MPV 10 8 8 9 - 12 7 fL    Platelets 655 (L) 723 - 390 Thousands/uL    nRBC 0 /100 WBCs    Neutrophils Relative 84 (H) 43 - 75 %    Immat GRANS % 1 0 - 2 %    Lymphocytes Relative 6 (L) 14 - 44 %    Monocytes Relative 7 4 - 12 %    Eosinophils Relative 2 0 - 6 %    Basophils Relative 0 0 - 1 %    Neutrophils Absolute 2 27 1 85 - 7 62 Thousands/µL    Immature Grans Absolute 0 03 0 00 - 0 20 Thousand/uL    Lymphocytes Absolute 0 17 (L) 0 60 - 4 47 Thousands/µL    Monocytes Absolute 0 19 0 17 - 1 22 Thousand/µL    Eosinophils Absolute 0 04 0 00 - 0 61 Thousand/µL    Basophils Absolute 0 01 0 00 - 0 10 Thousands/µL   Comprehensive metabolic panel    Collection Time: 09/21/18  9:11 AM   Result Value Ref Range    Sodium 140 136 - 145 mmol/L    Potassium 3 7 3 5 - 5 3 mmol/L    Chloride 103 100 - 108 mmol/L    CO2 26 21 - 32 mmol/L    ANION GAP 11 4 - 13 mmol/L    BUN 14 5 - 25 mg/dL    Creatinine 1 58 (H) 0 60 - 1 30 mg/dL    Glucose 89 65 - 140 mg/dL    Calcium 8 5 8 3 - 10 1 mg/dL AST 24 5 - 45 U/L    ALT 30 12 - 78 U/L    Alkaline Phosphatase 103 46 - 116 U/L    Total Protein 7 0 6 4 - 8 2 g/dL    Albumin 3 3 (L) 3 5 - 5 0 g/dL    Total Bilirubin 0 30 0 20 - 1 00 mg/dL    eGFR 33 ml/min/1 73sq m   Protime-INR    Collection Time: 09/21/18  9:11 AM   Result Value Ref Range    Protime 13 2 11 8 - 14 2 seconds    INR 1 06 0 86 - 1 17   APTT    Collection Time: 09/21/18  9:11 AM   Result Value Ref Range    PTT 26 24 - 36 seconds   TSH    Collection Time: 09/21/18  9:11 AM   Result Value Ref Range    TSH 3RD GENERATON 1 623 0 358 - 3 740 uIU/mL   ECG 12 lead    Collection Time: 09/21/18  9:12 AM   Result Value Ref Range    Ventricular Rate 114 BPM    Atrial Rate 114 BPM    IL Interval 124 ms    QRSD Interval 82 ms    QT Interval 344 ms    QTC Interval 474 ms    P Axis 80 degrees    QRS Axis 68 degrees    T Wave Axis 67 degrees   Lactic acid x2 Q2h    Collection Time: 09/21/18 11:31 AM   Result Value Ref Range    LACTIC ACID 2 3 (HH) 0 5 - 2 0 mmol/L   UA w Reflex to Microscopic w Reflex to Culture    Collection Time: 09/21/18 11:31 AM   Result Value Ref Range    Color, UA Yellow     Clarity, UA Clear     Specific [place], UA 1 015 1 003 - 1 030    pH, UA 5 5 4 5 - 8 0    Leukocytes, UA Negative Negative    Nitrite, UA Negative Negative    Protein, UA Negative Negative mg/dl    Glucose, UA Negative Negative mg/dl    Ketones, UA Negative Negative mg/dl    Urobilinogen, UA 0 2 0 2, 1 0 E U /dl E U /dl    Bilirubin, UA Negative Negative    Blood, UA Negative Negative   Lactic acid, plasma    Collection Time: 09/21/18  1:35 PM   Result Value Ref Range    LACTIC ACID 0 9 0 5 - 2 0 mmol/L         Xr Chest 2 Views    Result Date: 9/21/2018  Narrative: CHEST INDICATION:   Cough  COMPARISON:  Chest x-ray from 4/18/2015 EXAM PERFORMED/VIEWS:  XR CHEST PA & LATERAL FINDINGS:  Cardiac silhouette is unremarkable  Thoracic aorta is markedly tortuous  Aneurysmal descending thoracic aorta, known   The lungs are clear   The known left upper lobe lung lesion is not well-defined from the aortic arch  Calcified granuloma noted in the left midlung  No pneumothorax or pleural effusion  Osseous structures appear within normal limits for patient age  Impression: 1  The known left upper lobe lung lesion is not well-defined from the aortic arch  No new pulmonary findings  2  Aneurysmal descending thoracic aorta, known  Workstation performed: [id number]       Assessment:  Stage IIIA non-small cell lung cancer with adenocarcinoma histology  Fever  No evidence of neutropenia  Plan:  A 79-year-old female with history as described above  She was hospitalized with fever and chills  She is not neutropenic  Source of fever is not clear  She is currently on IV antibiotics  I agree with current supportive care with empiric antibiotics  Once she is discharged, she will resume daily radiation therapy and weekly systemic chemotherapy as outpatient basis  She is in agreement

## 2018-09-21 NOTE — SOCIAL WORK
Met with Pt  Pt's dtr at bedside  Discussed role of   Pt lives with significant other in [address] [place] [address]  Pt has B/B on 2nd and 1st floor  Pt is independent with adls and ambulation  Pt and Pt's significant other drive and goes grocery shopping  Pt goes to [employer]  Pt had [facility] in past  Pt goes to [facility] for chemotherapy once a week and [facility] for radiation every day  Acknowledge recommendation for home PT/OT  Spoke with Pt and Pt's dtr re: home PT/OT  Pt is declining home PT/OT/SN  Will follow

## 2018-09-21 NOTE — RESPIRATORY THERAPY NOTE
RT Protocol Note  Stef Christina 72 y o  female MRN: [id number]  Unit/Bed#: -01 Encounter: [id number]    Assessment    Principal Problem:    Severe sepsis ([email or web address] )  Active Problems:    Hypothyroidism    Pure hypercholesterolemia    Anxiety    Benign hypertension    Chronic pain syndrome    PAD (peripheral artery disease) ([facility])    Stage 3 chronic kidney disease    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)    Fever      Home Pulmonary Medications:   none       Past Medical History:   Diagnosis Date    Aortic aneurysm ([place] [address] )     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[facility])     ASCVD (arteriosclerotic cardiovascular disease)     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 ([place] [address] )     Back problem     Blood type B-     Cataract     COPD (chronic obstructive pulmonary disease) ([place] [address] )     Coronary artery disease     CVA (cerebral vascular accident) ([place] [address] ) 2012    Depression     Disease of thyroid gland     History of cataract     Hyperlipidemia     Hypertension     Lung mass     Mediastinal adenopathy     Muscle pain     Prediabetes     PVD (peripheral vascular disease) ([place] [address] )     Thoracic aortic aneurysm ([facility])     Transient insomnia      Social History     Social History    Marital status:      Spouse name: N/A    Number of children: N/A    Years of education: N/A     Social History Main Topics    Smoking status: Former Smoker     Packs/day: 0 50     Years: 45 00     Quit date: 3/6/2018    Smokeless tobacco: Never Used    Alcohol use No      Comment: rare    Drug use: No    Sexual activity: Not Currently     Other Topics Concern    None     Social History Narrative    Lives with family  Feels safe at home  Sees dentist occas  No living will       Dental care, occasionally     Does not exercise     Living alone    Living situation: supportive and safe    No advance directives     No caffeine use        Subjective         Objective    Physical Exam: Assessment Type: Pre-treatment  General Appearance: Alert  Respiratory Pattern: Normal  Chest Assessment: Chest expansion symmetrical  Bilateral Breath Sounds: Clear  Cough: None    Vitals:  Blood pressure 160/65, pulse (!) 114, temperature 98 4 °F (36 9 °C), temperature source Oral, resp  rate 19, height 5' 1" (1 549 m), weight 58 5 kg (129 lb), SpO2 98 %  Imaging and other studies: I have personally reviewed pertinent reports              Plan    Respiratory Plan: No distress/Pulmonary history

## 2018-09-21 NOTE — PHYSICAL THERAPY NOTE
Physical Therapy Evaluation     Patient's Name: Henry Mckeon    Admitting Diagnosis  Abnormal EKG [R94 31]  Fever [R50 9]  Severe sepsis ([facility] [address] ) [A41 9, R65 20]    Problem List  Patient Active Problem List   Diagnosis    [facility]    Hypothyroidism    Pure hypercholesterolemia    Anxiety    ASCVD (arteriosclerotic cardiovascular disease)    Aneurysm of infrarenal abdominal aorta (HCC)    Aneurysm, thoracoabdominal aortic ([facility] )    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 ([facility])    Benign hypertension    Minor depression    Chronic bilateral low back pain with right-sided sciatica    Spinal stenosis of lumbar region with neurogenic claudication    Chronic pain syndrome    PAD (peripheral artery disease) ([facility] )    Bilateral carotid artery stenosis    Stage 3 chronic kidney disease    Preoperative cardiovascular examination    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)    Severe sepsis ([facility] )    Fever       Past Medical History  Past Medical History:   Diagnosis Date    Aortic aneurysm ([facility] )     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HCC)     ASCVD (arteriosclerotic cardiovascular disease)     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 ([facility] )     Back problem     Blood type B-     Cataract     COPD (chronic obstructive pulmonary disease) ([facility] )     Coronary artery disease     CVA (cerebral vascular accident) ([facility] ) 2012    Depression     Disease of thyroid gland     History of cataract     Hyperlipidemia     Hypertension     Lung mass     Mediastinal adenopathy     Muscle pain     Prediabetes     PVD (peripheral vascular disease) ([facility] )     Thoracic aortic aneurysm (HCC)     Transient insomnia        Past Surgical History  Past Surgical History:   Procedure Laterality Date    CAROTID STENT Left     CT COLONOSCOPY FLX DX W/COLLJ SPEC WHEN PFRMD N/A 9/27/2017    Procedure: EGD AND COLONOSCOPY; Surgeon: Leann Fontenot MD;  Location:  MAIN OR;  Service: Gastroenterology    AK EDG US EXAM SURGICAL ALTER STOM DUODENUM/JEJUNUM N/A 8/1/2018    Procedure: LINEAR ENDOSCOPIC U/S;  Surgeon: Moises Feliz MD;  Location:  GI LAB; Service: Gastroenterology    TUBAL LIGATION          09/21/18 1350   Note Type   Note type Eval only   Pain Assessment   Pain Assessment No/denies pain   Home Living   Type of [address] Two level; Able to live on main level with bedroom/bathroom  (1 RAYMUNDO; 12 steps to the 2nd floor w/ hand rail)   Prior Function   Level of [place] Independent with ADLs and functional mobility  (amb w/o AD)   Lives With Friend(s)  (works)   Receives Help From Friend(s); Family   Falls in the last 6 months 0   Restrictions/Precautions   Braces or Orthoses (denies)   Other Precautions Fall Risk;O2;Multiple lines;Telemetry;Contact/isolation;Cardiac/sternal  (bed alarm activated at the end of session)   General   Additional Pertinent History cleared for assessment (spoke to nsg)   Family/Caregiver Present Yes  (dtr)   Cognition   Overall Cognitive Status [facility]   Arousal/Participation Alert   Attention Attends with cues to redirect   Orientation Level Oriented to person;Oriented to place;Oriented to situation   Memory Within functional limits   Following Commands Follows one step commands without difficulty   Comments Pt is observed in bed; reports feeling better; agreeable to demonstrate mobility skills; denies dizziness or pain   RUE Assessment   RUE Assessment WFL  (AROM)   RUE Strength   RUE Overall Strength Within Functional Limits - able to perform ADL tasks with strength   LUE Assessment   LUE Assessment WFL  (AROM)   LUE Strength   LUE Overall Strength Within Functional Limits - able to perform ADL tasks with strength   RLE Assessment   RLE Assessment WFL  (AROM)   Strength RLE   RLE Overall Strength (fair +/ good - (grossly))   LLE Assessment   LLE Assessment WFL  (AROM)   Strength LLE   LLE Overall Strength (fair +/ good - (grossly))   Bed Mobility   Supine to Sit 6  Modified independent   Sit to Supine 6  Modified independent   Transfers   Sit to Stand 5  Supervision   Additional items Assist x 1;Verbal cues   Stand to Sit 5  Supervision   Additional items Assist x 1;Verbal cues   Ambulation/Elevation   Gait pattern Excessively slow; Short stride   Gait Assistance 4  Minimal assist   Additional items Assist x 1;Verbal cues; Tactile cues  (stand by (A) of 2nd for lines)   Assistive Device None   Distance 4 ft total distance at bedside; further amb was not performed due to elevated HR to 100s bpm (90s bpm at rest); O2 sat at 94 % (93 % at rest)   Balance   Static Sitting Fair +   Static Standing Fair -   Ambulatory Poor +   Activity Tolerance   Activity Tolerance Patient limited by fatigue;Treatment limited secondary to medical complications (Comment)   Nurse Made Aware spoke to [address]   Assessment   Prognosis Good   Problem List Decreased strength;Decreased endurance; Impaired balance;Decreased mobility   Assessment Pt is 72 y o  female admitted with hx of chills and tachycardia and Dx of severe sepsis  Pt 's comorbidities affecting POC include: back problem, COPD, CAD, CVA, depression, PVD, and history of adenocarcinoma of lung undergoing chemotherapy and radiation and personal factors of: RAYMUNDO and home alone during the day  Pt's clinical presentation is currently unstable/unpredictable which is evident in current suppl O2 needs, ongoing telem monitoring while in steps down unit, generally elevated HR and labs test results pending  Pt presents w/ generalized weakness, incl decreased overall LE strength, decreased functional endurance, min impaired amb balance w/ associated inconsistent gait patterns during limited amb trial at bedside (likely due to above) and fall risk   Will cont to follow pt in PT for progressive mobilization to address above functional deficits and to max level of (I), endurance, and safety  Otherwise, anticipate pt will return home w/ available support upon D/C provided she cont improving w/ mobility skills, safety, and endurance and when medically cleared; home PT follow up would be beneficial upon D/C but pt declines; will follow  Barriers to Discharge Inaccessible home environment;Decreased caregiver support   Goals   Patient Goals to get better and to go home   STG Expiration Date 10/01/18   Short Term Goal #1 7-10 days  Pt will amb 2x 50 ft w/o AD, mod (I) in order to facilitate safe return to premorbid environment and at least household amb status  Pt will negotiate 1 step, (S)x1 in order to assure safe navigation in and out of the premorbid living environment  Pt will perform transfers w/ mod (I) to assure (I) and safety w/ functional mobility/transitions w/ all aspects of mobility/locomotion  Pt will participate in LE therex and balance activities to max progression w/ mobility skills  Treatment Day 0   Plan   Treatment/Interventions Functional transfer training;LE strengthening/ROM; Elevations; Therapeutic exercise; Endurance training;Bed mobility;Gait training;Spoke to nursing;Spoke to case management;OT;Family   PT Frequency Other (Comment)  (3-5x/wk)   Recommendation   Recommendation Home with family support  (1st floor set-up; home PT follow up (pt however declines))   Equipment Recommended (none at this time)   Modified Imperial Scale   Modified Juan Scale 4   Barthel Index   Feeding 10   Bathing 0   Grooming Score 5   Dressing Score 5   Bladder Score 10   Bowels Score 10   Toilet Use Score 5   Transfers (Bed/Chair) Score 10   Mobility (Level Surface) Score 0   Stairs Score 0   Barthel Index Score 55         Price Soriano, PT

## 2018-09-22 PROBLEM — A04.72 C. DIFFICILE DIARRHEA: Status: ACTIVE | Noted: 2018-01-01

## 2018-09-22 NOTE — CASE MANAGEMENT
Initial Clinical Review    Admission: Date/Time/Statement: 9/21/18 @ 1112     Orders Placed This Encounter   Procedures    Inpatient Admission (expected length of stay for this patient is greater than two midnights)     Standing Status:   Standing     Number of Occurrences:   1     Order Specific Question:   Admitting Physician     Answer:   Kaylie Willson [[id number]]     Order Specific Question:   Level of Care     Answer:   Level 1 Stepdown [13]     Order Specific Question:   Estimated length of stay     Answer:   More than 2 Midnights     Order Specific Question:   Certification     Answer:   I certify that inpatient services are medically necessary for this patient for a duration of greater than two midnights  See H&P and MD Progress Notes for additional information about the patient's course of treatment  ED: Date/Time/Mode of Arrival:   ED Arrival Information     Expected Arrival Acuity Means of Arrival Escorted By Service Admission Type    - 9/21/2018 08:53 Emergent Wheelchair Other General Medicine Emergency    Arrival Complaint    abnormal EKG          Chief Complaint:   Chief Complaint   Patient presents with    Fever - 9 weeks to 74 years     Patient present sto the ER from dr Dale Marshall office with a fever and tachycardia, patient receiving chemotherapy and radiation for lung ca diagnosed in june       History of Illness:  Alicia Scott is a 72 y o  female with history of adenocarcinoma of lung undergoing chemotherapy and radiation who presented the emergency department after being sent from her PCPs office for evaluation of chills and tachycardia  Patient underwent chemotherapy yesterday and was doing fine  This morning she went to her PCPs office for routine checkup  Patient complained of having chills and was found to have tachycardia  Patient also noted to have temperature of 101 1° and heart rate of 120 and was sent to ER    Patient does complain of having moist cough for past 2 weeks which is productive of clearish sputum  She has mild associated shortness of breath  She does admit to having diarrhea 4-5 episodes of loose watery nonbloody over the last couple of days  She denies having any fever, urinary complaints, nausea, vomiting, abdominal pain  Patient in ER had a chest x-ray done which showed no acute pulmonary findings  Urine was negative  Patient was given Jaymie Magic and vancomycin  Patient was found to have maculopapular rash over her body after she was done getting those 2 antibiotics  Upon reviewing the records patient has received Rocephin in the past for COPD but was getting IV Solu-Medrol along with it  ED Vital Signs:   ED Triage Vitals [09/21/18 0902]   Temperature Pulse Respirations Blood Pressure SpO2   (!) 101 1 °F (38 4 °C) (!) 120 20 140/90 93 %      Temp Source Heart Rate Source Patient Position - Orthostatic VS BP Location FiO2 (%)   Temporal Monitor Lying Right arm --      Pain Score       No Pain        Wt Readings from Last 1 Encounters:   09/22/18 63 3 kg (139 lb 8 8 oz)       Vital Signs (abnormal):    above    Abnormal Labs/Diagnostic Test Results:   Lactic  Acid  2 4  Creat   1 58  Albumin    3 3  WBC   2 71  H/H   11 4/35 8  CXR:     The known left upper lobe lung lesion is not well-defined from the aortic arch   No new pulmonary findings  2  Aneurysmal descending thoracic aorta, known      ED Treatment:   Medication Administration from 09/21/2018 0852 to 09/21/2018 1155       Date/Time Order Dose Route Action Action by Comments     09/21/2018 1026 sodium chloride 0 9 % bolus 1,000 mL 0 mL Intravenous Stopped Cookie Ibarra RN      09/21/2018 0912 sodium chloride 0 9 % bolus 1,000 mL 1,000 mL Intravenous Holger 37 Raad Randall RN      09/21/2018 1024 cefTAZidime (FORTAZ) 2,000 mg in sodium chloride 0 9 % 50 mL IVPB 0 mg Intravenous Stopped Cookie Ibarra RN      09/21/2018 0951 cefTAZidime (FORTAZ) 2,000 mg in sodium chloride 0 9 % 50 mL IVPB 2,000 mg Intravenous New Bag Didi Tam RN      09/21/2018 1119 vancomycin (VANCOCIN) IVPB (premix) 1,000 mg 0 mg/kg Intravenous Stopped Didi Tam RN      09/21/2018 1027 vancomycin (VANCOCIN) IVPB (premix) 1,000 mg 1,000 mg Intravenous New Bag Yuliya Chang RN another antibiotic completed     09/21/2018 1153 sodium chloride 0 9 % bolus 1,000 mL 1,000 mL Intravenous Continue to Inpatient Floor Didi Tam RN      09/21/2018 1131 sodium chloride 0 9 % bolus 1,000 mL 1,000 mL Intravenous New Bag Didi Tam RN           Past Medical/Surgical History:    Active Ambulatory Problems     Diagnosis Date Noted    Simple chronic bronchitis ([facility] [address] ) 02/18/2016    Hypothyroidism 02/18/2016    Pure hypercholesterolemia 02/18/2016    Anxiety 02/18/2016    ASCVD (arteriosclerotic cardiovascular disease) 02/19/2016    Aneurysm of infrarenal abdominal aorta (HCC) 05/27/2015    Aneurysm, thoracoabdominal aortic ([facility] ) 05/27/2015    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 ([facility] ) 05/27/2015    Benign hypertension 04/20/2015    Minor depression 04/20/2015    Chronic bilateral low back pain with right-sided sciatica 04/23/2018    Spinal stenosis of lumbar region with neurogenic claudication 04/23/2018    Chronic pain syndrome 04/23/2018    PAD (peripheral artery disease) ([facility] ) 05/17/2018    Bilateral carotid artery stenosis 05/17/2018    Stage 3 chronic kidney disease 05/28/2018    Preoperative cardiovascular examination 07/03/2018    Nonsquamous nonsmall cell neoplasm of left lung ([facility] [address] ) 08/07/2018     Resolved Ambulatory Problems     Diagnosis Date Noted    Acute respiratory failure with hypoxia ([address] ) 02/18/2016    Aortic aneurysm ([facility] [address] ) 02/18/2016    Mediastinal adenopathy 02/19/2016    Mental health problem 04/14/2016    Substance abuse 04/14/2016    Left breast lump 06/23/2017    Impaired fasting blood sugar 06/07/2017    Dry cough 01/30/2018    Muscle pain, myofacial 01/30/2018    Lumbar radiculopathy 04/23/2018     Past Medical History:   Diagnosis Date    Aortic aneurysm ([place] [address] )     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     ASCVD (arteriosclerotic cardiovascular disease)     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 ([place] [address] )     Back problem     Blood type B-     Cataract     COPD (chronic obstructive pulmonary disease) ([facility])     Coronary artery disease     CVA (cerebral vascular accident) ([place] [address] ) 2012    Depression     Disease of thyroid gland     History of cataract     Hyperlipidemia     Hypertension     Lung mass     Mediastinal adenopathy     Muscle pain     Prediabetes     PVD (peripheral vascular disease) ([place] [address] )     Thoracic aortic aneurysm (HCC)     Transient insomnia        Admitting Diagnosis: Abnormal EKG [R94 31]  Fever [R50 9]  Severe sepsis ([place] [address] ) [A41 9, R65 20]    Age/Sex: 72 y o  female    Assessment/Plan: Admit to step-down unit as inpatient status  · Severe sepsis/Fever/diarrhea in setting of chemotherapy/neutropenia-possible pneumonia  Rule out C diff  · Adenocarcinoma of lung-on chemoradiation  Will start on Azactam   Continue on vancomycin  Infectious disease consult  Follow-up culture results  Stool studies including C diff  UA is negative  Will repeat two view chest x-ray in a m  IV fluids  Serial lactate  · Hypertension, PAD and hyperlipidemia-  On Toprol-XL, Zestril, aspirin and Lipitor  · Hypothyroidism-on Synthroid  · Chronic kidney disease stage 3- stable  Monitor renal function  Avoid nephrotoxins medications/hypotension/NSAIDs  · DVT prophylaxis  · Discussed with patient in detail    Anticipated length of stay greater than 2 midnights        Admission Orders:   IP  9/21  @  1112  Scheduled Meds:   Current Facility-Administered Medications:  acetaminophen 650 mg Oral Q6H PRN Chris Duenas MD    albuterol 2 puff Inhalation Q4H PRN Chris Duenas MD    aspirin 325 mg Oral Daily Dewayne HENNING Sandro Worley MD    atorvastatin 40 mg Oral Daily Delta Henry MD    diphenhydrAMINE 25 mg Oral Q6H PRN Delta Henry MD    famotidine 20 mg Oral BID Delta Henry MD    gabapentin 200 mg Oral TID Delta Henry MD    heparin (porcine) 5,000 Units Subcutaneous Q8H Ravi Umanzor MD    levothyroxine 100 mcg Oral Daily Delta Henry MD    lisinopril 10 mg Oral Daily Delta Henry MD    magnesium sulfate 2 g Intravenous Once Delta Henry MD    metoprolol succinate 50 mg Oral BID Delta Henry MD    ondansetron 4 mg Intravenous Q6H PRN Delta Henry MD    pneumococcal 13-valent conjugate vaccine 0 5 mL Intramuscular Prior to discharge Delta Henry MD    sodium chloride 75 mL/hr Intravenous Continuous Delta Henry MD Last Rate: 75 mL/hr (09/22/18 1009)   vancomycin 125 mg Oral Q6H [address] TRACY Damon      Facility-Administered Medications Ordered in Other Encounters:  alteplase 2 mg Intracatheter PRN Colten Langley MD   heparin lock flush 300 Units Intracatheter Q1H PRN Colten Langley MD     Continuous Infusions:   sodium chloride 75 mL/hr Last Rate: 75 mL/hr (09/22/18 1009)     PRN Meds:   acetaminophen    albuterol    diphenhydrAMINE    ondansetron    pneumococcal 13-valent conjugate vaccine     BC  Cons  IM  Tele  O2  2L  Cons  ID  PT/OT  Cons oncology  Stool c/diff    Per  Oncology  Consult:   Stage IIIA non-small cell lung cancer with adenocarcinoma histology  Fever  No evidence of neutropenia      Plan:  A 79-year-old female with history as described above  She was hospitalized with fever and chills  She is not neutropenic  Source of fever is not clear  She is currently on IV antibiotics  I agree with current supportive care with empiric antibiotics  Once she is discharged, she will resume daily radiation therapy and weekly systemic chemotherapy as outpatient basis  She is in agreement      Thank you,  [address] Utilization Review Department  Phone: [phone]; Fax [phone]  ATTENTION: Please call with any questions or concerns to [phone]  and carefully follow the prompts so that you are directed to the right person  Send all requests for admission clinical reviews, approved or denied determinations and any other requests to fax [phone]   All voicemails are confidential

## 2018-09-22 NOTE — PROGRESS NOTES
Progress Note - Cedric Cano 72 y o  female MRN: [id number]  Unit/Bed#: [facility] 218-01 Encounter: [id number]    Assessment:  Principal Problem:    Severe sepsis ([address] )  Active Problems:    Hypothyroidism    Pure hypercholesterolemia    Anxiety    Benign hypertension    Chronic pain syndrome    PAD (peripheral artery disease) (HCC)    Stage 3 chronic kidney disease    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)    Fever  Resolved Problems:    * No resolved hospital problems  *      Plan:  · Severe sepsis secondary to C difficile infection  · Adenocarcinoma of lung-on chemo or radiation  Continue on IV antibiotics  Repeat chest x-ray  Patient was started on oral vancomycin for C diff  Infectious disease consult  IV fluids  · Hypomagnesemia-magnesium supplementation  · Hypothyroidism-on Synthroid  · Hypertension, PAD and hyperlipidemia  On Toprol-XL, Zestril, aspirin and Lipitor  · Chronic kidney disease stage 3-stable  · DVT prophylaxis  · Discussed with patient in detail  Subjective:   Patient is seen and examined at bedside  No new complaints  Has diarrhea and cough with some shortness of breath  Complains of some lower abdominal pain  All other ROS are negative  Objective:   Vitals: Blood pressure 131/59, pulse 81, temperature 98 3 °F (36 8 °C), temperature source Oral, resp  rate 20, height 5' 1" (1 549 m), weight 63 3 kg (139 lb 8 8 oz), SpO2 96 %  ,Body mass index is 26 37 kg/m²  SPO2 RA Rest      ED to Hosp-Admission (Current) from 9/21/2018 in [address] [place] [address] Intensive Care Unit   SpO2  96 %   SpO2 Activity  At Rest   O2 Device  Nasal cannula   O2 Flow Rate  2 L/min        I&O:   Intake/Output Summary (Last 24 hours) at 09/22/18 0944  Last data filed at 09/22/18 0558   Gross per 24 hour   Intake           4036 5 ml   Output              750 ml   Net           3286 5 ml       Physical Exam:    General- Alert, lying comfortably in bed   Not in any acute distress  HEENT- ALEXIS, EOM intact  Neck- Supple, No JVD  CVS- regular, S1 and S2 normal   Chest- Bilateral Air entry, No rhochi, crackles or wheezing present  Abdomen- soft, tenderness in lower abdomen, not distended, no guarding or rigidity, BS+  Extremities-  No pedal edema, No calf tenderness                         Normal ROM in all extremities  CNS-   Alert, awake and orientedx3  No focal deficits present      Invasive Devices     Peripheral Intravenous Line            Peripheral IV 09/21/18 Left Antecubital 1 day                      Social History  reviewed  Family History   Problem Relation Age of Onset    Hypertension Mother     Heart disease Mother     Diabetes Father     Liver disease Father         hepatic failure     Breast cancer Daughter     Substance Abuse Neg Hx     Mental illness Neg Hx     reviewed    Meds:  Current Facility-Administered Medications   Medication Dose Route Frequency Provider Last Rate Last Dose    acetaminophen (TYLENOL) tablet 650 mg  650 mg Oral Q6H PRN Irasema Cortez MD        albuterol (PROVENTIL HFA,VENTOLIN HFA) inhaler 2 puff  2 puff Inhalation Q4H PRN Irasema Cortez MD   2 puff at 09/22/18 0340    aspirin (ECOTRIN) EC tablet 325 mg  325 mg Oral Daily Irasema Cortez MD   325 mg at 09/22/18 0838    atorvastatin (LIPITOR) tablet 40 mg  40 mg Oral Daily Irasema Cortez MD   40 mg at 09/22/18 0838    aztreonam (AZACTAM) 1,000 mg in sodium chloride 0 9 % 50 mL IVPB  1,000 mg Intravenous Q8H Irasema Cortez  mL/hr at 09/22/18 0557 1,000 mg at 09/22/18 0557    diphenhydrAMINE (BENADRYL) tablet 25 mg  25 mg Oral Q6H PRN Irasema Cortez MD   25 mg at 09/21/18 2142    famotidine (PEPCID) tablet 20 mg  20 mg Oral BID Irasema Cortez MD   20 mg at 09/22/18 3329    gabapentin (NEURONTIN) capsule 200 mg  200 mg Oral TID Irasema Cortez MD   200 mg at 09/22/18 0838    heparin (porcine) subcutaneous injection 5,000 Units  5,000 Units Subcutaneous Q8H [facility] Shae Townsend MD   5,000 Units at 09/22/18 0557    levothyroxine tablet 100 mcg  100 mcg Oral Daily Shae Townsend MD   100 mcg at 09/22/18 0600    lisinopril (ZESTRIL) tablet 10 mg  10 mg Oral Daily Shae Townsend MD   10 mg at 09/22/18 3964    magnesium sulfate 2 g/50 mL IVPB (premix) 2 g  2 g Intravenous Once Shae Townsend MD        metoprolol succinate (TOPROL-XL) 24 hr tablet 50 mg  50 mg Oral BID Shae Townsend MD   50 mg at 09/22/18 0838    ondansetron (ZOFRAN) injection 4 mg  4 mg Intravenous Q6H PRN Shae Townsend MD        pneumococcal 13-valent conjugate vaccine (PREVNAR-13) IM injection 0 5 mL  0 5 mL Intramuscular Prior to discharge Shae Townsend MD        sodium chloride 0 9 % infusion  125 mL/hr Intravenous Continuous Shae Townsend  mL/hr at 09/22/18 0559 125 mL/hr at 09/22/18 0559    vancomycin (VANCOCIN) IVPB (premix) 750 mg  12 5 mg/kg Intravenous Q24H Shae Townsend MD        vancomycin (VANCOCIN) oral solution 125 mg  125 mg Oral Q6H [facility] TRACY Damon   125 mg at 09/22/18 1499     Facility-Administered Medications Ordered in Other Encounters   Medication Dose Route Frequency Provider Last Rate Last Dose    alteplase (CATHFLO) injection 2 mg  2 mg Intracatheter PRN Enrique Rudolph MD        heparin lock flush 100 units/mL injection 300 Units  300 Units Intracatheter Q1H PRN Enrique uRdolph MD          Prescriptions Prior to Admission   Medication    albuterol (VENTOLIN HFA) 90 mcg/act inhaler    aspirin (ECOTRIN) 325 mg EC tablet    atorvastatin (LIPITOR) 40 mg tablet    co-enzyme Q-10 30 MG capsule    gabapentin (NEURONTIN) 100 mg capsule    levothyroxine 100 mcg tablet    lisinopril (ZESTRIL) 10 mg tablet    magic mouthwash oral suspension (mixture)    Methylcobalamin (B12-ACTIVE PO)    metoprolol succinate (TOPROL-XL) 50 mg 24 hr tablet    Omega-3 Fatty Acids (FISH OIL PO)    prednisoLONE acetate (PRED FORTE) 1 % ophthalmic suspension       Labs:    Results from last 7 days  Lab Units 09/22/18  0452 09/21/18  0911   WBC Thousand/uL 1 88* 2 71*   HEMOGLOBIN g/dL 10 0* 11 4*   HEMATOCRIT % 31 5* 35 8   PLATELETS Thousands/uL 89* 120*   NEUTROS PCT %  --  84*   LYMPHS PCT %  --  6*   LYMPHO PCT % 12*  --    MONOS PCT %  --  7   MONO PCT MAN % 9  --    EOS PCT %  --  2   EOSINO PCT MANUAL % 0  --        Results from last 7 days  Lab Units 09/22/18  0452 09/21/18  0911   SODIUM mmol/L 141 140   POTASSIUM mmol/L 4 2 3 7   CHLORIDE mmol/L 110* 103   CO2 mmol/L 23 26   BUN mg/dL 13 14   CREATININE mg/dL 1 34* 1 58*   CALCIUM mg/dL 7 5* 8 5   ALK PHOS U/L 103 103   ALT U/L 85* 30   AST U/L 64* 24     No results found for: TROPONINI, CKMB, CKTOTAL    Results from last 7 days  Lab Units 09/21/18  0911   INR  1 06     Lab Results   Component Value Date    BLOODCX No Growth After 5 Days  02/18/2016    BLOODCX No Growth After 5 Days  02/18/2016    URINECX <10,000 cfu/ml Mixed Contaminants X2 02/18/2016         Imaging:  No results found for this or any previous visit  Results for orders placed during the hospital encounter of 09/21/18   XR chest 2 views    Narrative CHEST     INDICATION:   Cough  COMPARISON:  Chest x-ray from 4/18/2015    EXAM PERFORMED/VIEWS:  XR CHEST PA & LATERAL      FINDINGS:      Cardiac silhouette is unremarkable  Thoracic aorta is markedly tortuous  Aneurysmal descending thoracic aorta, known  The lungs are clear  The known left upper lobe lung lesion is not well-defined from the aortic arch  Calcified granuloma noted in the left midlung  No pneumothorax or pleural effusion  Osseous structures appear within normal limits for patient age  Impression 1  The known left upper lobe lung lesion is not well-defined from the aortic arch  No new pulmonary findings  2  Aneurysmal descending thoracic aorta, known          Workstation performed: [id number]         Labs & Imaging: I have personally reviewed pertinent reports        VTE Pharmacologic Prophylaxis: Heparin  VTE Mechanical Prophylaxis: sequential compression device    Code Status:   Level 1 - Full Code      "This note has been constructed using a voice recognition system"      Jaiden Orosco MD  7/85/0548,6:53 AM

## 2018-09-22 NOTE — CONSULTS
Consultation - Infectious Disease   Walter East 72 y o  female MRN: [id number]  Unit/Bed#: -01 Encounter: [id number]      Assessment/Plan     Assessment: 72y o  year old female with PMH of lung ca s/p XRT and currently on chemo who presents with fever, chills, and cough which started on the day of admission in the setting of getting chemo the day prior  1  Sepsis due to C diff colitis - in the setting of immunosuppression  New onset C diff now but diarrhea has now resolved  CXR shows no pneumonia confirmed on repeat imaging  Productive cough likely viral in nature  Blood cx are showing no growth to date  2  Fever, lactic acidosis, pancytopenia - in the setting of recent chemo  Fever resolved  No neutropenia noted  3  Lung adenocarcinoma - on chemo    Plan:  1  D/C IV vancomycin and aztreonam and continue PO vancomycin to complete 2 week course for C diff colitis  2  Check influenza PCR  History of Present Illness   Physician Requesting Consult: Irasema Cortez MD  Reason for Consult / Principal Problem: C diff  Hx and PE limited by: none  HPI: Walter East is a 72y o  year old female with PMH of lung ca s/p XRT and currently on chemo who presents with fever, chills, and cough which started on the day of admission in the setting of getting chemo the day prior  She also had 4-5 loose BM over the last few days  She denies any abdominal pain, N/V, dysuria, skin rashes, headache  On admission, she was febrile at 101 1 with pancytopenia  Lactate was 2 3  CXR showed no new findings  She was started on IV vancomycin, aztreonam and PO vancomycin was started after C diff PCR was found to be positive  Inpatient consult to Infectious Diseases  Consult performed by: Isabel Park, [address] [place] [address] ordered by: Kacey Hedrick          Review of Systems   Constitutional: Positive for chills and fever  HENT: Positive for rhinorrhea  Negative for sore throat  Respiratory: Positive for choking  Negative for shortness of breath  Cardiovascular: Negative for chest pain  Gastrointestinal: Negative for abdominal pain, nausea and vomiting  Genitourinary: Negative for dysuria  Musculoskeletal: Negative for arthralgias  Skin: Negative for rash  Neurological: Negative for dizziness  Psychiatric/Behavioral: Negative for confusion  Historical Information   Past Medical History:   Diagnosis Date    Aortic aneurysm ([place] [address] )     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HCC)     ASCVD (arteriosclerotic cardiovascular disease)     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 ([place] [address] )     Back problem     Blood type B-     Cataract     [facility]     Coronary artery disease     CVA (cerebral vascular accident) ([place] [address] ) 2012    Depression     Disease of thyroid gland     History of cataract     Hyperlipidemia     Hypertension     Lung mass     Mediastinal adenopathy     Muscle pain     Prediabetes     PVD (peripheral vascular disease) ([place] [address] )     Thoracic aortic aneurysm ([facility])     Transient insomnia      Past Surgical History:   Procedure Laterality Date    CAROTID STENT Left     ME COLONOSCOPY FLX DX W/COLLJ SPEC WHEN PFRMD N/A 9/27/2017    Procedure: EGD AND COLONOSCOPY;  Surgeon: Isaak Black MD;  Location: [facility];  Service: Gastroenterology    ME EDG US EXAM SURGICAL ALTER STOM DUODENUM/JEJUNUM N/A 8/1/2018    Procedure: LINEAR ENDOSCOPIC U/S;  Surgeon: Kaycee Conde MD;  Location: [facility];   Service: Gastroenterology    TUBAL LIGATION       Social History   History   Alcohol Use No     Comment: rare     History   Drug Use No     History   Smoking Status    Former Smoker    Packs/day: 0 50    Years: 45 00    Quit date: 3/6/2018   Smokeless Tobacco    Never Used     Family History: non-contributory    Meds/Allergies   all current active meds have been reviewed    Allergies   Allergen Reactions    Penicillins Rash Objective       Intake/Output Summary (Last 24 hours) at 09/22/18 1517  Last data filed at 09/22/18 1300   Gross per 24 hour   Intake          2733  59 ml   Output              700 ml   Net          2033 59 ml       Invasive Devices:   Peripheral IV 09/22/18 Right Forearm (Active)       Physical Exam   Constitutional: She appears well-developed and well-nourished  HENT:   Head: Normocephalic and atraumatic  Mouth/Throat: Oropharynx is clear and moist    Eyes: EOM are normal    Neck: Neck supple  Cardiovascular: Normal rate and regular rhythm  Pulmonary/Chest: Effort normal and breath sounds normal  She has no rales  Abdominal: Soft  Bowel sounds are normal  There is no tenderness  Musculoskeletal: Normal range of motion  She exhibits no edema  Neurological: She is alert  Skin: Skin is warm and dry  No erythema  Psychiatric: She has a normal mood and affect  Vitals reviewed  Lab Results: I have personally reviewed pertinent labs  Imaging Studies: I have personally reviewed pertinent reports  EKG, Pathology, and Other Studies: I have personally reviewed pertinent reports  Counseling / Coordination of Care  Total floor / unit time spent today 45 minutes  Greater than 50% of total time was spent with the patient and / or family counseling and / or coordination of care   A description of the counseling / coordination of care:

## 2018-09-23 NOTE — PROGRESS NOTES
Progress Note - Infectious Disease   Jessica Finn 72 y o  female MRN: [id number]  Unit/Bed#: -01 Encounter: [id number]    Assessment:  72y o  year old female with PMH of lung ca s/p XRT and currently on chemo who presents with fever, chills, and cough which started on the day of admission in the setting of getting chemo the day prior      1  Sepsis due to C diff colitis - in the setting of immunosuppression  New onset C diff now but diarrhea has now improved  CXR shows no pneumonia confirmed on repeat imaging  Productive cough likely viral in nature  Influenza PCR is negative  Blood cx are showing no growth to date       2  Fever, lactic acidosis, pancytopenia - in the setting of recent chemo  Fever resolved  No neutropenia noted      3  Lung adenocarcinoma - on chemo     Plan:  1  Continue PO vancomycin to complete 2 week course for C diff colitis  Subjective/Objective   Chief Complaint: C diff colitis    Subjective: had only one BM today and remains afebrile  Objective:     HR:  [71-82] 81  Resp:  [16-18] 18  BP: ()/(54-91) 131/73  SpO2:  [92 %-96 %] 93 %  Temp (24hrs), Av 9 °F (36 6 °C), Min:97 6 °F (36 4 °C), Max:98 1 °F (36 7 °C)  Current: Temperature: 98 1 °F (36 7 °C)    Physical Exam:   Constitutional: She appears well-developed and well-nourished  HENT:   Head: Normocephalic and atraumatic  Mouth/Throat: Oropharynx is clear and moist    Eyes: EOM are normal    Neck: Neck supple  Cardiovascular: Normal rate and regular rhythm  Pulmonary/Chest: Effort normal and breath sounds normal  She has no rales  Abdominal: Soft  Bowel sounds are normal  There is no tenderness  Musculoskeletal: Normal range of motion  She exhibits no edema  Neurological: She is alert  Skin: Skin is warm and dry  No erythema  Psychiatric: She has a normal mood and affect  Vitals reviewed      Invasive Devices     Peripheral Intravenous Line            Peripheral IV 18 Right Forearm less than 1 day                Lab, Imaging and other studies: I have personally reviewed pertinent reports

## 2018-09-23 NOTE — PROGRESS NOTES
Progress Note - Alicia Scott 72 y o  female MRN: [id number]  Unit/Bed#: -01 Encounter: [id number]    Assessment:  Principal Problem:    Severe sepsis ([address] )  Active Problems:    Hypothyroidism    Pure hypercholesterolemia    Anxiety    Benign hypertension    Chronic pain syndrome    PAD (peripheral artery disease) (HCC)    Stage 3 chronic kidney disease    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)    Fever    C  difficile diarrhea  Resolved Problems:    * No resolved hospital problems  *      Plan:  · Severe sepsis secondary to C difficile colitis  · Adenocarcinoma of lung-on chemoradiation  Continue on oral vancomycin  Infectious disease consult appreciated  Blood cultures negative to date  Awaiting influenza PCR  DC IV fluids  · Hypomagnesemia-resolved  · Hypothyroidism-on Synthroid  · Hypertension, PAD and hyperlipidemia  On Toprol-XL, aspirin, Lipitor and Zestril  · Chronic kidney disease stage 3-stable  · DVT prophylaxis  · Discussed with patient in detail  Subjective:   Patient is seen and examined at bedside  Diarrhea and lower abdominal pain is improving  Afebrile  No other complaints  All other ROS are negative  Objective:   Vitals: Blood pressure 156/91, pulse 82, temperature 98 °F (36 7 °C), temperature source Oral, resp  rate 16, height 5' 1" (1 549 m), weight 63 7 kg (140 lb 6 9 oz), SpO2 93 %  ,Body mass index is 26 53 kg/m²  SPO2 RA Rest      ED to Hosp-Admission (Current) from 9/21/2018 in [address] [place] [address] Intensive Care Unit   SpO2  93 %   SpO2 Activity  At Rest   O2 Device  None (Room air)   O2 Flow Rate  2 L/min        I&O:   Intake/Output Summary (Last 24 hours) at 09/23/18 0654  Last data filed at 09/23/18 0400   Gross per 24 hour   Intake          1360 84 ml   Output              400 ml   Net           960 84 ml       Physical Exam:    General- Alert, lying comfortably in bed  Not in any acute distress  HEENT- ALEXIS, EOM intact    Neck- Supple, No JVD  CVS- regular, S1 and S2 normal   Chest- Bilateral Air entry, No rhochi, crackles or wheezing present  Abdomen- soft, mild lower abdominal tenderness on deep palpation, not distended, no guarding or rigidity, BS+  Extremities-  No pedal edema, No calf tenderness                         Normal ROM in all extremities  CNS-   Alert, awake and orientedx3  No focal deficits present      Invasive Devices     Peripheral Intravenous Line            Peripheral IV 09/22/18 Right Forearm less than 1 day                      Social History  reviewed  Family History   Problem Relation Age of Onset    Hypertension Mother     Heart disease Mother     Diabetes Father     Liver disease Father         hepatic failure     Breast cancer Daughter     Substance Abuse Neg Hx     Mental illness Neg Hx     reviewed    Meds:  Current Facility-Administered Medications   Medication Dose Route Frequency Provider Last Rate Last Dose    acetaminophen (TYLENOL) tablet 650 mg  650 mg Oral Q6H PRN Paige Dong MD        albuterol (PROVENTIL HFA,VENTOLIN HFA) inhaler 2 puff  2 puff Inhalation Q4H PRN Paige Dong MD   2 puff at 09/23/18 0224    aspirin (ECOTRIN) EC tablet 325 mg  325 mg Oral Daily Paige Dong MD   325 mg at 09/22/18 0838    atorvastatin (LIPITOR) tablet 40 mg  40 mg Oral Daily Paige Dong MD   40 mg at 09/22/18 0838    diphenhydrAMINE (BENADRYL) tablet 25 mg  25 mg Oral Q6H PRN Paige Dong MD   25 mg at 09/21/18 2142    famotidine (PEPCID) tablet 20 mg  20 mg Oral BID Paige Dong MD   20 mg at 09/22/18 1837    gabapentin (NEURONTIN) capsule 200 mg  200 mg Oral TID Paige Dong MD   200 mg at 09/22/18 2115    heparin (porcine) subcutaneous injection 5,000 Units  5,000 Units Subcutaneous [facility] Paige Dong MD   5,000 Units at 09/23/18 0222    levothyroxine tablet 100 mcg  100 mcg Oral Daily Paige Dong MD   100 mcg at 09/23/18 0605    lisinopril (ZESTRIL) tablet 10 mg  10 mg Oral Daily Jaiden Orosco MD   10 mg at 09/22/18 0704    metoprolol succinate (TOPROL-XL) 24 hr tablet 50 mg  50 mg Oral BID Jaiden Orosco MD   50 mg at 09/22/18 0838    ondansetron (ZOFRAN) injection 4 mg  4 mg Intravenous Q6H PRN Jaiden Orosco MD        pneumococcal 13-valent conjugate vaccine (PREVNAR-13) IM injection 0 5 mL  0 5 mL Intramuscular Prior to discharge Jaiden Orosco MD        sodium chloride 0 9 % infusion  75 mL/hr Intravenous Continuous Jaiden Orosco MD 75 mL/hr at 09/22/18 2125 75 mL/hr at 09/22/18 2125    vancomycin (VANCOCIN) oral solution 125 mg  125 mg Oral Q6H [facility] TRACY Witt   125 mg at 09/23/18 0605      Prescriptions Prior to Admission   Medication    albuterol (VENTOLIN HFA) 90 mcg/act inhaler    aspirin (ECOTRIN) 325 mg EC tablet    atorvastatin (LIPITOR) 40 mg tablet    co-enzyme Q-10 30 MG capsule    gabapentin (NEURONTIN) 100 mg capsule    levothyroxine 100 mcg tablet    lisinopril (ZESTRIL) 10 mg tablet    magic mouthwash oral suspension (mixture)    Methylcobalamin (B12-ACTIVE PO)    metoprolol succinate (TOPROL-XL) 50 mg 24 hr tablet    Omega-3 Fatty Acids (FISH OIL PO)    prednisoLONE acetate (PRED FORTE) 1 % ophthalmic suspension       Labs:    Results from last 7 days  Lab Units 09/23/18  0441 09/22/18  0452 09/21/18  0911   WBC Thousand/uL 1 89* 1 88* 2 71*   HEMOGLOBIN g/dL 10 2* 10 0* 11 4*   HEMATOCRIT % 32 9* 31 5* 35 8   PLATELETS Thousands/uL 94* 89* 120*   NEUTROS PCT % 65  --  84*   LYMPHS PCT % 22  --  6*   LYMPHO PCT %  --  12*  --    MONOS PCT % 8  --  7   MONO PCT MAN %  --  9  --    EOS PCT % 3  --  2   EOSINO PCT MANUAL %  --  0  --        Results from last 7 days  Lab Units 09/23/18  0441 09/22/18  0452 09/21/18  0911   SODIUM mmol/L 142 141 140   POTASSIUM mmol/L 4 4 4 2 3 7   CHLORIDE mmol/L 111* 110* 103   CO2 mmol/L 24 23 26   BUN mg/dL 10 13 14   CREATININE mg/dL 1 28 1 34* 1 58* CALCIUM mg/dL 8 3 7 5* 8 5   ALK PHOS U/L  --  103 103   ALT U/L  --  85* 30   AST U/L  --  64* 24     No results found for: TROPONINI, CKMB, CKTOTAL    Results from last 7 days  Lab Units 09/21/18  0911   INR  1 06     Lab Results   Component Value Date    BLOODCX No Growth at 24 hrs  09/21/2018    BLOODCX No Growth at 24 hrs  09/21/2018    BLOODCX No Growth After 5 Days  02/18/2016    BLOODCX No Growth After 5 Days  02/18/2016    URINECX <10,000 cfu/ml Mixed Contaminants X2 02/18/2016         Imaging:  No results found for this or any previous visit  Results for orders placed during the hospital encounter of 09/21/18   XR chest 2 views    Narrative CHEST     INDICATION:   Cough  COMPARISON:  Chest x-ray from 4/18/2015    EXAM PERFORMED/VIEWS:  XR CHEST PA & LATERAL      FINDINGS:      Cardiac silhouette is unremarkable  Thoracic aorta is markedly tortuous  Aneurysmal descending thoracic aorta, known  The lungs are clear  The known left upper lobe lung lesion is not well-defined from the aortic arch  Calcified granuloma noted in the left midlung  No pneumothorax or pleural effusion  Osseous structures appear within normal limits for patient age  Impression 1  The known left upper lobe lung lesion is not well-defined from the aortic arch  No new pulmonary findings  2  Aneurysmal descending thoracic aorta, known  Workstation performed: [id number]         Labs & Imaging: I have personally reviewed pertinent reports        VTE Pharmacologic Prophylaxis: Heparin  VTE Mechanical Prophylaxis: sequential compression device    Code Status:   Level 1 - Full Code      "This note has been constructed using a voice recognition system"      Patt Avila MD  6/40/9567,9:50 AM

## 2018-09-23 NOTE — PROGRESS NOTES
Pt AAOx4  Sitting up in bed  Denies SOB or pain at this time  SCDs on  IVF infusing per order  Call bell within reach  Pt able to make needs known  CDiff and Flu precautions maintained  Pt voiding independently in bathroom; denies any more loose BM  See flowsheet for assessment

## 2018-09-24 PROBLEM — A04.72 C. DIFFICILE DIARRHEA: Status: RESOLVED | Noted: 2018-01-01 | Resolved: 2018-01-01

## 2018-09-24 PROBLEM — R65.20 SEVERE SEPSIS (HCC): Status: RESOLVED | Noted: 2018-01-01 | Resolved: 2018-01-01

## 2018-09-24 PROBLEM — D61.818 PANCYTOPENIA (HCC): Status: ACTIVE | Noted: 2018-01-01

## 2018-09-24 PROBLEM — A41.9 SEVERE SEPSIS (HCC): Status: RESOLVED | Noted: 2018-01-01 | Resolved: 2018-01-01

## 2018-09-24 NOTE — ASSESSMENT & PLAN NOTE
She will continue metoprolol lisinopril on discharge    On day of discharge her lungs are clear to auscultation, heart is regular rhythm and she has no pedal edema

## 2018-09-24 NOTE — ASSESSMENT & PLAN NOTE
Patient's diarrhea gradually abated on p o  Vancomycin  On day of discharge patient had no abdominal tenderness on physical examination  She tolerated p o  Diet  I spoke [place] patient[place] [facility] confirmed that p o  Vancomycin is covered for her and her co-pay is less than 5 dollars  She will complete 7 more days of p o   Vancomycin 125 mg q 6 hours therapy at home

## 2018-09-24 NOTE — PROGRESS NOTES
Discharge instructions taught to pt  Pt verbalized understanding  All questions answered  IV removed  Pt denies specific needs prior to discharge

## 2018-09-24 NOTE — DISCHARGE SUMMARY
Discharge- Jessica Finn 1945, 72 y o  female MRN: [id number]    Unit/Bed#: -01 Encounter: [id number]    Primary Care Provider: Ana Laura Mills MD   Date and time admitted to hospital: 9/21/2018  8:55 AM        * Severe sepsis [facility])   Assessment & Plan    Patient met criteria for severe sepsis admission due to C diff positive diarrhea  Chest x-ray shows no pneumonia, stool came back positive for C diff  He patient was placed on p o  Vancomycin        C  difficile diarrhea   Assessment & Plan    Patient's diarrhea gradually abated on p o  Vancomycin  On day of discharge patient had no abdominal tenderness on physical examination  She tolerated p o  Diet  I spoke with patient's pharmacist at [facility] and confirmed that p o  Vancomycin is covered for her and her co-pay is less than 5 dollars  She will complete 7 more days of p o  Vancomycin 125 mg q 6 hours therapy at home        Pancytopenia [facility])   Assessment & Plan    This is likely due to chemotherapy for lung cancer  On discharge patient's leukocyte count is 2 16, hemoglobin is 10 5, platelet count is 493G        Benign hypertension   Assessment & Plan    She will continue metoprolol lisinopril on discharge  On day of discharge her lungs are clear to auscultation, heart is regular rhythm and she has no pedal edema        Stage 3 chronic kidney disease   Assessment & Plan    Creatinine is 1 22 which is at baseline on discharge                  Hospital Course:     Jessica Finn is a 72 y o  female patient who originally presented to the hospital on   Admission Orders     Ordered        09/21/18 1112  Inpatient Admission (expected length of stay for this patient is greater than two midnights)  Once            due to severe sepsis from C diff positive diarrhea    Please see above list of diagnoses and related plan for additional information       Condition at Discharge:  good      Discharge instructions/Information to patient and family: See after visit summary for information provided to patient and family  Provisions for Follow-Up Care:  See after visit summary for information related to follow-up care and any pertinent home health orders  Disposition:     Home       Discharge Statement:  I spent 32 minutes discharging the patient  This time was spent on the day of discharge  I had direct contact with the patient on the day of discharge  Greater than 50% of the total time was spent examining patient, answering all patient questions, arranging and discussing plan of care with patient as well as directly providing post-discharge instructions  Additional time then spent on discharge activities  Discharge Medications:  See after visit summary for reconciled discharge medications provided to patient and family        ** Please Note: This note has been constructed using a voice recognition system **

## 2018-09-24 NOTE — CASE MANAGEMENT
Auth:   [id number]      Notification of Discharge  This is a Notification of Discharge from our facility [address]  Please be advised that this patient has been discharge from our facility  Below you will find the admission and discharge date and time including the patients disposition  PRESENTATION DATE: 9/21/2018  8:55 AM  IP ADMISSION DATE: 9/21/18 1112  DISCHARGE DATE: 9/24/2018 11:02 AM  DISPOSITION: Home/Self Care    [address] [facility] in the [facility] Utilization Review Department  Phone: [phone]; Fax [phone]  ATTENTION: The Network Utilization Review Department is now centralized for our 9 Facilities  Make a note that we have a new phone and fax numbers for our Department  Please call with any questions or concerns to [phone] and carefully follow the prompts so that you are directed to the right person  All voicemails are confidential  Fax any determinations, approvals, denials, and requests for initial or continue stay review clinical to [phone]  Due to HIGH CALL volume, it would be easier if you could please send faxed requests to expedite your requests and in part, help us provide discharge notifications faster

## 2018-09-24 NOTE — ASSESSMENT & PLAN NOTE
This is likely due to chemotherapy for lung cancer    On discharge patient's leukocyte count is 2 16, hemoglobin is 10 5, platelet count is 902Z

## 2018-09-24 NOTE — ASSESSMENT & PLAN NOTE
Patient met criteria for severe sepsis admission due to C diff positive diarrhea  Chest x-ray shows no pneumonia, stool came back positive for C diff  He patient was placed on p o   Vancomycin

## 2018-09-27 NOTE — PROGRESS NOTES
Patient to infusion for chemo  Patient hospitalized from 9/21/18 to 9/24/18 with stool sample positive for cdiff  Currently on po vancomycin  Patient states diarrhea has improved but not completely resolved, patient had 2 bowel movements today  Patient missed 2 radiation appointment when in hospital but has been receiving radiation since discharge  Call placed to [facility] informing her of same  She will talk to Dr Salazar Sapp  Awaiting return call whether to proceed with chemo or if it will be held

## 2018-09-27 NOTE — PLAN OF CARE
Problem: Potential for Falls  Goal: Patient will remain free of falls  INTERVENTIONS:  - Assess patient frequently for physical needs  -  Identify cognitive and physical deficits and behaviors that affect risk of falls    -  [place] fall precautions as indicated by assessment   - Educate patient/family on patient safety including physical limitations  - Instruct patient to call for assistance with activity based on assessment  - Modify environment to reduce risk of injury  - Consider OT/PT consult to assist with strengthening/mobility   Outcome: Progressing

## 2018-10-02 PROBLEM — G89.29 CHRONIC BILATERAL LOW BACK PAIN WITH RIGHT-SIDED SCIATICA: Status: RESOLVED | Noted: 2018-04-23 | Resolved: 2018-01-01

## 2018-10-02 PROBLEM — Z01.810 PREOPERATIVE CARDIOVASCULAR EXAMINATION: Status: RESOLVED | Noted: 2018-01-01 | Resolved: 2018-01-01

## 2018-10-02 PROBLEM — M54.41 CHRONIC BILATERAL LOW BACK PAIN WITH RIGHT-SIDED SCIATICA: Status: RESOLVED | Noted: 2018-04-23 | Resolved: 2018-01-01

## 2018-10-02 PROBLEM — G89.4 CHRONIC PAIN SYNDROME: Status: RESOLVED | Noted: 2018-04-23 | Resolved: 2018-01-01

## 2018-10-02 NOTE — PROGRESS NOTES
Assessment/Plan:      Diagnoses and all orders for this visit:    Clostridium difficile colitis    Minor depression    Benign hypertension    Hypotension due to drugs     Will see back in 6 weeks with attn to BP and overall state  Subjective:     Patient ID: Sveta Frederick is a 72 y o  female  HPI     Was is hospital with c diff-still getting RT and chemo tx  appetitie down  No diarrhea  Review of Systems   All other systems reviewed and are negative  Objective:     Physical Exam   Constitutional: She appears well-developed and well-nourished  Cardiovascular: Normal rate and regular rhythm  Pulmonary/Chest: Effort normal and breath sounds normal    Abdominal: Soft  Vitals reviewed  Vitals:    10/02/18 1425   BP: 90/70   BP Location: Left arm   Patient Position: Sitting   Cuff Size: Standard   Pulse: 105   Weight: 56 kg (123 lb 8 oz)   Height: 5' 1" (1 549 m)       Transitional Care Management Review:  Sveta Frederick is a 72 y o  female here for TCM follow up  During the TCM phone call patient stated:    Date and time hospital follow up call was made:  9/26/2018 11:55 AM  Hospital care reviewed:  Records reviewed  Patient was hopsitalized at:  [facility]  Date of admission:  9/21/18  Date of discharge:  9/24/18  Diagnosis:  Severe sepsis  Disposition:  Home  Were the patients medicaitons reviewed and updated:  Yes  Current symptoms:  None  Post hospital issues:  None  Should patient be enrolled in anticoag monitoring?:  No  Scheduled for follow up?:  Yes  Patients specialists:  Other (comment)  Other specialists Name:  uEnice Bauer MD  Did you obtain your prescribed medications:  Yes  Do you need help managing your perscriptions or medications:  No  Is transportation to your appointments needed:  No  I have advised the patient to call PCP with any new or worsening symptoms (please type in name along with any credentials):   JARED Morales MA   Living Arrangements: Alone  Support System:  Friends, Family  Are you recieving outpatient services:  No  Are you recieving home care services:  No  Comments:  PT is taking radiation daily              Rosa Garcia MD

## 2018-10-04 NOTE — PROGRESS NOTES
Pt with elevated BP today 150/90  Pt reports family doc stopped lisinopril  Will call during treatment for further assistance

## 2018-10-08 PROBLEM — E86.0 DEHYDRATION: Status: ACTIVE | Noted: 2018-01-01

## 2018-10-08 PROBLEM — N17.9 AKI (ACUTE KIDNEY INJURY) (HCC): Status: ACTIVE | Noted: 2018-01-01

## 2018-10-08 NOTE — PLAN OF CARE
Problem: PAIN - ADULT  Goal: Verbalizes/displays adequate comfort level or baseline comfort level  Interventions:  - Encourage patient to monitor pain and request assistance  - Assess pain using appropriate pain scale  - Administer analgesics based on type and severity of pain and evaluate response  - Implement non-pharmacological measures as appropriate and evaluate response  - Consider cultural and social influences on pain and pain management  - Notify physician/advanced practitioner if interventions unsuccessful or patient reports new pain  Outcome: Progressing      Problem: INFECTION - ADULT  Goal: Absence or prevention of progression during hospitalization  INTERVENTIONS:  - Assess and monitor for signs and symptoms of infection  - Monitor lab/diagnostic results  - Monitor all insertion sites, i e  indwelling lines, tubes, and drains  - Monitor endotracheal (as able) and nasal secretions for changes in amount and color  - [place] appropriate cooling/warming therapies per order  - Administer medications as ordered  - Instruct and encourage patient and family to use good hand hygiene technique  - Identify and instruct in appropriate isolation precautions for identified infection/condition  Outcome: Progressing    Goal: Absence of fever/infection during neutropenic period  INTERVENTIONS:  - Monitor WBC  - Implement neutropenic guidelines  Outcome: Progressing      Problem: SAFETY ADULT  Goal: Patient will remain free of falls  INTERVENTIONS:  - Assess patient frequently for physical needs  -  Identify cognitive and physical deficits and behaviors that affect risk of falls    -  [place] fall precautions as indicated by assessment   - Educate patient/family on patient safety including physical limitations  - Instruct patient to call for assistance with activity based on assessment  - Modify environment to reduce risk of injury  - Consider OT/PT consult to assist with strengthening/mobility  Outcome: Progressing    Goal: Maintain or return to baseline ADL function  INTERVENTIONS:  -  Assess patient's ability to carry out ADLs; assess patient's baseline for ADL function and identify physical deficits which impact ability to perform ADLs (bathing, care of mouth/teeth, toileting, grooming, dressing, etc )  - Assess/evaluate cause of self-care deficits   - Assess range of motion  - Assess patient's mobility; develop plan if impaired  - Assess patient's need for assistive devices and provide as appropriate  - Encourage maximum independence but intervene and supervise when necessary  ¯ Involve family in performance of ADLs  ¯ Assess for home care needs following discharge   ¯ Request OT consult to assist with ADL evaluation and planning for discharge  ¯ Provide patient education as appropriate  Outcome: Progressing    Goal: Maintain or return mobility status to optimal level  INTERVENTIONS:  - Assess patient's baseline mobility status (ambulation, transfers, stairs, etc )    - Identify cognitive and physical deficits and behaviors that affect mobility  - Identify mobility aids required to assist with transfers and/or ambulation (gait belt, sit-to-stand, lift, walker, cane, etc )  - [place] fall precautions as indicated by assessment  - Record patient progress and toleration of activity level on Mobility SBAR; progress patient to next Phase/Stage  - Instruct patient to call for assistance with activity based on assessment  - Request Rehabilitation consult to assist with strengthening/weightbearing, etc   Outcome: Progressing      Problem: DISCHARGE PLANNING  Goal: Discharge to home or other facility with appropriate resources  INTERVENTIONS:  - Identify barriers to discharge w/patient and caregiver  - Arrange for needed discharge resources and transportation as appropriate  - Identify discharge learning needs (meds, wound care, etc )  - Arrange for interpretive services to assist at discharge as needed  - Refer to Case Management Department for coordinating discharge planning if the patient needs post-hospital services based on physician/advanced practitioner order or complex needs related to functional status, cognitive ability, or social support system  Outcome: Progressing      Problem: Knowledge Deficit  Goal: Patient/family/caregiver demonstrates understanding of disease process, treatment plan, medications, and discharge instructions  Complete learning assessment and assess knowledge base    Interventions:  - Provide teaching at level of understanding  - Provide teaching via preferred learning methods  Outcome: Progressing

## 2018-10-08 NOTE — ED NOTES
Charge nurse aware that patient is being admitted and [facility] [address] [facility] [address] [facility]  10/08/18 9023

## 2018-10-08 NOTE — CONSULTS
Oncology Consult Note  Leticia Serrano 72 y o  female MRN: [id number]  Unit/Bed#: [address] [place] [address] Encounter: [id number]      Presenting Complaint:  Diarrhea, stage IIIA non-small cell lung cancer with adenocarcinoma      History of Presenting Illness:  72-year-old  female with extensive history of smoking, she was diagnosed with adenocarcinoma of the lung stage IIIA, she was initiated on concurrent radiation therapy with weekly Taxol/carboplatin in August 2018, she was admitted to the hospital with fever and chills, she was found to have C diff colitis treated with metronidazole 3 times a day  The patient received dose 5  Out of 6 of Taxol/carboplatin last for a day and she was admitted yesterday with diarrhea, dehydration and acute renal insufficiency, she is stating metronidazole is not helping  She was found to have WBC of 2700, hemoglobin 10 2, platelets 610160, 64% neutrophils with ANC of 1 83, creatinine 2 5, BUN 33, calcium 9 2  She feels dry mouth, fatigue, back pain denied any headache blurred vision fever chills dysuria hematuria melena or hematochezia          Review of Systems - As stated in the HPI otherwise the fourteen point review of systems was negative      Past Medical History:   Diagnosis Date    Aortic aneurysm ([address] )     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HCC)     ASCVD (arteriosclerotic cardiovascular disease)     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 ([address] )     Back problem     Blood type B-     Cataract     COPD (chronic obstructive pulmonary disease) (HCC)     Coronary artery disease     CVA (cerebral vascular accident) ([address] ) 2012    Depression     Disease of thyroid gland     History of cataract     Hyperlipidemia     Hypertension     Lung mass     Mediastinal adenopathy     Muscle pain     Prediabetes     PVD (peripheral vascular disease) ([address] )     Thoracic aortic aneurysm (HCC)     Transient insomnia        Social History Social History    Marital status:      Spouse name: N/A    Number of children: N/A    Years of education: N/A     Social History Main Topics    Smoking status: Former Smoker     Packs/day: 0 50     Years: 45 00     Quit date: 3/6/2018    Smokeless tobacco: Never Used    Alcohol use No    Drug use: No    Sexual activity: Not Currently     Other Topics Concern    None     Social History Narrative    Lives with family  Feels safe at home  Sees dentist occas  No living will       Dental care, occasionally     Does not exercise     Living alone    Living situation: supportive and safe    No advance directives     No caffeine use        Family History   Problem Relation Age of Onset    Hypertension Mother     Heart disease Mother     Diabetes Father     Liver disease Father         hepatic failure     Breast cancer Daughter     Substance Abuse Neg Hx     Mental illness Neg Hx        Allergies   Allergen Reactions    Penicillins Rash         Current Facility-Administered Medications:     albuterol (PROVENTIL HFA,VENTOLIN HFA) inhaler 2 puff, 2 puff, Inhalation, Q6H PRN, Lady Alen DO    aspirin (ECOTRIN) EC tablet 325 mg, 325 mg, Oral, Daily, Shaina Fly, DO, 325 mg at 10/08/18 1526    atorvastatin (LIPITOR) tablet 40 mg, 40 mg, Oral, Daily With Dinner, Shaina Fly, DO, 40 mg at 10/08/18 1530    enoxaparin (LOVENOX) subcutaneous injection 30 mg, 30 mg, Subcutaneous, Daily, Shaina Fly, DO, 30 mg at 10/08/18 1526    gabapentin (NEURONTIN) capsule 200 mg, 200 mg, Oral, TID, Shaina Fly, DO, 200 mg at 10/08/18 1525    HYDROcodone-acetaminophen (NORCO) 5-325 mg per tablet 1 tablet, 1 tablet, Oral, Q6H PRN, Lady Alen DO    levothyroxine tablet 100 mcg, 100 mcg, Oral, Early Morning, Shaina Fly, DO, 100 mcg at 10/08/18 1526    lidocaine (LIDODERM) 5 % patch 1 patch, 1 patch, Topical, Once, Rafael Navarrete DO, 1 patch at 10/08/18 1104    magic mouthwash oral suspension (mixture), 10 mL, Swish & Swallow, TID PRN, Tracy Irons, DO    metoprolol succinate (TOPROL-XL) 24 hr tablet 50 mg, 50 mg, Oral, BID, Shaina Fly, DO    ondansetron (ZOFRAN) injection 4 mg, 4 mg, Intravenous, Q6H PRN, Shaina Fly, DO    sodium chloride 0 9 % infusion, 125 mL/hr, Intravenous, Continuous, Shaina Fly, DO, Last Rate: 125 mL/hr at 10/08/18 1527, 125 mL/hr at 10/08/18 1527    vancomycin (VANCOCIN) oral solution 125 mg, 125 mg, Oral, Q6H PATY, Shaina Fly, DO      BP 95/69 (BP Location: Left arm)   Pulse 80   Temp 98 °F (36 7 °C) (Oral)   Resp 18   Ht 5' 1 2" (1 554 m)   Wt 56 5 kg (124 lb 9 oz)   LMP  (LMP Unknown)   SpO2 98%   BMI 23 38 kg/m²       General Appearance:    Alert, oriented        Eyes:    PERRL   Ears:    Normal external ear canals, both ears   Nose:   Nares normal, septum midline   Throat:   Mucosa moist  Pharynx without injection  Neck:   Supple       Lungs:     Clear to auscultation bilaterally   Chest Wall:    No tenderness or deformity    Heart:    Regular rate and rhythm       Abdomen:     Soft, non-tender, bowel sounds +, no organomegaly           Extremities:   Extremities no cyanosis or edema       Skin:   no rash or icterus      Lymph nodes:   Cervical, supraclavicular, and axillary nodes normal   Neurologic:   CNII-XII intact, normal strength, sensation and reflexes     Throughout               Recent Results (from the past 48 hour(s))   CBC and differential    Collection Time: 10/08/18 10:56 AM   Result Value Ref Range    WBC 2 77 (L) 4 31 - 10 16 Thousand/uL    RBC 3 19 (L) 3 81 - 5 12 Million/uL    Hemoglobin 10 2 (L) 11 5 - 15 4 g/dL    Hematocrit 31 8 (L) 34 8 - 46 1 %     (H) 82 - 98 fL    MCH 32 0 26 8 - 34 3 pg    MCHC 32 1 31 4 - 37 4 g/dL    RDW 15 7 (H) 11 6 - 15 1 %    MPV 12 0 8 9 - 12 7 fL    Platelets 960 (L) 236 - 390 Thousands/uL    nRBC 0 /100 WBCs    Neutrophils Relative 66 43 - 75 %    Immat GRANS % 1 0 - 2 %    Lymphocytes Relative 25 14 - 44 %    Monocytes Relative 6 4 - 12 % Eosinophils Relative 1 0 - 6 %    Basophils Relative 1 0 - 1 %    Neutrophils Absolute 1 83 (L) 1 85 - 7 62 Thousands/µL    Immature Grans Absolute 0 02 0 00 - 0 20 Thousand/uL    Lymphocytes Absolute 0 70 0 60 - 4 47 Thousands/µL    Monocytes Absolute 0 17 0 17 - 1 22 Thousand/µL    Eosinophils Absolute 0 03 0 00 - 0 61 Thousand/µL    Basophils Absolute 0 02 0 00 - 0 10 Thousands/µL   CMP    Collection Time: 10/08/18 10:56 AM   Result Value Ref Range    Sodium 139 136 - 145 mmol/L    Potassium 4 4 3 5 - 5 3 mmol/L    Chloride 103 100 - 108 mmol/L    CO2 21 21 - 32 mmol/L    ANION GAP 15 (H) 4 - 13 mmol/L    BUN 33 (H) 5 - 25 mg/dL    Creatinine 2 57 (H) 0 60 - 1 30 mg/dL    Glucose 109 65 - 140 mg/dL    Calcium 9 2 8 3 - 10 1 mg/dL    AST 18 5 - 45 U/L    ALT 27 12 - 78 U/L    Alkaline Phosphatase 112 46 - 116 U/L    Total Protein 6 8 6 4 - 8 2 g/dL    Albumin 3 2 (L) 3 5 - 5 0 g/dL    Total Bilirubin 0 40 0 20 - 1 00 mg/dL    eGFR 18 ml/min/1 73sq m   Lipase    Collection Time: 10/08/18 10:56 AM   Result Value Ref Range    Lipase 273 73 - 393 u/L         Ct Abdomen Pelvis Without Contrast    Result Date: 10/8/2018  Narrative: CT ABDOMEN AND PELVIS WITHOUT IV CONTRAST INDICATION:   Abd pain, gastroenteritis or colitis suspected  COMPARISON: CTA chest, abdomen and pelvis 6/11/2018 and PET/CT 8/17/2018  TECHNIQUE:  CT examination of the abdomen and pelvis was performed without intravenous contrast   Axial, sagittal, and coronal 2D reformatted images were created from the source data and submitted for interpretation  Radiation dose length product (DLP) for this visit:  218 93 mGy-cm   This examination, like all CT scans performed in the [facility], was performed utilizing techniques to minimize radiation dose exposure, including the use of iterative  reconstruction and automated exposure control  Enteric contrast was administered   FINDINGS: ABDOMEN LOWER CHEST:  No clinically significant abnormality identified in the visualized lower chest  LIVER/BILIARY TREE:  Unremarkable  GALLBLADDER:  No calcified gallstones  No pericholecystic inflammatory change  SPLEEN:  Unremarkable  PANCREAS:  Unremarkable  ADRENAL GLANDS:  Unremarkable  KIDNEYS/URETERS:  Chronic cortical deformity of the inferior pole of the right kidney perhaps from prior infarct  No hydronephrosis  STOMACH AND BOWEL:  Multiple distended and nondilated small and large bowel loops containing fluid consistent with history of diarrhea  No focal wall thickening with associated fat stranding to suggests colitis  No bowel obstruction  APPENDIX:  A normal appendix was visualized  ABDOMINOPELVIC CAVITY:  No ascites or free intraperitoneal air  No lymphadenopathy  VESSELS:  Stable appearance of thoracoabdominal aortic aneurysm containing calcified intramural thrombus with maximal dimensions of 6 1 x 0 1 cm near the diaphragmatic hiatus  PELVIS REPRODUCTIVE ORGANS:  Unremarkable for patient's age  URINARY BLADDER:  Unremarkable  ABDOMINAL WALL/INGUINAL REGIONS:  Focal soft tissue thickening in the right mons pubis associated with hypermetabolic activity on PET/CT has resolved  OSSEOUS STRUCTURES:  No acute fracture or destructive osseous lesion  Multilevel degenerative changes throughout the spine  Impression: Multiple distended and nondilated small and large bowel loops containing fluid consistent with history of diarrhea  No focal bowel wall thickening with stranding to suggest colitis  Focal soft tissue thickening in the right mons pubis associated with hypermetabolic activity on PET/CT has resolved and was likely inflammatory in nature  Workstation performed: [id number]     Xr Chest Pa & Lateral    Result Date: 9/24/2018  Narrative: CHEST INDICATION:   Shortness of breath  COMPARISON:  9/21/2018  EXAM PERFORMED/VIEWS:  XR CHEST PA & LATERAL FINDINGS: Cardiac silhouette is unremarkable  Tortuous thoracic aorta again present  The lungs are clear  No pneumothorax or pleural effusion  Osseous structures appear within normal limits for patient age  Impression: No acute cardiopulmonary disease  Workstation performed: [id number]     Xr Chest 2 Views    Result Date: 9/21/2018  Narrative: CHEST INDICATION:   Cough  COMPARISON:  Chest x-ray from 4/18/2015 EXAM PERFORMED/VIEWS:  XR CHEST PA & LATERAL FINDINGS:  Cardiac silhouette is unremarkable  Thoracic aorta is markedly tortuous  Aneurysmal descending thoracic aorta, known  The lungs are clear  The known left upper lobe lung lesion is not well-defined from the aortic arch  Calcified granuloma noted in the left midlung  No pneumothorax or pleural effusion  Osseous structures appear within normal limits for patient age  Impression: 1  The known left upper lobe lung lesion is not well-defined from the aortic arch  No new pulmonary findings  2  Aneurysmal descending thoracic aorta, known  Workstation performed: [id number]     Ct Recon Only Lumbar Spine    Result Date: 10/8/2018  Narrative: CT LUMBAR SPINE INDICATION:   Low back pain, cancer or infection suspected PAIN  COMPARISON:  None TECHNIQUE: Axial CT examination of the lumbar spine was obtained utilizing reconstructed images from CT of the chest, abdomen and pelvis performed the same day  Images were reformatted in the sagittal and coronal planes  This examination, like all CT scans performed in the [facility], was performed utilizing techniques to minimize radiation dose exposure, including the use of iterative reconstruction and automated exposure control  FINDINGS: ALIGNMENT: Normal alignment of lumbar vertebral bodies  No subluxation  VERTEBRAL BODIES: Mild osteopenia  No fracture  No discrete lytic or destructive lesion  DEGENERATIVE CHANGES: Slight annular bulging at L1-2 without canal stenosis or foraminal narrowing  Similar findings at the L2-3 and L3-4 levels   At L4-5 there is slightly more pronounced diffuse annular bulging with a broad-based left foraminal disc protrusion and mild facet arthropathy  Slight canal stenosis without discrete nerve impingement  Mild bilateral foraminal narrowing without nerve compression or displacement  L5-S1 demonstrates vacuum phenomenon with mild annular bulging and slight endplate degenerative change, more pronounced on the right  There is no canal stenosis  Slight foraminal narrowing, right greater than left without discrete nerve impingement  PREVERTEBRAL AND PARASPINAL SOFT TISSUES: Aneurysmal dilatation of the abdominal aorta extending above the scope of this examination  Distal thoracic aorta just above the diaphragmatic hiatus measures 6 8 cm in transverse diameter  Tortuosity and mild aneurysmal dilatation of the abdominal aorta  Maximum oblique diameter of the distal aorta just above the bifurcation measures 4 9 cm  Impression: Mild noncompressive lumbar degenerative disc disease at the L4-5 and L5-S1 levels  Small left foraminal disc protrusion at L4-5 and asymmetric endplate changes on the right at L5-S1 resulting in mild foraminal narrowing  No compression fracture  Thoracoabdominal aortic aneurysm  The distal thoracic aorta measures up to 6 8 cm  Workstation performed: [id number]       Assessment and plan:  1  Stage IIIA adenocarcinoma of the lung status post concurrent radiation therapy with chemo utilizing weekly Taxol/carboplatin initiated on August 7284, complicated with C diff colitis admitted last week, she was initiated on metronidazole 500 mg p o  T i d , unfortunately most likely refractory to metronidazole, the patient be admitted to the hospital with diarrhea, acute renal failure she received the last chemotherapy 4 days ago  2  I will hold additional chemotherapy at this time as well as radiation  3  Infectious disease consult for refractory C diff colitis 2 metronidazole and possible initiation of p  O  Vancomycin  4   Follow-up with Dr John Wasserman as an outpatient

## 2018-10-08 NOTE — ED PROVIDER NOTES
History  Chief Complaint   Patient presents with    Back Pain     Patient presents with lower back pain chronic in nature but feels worse  Patient also DX with cdiff and is still having diarrhea  Patient is still having episodes of diarrhea 5-6 times a day     Patient complains of persistent diarrhea over last 2 weeks after recent diagnosis of C  Diff  She has about 5 stools daily  She has crampy abdominal discomfort and feels that metronidazole pills aren't working  She has lung cancer and gets chemo - last chemo was 5 days ago  Patient has gradual worsening low back pain over last 2 days midline and more on right  No specific loss of bowel or bladder control  Pain feels sharp and muscular  Tramadol does not relieve pain  Pain does radiate down right leg to foot  Prior to Admission Medications   Prescriptions Last Dose Informant Patient Reported? Taking? Methylcobalamin (B12-ACTIVE PO)  Self Yes No   Sig: Take by mouth daily     Omega-3 Fatty Acids (FISH OIL PO)  Self Yes No   Sig: Take 1 capsule by mouth daily     albuterol (VENTOLIN HFA) 90 mcg/act inhaler  Self No No   Sig: Inhale 2 puffs every 6 (six) hours as needed for wheezing or shortness of breath   aspirin (ECOTRIN) 325 mg EC tablet  Self Yes No   Sig: Take 325 mg by mouth daily     atorvastatin (LIPITOR) 40 mg tablet  Self No No   Sig: Take 1 tablet (40 mg total) by mouth daily   Patient taking differently: Take 40 mg by mouth daily in the early morning     co-enzyme Q-10 30 MG capsule  Self Yes No   Sig: Take 30 mg by mouth daily     gabapentin (NEURONTIN) 100 mg capsule  Self Yes No   Sig: Take 200 mg by mouth 3 (three) times a day   levothyroxine 100 mcg tablet  Self No No   Sig: Take 1 tablet (100 mcg total) by mouth daily   lisinopril (ZESTRIL) 10 mg tablet   Yes No   Sig: Take 10 mg by mouth daily   magic mouthwash oral suspension (mixture)  Self No No   Sig: Swish and swallow 10 mL 3 (three) times a day as needed (esophagitis)   metoprolol succinate (TOPROL-XL) 50 mg 24 hr tablet  Self No No   Sig: Take 1 tablet (50 mg total) by mouth 2 (two) times a day      Facility-Administered Medications: None       Past Medical History:   Diagnosis Date    Aortic aneurysm ([place] [address] )     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HCC)     ASCVD (arteriosclerotic cardiovascular disease)     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 ([place] [address] )     Back problem     Blood type B-     Cataract     COPD (chronic obstructive pulmonary disease) ([place] [address] )     Coronary artery disease     CVA (cerebral vascular accident) ([place] [address] ) 2012    Depression     Disease of thyroid gland     History of cataract     Hyperlipidemia     Hypertension     Lung mass     Mediastinal adenopathy     Muscle pain     Prediabetes     PVD (peripheral vascular disease) ([place] [address] )     Thoracic aortic aneurysm (HCC)     Transient insomnia        Past Surgical History:   Procedure Laterality Date    CAROTID STENT Left     DC COLONOSCOPY FLX DX W/COLLJ SPEC WHEN PFRMD N/A 9/27/2017    Procedure: EGD AND COLONOSCOPY;  Surgeon: Valerio Hylton MD;  [facility];  Service: Gastroenterology    DC EDG US EXAM SURGICAL ALTER STOM DUODENUM/JEJUNUM N/A 8/1/2018    Procedure: LINEAR ENDOSCOPIC U/S;  Surgeon: Kassandra Nixon MD;  [facility]; Service: Gastroenterology    TUBAL LIGATION         Family History   Problem Relation Age of Onset    Hypertension Mother     Heart disease Mother     Diabetes Father     Liver disease Father         hepatic failure     Breast cancer Daughter     Substance Abuse Neg Hx     Mental illness Neg Hx      I have reviewed and agree with the history as documented      Social History   Substance Use Topics    Smoking status: Former Smoker     Packs/day: 0 50     Years: 45 00     Quit date: 3/6/2018    Smokeless tobacco: Never Used    Alcohol use No        Review of Systems   All other systems reviewed and are negative  Physical Exam  Physical Exam   Constitutional: She is oriented to person, place, and time  She appears well-developed and well-nourished  HENT:   Mouth/Throat: Oropharynx is clear and moist  Mucous membranes are dry  Eyes: Pupils are equal, round, and reactive to light  Conjunctivae and EOM are normal    Neck: Normal range of motion  Neck supple  No spinous process tenderness present  Cardiovascular: Normal rate, regular rhythm, normal heart sounds and intact distal pulses  Pulmonary/Chest: Effort normal and breath sounds normal  No respiratory distress  She has no wheezes  Abdominal: Soft  Bowel sounds are normal  She exhibits no distension  There is generalized tenderness  There is no rebound and no guarding  Musculoskeletal: Normal range of motion  Lumbar back: She exhibits tenderness, bony tenderness, pain and spasm  She exhibits normal pulse  Back:    Negative straight leg raise   Neurological: She is alert and oriented to person, place, and time  She has normal strength  No sensory deficit  GCS eye subscore is 4  GCS verbal subscore is 5  GCS motor subscore is 6  Skin: Skin is warm and dry  No rash noted  Psychiatric: She has a normal mood and affect  Nursing note and vitals reviewed        Vital Signs  ED Triage Vitals   Temperature Pulse Respirations Blood Pressure SpO2   10/08/18 1002 10/08/18 1002 10/08/18 1002 10/08/18 1002 10/08/18 1002   (!) 96 6 °F (35 9 °C) 90 20 154/74 98 %      Temp Source Heart Rate Source Patient Position - Orthostatic VS BP Location FiO2 (%)   10/08/18 1427 10/08/18 1600 10/08/18 1427 10/08/18 1427 --   Oral Monitor Lying Left arm       Pain Score       10/08/18 1002       6           Vitals:    10/08/18 1002 10/08/18 1427 10/08/18 1600   BP: 154/74 128/62 95/69   Pulse: 90 85 80   Patient Position - Orthostatic VS:  Lying Lying       Visual Acuity      ED Medications  Medications   lidocaine (LIDODERM) 5 % patch 1 patch (1 patch Topical Medication Applied 10/8/18 1104)   aspirin (ECOTRIN) EC tablet 325 mg (325 mg Oral Given 10/8/18 1526)   levothyroxine tablet 100 mcg (100 mcg Oral Given 10/8/18 1526)   metoprolol succinate (TOPROL-XL) 24 hr tablet 50 mg (50 mg Oral Not Given 10/8/18 1728)   gabapentin (NEURONTIN) capsule 200 mg (200 mg Oral Given 10/8/18 1525)   albuterol (PROVENTIL HFA,VENTOLIN HFA) inhaler 2 puff (not administered)   magic mouthwash oral suspension (mixture) (not administered)   ondansetron (ZOFRAN) injection 4 mg (not administered)   enoxaparin (LOVENOX) subcutaneous injection 30 mg (30 mg Subcutaneous Given 10/8/18 1526)   sodium chloride 0 9 % infusion (125 mL/hr Intravenous New Bag 10/8/18 1527)   vancomycin (VANCOCIN) oral solution 125 mg (125 mg Oral Given 10/8/18 1727)   HYDROcodone-acetaminophen (NORCO) 5-325 mg per tablet 1 tablet (1 tablet Oral Given 10/8/18 1727)   atorvastatin (LIPITOR) tablet 40 mg (not administered)   sodium chloride 0 9 % bolus 1,000 mL (0 mL Intravenous Stopped 10/8/18 1222)   HYDROcodone-acetaminophen (NORCO) 5-325 mg per tablet 1 tablet (1 tablet Oral Given 10/8/18 1314)   sodium chloride 0 9 % bolus 1,000 mL (1,000 mL Intravenous New Bag 10/8/18 1317)       Diagnostic Studies  Results Reviewed     Procedure Component Value Units Date/Time    CMP [[id number]]  (Abnormal) Collected:  10/08/18 1056    Lab Status:  Final result Specimen:  Blood from Arm, Right Updated:  10/08/18 1122     Sodium 139 mmol/L      Potassium 4 4 mmol/L      Chloride 103 mmol/L      CO2 21 mmol/L      ANION GAP 15 (H) mmol/L      BUN 33 (H) mg/dL      Creatinine 2 57 (H) mg/dL      Glucose 109 mg/dL      Calcium 9 2 mg/dL      AST 18 U/L      ALT 27 U/L      Alkaline Phosphatase 112 U/L      Total Protein 6 8 g/dL      Albumin 3 2 (L) g/dL      Total Bilirubin 0 40 mg/dL      eGFR 18 ml/min/1 73sq m     Narrative:         National Kidney Disease Education Program recommendations are as follows:  GFR calculation is accurate only with a steady state creatinine  Chronic Kidney disease less than 60 ml/min/1 73 sq  meters  Kidney failure less than 15 ml/min/1 73 sq  meters  Lipase [[id number]]  (Normal) Collected:  10/08/18 1056    Lab Status:  Final result Specimen:  Blood from Arm, Right Updated:  10/08/18 1122     Lipase 273 u/L     CBC and differential [[id number]]  (Abnormal) Collected:  10/08/18 1056    Lab Status:  Final result Specimen:  Blood from Arm, Right Updated:  10/08/18 1103     WBC 2 77 (L) Thousand/uL      RBC 3 19 (L) Million/uL      Hemoglobin 10 2 (L) g/dL      Hematocrit 31 8 (L) %       (H) fL      MCH 32 0 pg      MCHC 32 1 g/dL      RDW 15 7 (H) %      MPV 12 0 fL      Platelets 626 (L) Thousands/uL      nRBC 0 /100 WBCs      Neutrophils Relative 66 %      Immat GRANS % 1 %      Lymphocytes Relative 25 %      Monocytes Relative 6 %      Eosinophils Relative 1 %      Basophils Relative 1 %      Neutrophils Absolute 1 83 (L) Thousands/µL      Immature Grans Absolute 0 02 Thousand/uL      Lymphocytes Absolute 0 70 Thousands/µL      Monocytes Absolute 0 17 Thousand/µL      Eosinophils Absolute 0 03 Thousand/µL      Basophils Absolute 0 02 Thousands/µL                  CT recon only lumbar spine   Final Result by Homa Farfan DO (10/08 1243)      Mild noncompressive lumbar degenerative disc disease at the L4-5 and L5-S1 levels  Small left foraminal disc protrusion at L4-5 and asymmetric endplate changes on the right at L5-S1 resulting in mild foraminal narrowing  No compression fracture  Thoracoabdominal aortic aneurysm  The distal thoracic aorta measures up to 6 8 cm  Workstation performed: [id number]         CT abdomen pelvis without contrast   Final Result by Himanshu Scott MD (10/08 1222)      Multiple distended and nondilated small and large bowel loops containing fluid consistent with history of diarrhea  No focal bowel wall thickening with stranding to suggest colitis  Focal soft tissue thickening in the right mons pubis associated with hypermetabolic activity on PET/CT has resolved and was likely inflammatory in nature  Workstation performed: [id number]                    Procedures  Procedures       Phone Contacts  ED Phone Contact    ED Course  ED Course as of Oct 08 2020   Mon Oct 08, 2018   1241 Back pain decreased with lidocaine patch but now returned    Will give vicodin    1104 Paged Fly                                MDM  Number of Diagnoses or Management Options  Ac[facility] on chronic [facility] failure ([facility] ): new and requires workup  Diarrhea: new and requires workup  Lumbar radiculopathy, acute: new and requires workup     Amount and/or Complexity of Data Reviewed  Clinical lab tests: ordered and reviewed  Tests in the radiology section of CPT®: ordered and reviewed  Obtain history from someone other than the patient: yes  Discuss the patient with other providers: yes    Patient Progress  Patient progress: improved    CritCare Time    Disposition  Final diagnoses:   Acute on chronic renal failure ([facility] ) - secondary to diarrhea   Lumbar radiculopathy, acute - right   Diarrhea     Time reflects when diagnosis was documented in both MDM as applicable and the Disposition within this note     Time User Action Codes Description Comment    10/8/2018  1:06 PM Cassandria Broach Add [N17 9,  N18 9] Acute on chronic renal failure ([facility] )     10/8/2018  1:06 PM Cassandria Broach Modify [N17 9,  N18 9] Acute on chronic renal failure ([facility] ) secondary to diarrhea    10/8/2018  1:06 PM Cassandria Broach Add [M54 16] Lumbar radiculopathy, acute     10/8/2018  1:06 PM Cassandria Broach Modify [M54 16] Lumbar radiculopathy, acute right    10/8/2018  1:06 PM Cassandria Broach Add [R19 7] Diarrhea     10/8/2018  2:10 PM Shaina Singh Add [C34 92] Nonsquamous nonsmall cell neoplasm of left lung ([facility] )     10/8/2018  2:10 PM Shaina Singh Modify [C34 92] Nonsquamous nonsmall cell neoplasm of left lung [facility])       ED Disposition     ED Disposition Condition Comment    Admit  Case was discussed with Kay Lewis** and the patient's admission status was agreed to be Admission Status: inpatient status to the service of Dr David Dunbar   Follow-up Information    None         Current Discharge Medication List      CONTINUE these medications which have NOT CHANGED    Details   albuterol (VENTOLIN HFA) 90 mcg/act inhaler Inhale 2 puffs every 6 (six) hours as needed for wheezing or shortness of breath  Qty: 18 g, Refills: 1    Associated Diagnoses: COPD with asthma ([address] )      aspirin (ECOTRIN) 325 mg EC tablet Take 325 mg by mouth daily  atorvastatin (LIPITOR) 40 mg tablet Take 1 tablet (40 mg total) by mouth daily  Qty: 90 tablet, Refills: 3    Associated Diagnoses: Pure hypercholesterolemia      co-enzyme Q-10 30 MG capsule Take 30 mg by mouth daily        gabapentin (NEURONTIN) 100 mg capsule Take 200 mg by mouth 3 (three) times a day      levothyroxine 100 mcg tablet Take 1 tablet (100 mcg total) by mouth daily  Qty: 30 tablet, Refills: 3    Associated Diagnoses: Hypothyroidism, unspecified type      lisinopril (ZESTRIL) 10 mg tablet Take 10 mg by mouth daily      magic mouthwash oral suspension (mixture) Swish and swallow 10 mL 3 (three) times a day as needed (esophagitis)  Qty: 12 oz, Refills: 1    Associated Diagnoses: Squamous cell carcinoma of bronchus in left upper lobe (HCC)      Methylcobalamin (B12-ACTIVE PO) Take by mouth daily        metoprolol succinate (TOPROL-XL) 50 mg 24 hr tablet Take 1 tablet (50 mg total) by mouth 2 (two) times a day  Qty: 60 tablet, Refills: 5    Associated Diagnoses: Essential hypertension      Omega-3 Fatty Acids (FISH OIL PO) Take 1 capsule by mouth daily             No discharge procedures on file      ED Provider  Electronically Signed by           Sarah Fenton DO  10/08/18 2020

## 2018-10-08 NOTE — ED NOTES
Patient states she gets intermittent abd cramping and has hyperactive BS    Patient states she has 5-6 stools daily     Cortes Morgan RN  10/08/18 1007

## 2018-10-08 NOTE — CONSULTS
Consultation - Jesenia Falk 72 y o  female MRN: [id number]  Unit/Bed#: [address] [place], [place] [id number] Encounter: [id number]    Inpatient consult to gastroenterology  Consult performed by: Brittney Leyva ordered by: Spring Anaya        ASSESSMENT  Diarrhea and abdominal pain - patient diagnosed with C difficile colitis 2 weeks ago and states no improvement on metronidazole  Suspect under treated or unresponsive C diff colitis  Rule out other infections  Recommend bowel rest and addition of vancomycin  Consider cholestyramine also  PLAN  Clear liquid diet  Vancomycin 125 mg p o  Q i d  If no improvement consider adding metronidazole back intravenously, consider adding cholestyramine    Chief Complaint   Patient presents with    Back Pain     Patient presents with lower back pain chronic in nature but feels worse  Patient also DX with cdiff and is still having diarrhea  Patient is still having episodes of diarrhea 5-6 times a day       HPI 29-year-old female being treated for lung cancer with chemotherapy 2 weeks ago diagnosed with C difficile colitis  Has been on metronidazole since  States she is continuing to have multiple loose stools through the day and nighttime  No melena or hematochezia  Has intermittent crampy lower abdominal pain and last night also developed back pain  Denies fever  She has nausea but no vomiting  No appetite  Aside from the metronidazole no new medications      Past Medical History:   Diagnosis Date    [facility] )     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[facility])     ASCVD (arteriosclerotic cardiovascular disease)     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 ([facility] )     Back problem     Blood type B-     Cataract     COPD (chronic obstructive pulmonary disease) ([facility])     Coronary artery disease     CVA (cerebral vascular accident) ([facility] ) 2012    Depression     Disease of thyroid gland     History of cataract     Hyperlipidemia     Hypertension     Lung mass     Mediastinal adenopathy     Muscle pain     Prediabetes     PVD (peripheral vascular disease) ([address] )     Thoracic aortic aneurysm (HCC)     Transient insomnia        Past Surgical History:   Procedure Laterality Date    CAROTID STENT Left     ID COLONOSCOPY FLX DX W/COLLJ SPEC WHEN PFRMD N/A 9/27/2017    Procedure: EGD AND COLONOSCOPY;  Surgeon: Lexi Bentley MD;  [facility];  Service: Gastroenterology    ID EDG US EXAM SURGICAL ALTER STOM DUODENUM/JEJUNUM N/A 8/1/2018    Procedure: LINEAR ENDOSCOPIC U/S;  Surgeon: Chandni Jaffe MD;  [facility]; Service: Gastroenterology    TUBAL LIGATION         Prescriptions Prior to Admission   Medication    albuterol (VENTOLIN HFA) 90 mcg/act inhaler    aspirin (ECOTRIN) 325 mg EC tablet    atorvastatin (LIPITOR) 40 mg tablet    co-enzyme Q-10 30 MG capsule    gabapentin (NEURONTIN) 100 mg capsule    levothyroxine 100 mcg tablet    lisinopril (ZESTRIL) 10 mg tablet    magic mouthwash oral suspension (mixture)    Methylcobalamin (B12-ACTIVE PO)    metoprolol succinate (TOPROL-XL) 50 mg 24 hr tablet    Omega-3 Fatty Acids (FISH OIL PO)       Allergies   Allergen Reactions    Penicillins Rash       Social History     Family History   Problem Relation Age of Onset    Hypertension Mother     Heart disease Mother     Diabetes Father     Liver disease Father         hepatic failure     Breast cancer Daughter     Substance Abuse Neg Hx     Mental illness Neg Hx        Review of Systems - multiple positives as noted in the HPI and past medical history reviewed in the admission notes  BP 95/69 (BP Location: Left arm)   Pulse 80   Temp 98 °F (36 7 °C) (Oral)   Resp 18   Ht 5' 1 2" (1 554 m)   Wt 56 5 kg (124 lb 9 oz)   LMP  (LMP Unknown)   SpO2 98%   BMI 23 38 kg/m²     PHYSICALEXAM  Awake alert oriented    Lungs distant breath sounds heart S1-S2 abdomen is soft with hypoactive bowel sounds there is no mass or hepatosplenomegaly  There is no distention  There is mild suprapubic and left lower quadrant tenderness without rebound or guarding      Lab Results   Component Value Date    GLUCOSE 98 03/05/2016    CALCIUM 9 2 10/08/2018     10/08/2018    K 4 4 10/08/2018    CO2 21 10/08/2018     10/08/2018    BUN 33 (H) 10/08/2018    CREATININE 2 57 (H) 10/08/2018     Lab Results   Component Value Date    WBC 2 77 (L) 10/08/2018    HGB 10 2 (L) 10/08/2018    HCT 31 8 (L) 10/08/2018     (H) 10/08/2018     (L) 10/08/2018     Lab Results   Component Value Date    ALT 27 10/08/2018    AST 18 10/08/2018    ALKPHOS 112 10/08/2018    BILITOT 0 25 04/23/2015     No components found for: [id number]  Lab Results   Component Value Date    LIPASE 273 10/08/2018     No results found for: IRON, TIBC, FERRITIN  Lab Results   Component Value Date    INR 1 06 09/21/2018

## 2018-10-08 NOTE — H&P
Assessment/Plan     Principal Problem:    NYDIA (acute kidney injury) ([facility] )  Active Problems:    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)    Dehydration    Hypothyroidism    Aneurysm of infrarenal abdominal aorta (HCC)    Pure hypercholesterolemia    Bilateral carotid artery stenosis    Pancytopenia (HCC)       1   nydia- with dehydration - likely caused by c diff colitis  -v fluids and serial crt  2   [facility] still with marked loose stools- appetitie is fair  3  Lung cancer- on chemo with Dr Courtney Ramirez and radiation therapy currently- had last chemo on Thursday 10/4  4  Pancytopenia- wbc 2,700, h/h 10 2/ 31 8 and plts 107,000    Subjective     Michael Maurer is an 72 y o  female  HPI:  Who presents with weakness and fatigue and found to have nydia- likely due to dehydration with recent c,  Diff colitis( has perisstent frequent loose stools  Her crt is now 2 57 with baseline at 1 22  Past Surgical History:   Procedure Laterality Date    CAROTID STENT Left     DC COLONOSCOPY FLX DX W/COLLJ SPEC WHEN PFRMD N/A 9/27/2017    Procedure: EGD AND COLONOSCOPY;  Surgeon: Shayy Nunez MD;  [facility];  Service: Gastroenterology    DC EDG US EXAM SURGICAL ALTER STOM DUODENUM/JEJUNUM N/A 8/1/2018    Procedure: LINEAR ENDOSCOPIC U/S;  Surgeon: Marylin Decker MD;  [facility]; Service: Gastroenterology    TUBAL LIGATION         Review of Systems   Constitutional: Positive for fatigue  Negative for fever  Gastrointestinal: Positive for abdominal pain and diarrhea  Musculoskeletal: Positive for back pain  Neurological: Positive for weakness  All other systems reviewed and are negative  Objective     /74   Pulse 90   Temp (!) 96 6 °F (35 9 °C)   Resp 20   Wt 56 7 kg (125 lb)   LMP  (LMP Unknown)   SpO2 98%   BMI 23 30 kg/m²     Physical Exam   Constitutional: She is oriented to person, place, and time   She appears well-developed and well-nourished  Fatigued and ill appearing   HENT:   Head: Normocephalic and atraumatic  Mouth/Throat: No oropharyngeal exudate  Dry oral mucosa   Eyes: Right eye exhibits no discharge  Left eye exhibits no discharge  No scleral icterus  Neck: No JVD present  No tracheal deviation present  No thyromegaly present  Cardiovascular: Normal rate and regular rhythm  No murmur heard  Pulmonary/Chest: Effort normal and breath sounds normal  No respiratory distress  She exhibits no tenderness  mild decreased bs in bases   Abdominal: Soft  There is tenderness  There is no rebound  Hyperactive bs   Musculoskeletal: She exhibits no edema or tenderness  Neurological: She is alert and oriented to person, place, and time  No cranial nerve deficit  Coordination normal    Skin: Skin is warm and dry  No erythema  Nursing note and vitals reviewed  Lashaun Garibay [facility] \[facility]\""

## 2018-10-09 NOTE — PLAN OF CARE
Problem: Nutrition/Hydration-ADULT  Goal: Nutrient/Hydration intake appropriate for improving, restoring or maintaining nutritional needs  Monitor and assess patient's nutrition/hydration status for malnutrition (ex- brittle hair, bruises, dry skin, pale skin and conjunctiva, muscle wasting, smooth red tongue, and disorientation)  Collaborate with interdisciplinary team and initiate plan and interventions as ordered  Monitor patient's weight and dietary intake as ordered or per policy  Utilize nutrition screening tool and intervene per policy  Determine patient's food preferences and provide high-protein, high-caloric foods as appropriate       INTERVENTIONS:  - Monitor oral intake, urinary output, labs, and treatment plans  - Assess nutrition and hydration status and recommend course of action  - Evaluate amount of meals eaten  - Assist patient with eating if necessary   - Allow adequate time for meals  - Recommend/ encourage appropriate diets, oral nutritional supplements, and vitamin/mineral supplements  - Order, calculate, and assess calorie counts as needed  - Recommend, monitor, and adjust tube feedings and TPN/PPN based on assessed needs  - Assess need for intravenous fluids  - Provide specific nutrition/hydration education as appropriate  - Include patient/family/caregiver in decisions related to nutrition   Outcome: Progressing      Problem: PAIN - ADULT  Goal: Verbalizes/displays adequate comfort level or baseline comfort level  Interventions:  - Encourage patient to monitor pain and request assistance  - Assess pain using appropriate pain scale  - Administer analgesics based on type and severity of pain and evaluate response  - Implement non-pharmacological measures as appropriate and evaluate response  - Consider cultural and social influences on pain and pain management  - Notify physician/advanced practitioner if interventions unsuccessful or patient reports new pain   Outcome: Progressing      Problem: INFECTION - ADULT  Goal: Absence or prevention of progression during hospitalization  INTERVENTIONS:  - Assess and monitor for signs and symptoms of infection  - Monitor lab/diagnostic results  - Monitor all insertion sites, i e  indwelling lines, tubes, and drains  - Monitor endotracheal (as able) and nasal secretions for changes in amount and color  - [place] appropriate cooling/warming therapies per order  - Administer medications as ordered  - Instruct and encourage patient and family to use good hand hygiene technique  - Identify and instruct in appropriate isolation precautions for identified infection/condition   Outcome: Progressing    Goal: Absence of fever/infection during neutropenic period  INTERVENTIONS:  - Monitor WBC  - Implement neutropenic guidelines   Outcome: Progressing      Problem: SAFETY ADULT  Goal: Patient will remain free of falls  INTERVENTIONS:  - Assess patient frequently for physical needs  -  Identify cognitive and physical deficits and behaviors that affect risk of falls    -  [place] fall precautions as indicated by assessment   - Educate patient/family on patient safety including physical limitations  - Instruct patient to call for assistance with activity based on assessment  - Modify environment to reduce risk of injury  - Consider OT/PT consult to assist with strengthening/mobility   Outcome: Progressing    Goal: Maintain or return to baseline ADL function  INTERVENTIONS:  -  Assess patient's ability to carry out ADLs; assess patient's baseline for ADL function and identify physical deficits which impact ability to perform ADLs (bathing, care of mouth/teeth, toileting, grooming, dressing, etc )  - Assess/evaluate cause of self-care deficits   - Assess range of motion  - Assess patient's mobility; develop plan if impaired  - Assess patient's need for assistive devices and provide as appropriate  - Encourage maximum independence but intervene and supervise when necessary  ¯ Involve family in performance of ADLs  ¯ Assess for home care needs following discharge   ¯ Request OT consult to assist with ADL evaluation and planning for discharge  ¯ Provide patient education as appropriate   Outcome: Progressing    Goal: Maintain or return mobility status to optimal level  INTERVENTIONS:  - Assess patient's baseline mobility status (ambulation, transfers, stairs, etc )    - Identify cognitive and physical deficits and behaviors that affect mobility  - Identify mobility aids required to assist with transfers and/or ambulation (gait belt, sit-to-stand, lift, walker, cane, etc )  - [place] fall precautions as indicated by assessment  - Record patient progress and toleration of activity level on Mobility SBAR; progress patient to next Phase/Stage  - Instruct patient to call for assistance with activity based on assessment  - Request Rehabilitation consult to assist with strengthening/weightbearing, etc    Outcome: Progressing      Problem: DISCHARGE PLANNING  Goal: Discharge to home or other facility with appropriate resources  INTERVENTIONS:  - Identify barriers to discharge w/patient and caregiver  - Arrange for needed discharge resources and transportation as appropriate  - Identify discharge learning needs (meds, wound care, etc )  - Arrange for interpretive services to assist at discharge as needed  - Refer to Case Management Department for coordinating discharge planning if the patient needs post-hospital services based on physician/advanced practitioner order or complex needs related to functional status, cognitive ability, or social support system   Outcome: Progressing      Problem: Knowledge Deficit  Goal: Patient/family/caregiver demonstrates understanding of disease process, treatment plan, medications, and discharge instructions  Complete learning assessment and assess knowledge base    Interventions:  - Provide teaching at level of understanding  - Provide teaching via preferred learning methods   Outcome: Progressing

## 2018-10-09 NOTE — MALNUTRITION/BMI
This medical record reflects one or more clinical indicators suggestive of malnutrition and/or morbid obesity  Malnutrition Findings:   Malnutrition type: Chronic illness  Degree of Malnutrition: Malnutrition of moderate degree  Malnutrition Characteristics: Inadequate energy, Weight loss (Pt presents with 5% involuntary wt loss in 2 months, poor po intake < 75% intake for greater than 7 days and mild oribital hollowing  Treat with diet and supplements  )    BMI Findings: Body mass index is 23 51 kg/m²  See Nutrition note dated 10/9/18  for additional details  Completed nutrition assessment is viewable in the nutrition documentation

## 2018-10-09 NOTE — UTILIZATION REVIEW
Initial Clinical Review  Admission: Date/Time/Statement: 10/8/18 @ 1318   Orders Placed This Encounter   Procedures    Inpatient Admission (expected length of stay for this patient is greater than two midnights)     Standing Status:   Standing     Number of Occurrences:   1     Order Specific Question:   Admitting Physician     Answer:   Riddhi Lutz [[id number]]     Order Specific Question:   Level of Care     Answer:   Med Surg [[id number]]     Order Specific Question:   Estimated length of stay     Answer:   More than 2 Midnights     Order Specific Question:   Certification     Answer:   I certify that inpatient services are medically necessary for this patient for a duration of greater than two midnights  See H&P and MD Progress Notes for additional information about the patient's course of treatment  ED: Date/Time/Mode of Arrival:   ED Arrival Information     Expected Arrival Acuity Means of Arrival Escorted By Service Admission Type    - 10/8/2018 09:35 Urgent [facility] Urgent    Arrival Complaint    back pain, vomiting and diarrhea      Chief Complaint:   Chief Complaint   Patient presents with    Back Pain     Patient presents with lower back pain chronic in nature but feels worse  Patient also DX with cdiff and is still having diarrhea  Patient is still having episodes of diarrhea 5-6 times a day   History of Illness:   Patient complains of persistent diarrhea over last 2 weeks after recent diagnosis of C  Diff  She has about 5 stools daily  She has crampy abdominal discomfort and feels that metronidazole pills aren't working  She has lung cancer and gets chemo - last chemo was 5 days ago  Patient has gradual worsening low back pain over last 2 days midline and more on right  No specific loss of bowel or bladder control  Pain feels sharp and muscular  Tramadol does not relieve pain  Pain does radiate down right leg to foot  Mouth/Throat: Mucous membranes are dry     Abdominal: There is generalized tenderness  Musculoskeletal: Lumbar back: She exhibits tenderness, bony tenderness, pain and spasm  ED Vital Signs:   ED Triage Vitals   Temperature Pulse Respirations Blood Pressure SpO2   10/08/18 1002 10/08/18 1002 10/08/18 1002 10/08/18 1002 10/08/18 1002   (!) 96 6 °F (35 9 °C) 90 20 154/74 98 %      Temp Source Heart Rate Source Patient Position - Orthostatic VS BP Location FiO2 (%)   10/08/18 1427 10/08/18 1600 10/08/18 1427 10/08/18 1427 --   Oral Monitor Lying Left arm       Pain Score       10/08/18 1002       6        Wt Readings from Last 1 Encounters:   10/09/18 56 8 kg (125 lb 3 5 oz)   Vital Signs (abnormal): Abnormal Labs/Diagnostic Test Results:   WBC 2 77 HGB 10 2  ANION GAP 15 BUN 33 CR 2 57 ALB 3 2 GFR 18   U/A+PROT  CT A/P=Multiple distended and nondilated small and large bowel loops containing fluid consistent with history of diarrhea  No focal bowel wall thickening with stranding to suggest colitis  Focal soft tissue thickening in the right mons pubis associated with hypermetabolic activity on PET/CT has resolved and was likely inflammatory in nature  CT LUMBAR SPINE=Mild noncompressive lumbar degenerative disc disease at the L4-5 and L5-S1 levels  Small left foraminal disc protrusion at L4-5 and asymmetric endplate changes on the right at L5-S1 resulting in mild foraminal narrowing  No compression fracture  Thoracoabdominal aortic aneurysm  The distal thoracic aorta measures up to 6 8 cm    ED Treatment:   Medication Administration from 10/08/2018 0935 to 10/08/2018 1427       Date/Time Order Dose Route Action Action by Comments     10/08/2018 1104 lidocaine (LIDODERM) 5 % patch 1 patch 1 patch Topical Medication Applied Sedrick Romano RN lumbar     10/08/2018 1222 sodium chloride 0 9 % bolus 1,000 mL 0 mL Intravenous Stopped Sedrick Romano RN      10/08/2018 1104 sodium chloride 0 9 % bolus 1,000 mL 1,000 mL Intravenous [address] Sedrick Romano RN      10/08/2018 1314 HYDROcodone-acetaminophen (NORCO) 5-325 mg per tablet 1 tablet 1 tablet Oral Given Keerthi Mckenna RN      10/08/2018 1317 sodium chloride 0 9 % bolus 1,000 mL 1,000 mL Intravenous New Bag Keerthi Mckenna RN       Past Medical/Surgical History:   Past Medical History:   Diagnosis Date    Aortic aneurysm ([facility] )     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HCC)     ASCVD (arteriosclerotic cardiovascular disease)     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)     Back problem     Blood type B-     Cataract     [facility]     Coronary artery disease     CVA (cerebral vascular accident) ([facility] ) 2012    Depression     Disease of thyroid gland     History of cataract     Hyperlipidemia     Hypertension     Lung mass     Mediastinal adenopathy     Muscle pain     Prediabetes     PVD (peripheral vascular disease) ([facility] )     Thoracic aortic aneurysm ([facility])     Transient insomnia    Admitting Diagnosis: Diarrhea [R19 7]  Back pain [M54 9]  Acute on chronic renal failure (HCC) [N17 9, N18 9]  Lumbar radiculopathy, acute [M54 16]  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) [C34 92]  Age/Sex: 72 y o  female  Assessment/Plan:   73 YO FEMALE TO ER FROM HOME C/O CRAMPY ABD PAIN WITH PERSISTENT DIARRHEA  PT BEING TX FOR C-DIFF WITH FLAGYL  HX LUNG CA, RECEIVING CHEMO, LAST DOSE 5 DAYS AGO  PRESENTS FATIGUED WITH GENERALIZED ABD PAIN/TENDERNESS, HYPERACTIVE BS, BACK PAIN & DIARRHEA  ADMITTED TO INPATIENT STATUS, STARTED ON IV FLAGYL, PO VANCO & IVF  PLACED ON CONTACT ISOLATION, GI CONSULTED     Admission Orders:  MED SURG  CONTACT & HAND HYGIENE ISOLATION  CONSULT GI  CONSULT ONCOLOGY  ORTHOSTATIC BP  Scheduled Meds:   Current Facility-Administered Medications:  albuterol 2 puff Inhalation Q6H PRN Shaina Fly, DO    aspirin 325 mg Oral Daily Shaina Fly, DO    atorvastatin 40 mg Oral Daily Shaina Fly, DO    enoxaparin 30 mg Subcutaneous Daily Shaina Fly, DO gabapentin 200 mg Oral TID Bernis Smith, DO    HYDROcodone-acetaminophen 1 tablet Oral Q6H PRN Bernis Smith, DO    levothyroxine 100 mcg Oral Early Morning Shaina Fly, DO    magic mouthwash oral suspension (mixture) 10 mL Swish & Swallow TID PRN Bernis Smith, DO    metoprolol succinate 50 mg Oral BID Shaina Fly, DO    metroNIDAZOLE 500 mg Intravenous Q8H Shaina Fly, DO    ondansetron 4 mg Intravenous Q6H PRN Shaina Fly, DO    vancomycin 125 mg Oral Q6H [facility] group home Shaina Fly, DO    Continuous Infusions:   sodium chloride 75 mL/hr Last Rate: 75 mL/hr (10/09/18 0940)   PRN Meds:   albuterol    HYDROcodone-acetaminophen    magic mouthwash oral suspension (mixture)    ondansetron  Thank you,  Alexus Taylor  Utilization Review Department  Phone: [phone]; Fax [phone]  ATTENTION: Please call with any questions or concerns to [phone]  and carefully follow the prompts so that you are directed to the right person  Send all requests for admission clinical reviews, approved or denied determinations and any other requests to fax [phone]   All voicemails are confidential

## 2018-10-09 NOTE — SOCIAL WORK
Met with Pt  Pt presents AA&Ox3  Discussed role of   Pt lives with significant other in 2sh, 1 raza  Pt is independent with adls and ambulation  Pt goes to [address] in [facility]  Pt had [facility] in past but unable to recall which agency  Pt goes to [facility] for chemotherapy once a week and goes to [facility] for radiation every day  Pt's significant other drives and goes grocery shopping  Pt declines services upon discharge  Will follow

## 2018-10-09 NOTE — PROGRESS NOTES
Nancy Steve  [id number]    72 y o   female      ASSESSMENT  1  Diarrhea and abdominal pain - patient diagnosed with C difficile colitis 2 weeks ago and states no improvement on metronidazole  Suspect under treated or unresponsive C diff colitis  Recent stool for C diff negative on IV Flagyl and oral vancomycin  PLAN  1  Continue IV metronidazole and oral vancomycin  2  Add Questran    Chief Complaint   Patient presents with    Back Pain     Patient presents with lower back pain chronic in nature but feels worse  Patient also DX with cdiff and is still having diarrhea  Patient is still having episodes of diarrhea 5-6 times a day       SUBJECTIVE/HPI   Patient with 2 loose stools this morning  Tolerating regular diet  Denies further abdominal pain      /60 (BP Location: Left arm)   Pulse 87   Temp 98 1 °F (36 7 °C) (Oral)   Resp 18   Ht 5' 1 2" (1 554 m)   Wt 56 8 kg (125 lb 3 5 oz)   LMP  (LMP Unknown)   SpO2 98%   BMI 23 51 kg/m²       PHYSICALEXAM  Constitutional:  Well developed, well nourished, no acute distress, non-toxic appearance   Eyes:  conjunctiva normal   HENT:  Atraumatic  Respiratory:  No respiratory distress  Cardiovascular:  Normal rate  GI:  Soft, nondistended, normal bowel sounds, nontender   Musculoskeletal:  No edema  Neurologic:  Alert & oriented x 3   Psychiatric:  Speech and behavior appropriate       Lab Results   Component Value Date    GLUCOSE 98 03/05/2016    CALCIUM 8 3 10/09/2018     10/09/2018    K 4 5 10/09/2018    CO2 23 10/09/2018     (H) 10/09/2018    BUN 24 10/09/2018    CREATININE 1 49 (H) 10/09/2018     Lab Results   Component Value Date    WBC 1 66 (LL) 10/09/2018    HGB 9 4 (L) 10/09/2018    HCT 28 9 (L) 10/09/2018     (H) 10/09/2018    PLT 93 (L) 10/09/2018     Lab Results   Component Value Date    ALT 21 10/09/2018    AST 16 10/09/2018    ALKPHOS 89 10/09/2018    BILITOT 0 25 04/23/2015     No results found for: AMYLASE  Lab Results   Component Value Date    LIPASE 273 10/08/2018     No results found for: IRON, TIBC, FERRITIN  Lab Results   Component Value Date    INR 1 06 09/21/2018       Counseling / Coordination of Care  Total floor / unit time spent today 25 minutes  Greater than 50% of total time was spent with the patient and / or family counseling and / or coordination of care  A description of the counseling / coordination of care: 15    Ferol Pap

## 2018-10-09 NOTE — PROGRESS NOTES
Progress Note - China Charles 72 y o  female MRN: [id number]    Unit/Bed#: [address] [place] [address] [id number] Encounter: [id number]      Assessment:  Principal Problem:    NYDIA (acute kidney injury) ([facility] )  Active Problems:    Nonsquamous nonsmall cell neoplasm of left lung ([facility] )    C  difficile diarrhea    Dehydration    Hypothyroidism    Aneurysm of infrarenal abdominal aorta ([facility] )    Pure hypercholesterolemia    Bilateral carotid artery stenosis    Pancytopenia ([facility] )  Resolved Problems:    * No resolved hospital problems  *        Plan:  · Acute kidney injury with dehydration due to C diff colitis-will decrease IV fluids to 75 mL an hour  · C diff colitis- on oral Vanco currently-GI input appreciated-will check with pharmacy at [address] [place] [address] to see if she was given metronidazole at time of discharge as discharge sheet lists p o  Vancomycin for a 7 day course was given at discharge-still having loose stools-will add IV metronidazole  · Pancytopenia-this is due to her recent chemo-decreased leukocytes today-will repeat in the a m  · Stage III lung cancer not squamous of the left lung on  · Vascular disease with history of if her renal id resume as well as bilateral carotid artery stenosis no signs of compromise    Subjective:   Patient still having loose bowel movements x2 overnight-not formed  No nausea or vomiting  Denies shortness of breath but feels generally fatigued  Some cough but no significant sputum production  ROS  Comprehensive system review negative other than noted above    Objective:     Vitals: Blood pressure 129/60, pulse 87, temperature 98 1 °F (36 7 °C), temperature source Oral, resp  rate 18, height 5' 1 2" (1 554 m), weight 56 8 kg (125 lb 3 5 oz), SpO2 98 %  ,Body mass index is 23 51 kg/m²    Current Facility-Administered Medications   Medication Dose Route Frequency Provider Last Rate Last Dose    albuterol (PROVENTIL HFA,VENTOLIN HFA) inhaler 2 puff  2 puff Inhalation Q6H PRN Jacqualin Oddi, DO        aspirin (ECOTRIN) EC tablet 325 mg  325 mg Oral Daily Shaina Fly, DO   325 mg at 10/08/18 1526    atorvastatin (LIPITOR) tablet 40 mg  40 mg Oral Daily Shaina Fly, DO        enoxaparin (LOVENOX) subcutaneous injection 30 mg  30 mg Subcutaneous Daily Shaina Fly, DO   30 mg at 10/08/18 1526    gabapentin (NEURONTIN) capsule 200 mg  200 mg Oral TID Linh Horn, DO   200 mg at 10/08/18 2139    HYDROcodone-acetaminophen (NORCO) 5-325 mg per tablet 1 tablet  1 tablet Oral Q6H PRN Linh Horn, DO   1 tablet at 10/08/18 1727    levothyroxine tablet 100 mcg  100 mcg Oral Early Morning Shaina Fly, DO   100 mcg at 10/09/18 0630    magic mouthwash oral suspension (mixture)  10 mL Swish & Swallow TID PRN Linh Horn, DO        metoprolol succinate (TOPROL-XL) 24 hr tablet 50 mg  50 mg Oral BID Shaina Fly, DO        ondansetron (ZOFRAN) injection 4 mg  4 mg Intravenous Q6H PRN Linh Horn, DO        sodium chloride 0 9 % infusion  75 mL/hr Intravenous Continuous Shaina Fly,  mL/hr at 10/08/18 2354 125 mL/hr at 10/08/18 2354    vancomycin (VANCOCIN) oral solution 125 mg  125 mg Oral Q6H [facility] halfway Shaina Fly, DO   125 mg at 10/09/18 0630     Prescriptions Prior to Admission   Medication    albuterol (VENTOLIN HFA) 90 mcg/act inhaler    aspirin (ECOTRIN) 325 mg EC tablet    atorvastatin (LIPITOR) 40 mg tablet    co-enzyme Q-10 30 MG capsule    gabapentin (NEURONTIN) 100 mg capsule    levothyroxine 100 mcg tablet    lisinopril (ZESTRIL) 10 mg tablet    magic mouthwash oral suspension (mixture)    Methylcobalamin (B12-ACTIVE PO)    metoprolol succinate (TOPROL-XL) 50 mg 24 hr tablet    Omega-3 Fatty Acids (FISH OIL PO)         Intake/Output Summary (Last 24 hours) at 10/09/18 0834  Last data filed at 10/08/18 2354   Gross per 24 hour   Intake             2000 ml   Output                0 ml   Net             2000 ml       Physical Exam:  General appearance: alert and Mild generalized weakness  Neck: no adenopathy, no JVD, supple, symmetrical, trachea midline and thyroid not enlarged, symmetric, no tenderness/mass/nodules  Lungs: No wheezes-decreased breath sounds in general  Heart: regular rate and rhythm, S1, S2 normal, no murmur, click, rub or gallop  Abdomen:  No guarding or rigidity  Mild generalized tenderness with hyperactive bowel sounds  Extremities: extremities normal, warm and well-perfused; no cyanosis, clubbing, or edema  Skin: Skin color, texture, turgor normal  No rashes or lesions  Neurologic:  No focal motor deficits       Lab, Imaging and other studies: I have personally reviewed pertinent reports  Results from last 7 days  Lab Units 10/09/18  0523 10/08/18  1056   WBC Thousand/uL 1 66* 2 77*   HEMOGLOBIN g/dL 9 4* 10 2*   HEMATOCRIT % 28 9* 31 8*   PLATELETS Thousands/uL 93* 107*   NEUTROS PCT %  --  66   LYMPHS PCT %  --  25   MONOS PCT %  --  6   EOS PCT %  --  1       Results from last 7 days  Lab Units 10/09/18  0523 10/08/18  1056   SODIUM mmol/L 139 139   POTASSIUM mmol/L 4 5 4 4   CHLORIDE mmol/L 110* 103   CO2 mmol/L 23 21   BUN mg/dL 24 33*   CREATININE mg/dL 1 49* 2 57*   CALCIUM mg/dL 8 3 9 2   ALK PHOS U/L 89 112   ALT U/L 21 27   AST U/L 16 18     No results found for: TROPONINI, CKMB, CKTOTAL      Lab Results   Component Value Date    BLOODCX No Growth After 5 Days  09/21/2018    BLOODCX No Growth After 5 Days  09/21/2018    BLOODCX No Growth After 5 Days  02/18/2016    BLOODCX No Growth After 5 Days  02/18/2016    URINECX <10,000 cfu/ml Mixed Contaminants X2 02/18/2016       Imaging:  No results found for this or any previous visit  Results for orders placed during the hospital encounter of 09/21/18   XR chest pa & lateral    Narrative CHEST     INDICATION:   Shortness of breath  COMPARISON:  9/21/2018  EXAM PERFORMED/VIEWS:  XR CHEST PA & LATERAL      FINDINGS:    Cardiac silhouette is unremarkable  Tortuous thoracic aorta again present  The lungs are clear    No pneumothorax or pleural effusion  Osseous structures appear within normal limits for patient age  Impression No acute cardiopulmonary disease  Workstation performed: [id number]         PATIENT CENTERED ROUNDS: I have performed rounds with the nursing staff            Ronan Hernandez DO

## 2018-10-09 NOTE — PROGRESS NOTES
See full assessment under Nutr/Eval under flowsheets  Recs:   Please order Ensure Enlive Chocolate BID with lunch and dinner  Please change RD protocol under diet order to YES so RD can adjust supplements as needed    Monitor weights

## 2018-10-09 NOTE — PROGRESS NOTES
Spoke with [facility]  On 9/24, Vancomycin PO, 125 mg q i d   Notified Dr Courtney Manzanares

## 2018-10-10 PROBLEM — E44.0 MODERATE PROTEIN-CALORIE MALNUTRITION (HCC): Status: ACTIVE | Noted: 2018-01-01

## 2018-10-10 NOTE — PLAN OF CARE
DISCHARGE PLANNING     Discharge to home or other facility with appropriate resources Adequate for Discharge        DISCHARGE PLANNING - CARE MANAGEMENT     Discharge to post-acute care or home with appropriate resources Adequate for Discharge        INFECTION - ADULT     Absence or prevention of progression during hospitalization Adequate for Discharge     Absence of fever/infection during neutropenic period Adequate for Discharge        Knowledge Deficit     Patient/family/caregiver demonstrates understanding of disease process, treatment plan, medications, and discharge instructions Adequate for Discharge        Nutrition/Hydration-ADULT     Nutrient/Hydration intake appropriate for improving, restoring or maintaining nutritional needs Adequate for Discharge        PAIN - ADULT     Verbalizes/displays adequate comfort level or baseline comfort level Adequate for Discharge        SAFETY ADULT     Patient will remain free of falls Adequate for Discharge     Maintain or return to baseline ADL function Adequate for Discharge     Maintain or return mobility status to optimal level Adequate for Discharge

## 2018-10-10 NOTE — DISCHARGE SUMMARY
Discharge Summary - Sveta Frederick 72 y o  female MRN: [id number]    Unit/Bed#: [address] [place] [address] Encounter: [id number]    Admission Date: 10/8/2018     Admitting Diagnosis: Diarrhea [R19 7]  Back pain [M54 9]  Acute on chronic renal failure ([address] ) [N17 9, N18 9]  Lumbar radiculopathy, acute [M54 16]  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) [C34 92]    HPI:  79-year-old female undergoing chemo for neoplasm of the lungwas admitted with recurrent diarrhea and acute renal failure   had previous admission for C difficile colitis  Consults  GI Dr Karina Darby  Procedures Performed: No orders of the defined types were placed in this encounter  Hospital Course:  Patient was admitted and placed on IV fluid hydration as she had acute kidney injury with elevation in her creatinine to 2 57 with her usual baseline at 1 22  After 48 hours on fluids her creatinine improved to 1 23 with BUN of 15 on the day of discharge  Patient had been treated with 1 week of outpatient oral vancomycin liquid on at the time of discharge from her recent admission 9/21  To 9/24/18  Repeat stool for C diff was negative but patient will be treated for an additional 2 weeks of treatment with combination of metronidazole orally as well as vancomycin orally as per GI recommendations  Her lisinopril was stopped due to the acute kidney injury and blood pressure remained stable  She will be continued on her usual metoprolol and follow with Dr Marianna Arora her primary physician in 7-10 days    Significant Findings, Care, Treatment and Services Provided: none  General appearance: alert and Mildly fatigued  Neck: no adenopathy, no JVD and thyroid not enlarged, symmetric, no tenderness/mass/nodules  Lungs: Decreased breath sounds in the bases  No wheezes  Heart: regular rate and rhythm, S1, S2 normal, no murmur, click, rub or gallop  Abdomen: Minimal periumbilical tenderness  Active bowel sounds are noted    No guarding or rigidity  Extremities: extremities normal, warm and well-perfused; no cyanosis, clubbing, or edema  Skin: Skin color, texture, turgor normal  No rashes or lesions  Neurologic:  No focal motor deficits       Complications: none    Discharge Diagnosis: Principal Problem:    NYDIA (acute kidney injury) ([facility] )  Active Problems:    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)    C  difficile diarrhea    Dehydration    Hypothyroidism    Aneurysm of infrarenal abdominal aorta (HCC)    Pure hypercholesterolemia    Bilateral carotid artery stenosis    Pancytopenia (HCC)    Moderate protein-calorie malnutrition ([facility] )        Condition at Discharge: good     Discharge instructions/Information to patient and family:   See after visit summary for information provided to patient and family  Provisions for Follow-Up Care:  See after visit summary for information related to follow-up care and any pertinent home health orders  Disposition: Home    Planned Readmission: No    Discharge Statement   I spent 25 minutes discharging the patient  This time was spent on the day of discharge  I had direct contact with the patient on the day of discharge  Additional documentation is required if more than 30 minutes were spent on discharge  Discharge Medications:  See after visit summary for reconciled discharge medications provided to patient and family          Ozzie Akhtar DO

## 2018-10-11 NOTE — PROGRESS NOTES
Pt was scheduled to see this RD after radiation tx Mon  10/8/18, pt admitted to [facility] therefore appt cancelled  This RD did notify [facility] inpatient RD Romel Bettencourt of pt's admission and interest in nutrition care  Will reach out to pt as able to R/S following d/c

## 2018-10-11 NOTE — UTILIZATION REVIEW
Auth:    [id number]  Initial Clinical Review  Admission: Date/Time/Statement: 10/8/18 @ 1318   Orders Placed This Encounter   Procedures    Inpatient Admission (expected length of stay for this patient is greater than two midnights)     Standing Status:   Standing     Number of Occurrences:   1     Order Specific Question:   Admitting Physician     Answer:   Mike Churchill [[id number]]     Order Specific Question:   Level of Care     Answer:   Med Surg [[id number]]     Order Specific Question:   Estimated length of stay     Answer:   More than 2 Midnights     Order Specific Question:   Certification     Answer:   I certify that inpatient services are medically necessary for this patient for a duration of greater than two midnights  See H&P and MD Progress Notes for additional information about the patient's course of treatment  ED: Date/Time/Mode of Arrival:   ED Arrival Information     Expected Arrival Acuity Means of Arrival Escorted By Service Admission Type    - 10/8/2018 09:35 Urgent [facility] Urgent    Arrival Complaint    back pain, vomiting and diarrhea      Chief Complaint:   Chief Complaint   Patient presents with    Back Pain     Patient presents with lower back pain chronic in nature but feels worse  Patient also DX with cdiff and is still having diarrhea  Patient is still having episodes of diarrhea 5-6 times a day   History of Illness:   Patient complains of persistent diarrhea over last 2 weeks after recent diagnosis of C  Diff  She has about 5 stools daily  She has crampy abdominal discomfort and feels that metronidazole pills aren't working  She has lung cancer and gets chemo - last chemo was 5 days ago  Patient has gradual worsening low back pain over last 2 days midline and more on right  No specific loss of bowel or bladder control  Pain feels sharp and muscular  Tramadol does not relieve pain  Pain does radiate down right leg to foot  Mouth/Throat: Mucous membranes are dry  Abdominal: There is generalized tenderness  Musculoskeletal: Lumbar back: She exhibits tenderness, bony tenderness, pain and spasm  ED Vital Signs:   ED Triage Vitals   Temperature Pulse Respirations Blood Pressure SpO2   10/08/18 1002 10/08/18 1002 10/08/18 1002 10/08/18 1002 10/08/18 1002   (!) 96 6 °F (35 9 °C) 90 20 154/74 98 %      Temp Source Heart Rate Source Patient Position - Orthostatic VS BP Location FiO2 (%)   10/08/18 1427 10/08/18 1600 10/08/18 1427 10/08/18 1427 --   Oral Monitor Lying Left arm       Pain Score       10/08/18 1002       6          Wt Readings from Last 1 Encounters:   10/10/18 57 1 kg (125 lb 14 1 oz)   Vital Signs (abnormal): Abnormal Labs/Diagnostic Test Results:   WBC 2 77 HGB 10 2  ANION GAP 15 BUN 33 CR 2 57 ALB 3 2 GFR 18   U/A+PROT  CT A/P=Multiple distended and nondilated small and large bowel loops containing fluid consistent with history of diarrhea  No focal bowel wall thickening with stranding to suggest colitis  Focal soft tissue thickening in the right mons pubis associated with hypermetabolic activity on PET/CT has resolved and was likely inflammatory in nature  CT LUMBAR SPINE=Mild noncompressive lumbar degenerative disc disease at the L4-5 and L5-S1 levels  Small left foraminal disc protrusion at L4-5 and asymmetric endplate changes on the right at L5-S1 resulting in mild foraminal narrowing  No compression fracture  Thoracoabdominal aortic aneurysm  The distal thoracic aorta measures up to 6 8 cm    ED Treatment:   Medication Administration from 10/08/2018 0935 to 10/08/2018 1427       Date/Time Order Dose Route Action Action by Comments     10/08/2018 1104 lidocaine (LIDODERM) 5 % patch 1 patch 1 patch Topical Medication Applied Kin Melendrez RN lumbar     10/08/2018 1222 sodium chloride 0 9 % bolus 1,000 mL 0 mL Intravenous Stopped Kin Melendrez RN      10/08/2018 1104 sodium chloride 0 9 % bolus 1,000 mL 1,000 mL Intravenous New Bag Kin Melendrez RN      10/08/2018 1314 HYDROcodone-acetaminophen (NORCO) 5-325 mg per tablet 1 tablet 1 tablet Oral Given Marcos Rose RN      10/08/2018 1317 sodium chloride 0 9 % bolus 1,000 mL 1,000 mL Intravenous New Bag Marcos Rose RN       Past Medical/Surgical History:   Past Medical History:   Diagnosis Date    [facility] )     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[facility])     ASCVD (arteriosclerotic cardiovascular disease)     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 ([facility])     Back problem     Blood type B-     Cataract     COPD (chronic obstructive pulmonary disease) ([facility])     Coronary artery disease     CVA (cerebral vascular accident) ([facility] ) 2012    Depression     Disease of thyroid gland     History of cataract     Hyperlipidemia     Hypertension     Lung mass     Mediastinal adenopathy     Muscle pain     Prediabetes     PVD (peripheral vascular disease) ([facility] )     Thoracic aortic aneurysm ([facility])     Transient insomnia    Admitting Diagnosis: Diarrhea [R19 7]  Back pain [M54 9]  Acute on chronic renal failure (HCC) [N17 9, N18 9]  Lumbar radiculopathy, acute [M54 16]  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) [C34 92]  Age/Sex: 72 y o  female  Assessment/Plan:   73 YO FEMALE TO ER FROM HOME C/O CRAMPY ABD PAIN WITH PERSISTENT DIARRHEA  PT BEING TX FOR C-DIFF WITH FLAGYL  HX LUNG CA, RECEIVING CHEMO, LAST DOSE 5 DAYS AGO  PRESENTS FATIGUED WITH GENERALIZED ABD PAIN/TENDERNESS, HYPERACTIVE BS, BACK PAIN & DIARRHEA  ADMITTED TO INPATIENT STATUS, STARTED ON IV FLAGYL, PO VANCO & IVF  PLACED ON CONTACT ISOLATION, GI CONSULTED     Admission Orders:  MED SURG  CONTACT & HAND HYGIENE ISOLATION  CONSULT GI  CONSULT ONCOLOGY  ORTHOSTATIC BP  Scheduled Meds:   Current Facility-Administered Medications:  albuterol 2 puff Inhalation Q6H PRN Shaina Fly, DO    aspirin 325 mg Oral Daily Shaina Fly, DO    atorvastatin 40 mg Oral Daily Shaina Fly, DO    enoxaparin 30 mg Subcutaneous Daily Shaina Fly, DO    gabapentin 200 mg Oral TID Maysel Land, DO    HYDROcodone-acetaminophen 1 tablet Oral Q6H PRN Soheila Land, DO    levothyroxine 100 mcg Oral Early Morning Shaina Fly, DO    magic mouthwash oral suspension (mixture) 10 mL Swish & Swallow TID PRN Shaina Fly, DO    metoprolol succinate 50 mg Oral BID Shaina Fly, DO    metroNIDAZOLE 500 mg Intravenous Q8H Shaina Fly, DO    ondansetron 4 mg Intravenous Q6H PRN Shaina Fly, DO    vancomycin 125 mg Oral Q6H [address] Shaina Fly, DO    Continuous Infusions:   No current facility-administered medications for this encounter  PRN Meds: Thank you,  [address] [place] [address] Utilization Review Department  Phone: [phone]; Fax [phone]  ATTENTION: Please call with any questions or concerns to [phone]  and carefully follow the prompts so that you are directed to the right person  Send all requests for admission clinical reviews, approved or denied determinations and any other requests to fax [phone]   All voicemails are confidential

## 2018-10-12 NOTE — TELEPHONE ENCOUNTER
Reached out to Parag Scott to r/s cancelled nutrition appt  Pt states she feels she is doing okay nutritionally and would not like to r/s at this time  Briefly provided MNT tips for diarrhea as pt is recovering from C  Diff infection  Pt has this RD's contact info and I encouraged her to reach out at any time as desired

## 2018-10-16 NOTE — PROGRESS NOTES
Assessment/Plan:       Diagnoses and all orders for this visit:    Clostridium difficile colitis    Dehydration          All of the above diagnoses have been assessed  Additional COMMENTS/PLAN: doing better      Subjective:      Patient ID: Edilma Wolf is a 72 y o  female  HPI     Diarrhea-Still in bowels 2-3 times per day  On metronidazole or vancomycin   for extended period    Has apt with onc tomorrow and 4 more RT apt  The following portions of the patient's history were revised and updated as appropriate: Problem list, allergies, med list, FH, SH, Past medical and surgical histories  Current Outpatient Prescriptions   Medication Sig Dispense Refill    albuterol (VENTOLIN HFA) 90 mcg/act inhaler Inhale 2 puffs every 6 (six) hours as needed for wheezing or shortness of breath 18 g 1    aspirin (ECOTRIN) 325 mg EC tablet Take 325 mg by mouth daily        atorvastatin (LIPITOR) 40 mg tablet Take 1 tablet (40 mg total) by mouth daily (Patient taking differently: Take 40 mg by mouth daily in the early morning  ) 90 tablet 3    co-enzyme Q-10 30 MG capsule Take 30 mg by mouth daily        gabapentin (NEURONTIN) 100 mg capsule Take 200 mg by mouth 3 (three) times a day      levothyroxine 100 mcg tablet Take 1 tablet (100 mcg total) by mouth daily 30 tablet 3    magic mouthwash oral suspension (mixture) Swish and swallow 10 mL 3 (three) times a day as needed (esophagitis) 12 oz 1    Methylcobalamin (B12-ACTIVE PO) Take by mouth daily        metoprolol succinate (TOPROL-XL) 50 mg 24 hr tablet Take 1 tablet (50 mg total) by mouth 2 (two) times a day 60 tablet 5    metroNIDAZOLE (FLAGYL) 500 mg tablet Take 1 tablet (500 mg total) by mouth 3 (three) times a day for 12 days 36 tablet 0    Omega-3 Fatty Acids (FISH OIL PO) Take 1 capsule by mouth daily        vancomycin (VANCOCIN) 50mg/mL SOLN Take 2 5 mL (125 mg total) by mouth every 6 (six) hours for 12 days 150 mL 0     No current facility-administered medications for this visit  Review of Systems   All other systems reviewed and are negative  Objective:    /70 (BP Location: Left arm, Patient Position: Sitting, Cuff Size: Standard)   Pulse 84   Ht 5' 1" (1 549 m)   Wt 56 2 kg (124 lb)   LMP  (LMP Unknown)   BMI 23 43 kg/m²      Physical Exam   Constitutional: She appears well-developed and well-nourished  Neck: No JVD present  Cardiovascular: Normal rate and regular rhythm  Murmur heard  Pulmonary/Chest: Effort normal and breath sounds normal    Abdominal: Soft  Bowel sounds are normal  There is no tenderness  Musculoskeletal: She exhibits no edema  Vitals reviewed          Admission on 10/08/2018, Discharged on 10/10/2018   Component Date Value Ref Range Status    WBC 10/08/2018 2 77* 4 31 - 10 16 Thousand/uL Final    RBC 10/08/2018 3 19* 3 81 - 5 12 Million/uL Final    Hemoglobin 10/08/2018 10 2* 11 5 - 15 4 g/dL Final    Hematocrit 10/08/2018 31 8* 34 8 - 46 1 % Final    MCV 10/08/2018 100* 82 - 98 fL Final    MCH 10/08/2018 32 0  26 8 - 34 3 pg Final    MCHC 10/08/2018 32 1  31 4 - 37 4 g/dL Final    RDW 10/08/2018 15 7* 11 6 - 15 1 % Final    MPV 10/08/2018 12 0  8 9 - 12 7 fL Final    Platelets 63/35/6834 107* 149 - 390 Thousands/uL Final    nRBC 10/08/2018 0  /100 WBCs Final    Neutrophils Relative 10/08/2018 66  43 - 75 % Final    Immat GRANS % 10/08/2018 1  0 - 2 % Final    Lymphocytes Relative 10/08/2018 25  14 - 44 % Final    Monocytes Relative 10/08/2018 6  4 - 12 % Final    Eosinophils Relative 10/08/2018 1  0 - 6 % Final    Basophils Relative 10/08/2018 1  0 - 1 % Final    Neutrophils Absolute 10/08/2018 1 83* 1 85 - 7 62 Thousands/µL Final    Immature Grans Absolute 10/08/2018 0 02  0 00 - 0 20 Thousand/uL Final    Lymphocytes Absolute 10/08/2018 0 70  0 60 - 4 47 Thousands/µL Final    Monocytes Absolute 10/08/2018 0 17  0 17 - 1 22 Thousand/µL Final    Eosinophils Absolute 10/08/2018 0 03  0 00 - 0 61 Thousand/µL Final    Basophils Absolute 10/08/2018 0 02  0 00 - 0 10 Thousands/µL Final    Sodium 10/08/2018 139  136 - 145 mmol/L Final    Potassium 10/08/2018 4 4  3 5 - 5 3 mmol/L Final    Chloride 10/08/2018 103  100 - 108 mmol/L Final    CO2 10/08/2018 21  21 - 32 mmol/L Final    ANION GAP 10/08/2018 15* 4 - 13 mmol/L Final    BUN 10/08/2018 33* 5 - 25 mg/dL Final    Creatinine 10/08/2018 2 57* 0 60 - 1 30 mg/dL Final    Standardized to IDMS reference method    Glucose 10/08/2018 109  65 - 140 mg/dL Final      If the patient is fasting, the ADA then defines impaired fasting glucose as > 100 mg/dL and diabetes as > or equal to 123 mg/dL  Specimen collection should occur prior to Sulfasalazine administration due to the potential for falsely depressed results  Specimen collection should occur prior to Sulfapyridine administration due to the potential for falsely elevated results   Calcium 10/08/2018 9 2  8 3 - 10 1 mg/dL Final    AST 10/08/2018 18  5 - 45 U/L Final      Specimen collection should occur prior to Sulfasalazine administration due to the potential for falsely depressed results   ALT 10/08/2018 27  12 - 78 U/L Final      Specimen collection should occur prior to Sulfasalazine administration due to the potential for falsely depressed results       Alkaline Phosphatase 10/08/2018 112  46 - 116 U/L Final    Total Protein 10/08/2018 6 8  6 4 - 8 2 g/dL Final    Albumin 10/08/2018 3 2* 3 5 - 5 0 g/dL Final    Total Bilirubin 10/08/2018 0 40  0 20 - 1 00 mg/dL Final    eGFR 10/08/2018 18  ml/min/1 73sq m Final    Lipase 10/08/2018 273  73 - 393 u/L Final    Color, UA 10/08/2018 Yellow   Final    Clarity, UA 10/08/2018 Clear   Final    Specific Gravity, UA 10/08/2018 >=1 030  1 003 - 1 030 Final    pH, UA 10/08/2018 5 5  4 5 - 8 0 Final    Leukocytes, UA 10/08/2018 Negative  Negative Final    Nitrite, UA 10/08/2018 Negative  Negative Final    Protein, UA 10/08/2018 Trace* Negative mg/dl Final    Glucose, UA 10/08/2018 Negative  Negative mg/dl Final    Ketones, UA 10/08/2018 Negative  Negative mg/dl Final    Urobilinogen, UA 10/08/2018 0 2  0 2, 1 0 E U /dl E U /dl Final    Bilirubin, UA 10/08/2018 Negative  Negative Final    Blood, UA 10/08/2018 Negative  Negative Final    C difficile toxin by PCR 10/08/2018 NEGATIVE for C difficle toxin by PCR  NEGATIVE for C difficle toxin by PCR  Final    RBC, UA 10/08/2018 0-1* None Seen, 0-5 /hpf Final    WBC, UA 10/08/2018 2-4* None Seen, 0-5, 5-55, 5-65 /hpf Final    Epithelial Cells 10/08/2018 Occasional  None Seen, Occasional /hpf Final    Bacteria, UA 10/08/2018 None Seen  None Seen, Occasional /hpf Final    Hyaline Casts, UA 10/08/2018 2-4* (none) /lpf Final    COARSE GRANULAR CASTS 10/08/2018 5-10  /lpf Final    WBC Casts, UA 10/08/2018 0-3* (none) /lpf Final    Sodium 10/09/2018 139  136 - 145 mmol/L Final    Potassium 10/09/2018 4 5  3 5 - 5 3 mmol/L Final    Chloride 10/09/2018 110* 100 - 108 mmol/L Final    CO2 10/09/2018 23  21 - 32 mmol/L Final    ANION GAP 10/09/2018 6  4 - 13 mmol/L Final    BUN 10/09/2018 24  5 - 25 mg/dL Final    Creatinine 10/09/2018 1 49* 0 60 - 1 30 mg/dL Final    Standardized to IDMS reference method    Glucose 10/09/2018 80  65 - 140 mg/dL Final      If the patient is fasting, the ADA then defines impaired fasting glucose as > 100 mg/dL and diabetes as > or equal to 123 mg/dL  Specimen collection should occur prior to Sulfasalazine administration due to the potential for falsely depressed results  Specimen collection should occur prior to Sulfapyridine administration due to the potential for falsely elevated results   Calcium 10/09/2018 8 3  8 3 - 10 1 mg/dL Final    AST 10/09/2018 16  5 - 45 U/L Final      Specimen collection should occur prior to Sulfasalazine administration due to the potential for falsely depressed results       ALT 10/09/2018 21  12 - 78 U/L Final      Specimen collection should occur prior to Sulfasalazine administration due to the potential for falsely depressed results   Alkaline Phosphatase 10/09/2018 89  46 - 116 U/L Final    Total Protein 10/09/2018 5 5* 6 4 - 8 2 g/dL Final    Albumin 10/09/2018 2 6* 3 5 - 5 0 g/dL Final    Total Bilirubin 10/09/2018 0 20  0 20 - 1 00 mg/dL Final    eGFR 10/09/2018 35  ml/min/1 73sq m Final    WBC 10/09/2018 1 66* 4 31 - 10 16 Thousand/uL Final    This result has been called to Kourtney Tamez by Pushpa Pena on 10 09 2018 at [phone], and has been read back   RBC 10/09/2018 2 88* 3 81 - 5 12 Million/uL Final    Hemoglobin 10/09/2018 9 4* 11 5 - 15 4 g/dL Final    Hematocrit 10/09/2018 28 9* 34 8 - 46 1 % Final    MCV 10/09/2018 100* 82 - 98 fL Final    MCH 10/09/2018 32 6  26 8 - 34 3 pg Final    MCHC 10/09/2018 32 5  31 4 - 37 4 g/dL Final    RDW 10/09/2018 15 9* 11 6 - 15 1 % Final    Platelets 70/06/2737 93* 149 - 390 Thousands/uL Final    MPV 10/09/2018 11 3  8 9 - 12 7 fL Final    WBC 10/10/2018 1 68* 4 31 - 10 16 Thousand/uL Final    This result has been called to Edilberto Guidry by Sukhdev Aguayo on 10 10 2018 at [phone], and has been read back       RBC 10/10/2018 2 77* 3 81 - 5 12 Million/uL Final    Hemoglobin 10/10/2018 8 8* 11 5 - 15 4 g/dL Final    Hematocrit 10/10/2018 28 0* 34 8 - 46 1 % Final    MCV 10/10/2018 101* 82 - 98 fL Final    MCH 10/10/2018 31 8  26 8 - 34 3 pg Final    MCHC 10/10/2018 31 4  31 4 - 37 4 g/dL Final    RDW 10/10/2018 15 6* 11 6 - 15 1 % Final    Platelets 22/44/2633 103* 149 - 390 Thousands/uL Final    MPV 10/10/2018 11 3  8 9 - 12 7 fL Final    Sodium 10/10/2018 140  136 - 145 mmol/L Final    Potassium 10/10/2018 4 3  3 5 - 5 3 mmol/L Final    Chloride 10/10/2018 110* 100 - 108 mmol/L Final    CO2 10/10/2018 22  21 - 32 mmol/L Final    ANION GAP 10/10/2018 8  4 - 13 mmol/L Final    BUN 10/10/2018 15  5 - 25 mg/dL Final    Creatinine 10/10/2018 1 23  0 60 - 1 30 mg/dL Final    Standardized to IDMS reference method    Glucose 10/10/2018 86  65 - 140 mg/dL Final      If the patient is fasting, the ADA then defines impaired fasting glucose as > 100 mg/dL and diabetes as > or equal to 123 mg/dL  Specimen collection should occur prior to Sulfasalazine administration due to the potential for falsely depressed results  Specimen collection should occur prior to Sulfapyridine administration due to the potential for falsely elevated results      Calcium 10/10/2018 8 2* 8 3 - 10 1 mg/dL Final    eGFR 10/10/2018 44  ml/min/1 73sq m Final         Nicolas Estrada MD

## 2018-10-17 NOTE — PROGRESS NOTES
Hematology/Oncology Outpatient Follow- up Note  Melyssa Henry 72 y o  female MRN: @ Encounter: [id number]        Date:  10/17/2018    Presenting Complaint/Diagnosis : Stage IIIa left upper lobe adenocarcinoma of the lung  HPI:    Jonnie Falk is seen for initial consultation 8/16/2018 regarding Newly diagnosed stage IIIa adenocarcinoma of the lung  The patient initially was found to have a left hilar mass with what appeared to be adenopathy extending under her aortic arch in the AP window  PET/CT scan was denies so she had an EUS guided biopsy of the adenopathy pathology of which was consistent with adenocarcinoma of pulmonary primary  This made it a stage IIIa lung cancer  She was seen by her colleagues in cardiothoracic surgery who referred her to see us and her colleagues in radiation oncology to consider concurrent chemoradiation  Her last CMP does show a creatinine that is elevated so standard of care chemotherapy with cisplatin and -16 may carry the risk of toxicity along with renal dysfunction especially at 72years of age so carboplatin and Taxol concurrently with radiation was considered  Previous Hematologic/ Oncologic History:       Nonsquamous nonsmall cell neoplasm of left lung ([facility] [address] )    8/2018 Initial Diagnosis     Nonsquamous nonsmall cell neoplasm of lung ([facility] [address] )         8/1/2018 Biopsy     Lymph node, mediastinal (fine needle aspiration with cell block preparation):     - Involvement by non-small cell carcinoma, most compatible with pulmonary adenocarcinoma     - Positive: Emmett-Ep4, MOC-31, TTF1, CKAE1/3     - Negative: [place]-8, CDX2, synaptophysin, CD56, HMB-45, P40            Current Hematologic/ Oncologic Treatment:    Carboplatin AUC of 2 with Taxol 50 mg/m² g with concurrent chemoradiation   She got 5 cycles with concurrent therapy and her last week is actually without chemotherapy secondary to cytopenia along with side effects    Interval History:    Patient returns for follow-up visit  Her blood counts consistently went down while on treatment and then she ended up in the hospital with C  Difficile colitis and diarrhea  She is on treatment for this and her symptoms are improving  She is back on radiation and has 3 more treatments left  As far as symptoms are concerned she denies any nausea denies any vomiting denies any diarrhea currently  States her symptoms are controlled and she feels much better than she did a few weeks ago  Her 14 point review of systems today was otherwise negative  She does have a cough with some phlegm but this seems to be related to inflammation and her radiation not an infection and she denies any fevers or any color to the phlegm  Test Results:    Imaging: Ct Abdomen Pelvis Without Contrast    Result Date: 10/8/2018  Narrative: CT ABDOMEN AND PELVIS WITHOUT IV CONTRAST INDICATION:   Abd pain, gastroenteritis or colitis suspected  COMPARISON: CTA chest, abdomen and pelvis 6/11/2018 and PET/CT 8/17/2018  TECHNIQUE:  CT examination of the abdomen and pelvis was performed without intravenous contrast   Axial, sagittal, and coronal 2D reformatted images were created from the source data and submitted for interpretation  Radiation dose length product (DLP) for this visit:  218 93 mGy-cm   This examination, like all CT scans performed in the [facility], was performed utilizing techniques to minimize radiation dose exposure, including the use of iterative  reconstruction and automated exposure control  Enteric contrast was administered  FINDINGS: ABDOMEN LOWER CHEST:  No clinically significant abnormality identified in the visualized lower chest  LIVER/BILIARY TREE:  Unremarkable  GALLBLADDER:  No calcified gallstones  No pericholecystic inflammatory change  SPLEEN:  Unremarkable  PANCREAS:  Unremarkable  ADRENAL GLANDS:  Unremarkable  KIDNEYS/URETERS:  Chronic cortical deformity of the inferior pole of the right kidney perhaps from prior infarct  No hydronephrosis  STOMACH AND BOWEL:  Multiple distended and nondilated small and large bowel loops containing fluid consistent with history of diarrhea  No focal wall thickening with associated fat stranding to suggests colitis  No bowel obstruction  APPENDIX:  A normal appendix was visualized  ABDOMINOPELVIC CAVITY:  No ascites or free intraperitoneal air  No lymphadenopathy  VESSELS:  Stable appearance of thoracoabdominal aortic aneurysm containing calcified intramural thrombus with maximal dimensions of 6 1 x 0 1 cm near the diaphragmatic hiatus  PELVIS REPRODUCTIVE ORGANS:  Unremarkable for patient's age  URINARY BLADDER:  Unremarkable  ABDOMINAL WALL/INGUINAL REGIONS:  Focal soft tissue thickening in the right mons pubis associated with hypermetabolic activity on PET/CT has resolved  OSSEOUS STRUCTURES:  No acute fracture or destructive osseous lesion  Multilevel degenerative changes throughout the spine  Impression: Multiple distended and nondilated small and large bowel loops containing fluid consistent with history of diarrhea  No focal bowel wall thickening with stranding to suggest colitis  Focal soft tissue thickening in the right mons pubis associated with hypermetabolic activity on PET/CT has resolved and was likely inflammatory in nature  Workstation performed: [id number]     Xr Chest Pa & Lateral    Result Date: 9/24/2018  Narrative: CHEST INDICATION:   Shortness of breath  COMPARISON:  9/21/2018  EXAM PERFORMED/VIEWS:  XR CHEST PA & LATERAL FINDINGS: Cardiac silhouette is unremarkable  Tortuous thoracic aorta again present  The lungs are clear  No pneumothorax or pleural effusion  Osseous structures appear within normal limits for patient age  Impression: No acute cardiopulmonary disease  Workstation performed: [id number]     Xr Chest 2 Views    Result Date: 9/21/2018  Narrative: CHEST INDICATION:   Cough   COMPARISON:  Chest x-ray from 4/18/2015 EXAM PERFORMED/VIEWS:  XR CHEST PA & LATERAL FINDINGS:  Cardiac silhouette is unremarkable  Thoracic aorta is markedly tortuous  Aneurysmal descending thoracic aorta, known  The lungs are clear  The known left upper lobe lung lesion is not well-defined from the aortic arch  Calcified granuloma noted in the left midlung  No pneumothorax or pleural effusion  Osseous structures appear within normal limits for patient age  Impression: 1  The known left upper lobe lung lesion is not well-defined from the aortic arch  No new pulmonary findings  2  Aneurysmal descending thoracic aorta, known  Workstation performed: [id number]     Ct Recon Only Lumbar Spine    Result Date: 10/8/2018  Narrative: CT LUMBAR SPINE INDICATION:   Low back pain, cancer or infection suspected PAIN  COMPARISON:  None TECHNIQUE: Axial CT examination of the lumbar spine was obtained utilizing reconstructed images from CT of the chest, abdomen and pelvis performed the same day  Images were reformatted in the sagittal and coronal planes  This examination, like all CT scans performed in the [facility], was performed utilizing techniques to minimize radiation dose exposure, including the use of iterative reconstruction and automated exposure control  FINDINGS: ALIGNMENT: Normal alignment of lumbar vertebral bodies  No subluxation  VERTEBRAL BODIES: Mild osteopenia  No fracture  No discrete lytic or destructive lesion  DEGENERATIVE CHANGES: Slight annular bulging at L1-2 without canal stenosis or foraminal narrowing  Similar findings at the L2-3 and L3-4 levels  At L4-5 there is slightly more pronounced diffuse annular bulging with a broad-based left foraminal disc protrusion and mild facet arthropathy  Slight canal stenosis without discrete nerve impingement  Mild bilateral foraminal narrowing without nerve compression or displacement   L5-S1 demonstrates vacuum phenomenon with mild annular bulging and slight endplate degenerative change, more pronounced on the right  There is no canal stenosis  Slight foraminal narrowing, right greater than left without discrete nerve impingement  PREVERTEBRAL AND PARASPINAL SOFT TISSUES: Aneurysmal dilatation of the abdominal aorta extending above the scope of this examination  Distal thoracic aorta just above the diaphragmatic hiatus measures 6 8 cm in transverse diameter  Tortuosity and mild aneurysmal dilatation of the abdominal aorta  Maximum oblique diameter of the distal aorta just above the bifurcation measures 4 9 cm  Impression: Mild noncompressive lumbar degenerative disc disease at the L4-5 and L5-S1 levels  Small left foraminal disc protrusion at L4-5 and asymmetric endplate changes on the right at L5-S1 resulting in mild foraminal narrowing  No compression fracture  Thoracoabdominal aortic aneurysm  The distal thoracic aorta measures up to 6 8 cm  Workstation performed: [id number]       Labs:   Lab Results   Component Value Date    WBC 1 68 (LL) 10/10/2018    HGB 8 8 (L) 10/10/2018    HCT 28 0 (L) 10/10/2018     (H) 10/10/2018     (L) 10/10/2018     Lab Results   Component Value Date     10/10/2018    K 4 3 10/10/2018     (H) 10/10/2018    CO2 22 10/10/2018    ANIONGAP 14 05/04/2015    BUN 15 10/10/2018    CREATININE 1 23 10/10/2018    GLUCOSE 98 03/05/2016    GLUF 90 06/12/2017    CALCIUM 8 2 (L) 10/10/2018    AST 16 10/09/2018    ALT 21 10/09/2018    ALKPHOS 89 10/09/2018    PROT 6 9 04/23/2015    BILITOT 0 25 04/23/2015    EGFR 44 10/10/2018       Lab Results   Component Value Date    UINSBWHB38 501 02/27/2018         ROS: As stated in the history of present illness otherwise his 14 point review of systems today was negative        Active Problems:   Patient Active Problem List   Diagnosis    Hypothyroidism    Pure hypercholesterolemia    Anxiety    ASCVD (arteriosclerotic cardiovascular disease)    Aneurysm of infrarenal abdominal aorta (HCC)    Aneurysm, thoracoabdominal aortic ([facility] )    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 ([facility] )    Benign hypertension    Minor depression    Spinal stenosis of lumbar region with neurogenic claudication    PAD (peripheral artery disease) ([facility] )    Bilateral carotid artery stenosis    Stage 3 chronic kidney disease (HCC)    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)    C  difficile diarrhea    P[facility]    NYDIA (acute kidney injury) ([place] [address] )    Dehydration    Moderate protein-calorie malnutrition (HCC)       Past Medical History:   Past Medical History:   Diagnosis Date    Aortic aneurysm ([place] [address] )     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HCC)     ASCVD (arteriosclerotic cardiovascular disease)     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 ([place] [address] )     Back problem     Blood type B-     Cataract     [facility]     Coronary artery disease     CVA (cerebral vascular accident) ([place] [address] ) 2012    Depression     Disease of thyroid gland     History of cataract     Hyperlipidemia     Hypertension     Lung mass     Mediastinal adenopathy     Muscle pain     Prediabetes     PVD (peripheral vascular disease) ([place] [address] )     Thoracic aortic aneurysm (HCC)     Transient insomnia        Surgical History:   Past Surgical History:   Procedure Laterality Date    CAROTID STENT Left     WA COLONOSCOPY FLX DX W/COLLJ SPEC WHEN PFRMD N/A 9/27/2017    Procedure: EGD AND COLONOSCOPY;  Surgeon: Jennifer Barger MD;  Location: [facility];  Service: Gastroenterology    WA EDG US EXAM SURGICAL ALTER STOM DUODENUM/JEJUNUM N/A 8/1/2018    Procedure: LINEAR ENDOSCOPIC U/S;  Surgeon: Janet Reddy MD;  Location: [facility];   Service: Gastroenterology    TUBAL LIGATION         Family History:    Family History   Problem Relation Age of Onset    Hypertension Mother     Heart disease Mother     Diabetes Father     Liver disease Father         hepatic failure     Breast cancer Daughter     Substance Abuse Neg Hx     Mental illness Neg Hx        Cancer-related family history includes Breast cancer in her daughter  Social History:   Social History     Social History    Marital status:      Spouse name: N/A    Number of children: N/A    Years of education: N/A     Occupational History    Not on file  Social History Main Topics    Smoking status: Former Smoker     Packs/day: 0 50     Years: 45 00     Quit date: 3/6/2018    Smokeless tobacco: Never Used    Alcohol use No    Drug use: No    Sexual activity: Not Currently     Other Topics Concern    Not on file     Social History Narrative    Lives with family  Feels safe at home  Sees dentist occas  No living will  Dental care, occasionally     Does not exercise     Living alone    Living situation: supportive and safe    No advance directives     No caffeine use        Current Medications:   Current Outpatient Prescriptions   Medication Sig Dispense Refill    albuterol (VENTOLIN HFA) 90 mcg/act inhaler Inhale 2 puffs every 6 (six) hours as needed for wheezing or shortness of breath 18 g 1    aspirin (ECOTRIN) 325 mg EC tablet Take 325 mg by mouth daily        atorvastatin (LIPITOR) 40 mg tablet Take 1 tablet (40 mg total) by mouth daily (Patient taking differently: Take 40 mg by mouth daily in the early morning  ) 90 tablet 3    co-enzyme Q-10 30 MG capsule Take 30 mg by mouth daily        FLUZONE HIGH-DOSE 0 5 ML ADRIANNA       gabapentin (NEURONTIN) 100 mg capsule Take 200 mg by mouth 3 (three) times a day      levothyroxine 100 mcg tablet Take 1 tablet (100 mcg total) by mouth daily 30 tablet 3    lidocaine viscous (XYLOCAINE) 2 % mucosal solution       lisinopril (ZESTRIL) 10 mg tablet       magic mouthwash oral suspension (mixture) Swish and swallow 10 mL 3 (three) times a day as needed (esophagitis) 12 oz 1    Methylcobalamin (B12-ACTIVE PO) Take by mouth daily  metoprolol succinate (TOPROL-XL) 50 mg 24 hr tablet Take 1 tablet (50 mg total) by mouth 2 (two) times a day 60 tablet 2    metroNIDAZOLE (FLAGYL) 500 mg tablet Take 1 tablet (500 mg total) by mouth 3 (three) times a day for 12 days 36 tablet 0    Omega-3 Fatty Acids (FISH OIL PO) Take 1 capsule by mouth daily        vancomycin (VANCOCIN) 125 MG capsule       vancomycin (VANCOCIN) 50mg/mL SOLN Take 2 5 mL (125 mg total) by mouth every 6 (six) hours for 12 days 150 mL 0     No current facility-administered medications for this visit  Allergies: Allergies   Allergen Reactions    Penicillins Rash       Physical Exam:    Body surface area is 1 53 meters squared  Wt Readings from Last 3 Encounters:   10/17/18 56 2 kg (124 lb)   10/16/18 56 2 kg (124 lb)   10/10/18 57 1 kg (125 lb 14 1 oz)        Temp Readings from Last 3 Encounters:   10/17/18 98 1 °F (36 7 °C) (Tympanic)   10/10/18 98 1 °F (36 7 °C) (Oral)   10/04/18 97 8 °F (36 6 °C) (Temporal)        BP Readings from Last 3 Encounters:   10/17/18 126/86   10/16/18 100/70   10/10/18 137/86         Pulse Readings from Last 3 Encounters:   10/17/18 (!) 122   10/16/18 84   10/10/18 79         Physical Exam     Constitutional   General appearance: No acute distress, well appearing and well nourished  Eyes   Conjunctiva and lids: No swelling, erythema or discharge  Pupils and irises: Equal, round and reactive to light  Ears, Nose, Mouth, and Throat   External inspection of ears and nose: Normal     Nasal mucosa, septum, and turbinates: Normal without edema or erythema  Oropharynx: Normal with no erythema, edema, exudate or lesions  Pulmonary   Respiratory effort: No increased work of breathing or signs of respiratory distress  Auscultation of lungs: Clear to auscultation  Cardiovascular   Palpation of heart: Normal PMI, no thrills  Auscultation of heart: Normal rate and rhythm, normal S1 and S2, without murmurs      Examination of extremities for edema and/or varicosities: Normal     Carotid pulses: Normal     Abdomen   Abdomen: Non-tender, no masses  Liver and spleen: No hepatomegaly or splenomegaly  Lymphatic   Palpation of lymph nodes in neck: No lymphadenopathy  Musculoskeletal   Gait and station: Normal     Digits and nails: Normal without clubbing or cyanosis  Inspection/palpation of joints, bones, and muscles: Normal     Skin   Skin and subcutaneous tissue: Normal without rashes or lesions  Neurologic   Cranial nerves: Cranial nerves 2-12 intact  Sensation: No sensory loss  Psychiatric   Orientation to person, place, and time: Normal     Mood and affect: Normal         Assessment / Plan:      The patient is a pleasant 82-year-old female with newly diagnosed stage IIIa adenocarcinoma of the lung  Her PET/CT scan showed disease in the lymph nodes and the primary mass  There was no metastatic lesions seen  She was seen by our colleagues in surgery who recommended concurrent chemoradiation  Based on her stage I agreed with this  The patient was started on carboplatin at an AUC of 2 and Taxol at 48 m square with concurrent chemoradiation  He got 5 cycles but then was admitted with C  Difficile and diarrhea  She is now finishing up her radiation  Her blood counts were low so we have discontinued her chemotherapy as she is finishing up radiation at this point  Based on the data from the [address] [place] [address] trial now that she has finished her concurrent chemoradiation and she has stage IIIa malignancy we will put her on IMFINZI every 2 weeks to complete 1 year of treatment  The patient agrees with this  I went over the risk benefits and alternatives of therapy  I explained the rationale of adjuvant immunotherapy based on the study  I explained the possibility of side effects including but not limited to pneumonitis, thyroiditis, adrenal insufficiency, hypopituitarism, colitis  The patient agreed to proceed   I will set her up to start in 3 weeks  Goals and Barriers:  Current Goal:  Prolong Survival from lung cancer   Barriers: None  Patient's Capacity to Self Care:  Patient able to self care  Portions of the record may have been created with voice recognition software   Occasional wrong word or "sound a like" substitutions may have occurred due to the inherent limitations of voice recognition software   Read the chart carefully and recognize, using context, where substitutions have occurred

## 2018-10-19 NOTE — UTILIZATION REVIEW
Auth:   [id number]      Notification of Discharge  This is a Notification of Discharge from our facility [address]  Please be advised that this patient has been discharge from our facility  Below you will find the admission and discharge date and time including the patients disposition  PRESENTATION DATE: 10/8/2018  9:39 AM  IP ADMISSION DATE: 10/8/18 1318  DISCHARGE DATE: 10/10/2018  1:13 PM  DISPOSITION: Home/Self Care    [facility] in the [place] by [place] Utilization Review Department  Phone: [phone]; Fax [phone]  ATTENTION: The Network Utilization Review Department is now centralized for our 9 Facilities  Make a note that we have a new phone and fax numbers for our Department  Please call with any questions or concerns to [phone] and carefully follow the prompts so that you are directed to the right person  All voicemails are confidential  Fax any determinations, approvals, denials, and requests for initial or continue stay review clinical to [phone]  Due to HIGH CALL volume, it would be easier if you could please send faxed requests to expedite your requests and in part, help us provide discharge notifications faster

## 2018-10-23 NOTE — UTILIZATION REVIEW
Initial Clinical Review  Admission: Date/Time/Statement: 10/8/18 @ 1318   Orders Placed This Encounter   Procedures    Inpatient Admission (expected length of stay for this patient is greater than two midnights)     Standing Status:   Standing     Number of Occurrences:   1     Order Specific Question:   Admitting Physician     Answer:   Mango Harkins [[id number]]     Order Specific Question:   Level of Care     Answer:   Med Surg [[id number]]     Order Specific Question:   Estimated length of stay     Answer:   More than 2 Midnights     Order Specific Question:   Certification     Answer:   I certify that inpatient services are medically necessary for this patient for a duration of greater than two midnights  See H&P and MD Progress Notes for additional information about the patient's course of treatment  ED: Date/Time/Mode of Arrival:   ED Arrival Information     Expected Arrival Acuity Means of Arrival Escorted By Service Admission Type    - 10/8/2018 09:35 Urgent Walk-In [facility] General Medicine Urgent    Arrival Complaint    back pain, vomiting and diarrhea      Chief Complaint:   Chief Complaint   Patient presents with    Back Pain     Patient presents with lower back pain chronic in nature but feels worse  Patient also DX with cdiff and is still having diarrhea  Patient is still having episodes of diarrhea 5-6 times a day   History of Illness:   Patient complains of persistent diarrhea over last 2 weeks after recent diagnosis of C  Diff  She has about 5 stools daily  She has crampy abdominal discomfort and feels that metronidazole pills aren't working  She has lung cancer and gets chemo - last chemo was 5 days ago  Patient has gradual worsening low back pain over last 2 days midline and more on right  No specific loss of bowel or bladder control  Pain feels sharp and muscular  Tramadol does not relieve pain  Pain does radiate down right leg to foot  Mouth/Throat: Mucous membranes are dry     Abdominal: There is generalized tenderness  Musculoskeletal: Lumbar back: She exhibits tenderness, bony tenderness, pain and spasm  ED Vital Signs:   ED Triage Vitals   Temperature Pulse Respirations Blood Pressure SpO2   10/08/18 1002 10/08/18 1002 10/08/18 1002 10/08/18 1002 10/08/18 1002   (!) 96 6 °F (35 9 °C) 90 20 154/74 98 %      Temp Source Heart Rate Source Patient Position - Orthostatic VS BP Location FiO2 (%)   10/08/18 1427 10/08/18 1600 10/08/18 1427 10/08/18 1427 --   Oral Monitor Lying Left arm       Pain Score       10/08/18 1002       6          Wt Readings from Last 1 Encounters:   10/17/18 56 2 kg (124 lb)   Vital Signs (abnormal): Abnormal Labs/Diagnostic Test Results:   WBC 2 77 HGB 10 2  ANION GAP 15 BUN 33 CR 2 57 ALB 3 2 GFR 18   U/A+PROT  CT A/P=Multiple distended and nondilated small and large bowel loops containing fluid consistent with history of diarrhea  No focal bowel wall thickening with stranding to suggest colitis  Focal soft tissue thickening in the right mons pubis associated with hypermetabolic activity on PET/CT has resolved and was likely inflammatory in nature  CT LUMBAR SPINE=Mild noncompressive lumbar degenerative disc disease at the L4-5 and L5-S1 levels  Small left foraminal disc protrusion at L4-5 and asymmetric endplate changes on the right at L5-S1 resulting in mild foraminal narrowing  No compression fracture  Thoracoabdominal aortic aneurysm  The distal thoracic aorta measures up to 6 8 cm    ED Treatment:   Medication Administration from 10/08/2018 0935 to 10/08/2018 1427       Date/Time Order Dose Route Action Action by Comments     10/08/2018 1104 lidocaine (LIDODERM) 5 % patch 1 patch 1 patch Topical Medication Applied Annie Cohen RN lumbar     10/08/2018 1222 sodium chloride 0 9 % bolus 1,000 mL 0 mL Intravenous Stopped Annie Cohen RN      10/08/2018 1104 sodium chloride 0 9 % bolus 1,000 mL 1,000 mL Intravenous [address] Annie Cohen RN      10/08/2018 1314 HYDROcodone-acetaminophen (NORCO) 5-325 mg per tablet 1 tablet 1 tablet Oral Given Annie Cohen RN      10/08/2018 1317 sodium chloride 0 9 % bolus 1,000 mL 1,000 mL Intravenous New Bag Annie Cohen RN       Past Medical/Surgical History:   Past Medical History:   Diagnosis Date    [facility]     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HCC)     ASCVD (arteriosclerotic cardiovascular disease)     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)     Back problem     Blood type B-     Cataract     [facility]     Coronary artery disease     [facility]cere[facility] accident) ([facility] ) 2012    Depression     Disease of thyroid gland     History of cataract     Hyperlipidemia     Hypertension     Lung mass     Mediastinal adenopathy     Muscle pain     Prediabetes     PVD (peripheral vascular disease) ([facility] )     Thoracic aortic aneurysm ([facility])     Transient insomnia    Admitting Diagnosis: Diarrhea [R19 7]  Back pain [M54 9]  Acute on chronic renal failure (HCC) [N17 9, N18 9]  Lumbar radiculopathy, acute [M54 16]  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) [C34 92]  Age/Sex: 72 y o  female  Assessment/Plan:   71 YO FEMALE TO ER FROM HOME C/O CRAMPY ABD PAIN WITH PERSISTENT DIARRHEA  PT BEING TX FOR C-DIFF WITH FLAGYL  HX LUNG CA, RECEIVING CHEMO, LAST DOSE 5 DAYS AGO  PRESENTS FATIGUED WITH GENERALIZED ABD PAIN/TENDERNESS, HYPERACTIVE BS, BACK PAIN & DIARRHEA  ADMITTED TO INPATIENT STATUS, STARTED ON IV FLAGYL, PO VANCO & IVF  PLACED ON CONTACT ISOLATION, GI CONSULTED     Admission Orders:  MED SURG  CONTACT & HAND HYGIENE ISOLATION  CONSULT GI  CONSULT ONCOLOGY  ORTHOSTATIC BP  Scheduled Meds:   Current Facility-Administered Medications:  albuterol 2 puff Inhalation Q6H PRN Shaina Fly, DO    aspirin 325 mg Oral Daily Shaina Fly, DO    atorvastatin 40 mg Oral Daily Shaina Fly, DO    enoxaparin 30 mg Subcutaneous Daily Shaina Fly, DO gabapentin 200 mg Oral TID Ivar Lebanon, DO    HYDROcodone-acetaminophen 1 tablet Oral Q6H PRN Ivar Lebanon, DO    levothyroxine 100 mcg Oral Early Morning Shaina Fly, DO    magic mouthwash oral suspension (mixture) 10 mL Swish & Swallow TID PRN Shaina Fly, DO    metoprolol succinate 50 mg Oral BID Shaina Fly, DO    metroNIDAZOLE 500 mg Intravenous Q8H Shaina Fly, DO    ondansetron 4 mg Intravenous Q6H PRN Shaina Fly, DO    vancomycin 125 mg Oral Q6H [facility] Shaina Fly, DO    Continuous Infusions:   No current facility-administered medications for this encounter  PRN Meds: Thank you,  [facility] Utilization Review Department  Phone: [phone]; Fax [phone]  ATTENTION: Please call with any questions or concerns to [phone]  and carefully follow the prompts so that you are directed to the right person  Send all requests for admission clinical reviews, approved or denied determinations and any other requests to fax [phone]  All voicemails are confidential        Minal Martínez:   [phone]      Notification of Discharge  This is a Notification of Discharge from our facility [address]  Please be advised that this patient has been discharge from our facility  Below you will find the admission and discharge date and time including the patients disposition  PRESENTATION DATE: 10/8/2018  9:39 AM  IP ADMISSION DATE: 10/8/18 1318  DISCHARGE DATE: 10/10/2018  1:13 PM  DISPOSITION: Home/Self Care    [address] [place] [address] [facility] Util[facility] Review Department  Phone: [phone]; Fax [phone]  ATTENTION: The [facility] Utilization Review Department is now centralized for our 9 Facilities  Make a note that we have a new phone and fax numbers for our Department  Please call with any questions or concerns to [phone] and carefully follow the prompts so that you are directed to the right person   All voicemails are confidential  Fax any determinations, approvals, denials, and requests for initial or continue stay review clinical to [phone]  Due to HIGH CALL volume, it would be easier if you could please send faxed requests to expedite your requests and in part, help us provide discharge notifications faster

## 2018-10-26 NOTE — UTILIZATION REVIEW
Auth:   [id number]      Initial Clinical Review  Admission: Date/Time/Statement: 10/8/18 @ 1318   Orders Placed This Encounter   Procedures    Inpatient Admission (expected length of stay for this patient is greater than two midnights)     Standing Status:   Standing     Number of Occurrences:   1     Order Specific Question:   Admitting Physician     Answer:   Suellen Casas [[id number]]     Order Specific Question:   Level of Care     Answer:   Med Surg [16]     Order Specific Question:   Estimated length of stay     Answer:   More than 2 Midnights     Order Specific Question:   Certification     Answer:   I certify that inpatient services are medically necessary for this patient for a duration of greater than two midnights  See H&P and MD Progress Notes for additional information about the patient's course of treatment  ED: Date/Time/Mode of Arrival:   ED Arrival Information     Expected Arrival Acuity Means of Arrival Escorted By Service Admission Type    - 10/8/2018 09:35 Urgent [facility]-In [facility] General Medicine Urgent    Arrival Complaint    back pain, vomiting and diarrhea      Chief Complaint:   Chief Complaint   Patient presents with    Back Pain     Patient presents with lower back pain chronic in nature but feels worse  Patient also DX with cdiff and is still having diarrhea  Patient is still having episodes of diarrhea 5-6 times a day   History of Illness:   Patient complains of persistent diarrhea over last 2 weeks after recent diagnosis of C  Diff  She has about 5 stools daily  She has crampy abdominal discomfort and feels that metronidazole pills aren't working  She has lung cancer and gets chemo - last chemo was 5 days ago  Patient has gradual worsening low back pain over last 2 days midline and more on right  No specific loss of bowel or bladder control  Pain feels sharp and muscular  Tramadol does not relieve pain  Pain does radiate down right leg to foot    Mouth/Throat: Mucous membranes are dry    Abdominal: There is generalized tenderness  Musculoskeletal: Lumbar back: She exhibits tenderness, bony tenderness, pain and spasm  ED Vital Signs:   ED Triage Vitals   Temperature Pulse Respirations Blood Pressure SpO2   10/08/18 1002 10/08/18 1002 10/08/18 1002 10/08/18 1002 10/08/18 1002   (!) 96 6 °F (35 9 °C) 90 20 154/74 98 %      Temp Source Heart Rate Source Patient Position - Orthostatic VS BP Location FiO2 (%)   10/08/18 1427 10/08/18 1600 10/08/18 1427 10/08/18 1427 --   Oral Monitor Lying Left arm       Pain Score       10/08/18 1002       6          Wt Readings from Last 1 Encounters:   10/17/18 56 2 kg (124 lb)   Vital Signs (abnormal): Abnormal Labs/Diagnostic Test Results:   WBC 2 77 HGB 10 2  ANION GAP 15 BUN 33 CR 2 57 ALB 3 2 GFR 18   U/A+PROT  CT A/P=Multiple distended and nondilated small and large bowel loops containing fluid consistent with history of diarrhea  No focal bowel wall thickening with stranding to suggest colitis  Focal soft tissue thickening in the right mons pubis associated with hypermetabolic activity on PET/CT has resolved and was likely inflammatory in nature  CT LUMBAR SPINE=Mild noncompressive lumbar degenerative disc disease at the L4-5 and L5-S1 levels  Small left foraminal disc protrusion at L4-5 and asymmetric endplate changes on the right at L5-S1 resulting in mild foraminal narrowing  No compression fracture  Thoracoabdominal aortic aneurysm  The distal thoracic aorta measures up to 6 8 cm    ED Treatment:   Medication Administration from 10/08/2018 0935 to 10/08/2018 1427       Date/Time Order Dose Route Action Action by Comments     10/08/2018 1104 lidocaine (LIDODERM) 5 % patch 1 patch 1 patch Topical Medication Applied Marlon Tobin RN lumbar     10/08/2018 1222 sodium chloride 0 9 % bolus 1,000 mL 0 mL Intravenous Stopped Marlon Tobin RN      10/08/2018 1104 sodium chloride 0 9 % bolus 1,000 mL 1,000 mL Intravenous Alexander Martins RN 10/08/2018 1314 HYDROcodone-acetaminophen (NORCO) 5-325 mg per tablet 1 tablet 1 tablet Oral Given Germán Honeycutt RN      10/08/2018 1317 sodium chloride 0 9 % bolus 1,000 mL 1,000 mL Intravenous New Bag Germán Honeycutt RN       Past Medical/Surgical History:   Past Medical History:   Diagnosis Date    [facility]     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     ASCVD (arteriosclerotic cardiovascular disease)     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 ([facility])     Back problem     Blood type B-     Cataract     COPD [facility]ch[facility] obstructive pulmonary disease) [facility])     Coronary artery disease     [facility]    Depression     Disease of thyroid gland     History of cataract     Hyperlipidemia     Hypertension     Lung mass     Mediastinal adenopathy     Muscle pain     Prediabetes     PVD [facility]peripheral [facility]) [facility]     Thoracic aortic aneurysm ([facility])     Transient insomnia    Admitting Diagnosis: Diarrhea [R19 7]  Back pain [M54 9]  Acute on chronic renal failure (HCC) [N17 9, N18 9]  Lumbar radiculopathy, acute [M54 16]  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) [C34 92]  Age/Sex: 72 y o  female  Assessment/Plan:   73 YO FEMALE TO ER FROM HOME C/O CRAMPY ABD PAIN WITH PERSISTENT DIARRHEA  PT BEING TX FOR C-DIFF WITH FLAGYL  HX LUNG CA, RECEIVING CHEMO, LAST DOSE 5 DAYS AGO  PRESENTS FATIGUED WITH GENERALIZED ABD PAIN/TENDERNESS, HYPERACTIVE BS, BACK PAIN & DIARRHEA  ADMITTED TO INPATIENT STATUS, STARTED ON IV FLAGYL, PO VANCO & IVF  PLACED ON CONTACT ISOLATION, GI CONSULTED     Admission Orders:  MED SURG  CONTACT & HAND HYGIENE ISOLATION  CONSULT GI  CONSULT ONCOLOGY  ORTHOSTATIC BP  Scheduled Meds:   Current Facility-Administered Medications:  albuterol 2 puff Inhalation Q6H PRN Shaina Fly, DO    aspirin 325 mg Oral Daily Shaina Fly, DO    atorvastatin 40 mg Oral Daily Shaina Fly, DO    enoxaparin 30 mg Subcutaneous Daily Shaina Fly, DO    gabapentin 200 mg Oral TID Ginna Tru, DO    HYDROcodone-acetaminophen 1 tablet Oral Q6H PRN Ginna Tru, DO    levothyroxine 100 mcg Oral Early Morning Shaina Fly, DO    magic mouthwash oral suspension (mixture) 10 mL Swish & Swallow TID PRN Shaina Fly, DO    metoprolol succinate 50 mg Oral BID Shaina Fly, DO    metroNIDAZOLE 500 mg Intravenous Q8H Shaina Fly, DO    ondansetron 4 mg Intravenous Q6H PRN Shaina Fly, DO    vancomycin 125 mg Oral Q6H [address] Shaina Fly, DO    Continuous Infusions:   No current facility-administered medications for this encounter  PRN Meds: Thank you,  [address] [place] [address] Utilization Review Department  Phone: [phone]; Fax [phone]  ATTENTION: Please call with any questions or concerns to [phone]  and carefully follow the prompts so that you are directed to the right person  Send all requests for admission clinical reviews, approved or denied determinations and any other requests to fax [phone]   All voicemails are confidential

## 2018-11-08 NOTE — SOCIAL WORK
Met with pt in clinic today  She has completed chemo/rad at [facility] and will be here [id number] for Lionel  Pt requests info re: [email or web address]  Provided resources and explained funding option from [facility]  Discussed monthly support group and other services available  MSW will be available for support and assistance as needed

## 2018-11-08 NOTE — PROGRESS NOTES
Pt here for Imfinzi; TSH 4 87; notified Alen Nova RN/Dr Debra Carl; pt ok to proceed with treatment today

## 2018-11-12 NOTE — PATIENT INSTRUCTIONS
Take the gabapentin 200 mg 3 times a day every day  Call if cough gets worse with blood in sputum or yellow phlegm or fever  Try to wean off diarrhea medications

## 2018-11-12 NOTE — PROGRESS NOTES
Assessment/Plan:       Diagnoses and all orders for this visit:    Nonsquamous nonsmall cell neoplasm of left lung ([address] )    Spinal stenosis of lumbar region with neurogenic claudication    Stage 3 chronic kidney disease (HCC)    PAD (peripheral artery disease) (HCC)    Muscle pain          All of the above diagnoses have been assessed  Additional COMMENTS/PLAN:  Hopefully by taking gabapentin 3 times a day she will do better  Patient does have quite an uphill climb with her lung cancer and other multiple medical problems      Subjective:      Patient ID: Karen Galvez is a 72 y o  female  HPI   Changing treatment for her lung cancer  Will be getting Infinzi      Diarrhea -this is getting better      Pain-R leg going into foot-this has been for 2 years  On gabapentin -did help initially and not now  Pain is there at rest   Has been only taking the gabapentin 2 times a day  The following portions of the patient's history were revised and updated as appropriate: Problem list, allergies, med list, FH, SH, Past medical and surgical histories  Current Outpatient Prescriptions   Medication Sig Dispense Refill    albuterol (VENTOLIN HFA) 90 mcg/act inhaler Inhale 2 puffs every 6 (six) hours as needed for wheezing or shortness of breath 18 g 1    aspirin (ECOTRIN) 325 mg EC tablet Take 325 mg by mouth daily        atorvastatin (LIPITOR) 40 mg tablet Take 1 tablet (40 mg total) by mouth daily (Patient taking differently: Take 40 mg by mouth daily in the early morning  ) 90 tablet 3    co-enzyme Q-10 30 MG capsule Take 30 mg by mouth daily        FLUZONE HIGH-DOSE 0 5 ML ADRIANNA       gabapentin (NEURONTIN) 100 mg capsule Take 2 capsules (200 mg total) by mouth 3 (three) times a day 180 capsule 0    levothyroxine 100 mcg tablet Take 1 tablet (100 mcg total) by mouth daily 30 tablet 3    Methylcobalamin (B12-ACTIVE PO) Take by mouth daily        metoprolol succinate (TOPROL-XL) 50 mg 24 hr tablet Take 1 tablet (50 mg total) by mouth 2 (two) times a day 60 tablet 2    Omega-3 Fatty Acids (FISH OIL PO) Take 1 capsule by mouth daily        vancomycin (VANCOCIN) 125 MG capsule        No current facility-administered medications for this visit  Review of Systems   Respiratory: Positive for cough  Gastrointestinal: Positive for diarrhea  Genitourinary: Negative  Objective:    /60 (BP Location: Left arm, Patient Position: Sitting, Cuff Size: Standard)   Pulse 105   Ht 5' 0 5" (1 537 m)   Wt 54 kg (119 lb)   LMP  (LMP Unknown)   BMI 22 86 kg/m²      Physical Exam   Constitutional: She appears well-developed and well-nourished  Neck: No JVD present  Cardiovascular: Normal rate  Murmur heard  Pulmonary/Chest: Effort normal and breath sounds normal    Breath sounds bilaterally   Abdominal: Soft  Bowel sounds are normal    Musculoskeletal: She exhibits no edema  Negative straight leg raise  Neurological:   No motor weakness  Vitals reviewed  Orders Only on 11/06/2018   Component Date Value Ref Range Status    Glucose, Random 11/06/2018 159* 65 - 99 mg/dL Final    Comment:               Fasting reference interval     For someone without known diabetes, a glucose  value >125 mg/dL indicates that they may have  diabetes and this should be confirmed with a  follow-up test          BUN 11/06/2018 18  7 - 25 mg/dL Final    Creatinine 11/06/2018 1 30* 0 60 - 0 93 mg/dL Final    Comment: For patients >52years of age, the reference limit  for Creatinine is approximately 13% higher for people  identified as -American           eGFR Non  11/06/2018 41* > OR = 60 mL/min/1 73m2 Final    SL AMB EGFR  11/06/2018 48* > OR = 60 mL/min/1 73m2 Final    SL AMB BUN/CREATININE RATIO 11/06/2018 14  6 - 22 (calc) Final    Sodium 11/06/2018 141  135 - 146 mmol/L Final    Potassium 11/06/2018 4 1  3 5 - 5 3 mmol/L Final    Chloride 11/06/2018 107 98 - 110 mmol/L Final    CO2 11/06/2018 23  20 - 32 mmol/L Final    SL AMB CALCIUM 11/06/2018 9 0  8 6 - 10 4 mg/dL Final    SL AMB PROTEIN, TOTAL 11/06/2018 6 2  6 1 - 8 1 g/dL Final    Albumin 11/06/2018 3 5* 3 6 - 5 1 g/dL Final    Globulin 11/06/2018 2 7  1 9 - 3 7 g/dL (calc) Final    Albumin/Globulin Ratio 11/06/2018 1 3  1 0 - 2 5 (calc) Final    TOTAL BILIRUBIN 11/06/2018 0 3  0 2 - 1 2 mg/dL Final    Alkaline Phosphatase 11/06/2018 80  33 - 130 U/L Final    SL AMB AST 11/06/2018 17  10 - 35 U/L Final    SL AMB ALT 11/06/2018 13  6 - 29 U/L Final    White Blood Cell Count 11/06/2018 5 5  3 8 - 10 8 Thousand/uL Final    Red Blood Cell Count 11/06/2018 3 44* 3 80 - 5 10 Million/uL Final    Hemoglobin 11/06/2018 11 3* 11 7 - 15 5 g/dL Final    HCT 11/06/2018 33 7* 35 0 - 45 0 % Final    MCV 11/06/2018 98 0  80 0 - 100 0 fL Final    MCH 11/06/2018 32 8  27 0 - 33 0 pg Final    MCHC 11/06/2018 33 5  32 0 - 36 0 g/dL Final    RDW 11/06/2018 16 9* 11 0 - 15 0 % Final    Platelet Count 86/66/6349 196  140 - 400 Thousand/uL Final    SL AMB MPV 11/06/2018 10 6  7 5 - 12 5 fL Final    Neutrophils (Absolute) 11/06/2018 3284  1,500 - 7,800 cells/uL Final    Lymphocytes (Absolute) 11/06/2018 996  850 - 3,900 cells/uL Final    Monocytes (Absolute) 11/06/2018 979* 200 - 950 cells/uL Final    Eosinophils (Absolute) 11/06/2018 182  15 - 500 cells/uL Final    Basophils ABS 11/06/2018 61  0 - 200 cells/uL Final    Neutrophils 11/06/2018 59 7  % Final    Lymphocytes 11/06/2018 18 1  % Final    Monocytes 11/06/2018 17 8  % Final    Eosinophils 11/06/2018 3 3  % Final    Basophils PCT 11/06/2018 1 1  % Final    TSH W/RFX TO FREE T4 11/06/2018 4 87* 0 40 - 4 50 mIU/L Final    Free t4 11/06/2018 1 1  0 8 - 1 8 ng/dL Final         Rukhsana Nunez MD

## 2018-11-23 NOTE — PROGRESS NOTES
Pt arrives for immunotherapy, bp 185/118, 178/107  Pt admittedly forgot to take her metoprolol today, and states last dose was yesterday morning  Spoke to Uma Carrion RN, pts tx will be held, and pt is to go directly to ER for hypertensive crisis  Pt informed of decision, report given to Kenzie Wang RN in ED  Pt transported in [facility]  Pharmacy notified

## 2018-11-23 NOTE — DISCHARGE INSTRUCTIONS

## 2018-11-23 NOTE — ED PROVIDER NOTES
History  Chief Complaint   Patient presents with    Hypertension     Pt presents to ED from infusion center due to high blood pressure 178/108, asymptomatic      73 yo female w/ hx of lung CA, CKD stage III, HTN, HLD and thoracic aortic aneurysm presents to the Emergency Department for evaluation of hypertension  She reported to her chemo-infusion appointment today where elevated BP was noted  She endorses that she did not take her BP meds this AM  Williams Boom headache, vision changes, CP, SOB, N/V/D, decreased UOP or abd pain  Prior to Admission Medications   Prescriptions Last Dose Informant Patient Reported? Taking? FLUZONE HIGH-DOSE 0 5 ML ADRIANNA  Self Yes No   Methylcobalamin (B12-ACTIVE PO)  Self Yes No   Sig: Take by mouth daily     Omega-3 Fatty Acids (FISH OIL PO)  Self Yes No   Sig: Take 1 capsule by mouth daily     albuterol (VENTOLIN HFA) 90 mcg/act inhaler Past Month at Unknown time Self No Yes   Sig: Inhale 2 puffs every 6 (six) hours as needed for wheezing or shortness of breath   aspirin (ECOTRIN) 325 mg EC tablet 11/22/2018 at Unknown time Self Yes Yes   Sig: Take 325 mg by mouth daily     atorvastatin (LIPITOR) 40 mg tablet 11/22/2018 at Unknown time Self No Yes   Sig: Take 1 tablet (40 mg total) by mouth daily   Patient taking differently: Take 40 mg by mouth daily in the early morning     co-enzyme Q-10 30 MG capsule  Self Yes No   Sig: Take 30 mg by mouth daily     gabapentin (NEURONTIN) 100 mg capsule 11/22/2018 at Unknown time Self No Yes   Sig: Take 2 capsules (200 mg total) by mouth 3 (three) times a day   levothyroxine 100 mcg tablet 11/22/2018 at Unknown time Self No Yes   Sig: Take 1 tablet (100 mcg total) by mouth daily   metoprolol succinate (TOPROL-XL) 50 mg 24 hr tablet 11/22/2018 at Unknown time Self No Yes   Sig: Take 1 tablet (50 mg total) by mouth 2 (two) times a day      Facility-Administered Medications: None       Past Medical History:   Diagnosis Date    [facility]ortic aneurysm ([facility] )     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HCC)     ASCVD (arteriosclerotic cardiovascular disease)     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 ([place] [address] )     Back problem     Blood type B-     Cancer ([place] [address] )     lung CA    Cataract     COPD (chronic obstructive pulmonary disease) (HCC)     Coronary artery disease     CVA (cerebral vascular accident) ([place] [address] ) 2012    Depression     Disease of thyroid gland     History of cataract     Hyperlipidemia     Hypertension     Lung mass     Mediastinal adenopathy     Muscle pain     Prediabetes     PVD (peripheral vascular disease) ([place] [address] )     Thoracic aortic aneurysm (HCC)     Transient insomnia        Past Surgical History:   Procedure Laterality Date    CAROTID STENT Left     CA COLONOSCOPY FLX DX W/COLLJ SPEC WHEN PFRMD N/A 9/27/2017    Procedure: EGD AND COLONOSCOPY;  Surgeon: Yaneli Pena MD;  [facility];  Service: Gastroenterology    CA EDG US EXAM SURGICAL ALTER STOM DUODENUM/JEJUNUM N/A 8/1/2018    Procedure: LINEAR ENDOSCOPIC U/S;  Surgeon: Jhony Loomis MD;  [facility]; Service: Gastroenterology    TUBAL LIGATION         Family History   Problem Relation Age of Onset    Hypertension Mother     Heart disease Mother     Diabetes Father     Liver disease Father         hepatic failure     Breast cancer Daughter     Substance Abuse Neg Hx     Mental illness Neg Hx      I have reviewed and agree with the history as documented  Social History   Substance Use Topics    Smoking status: Former Smoker     Packs/day: 0 50     Years: 45 00     Quit date: 3/6/2018    Smokeless tobacco: Never Used    Alcohol use No        Review of Systems   Constitutional: Negative for chills, diaphoresis, fatigue and fever  Eyes: Negative for photophobia and visual disturbance  Respiratory: Negative for shortness of breath  Cardiovascular: Negative for chest pain     Gastrointestinal: Negative for nausea and vomiting  Genitourinary: Negative for decreased urine volume  Musculoskeletal: Negative for back pain and neck pain  Skin: Negative for rash  Neurological: Negative for dizziness, syncope, speech difficulty, weakness, light-headedness, numbness and headaches  Psychiatric/Behavioral: Negative for confusion and decreased concentration  The patient is not nervous/anxious  Physical Exam  Physical Exam   Constitutional: She is oriented to person, place, and time  She appears well-developed and well-nourished  No distress  HENT:   Head: Normocephalic and atraumatic  Eyes: Pupils are equal, round, and reactive to light  No scleral icterus  Cardiovascular: Normal rate and regular rhythm  Exam reveals no gallop and no friction rub  No murmur heard  Pulmonary/Chest: No respiratory distress  She has no wheezes  She has no rales  Abdominal: Soft  Bowel sounds are normal  She exhibits no distension and no mass  There is no tenderness  There is no guarding  Neurological: She is alert and oriented to person, place, and time  Skin: Skin is warm and dry  Capillary refill takes less than 2 seconds  She is not diaphoretic  Psychiatric: She has a normal mood and affect  Her behavior is normal    Vitals reviewed        Vital Signs  ED Triage Vitals   Temperature Pulse Respirations Blood Pressure SpO2   11/23/18 1419 11/23/18 1419 11/23/18 1419 11/23/18 1420 11/23/18 1419   (!) 96 6 °F (35 9 °C) 97 18 (!) 195/104 95 %      Temp Source Heart Rate Source Patient Position - Orthostatic VS BP Location FiO2 (%)   11/23/18 1419 11/23/18 1419 11/23/18 1420 11/23/18 1420 --   Tympanic Monitor Lying Right arm       Pain Score       --                  Vitals:    11/23/18 1419 11/23/18 1420 11/23/18 1525   BP:  (!) 195/104 154/76   Pulse: 97  88   Patient Position - Orthostatic VS:  Lying Sitting       Visual Acuity      ED Medications  Medications   metoprolol succinate (TOPROL-XL) 24 hr tablet 50 mg (25 mg Oral Given 11/23/18 1527)       Diagnostic Studies  Results Reviewed     Procedure Component Value Units Date/Time    POCT Chem 8+ [[id number]]  (Abnormal) Collected:  11/23/18 1436    Lab Status:  Final result Updated:  11/23/18 1441     SODIUM, I-STAT 140 mmol/l      Potassium, i-STAT 4 2 mmol/L      Chloride, istat 104 mmol/L      CO2, i-STAT 26 mmol/L      Anion Gap, i-STAT 15 (H) mmol/L      Calcium, Ionized i-STAT 1 27 mmol/L      BUN, I-STAT 17 mg/dl      Creatinine, i-STAT 1 2 mg/dl      eGFR 46 ml/min/1 73sq m      Glucose, i-STAT 82 mg/dl      Hct, i-STAT 36 %      Hgb, i-STAT 12 2 g/dl      Specimen Type VENOUS                 No orders to display              Procedures  ECG 12 Lead Documentation  Date/Time: 11/23/2018 2:30 PM  Performed by: Heidi Echevarria by: Geovanni Bowers     Indications / Diagnosis:  HTN  ECG reviewed by me, the ED Provider: yes    Patient location:  ED  Previous ECG:     Previous ECG:  Unavailable  Interpretation:     Interpretation: normal    Rate:     ECG rate:  86    ECG rate assessment: normal    Rhythm:     Rhythm: sinus rhythm    Ectopy:     Ectopy: none    QRS:     QRS axis:  Normal    QRS intervals:  Normal  Conduction:     Conduction: normal    ST segments:     ST segments:  Normal  T waves:     T waves: normal             Phone Contacts  ED Phone Contact    ED Course  ED Course as of Nov 23 1534 Fri Nov 23, 2018   1441 Renal function baseline  EKG unremarkable  Pt is without complaints  Will attempt to discharge back to infusion center for her normal chemo                                MDM  Number of Diagnoses or Management Options  Essential hypertension: new and requires workup  Diagnosis management comments: 71 yo female presents w/ asymptomatic HTN secondary to medication non compliance  Renal function baseline, EKG non ischemic  Pt given her normal PO medication in the ED  No e/o end organ damage          Amount and/or Complexity of Data Reviewed  Clinical lab tests: ordered and reviewed  Review and summarize past medical records: yes  Independent visualization of images, tracings, or specimens: yes      CritCare Time    Disposition  Final diagnoses:   Essential hypertension     Time reflects when diagnosis was documented in both MDM as applicable and the Disposition within this note     Time User Action Codes Description Comment    11/23/2018  2:49 PM Kinney, [address] [place] [address] Essential hypertension       ED Disposition     ED Disposition Condition Comment    Discharge  [address] [facility] discharge to home/self care  Condition at discharge: Good        Follow-up Information     Follow up With Specialties Details Why Contact Info    Hamida King MD Internal Medicine   [facility]  [address]  [phone]            Patient's Medications   Discharge Prescriptions    No medications on file     No discharge procedures on file      ED Provider  Electronically Signed by           Coral Ventura PA-C  11/23/18 5955

## 2018-11-24 NOTE — TELEPHONE ENCOUNTER
Mary Kate Ramirez 1945  CONFIDENTIALTY NOTICE: This fax transmission is intended only for the addressee  It contains information that is legally privileged,  confidential or otherwise protected from use or disclosure  If you are not the intended recipient, you are strictly prohibited from reviewing,  disclosing, copying using or disseminating any of this information or taking any action in reliance on or regarding this information  If you have  received this fax in error, please notify us immediately by telephone so that we can arrange for its return to us  Page:   Call Id: [id number]  Health Call  Standard Call Report  Health Call  Patient Name: Mary Kate Ramirez  Gender: Female  : 1945  Age: 72 Y 11 M 23 D  Return Phone  Number: [phone] (Home)  Address: [address] [place] [address],[address]  City/[facility]: [facility] [address]  Practice Name: [facility] INTERNAL  MEDICINE [facility]  Practice Charged:  Physician:  Caitlyn Mtz Name:  Relationship To  Patient: Self  Return Phone Number: [phone] (Home)  Presenting Problem: "I need a refill on my Toprol XL "  Service Type: Messages  Charged Service 1: Messages  Pharmacy Name and  Number:  Nurse Assessment  Protocols  Protocol Title Nurse Date/Time  Disp  Time Disposition Final User  2018 8:21:58 AM Send to [address] [place] [address] 45, [id number] [facility]  2018 12:00:37 PM Close Yes Braden Anderson  Comments  User: Bishop Diaz RN Date/Time: 2018 12:00:17 PM  Called patient back and patient stated she was able to  her Toprol XL from the pharmacy  Will close file now

## 2018-11-26 NOTE — PROGRESS NOTES
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    Oleksandr Nur is a 72y o  year old female who presents for first follow-up s/p completion of concurrent chemo/RT on 10/22/18       72year old woman with stage IIIA non-small cell left upper lung carcinoma  She completed IMRT concurrent with chemotherapy on 10/22/18  She tolerated her treatment course fairly well with only moderate esophagitis  She was however admitted twice to [facility] for C diff infection and marrow depression  She received 5 cycles Chemotherapy, discontinued due to low blood counts       10/17/18 Dr Zohaib Dorantes, Med Onc follow-up  Based on the data from the [facility] trial now that she has finished her concurrent chemoradiation and she has stage IIIa malignancy we will put her on IMFINZI every 2 weeks to complete 1 year of treatment       11/8/18 First Imfinzi infusion    Her 2nd Imfinzi infusion was held on November 23, 2018 secondary to hypertension  This is been rescheduled now to November 29, 2018 and she will see Dr Zohaib Dorantes on November 28, 2018  She denies any nausea or vomiting and tolerated the 1st Imfinzi infusion well  She is having no difficulty with any esophagitis and has resumed her regular diet  Her appetite has improved  She is also still taking 1 can of Boost or Ensure per day  Her C diff infection has resolved  She denies any cough or respiratory complaints  She she is scheduled to go on a cruise February 22, 2019        Historical Information      Nonsquamous nonsmall cell neoplasm of left lung ([facility] )    8/2018 Initial Diagnosis     Nonsquamous nonsmall cell neoplasm of lung ([facility] )         8/1/2018 Biopsy     Lymph node, mediastinal (fine needle aspiration with cell block preparation):     - Involvement by non-small cell carcinoma, most compatible with pulmonary adenocarcinoma     - Positive: Emmett-Ep4, MOC-31, TTF1, CKAE1/3     - Negative: [place]-8, CDX2, synaptophysin, CD56, HMB-45, P40         8/9/2018 - 10/22/2018 Radiation     Treatments:  Course: C1    Plan ID Energy Fractions Dose per Fraction (cGy) Dose Correction (cGy) Total Dose Delivered (cGy) Elapsed Days   L LUNG MED 6X 33 / 33 180 0 5,[phone] 54      Treatment dates:  8/29/2018 - 10/22/2018            8/29/2018 - 10/4/2018 Chemotherapy     Carboplatin AUC 2 and Taxol 50 mg/m2 concurrent with RT          11/8/2018 -  Chemotherapy     Imfinzi (immunotherapy) every 2 weeks           Clinical Trial: No     Screening  Tobacco  Current tobacco user: no  If yes, brief counseling provided: No     Hypertension  Hypertension screening performed: yes  Normotensive:  yes  If no, referred to PCP: no     Depression Screening  Screened for depression using PHQ-2: yes     Screened for depression using PHQ-9:  no  Screening positive or negative:  negative  If score >4, was any of the following actions taken?    Additional evaluation for depression, suicide risk assesment, referral to PCP or psychiatry, medication started:  yes, no     Advanced Care Planning for Patients >65 years  Advanced Care Planning Discussed:  yes  Patient named surrogate decision maker or care plan in chart: no    Past Medical History:   Diagnosis Date    [facility]     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[address] )     ASCVD (arteriosclerotic cardiovascular disease)     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 ([address] )     Back problem     Blood type B-     Cancer ([address] )     lung CA    Cataract     COPD (chronic obstructive pulmonary disease) ([address] )     Coronary artery disease     CVA (cerebral vascular accident) ([address] ) 2012    Depression     Disease of thyroid gland     History of cataract     Hyperlipidemia     Hypertension     Lung mass     Mediastinal adenopathy  Muscle pain     Prediabetes     [facility]) [facility]     Thoracic aortic aneurysm (HCC)     Transient insomnia      Past Surgical History:   Procedure Laterality Date    CAROTID STENT Left     [place] COLONOSCOPY FLX DX W/COLLJ SPEC WHEN PFRMD N/A 9/27/2017    Procedure: EGD AND COLONOSCOPY;  Surgeon: Kathy Gee MD;  [facility];  Service: Gastroenterology    [place] EDG US EXAM SURGICAL ALTER STOM DUODENUM/JEJUNUM N/A 8/1/2018    Procedure: LINEAR ENDOSCOPIC U/S;  Surgeon: Ijeoma Anna MD;  [facility]; Service: Gastroenterology    TUBAL LIGATION         Social History   History   Alcohol Use No     History   Drug Use No     History   Smoking Status    Former Smoker    Packs/day: 0 50    Years: 45 00    Quit date: 3/6/2018   Smokeless Tobacco    Never Used       Meds/Allergies     Current Outpatient Prescriptions:     albuterol (VENTOLIN HFA) 90 mcg/act inhaler, Inhale 2 puffs every 6 (six) hours as needed for wheezing or shortness of breath, Disp: 18 g, Rfl: 1    aspirin (ECOTRIN) 325 mg EC tablet, Take 325 mg by mouth daily  , Disp: , Rfl:     atorvastatin (LIPITOR) 40 mg tablet, Take 1 tablet (40 mg total) by mouth daily (Patient taking differently: Take 40 mg by mouth daily in the early morning  ), Disp: 90 tablet, Rfl: 3    co-enzyme Q-10 30 MG capsule, Take 30 mg by mouth daily  , Disp: , Rfl:     gabapentin (NEURONTIN) 100 mg capsule, Take 2 capsules (200 mg total) by mouth 3 (three) times a day, Disp: 180 capsule, Rfl: 0    levothyroxine 100 mcg tablet, Take 1 tablet (100 mcg total) by mouth daily, Disp: 30 tablet, Rfl: 3    Methylcobalamin (B12-ACTIVE PO), Take by mouth daily  , Disp: , Rfl:     metoprolol succinate (TOPROL-XL) 50 mg 24 hr tablet, Take 1 tablet (50 mg total) by mouth 2 (two) times a day, Disp: 60 tablet, Rfl: 2    Omega-3 Fatty Acids (FISH OIL PO), Take 1 capsule by mouth daily  , Disp: , Rfl:     FLUZONE HIGH-DOSE 0 5 ML ADRIANNA, , Disp: , Rfl:   No current facility-administered medications for this visit  Allergies   Allergen Reactions    Penicillins Rash     Review of Systems   Constitutional: Negative  Fatigue is improving since EOT  Appetite also improving   HENT: Negative  Esophagitis improved   Eyes: Negative  Respiratory: Positive for cough (clear phlegm)  Cardiovascular: Negative  Gastrointestinal: Positive for diarrhea  Occasional loose stool at times, treated for C-Diff during concurrent treatment  No longer taking imodium or flagyl   Endocrine: Negative  Genitourinary: Negative  Musculoskeletal: Positive for back pain  Skin: Negative  Allergic/Immunologic: Negative  Neurological: Negative  Hematological: Negative  Psychiatric/Behavioral: Negative  OBJECTIVE:   /82 (BP Location: Left arm)   Pulse 94   Temp 98 °F (36 7 °C) (Temporal)   Resp 16   Wt 54 3 kg (119 lb 12 8 oz)   LMP  (LMP Unknown)   SpO2 95%   BMI 23 40 kg/m²   Pain Assessment:  0  ECOG/Zubrod/WHO: 1 - Symptomatic but completely ambulatory    Physical Exam   Constitutional: She is oriented to person, place, and time  She appears well-developed and well-nourished  No distress  HENT:   Head: Normocephalic and atraumatic  Mouth/Throat: Oropharynx is clear and moist  No oropharyngeal exudate  Eyes: Pupils are equal, round, and reactive to light  Conjunctivae and EOM are normal  No scleral icterus  Neck: Normal range of motion  Neck supple  No tracheal deviation present  No thyromegaly present  Cardiovascular: Normal rate, regular rhythm and normal heart sounds  Pulmonary/Chest: Effort normal and breath sounds normal  No respiratory distress  She has no wheezes  She has no rales  She exhibits no tenderness  Abdominal: Soft  Bowel sounds are normal  She exhibits no distension and no mass  There is no tenderness  Musculoskeletal: Normal range of motion  She exhibits no edema or tenderness  Lymphadenopathy:     She has no cervical adenopathy  She has no axillary adenopathy  Right: No supraclavicular adenopathy present  Left: No supraclavicular adenopathy present  Neurological: She is alert and oriented to person, place, and time  No cranial nerve deficit  Coordination normal    Skin: Skin is warm and dry  No rash noted  She is not diaphoretic  No erythema  No pallor  Psychiatric: She has a normal mood and affect  Her behavior is normal  Judgment and thought content normal    Nursing note and vitals reviewed      RESULTS    Lab Results:   Recent Results (from the past 672 hour(s))   Comprehensive metabolic panel    Collection Time: 11/06/18  2:00 PM   Result Value Ref Range    Glucose, Random 159 (H) 65 - 99 mg/dL    BUN 18 7 - 25 mg/dL    Creatinine 1 30 (H) 0 60 - 0 93 mg/dL    eGFR Non  41 (L) > OR = 60 mL/min/1 73m2    SL AMB EGFR  48 (L) > OR = 60 mL/min/1 73m2    SL AMB BUN/CREATININE RATIO 14 6 - 22 (calc)    Sodium 141 135 - 146 mmol/L    Potassium 4 1 3 5 - 5 3 mmol/L    Chloride 107 98 - 110 mmol/L    CO2 23 20 - 32 mmol/L    SL AMB CALCIUM 9 0 8 6 - 10 4 mg/dL    SL AMB PROTEIN, TOTAL 6 2 6 1 - 8 1 g/dL    Albumin 3 5 (L) 3 6 - 5 1 g/dL    Globulin 2 7 1 9 - 3 7 g/dL (calc)    Albumin/Globulin Ratio 1 3 1 0 - 2 5 (calc)    TOTAL BILIRUBIN 0 3 0 2 - 1 2 mg/dL    Alkaline Phosphatase 80 33 - 130 U/L    SL AMB AST 17 10 - 35 U/L    SL AMB ALT 13 6 - 29 U/L   CBC and differential    Collection Time: 11/06/18  2:00 PM   Result Value Ref Range    White Blood Cell Count 5 5 3 8 - 10 8 Thousand/uL    Red Blood Cell Count 3 44 (L) 3 80 - 5 10 Million/uL    Hemoglobin 11 3 (L) 11 7 - 15 5 g/dL    HCT 33 7 (L) 35 0 - 45 0 %    MCV 98 0 80 0 - 100 0 fL    MCH 32 8 27 0 - 33 0 pg    MCHC 33 5 32 0 - 36 0 g/dL    RDW 16 9 (H) 11 0 - 15 0 %    Platelet Count 626 148 - 400 Thousand/uL    SL AMB MPV 10 6 7 5 - 12 5 fL    Neutrophils (Absolute) 3,284 1,500 - 7,800 cells/uL    Lymphocytes (Absolute) 996 850 - 3,900 cells/uL    Monocytes (Absolute) 979 (H) 200 - 950 cells/uL    Eosinophils (Absolute) 182 15 - 500 cells/uL    Basophils ABS 61 0 - 200 cells/uL    Neutrophils 59 7 %    Lymphocytes 18 1 %    Monocytes 17 8 %    Eosinophils 3 3 %    Basophils PCT 1 1 %   TSH, 3rd generation with Free T4 reflex    Collection Time: 11/06/18  2:00 PM   Result Value Ref Range    TSH W/RFX TO FREE T4 4 87 (H) 0 40 - 4 50 mIU/L   T4, free    Collection Time: 11/06/18  2:00 PM   Result Value Ref Range    Free t4 1 1 0 8 - 1 8 ng/dL   ECG 12 lead    Collection Time: 11/23/18  2:27 PM   Result Value Ref Range    Ventricular Rate 86 BPM    Atrial Rate 86 BPM    NJ Interval 112 ms    QRSD Interval 86 ms    QT Interval 374 ms    QTC Interval 447 ms    P Axis 47 degrees    QRS Axis 56 degrees    T Wave Axis 65 degrees   POCT Chem 8+    Collection Time: 11/23/18  2:36 PM   Result Value Ref Range    SODIUM, I-STAT 140 136 - 145 mmol/l    Potassium, i-STAT 4 2 3 5 - 5 3 mmol/L    Chloride, istat 104 100 - 108 mmol/L    CO2, i-STAT 26 21 - 32 mmol/L    Anion Gap, i-STAT 15 (H) 4 - 13 mmol/L    Calcium, Ionized i-STAT 1 27 1 12 - 1 32 mmol/L    BUN, I-STAT 17 5 - 25 mg/dl    Creatinine, i-STAT 1 2 0 6 - 1 3 mg/dl    eGFR 46 ml/min/1 73sq m    Glucose, i-STAT 82 65 - 140 mg/dl    Hct, i-STAT 36 34 8 - 46 1 %    Hgb, i-STAT 12 2 11 5 - 15 4 g/dl    Specimen Type VENOUS        Imaging Studies:No results found  Assessment/Plan:  Orders Placed This Encounter   Procedures    NM PET CT skull base to mid thigh        Aftab Farfan is a 72y o  year old female with a stage IIIA, T2a, N2, M0 non-small cell left upper lobe lung carcinoma  She completed definitive radiation therapy along with concurrent chemotherapy on October 22n,2018  She returns today for follow-up visit  She has recovered well from combined chemotherapy and radiation therapy    She has started on Imfinzi on November 8, 2018 which is scheduled every 2 weeks for 1 year  She is tolerating this well  She will have PET-CT study done in 2 months and then return for follow-up which will have been 3 months since completion of her combined treatment  Hemalatha Esposito MD  11/26/2018,2:01 PM    Portions of the record may have been created with voice recognition software   Occasional wrong word or "sound a like" substitutions may have occurred due to the inherent limitations of voice recognition software   Read the chart carefully and recognize, using context, where substitutions have occurred

## 2018-11-26 NOTE — PROGRESS NOTES
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 for C diff infection and marrow depression  She received 5 cycles Chemotherapy, discontinued due to low blood counts  10/17/18 Dr Anastasia Severe, Med Onc follow-up  Based on the data from the [facility] trial now that she has finished her concurrent chemoradiation and she has stage IIIa malignancy we will put her on IMFINZI every 2 weeks to complete 1 year of treatment       11/8/18 First Imfinzi infusion             Nonsquamous nonsmall cell neoplasm of left lung ([facility] )    8/2018 Initial Diagnosis     Nonsquamous nonsmall cell neoplasm of lung ([facility] )         8/1/2018 Biopsy     Lymph node, mediastinal (fine needle aspiration with cell block preparation):     - Involvement by non-small cell carcinoma, most compatible with pulmonary adenocarcinoma     - Positive: Emmett-Ep4, MOC-31, TTF1, CKAE1/3     - Negative: [place]-8, CDX2, synaptophysin, CD56, HMB-45, P40         8/9/2018 - 10/22/2018 Radiation     Treatments:  Course: C1    Plan ID Energy Fractions Dose per Fraction (cGy) Dose Correction (cGy) Total Dose Delivered (cGy) Elapsed Days   L LUNG MED 6X 33 / 33 180 0 5,940 54      Treatment dates:  8/29/2018 - 10/22/2018            8/29/2018 - 10/4/2018 Chemotherapy     Carboplatin AUC 2 and Taxol 50 mg/m2 concurrent with RT          11/8/2018 -  Chemotherapy     Imfinzi (immunotherapy) every 2 weeks             Clinical Trial: No    Screening  Tobacco  Current tobacco user: no  If yes, brief counseling provided: No    Hypertension  Hypertension screening performed: yes  Normotensive:  yes  If no, referred to PCP: no    Depression Screening  Screened for depression using PHQ-2: yes    Screened for depression using PHQ-9:  no  Screening positive or negative:  negative  If score >4, was any of the following actions taken?    Additional evaluation for depression, suicide risk assesment, referral to PCP or psychiatry, medication started:  yes, no    Advanced Care Planning for Patients >65 years  Advanced Care Planning Discussed:  yes  Patient named surrogate decision maker or care plan in chart: no      Health Maintenance   Topic Date Due    Medicare Annual Wellness Visit (AWV)  1945    DTaP,Tdap,and Td Vaccines (1 - Tdap) 06/05/1968    MAMMOGRAM  06/28/2018    Pneumococcal PPSV23/PCV13 65+ Years / High and Highest Risk (2 of 2 - PPSV23) 11/27/2018    Fall Risk  05/31/2019    Urinary Incontinence Screening  05/31/2019    CRC Screening: Colonoscopy  09/27/2027    Hepatitis C Screening  Completed    INFLUENZA VACCINE  Completed       Patient Active Problem List   Diagnosis    Hypothyroidism    Pure hypercholesterolemia    Anxiety    ASCVD (arteriosclerotic cardiovascular disease)    Aneurysm of infrarenal abdominal aorta ([facility] )    Aneurysm, thoracoabdominal aortic ([facility] )    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 ([facility] )    Benign hypertension    Minor depression    Spinal stenosis of lumbar region with neurogenic claudication    PAD (peripheral artery disease) ([facility] )    Bilateral carotid artery stenosis    Stage 3 chronic kidney disease ([facility] )    Nonsquamous nonsmall cell neoplasm of left lung ([facility] )    C  difficile diarrhea    Pancytopenia ([address]    NYDIA (acute kidney injury) ([address]    Dehydration    Moderate protein-calorie malnutrition ([facility] )     Past Medical History:   Diagnosis Date    Aortic aneurysm ([facility] )     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HCC)     ASCVD (arteriosclerotic cardiovascular disease)     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 ([place] [address] )     Back problem     Blood type B-     Cancer (HCC)     lung CA    Cataract     COPD (chronic obstructive pulmonary disease) (HCC)     Coronary artery disease     CVA (cerebral vascular accident) ([place] [address] ) 2012    Depression     Disease of thyroid gland     History of cataract     Hyperlipidemia     Hypertension     Lung mass     Mediastinal adenopathy     Muscle pain     Prediabetes     PVD (peripheral vascular disease) ([place] [address] )     Thoracic aortic aneurysm (HCC)     Transient insomnia      Past Surgical History:   Procedure Laterality Date    CAROTID STENT Left     SD COLONOSCOPY FLX DX W/COLLJ SPEC WHEN PFRMD N/A 9/27/2017    Procedure: EGD AND COLONOSCOPY;  Surgeon: Elin Cedeño MD;  [facility];  Service: Gastroenterology    SD EDG US EXAM SURGICAL ALTER STOM DUODENUM/JEJUNUM N/A 8/1/2018    Procedure: LINEAR ENDOSCOPIC U/S;  Surgeon: Josefa Ochoa MD;  [facility]; Service: Gastroenterology    TUBAL LIGATION       Family History   Problem Relation Age of Onset    Hypertension Mother     Heart disease Mother     Diabetes Father     Liver disease Father         hepatic failure     Breast cancer Daughter     Substance Abuse Neg Hx     Mental illness Neg Hx      Social History     Social History    Marital status:      Spouse name: N/A    Number of children: N/A    Years of education: N/A     Occupational History    Not on file  Social History Main Topics    Smoking status: Former Smoker     Packs/day: 0 50     Years: 45 00     Quit date: 3/6/2018    Smokeless tobacco: Never Used    Alcohol use No    Drug use: No    Sexual activity: Not Currently     Other Topics Concern    Not on file     Social History Narrative    Lives with family      Feels safe at home     Sees dentist occas  No living will  Dental care, occasionally     Does not exercise     Living alone    Living situation: supportive and safe    No advance directives     No caffeine use        Current Outpatient Prescriptions:     albuterol (VENTOLIN HFA) 90 mcg/act inhaler, Inhale 2 puffs every 6 (six) hours as needed for wheezing or shortness of breath, Disp: 18 g, Rfl: 1    aspirin (ECOTRIN) 325 mg EC tablet, Take 325 mg by mouth daily  , Disp: , Rfl:     atorvastatin (LIPITOR) 40 mg tablet, Take 1 tablet (40 mg total) by mouth daily (Patient taking differently: Take 40 mg by mouth daily in the early morning  ), Disp: 90 tablet, Rfl: 3    co-enzyme Q-10 30 MG capsule, Take 30 mg by mouth daily  , Disp: , Rfl:     gabapentin (NEURONTIN) 100 mg capsule, Take 2 capsules (200 mg total) by mouth 3 (three) times a day, Disp: 180 capsule, Rfl: 0    levothyroxine 100 mcg tablet, Take 1 tablet (100 mcg total) by mouth daily, Disp: 30 tablet, Rfl: 3    Methylcobalamin (B12-ACTIVE PO), Take by mouth daily  , Disp: , Rfl:     metoprolol succinate (TOPROL-XL) 50 mg 24 hr tablet, Take 1 tablet (50 mg total) by mouth 2 (two) times a day, Disp: 60 tablet, Rfl: 2    Omega-3 Fatty Acids (FISH OIL PO), Take 1 capsule by mouth daily  , Disp: , Rfl:     FLUZONE HIGH-DOSE 0 5 ML ADRIANNA, , Disp: , Rfl:   No current facility-administered medications for this visit  Allergies   Allergen Reactions    Penicillins Rash       Review of Systems:  Review of Systems   Constitutional: Negative  Fatigue is improving since EOT  Appetite also improving   HENT: Negative  Esophagitis improved   Eyes: Negative  Respiratory: Positive for cough (clear phlegm)  Cardiovascular: Negative  Gastrointestinal: Positive for diarrhea  Occasional loose stool at times, treated for C-Diff during concurrent treatment  No longer taking imodium or flagyl   Endocrine: Negative  Genitourinary: Negative  Musculoskeletal: Positive for back pain  Skin: Negative  Allergic/Immunologic: Negative  Neurological: Negative  Hematological: Negative  Psychiatric/Behavioral: Negative  Vitals:    11/26/18 1254   BP: 122/82   BP Location: Left arm   Pulse: 94   Resp: 16   Temp: 98 °F (36 7 °C)   TempSrc: Temporal   SpO2: 95%   Weight: 54 3 kg (119 lb 12 8 oz)            Imaging:No results found

## 2018-11-27 NOTE — TELEPHONE ENCOUNTER
After multiple discussions with patient she decided [facility]  I will adjust the remainder of her schedule accordingly   Patient verbalized understanding

## 2018-11-27 NOTE — TELEPHONE ENCOUNTER
Roberto Chuo    ED Visit Information     Ed visit date: 11/23/2018  Diagnosis Description: Essential hypertension  In Network? Yes [facility]  Discharge status: Home  Discharged with meds ? No  Number of ED visits to date: 2  ED Severity:3     Outreach Information    Outreach successful: Yes 1  Date letter mailed:N/a  Date Finalized:11/27/2018    Care Coordination    Follow up appointment with pcp: no Declined  Transportation issues ? No    Value [employer] type:  7 Day Outreach  Emergent necessity warranted by diagnosis:  No  [facility] PCP:  Yes  Transportation:  Friend/Family Transport  Called PCP first?:  No  Feels able to call PCP for urgent problems ?:  Yes  Understands what emergencies can be handled by PCP ?:  Yes  Ever any problems getting appointment with PCP for minor emergency/urgency problems?:  No  Practice Contacted Patient ?:  No  Pt had ED follow up with pcp/staff ?:  No    Seen for follow-up out of network ?:  No  Reason Patient went to ED instead of Urgent Care or PCP?:  Perceived Severity of Illness  Doing better today  No f/u appt needed  Yes , no trans- no, sluc- yes, yes  11/27/2018 03:37 PM Phone (Jesus Alberto Baez) Laurann Cabot (Self) [phone] (H)   Call Complete:    Personal communication with patient in regard to her recent ED visit on 11/23 for Essential hypertension  Patient stated that she is doing well  She was discharged without medication and advised to follow up with her PCP  She declined to schedule at this time  Patient does not meet OPCM criteria, denies any transportation issues and is aware of her nearest [place] [address] urgent care facility and what can be treated there

## 2018-11-27 NOTE — TELEPHONE ENCOUNTER
PT CALLED SAYING SHE DOES NOT WANT TO DO HER INFUSION IN [place] BECAUSE SHE LIVES CLOSER TO [facility]     WANTS YOU TO CALL HER TO DISCUSS CHANGING THE DATES OF INFUSION TO BE ABLE TO [address] AT [facility]

## 2018-11-27 NOTE — TELEPHONE ENCOUNTER
Could we please see if she could switch to [place]  If nothing available could we please let her know that we will treat her this cycle in [facility] and change subsequent treatments to [place]

## 2018-11-28 NOTE — PROGRESS NOTES
Hematology/Oncology Outpatient Follow- up Note  Miguel Guadarrama 72 y o  female MRN: @ Encounter: [id number]        Date:  11/28/2018    Presenting Complaint/Diagnosis :Stage IIIa left upper lobe adenocarcinoma of the lung  HPI:      Karene Leventhal Trumbauer is seen for initial consultation 8/16/2018 regarding Newly diagnosed stage IIIa adenocarcinoma of the lung  The patient initially was found to have a left hilar mass with what appeared to be adenopathy extending under her aortic arch in the AP window  PET/CT scan was denies so she had an EUS guided biopsy of the adenopathy pathology of which was consistent with adenocarcinoma of pulmonary primary  This made it a stage IIIa lung cancer  She was seen by her colleagues in cardiothoracic surgery who referred her to see us and her colleagues in radiation oncology to consider concurrent chemoradiation  Her last CMP does show a creatinine that is elevated so standard of care chemotherapy with cisplatin and -16 may carry the risk of toxicity along with renal dysfunction especially at 72years of age so carboplatin and Taxol concurrently with radiation was considered      Previous Hematologic/ Oncologic History:       Nonsquamous nonsmall cell neoplasm of left lung ([facility] )    8/2018 Initial Diagnosis     Nonsquamous nonsmall cell neoplasm of lung ([facility] )         8/1/2018 Biopsy     Lymph node, mediastinal (fine needle aspiration with cell block preparation):     - Involvement by non-small cell carcinoma, most compatible with pulmonary adenocarcinoma     - Positive: Emmett-Ep4, MOC-31, TTF1, CKAE1/3     - Negative: [place]-8, CDX2, synaptophysin, CD56, HMB-45, P40         8/9/2018 - 10/22/2018 Radiation     Treatments:  Course: C1    Plan ID Energy Fractions Dose per Fraction (cGy) Dose Correction (cGy) Total Dose Delivered (cGy) Elapsed Days   L LUNG MED 6X 33 / 33 180 0 5,940 54      Treatment dates:  8/29/2018 - 10/22/2018            8/29/2018 - 10/4/2018 Chemotherapy Carboplatin AUC 2 and Taxol 50 mg/m2 concurrent with RT          11/8/2018 -  Chemotherapy     Imfinzi (immunotherapy) every 2 weeks           Carboplatin AUC of 2 with Taxol 50 mg/m² g with concurrent chemoradiation  She got 5 cycles with concurrent therapy and her last week is actually without chemotherapy secondary to cytopenia along with side effects    Current Hematologic/ Oncologic Treatment:    Imfinzi 10 mg for 2 mg every 2 weeks  Dose #2 is on 29 November  Interval History:    The patient returns for follow-up visit  She states she is doing well  Her second dose of immunotherapy had to be held because of an elevated blood pressure  She was in the emergency room  She probably was not taking her medications properly  This was readjusted and she states she is now taking it regularly  Her oxygen saturation is slightly low today but she is completely asymptomatic  Denies any nausea denies any vomiting denies any chest pain  Denies any shortness of breath  Her 14 point review of systems today was negative  Test Results:    Imaging: No results found  Labs:   Lab Results   Component Value Date    WBC 5 5 11/06/2018    HGB 12 2 11/23/2018    HCT 36 11/23/2018    MCV 98 0 11/06/2018     11/06/2018     Lab Results   Component Value Date     04/23/2015    K 4 1 11/06/2018     11/06/2018    CO2 26 11/23/2018    ANIONGAP 14 05/04/2015    BUN 18 11/06/2018    CREATININE 1 23 10/10/2018    GLUCOSE 82 11/23/2018    GLUF 90 06/12/2017    CALCIUM 9 0 11/06/2018    AST 16 10/09/2018    ALT 21 10/09/2018    ALKPHOS 80 11/06/2018    PROT 6 9 04/23/2015    BILITOT 0 25 04/23/2015    EGFR 46 11/23/2018       Lab Results   Component Value Date    KUSPBDOP15 501 02/27/2018         ROS: As stated in the history of present illness otherwise his 14 point review of systems today was negative        Active Problems:   Patient Active Problem List   Diagnosis    Hypothyroidism    Pure hypercholesterolemia    Anxiety    ASCVD (arteriosclerotic cardiovascular disease)    Aneurysm of infrarenal abdominal aorta (HCC)    Aneurysm, thoracoabdominal aortic (HCC)    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)    Benign hypertension    Minor depression    Spinal stenosis of lumbar region with neurogenic claudication    PAD (peripheral artery disease) ([facility]    Bilateral carotid artery stenosis    Stage 3 chronic kidney disease (HCC)    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)    C  difficile diarrhea    Pancytopenia ([facility] )    NYDIA (acute kidney injury) ([facility] )    Dehydration    Moderate protein-calorie malnutrition (HCC)       Past Medical History:   Past Medical History:   Diagnosis Date    Aortic aneurysm ([facility] )     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HCC)     ASCVD (arteriosclerotic cardiovascular disease)     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 ([facility] )     Back problem     Blood type B-     Cancer (HCC)     lung CA    Cataract     COPD (chronic obstructive pulmonary disease) (HCC)     Coronary artery disease     CVA (cerebral vascular accident) ([facility] ) 2012    Depression     Disease of thyroid gland     History of cataract     Hyperlipidemia     Hypertension     Lung mass     Mediastinal adenopathy     Muscle pain     Prediabetes     PVD (peripheral vascular disease) (HCC)     Thoracic aortic aneurysm (HCC)     Transient insomnia        Surgical History:   Past Surgical History:   Procedure Laterality Date    CAROTID STENT Left     PA COLONOSCOPY FLX DX W/COLLJ SPEC WHEN PFRMD N/A 9/27/2017    Procedure: EGD AND COLONOSCOPY;  Surgeon: Jennifer Barger MD;  [facility];  Service: Gastroenterology    PA EDG US EXAM SURGICAL ALTER STOM DUODENUM/JEJUNUM N/A 8/1/2018    Procedure: LINEAR ENDOSCOPIC U/S;  Surgeon: Janet Reddy MD;  [facility];   Service: Gastroenterology    TUBAL LIGATION         Family History:    Family History   Problem Relation Age of Onset    Hypertension Mother     Heart disease Mother     Diabetes Father     Liver disease Father         hepatic failure     Breast cancer Daughter     Substance Abuse Neg Hx     Mental illness Neg Hx        Cancer-related family history includes Breast cancer in her daughter  Social History:   Social History     Social History    Marital status:      Spouse name: N/A    Number of children: N/A    Years of education: N/A     Occupational History    Not on file  Social History Main Topics    Smoking status: Former Smoker     Packs/day: 0 50     Years: 45 00     Quit date: 3/6/2018    Smokeless tobacco: Never Used    Alcohol use No    Drug use: No    Sexual activity: Not Currently     Other Topics Concern    Not on file     Social History Narrative    Lives with family  Feels safe at home  Sees dentist occas  No living will  Dental care, occasionally     Does not exercise     Living alone    Living situation: supportive and safe    No advance directives     No caffeine use        Current Medications:   Current Outpatient Prescriptions   Medication Sig Dispense Refill    albuterol (VENTOLIN HFA) 90 mcg/act inhaler Inhale 2 puffs every 6 (six) hours as needed for wheezing or shortness of breath 18 g 1    aspirin (ECOTRIN) 325 mg EC tablet Take 325 mg by mouth daily        atorvastatin (LIPITOR) 40 mg tablet Take 1 tablet (40 mg total) by mouth daily (Patient taking differently: Take 40 mg by mouth daily in the early morning  ) 90 tablet 3    co-enzyme Q-10 30 MG capsule Take 30 mg by mouth daily        FLUZONE HIGH-DOSE 0 5 ML ADRIANNA       gabapentin (NEURONTIN) 100 mg capsule Take 2 capsules (200 mg total) by mouth 3 (three) times a day 180 capsule 0    levothyroxine 100 mcg tablet Take 1 tablet (100 mcg total) by mouth daily 30 tablet 3    Methylcobalamin (B12-ACTIVE PO) Take by mouth daily        metoprolol succinate (TOPROL-XL) 50 mg 24 hr tablet Take 1 tablet (50 mg total) by mouth 2 (two) times a day 60 tablet 2    Omega-3 Fatty Acids (FISH OIL PO) Take 1 capsule by mouth daily        predniSONE 10 mg tablet        No current facility-administered medications for this visit  Allergies: Allergies   Allergen Reactions    Penicillins Rash       Physical Exam:    Body surface area is 1 5 meters squared  Wt Readings from Last 3 Encounters:   11/28/18 54 kg (119 lb)   11/26/18 54 3 kg (119 lb 12 8 oz)   11/23/18 118 kg (259 lb 14 8 oz)        Temp Readings from Last 3 Encounters:   11/28/18 (!) 97 °F (36 1 °C) (Tympanic)   11/26/18 98 °F (36 7 °C) (Temporal)   11/23/18 (!) 96 6 °F (35 9 °C) (Tympanic)        BP Readings from Last 3 Encounters:   11/28/18 126/98   11/26/18 122/82   11/23/18 154/76         Pulse Readings from Last 3 Encounters:   11/28/18 85   11/26/18 94   11/23/18 87        Physical Exam     Constitutional   General appearance: No acute distress, well appearing and well nourished  Eyes   Conjunctiva and lids: No swelling, erythema or discharge  Pupils and irises: Equal, round and reactive to light  Ears, Nose, Mouth, and Throat   External inspection of ears and nose: Normal     Nasal mucosa, septum, and turbinates: Normal without edema or erythema  Oropharynx: Normal with no erythema, edema, exudate or lesions  Pulmonary   Respiratory effort: No increased work of breathing or signs of respiratory distress  Auscultation of lungs: Clear to auscultation  Cardiovascular   Palpation of heart: Normal PMI, no thrills  Auscultation of heart: Normal rate and rhythm, normal S1 and S2, without murmurs  Examination of extremities for edema and/or varicosities: Normal     Carotid pulses: Normal     Abdomen   Abdomen: Non-tender, no masses  Liver and spleen: No hepatomegaly or splenomegaly  Lymphatic   Palpation of lymph nodes in neck: No lymphadenopathy  Musculoskeletal   Gait and station: Normal     Digits and nails: Normal without clubbing or cyanosis  Inspection/palpation of joints, bones, and muscles: Normal     Skin   Skin and subcutaneous tissue: Normal without rashes or lesions  Neurologic   Cranial nerves: Cranial nerves 2-12 intact  Sensation: No sensory loss  Psychiatric   Orientation to person, place, and time: Normal     Mood and affect: Normal         Assessment / Plan:      The patient is a pleasant 16-year-old female with newly diagnosed stage IIIa adenocarcinoma of the lung  Her PET/CT scan showed disease in the lymph nodes and the primary mass  There was no metastatic lesions seen  She was seen by our colleagues in surgery who recommended concurrent chemoradiation  Based on her stage I agreed with this  The patient was started on carboplatin at an AUC of 2 and Taxol at 48 m square with concurrent chemoradiation  He got 5 cycles but then was admitted with C  Difficile and diarrhea  She finished up the remainder of her radiation after that episode without chemotherapy because of low blood counts  Based on the data from the [address] [place] [address] trial We had a discussion once she finished her concurrent chemoradiation and because she has stage IIIa malignancy we  put her on IMFINZI every 2 weeks to complete 1 year of treatment  The patient agreed with this  She is doing well on her medication  Those #2 is coming up  The patient's blood pressure was very high because she was noncompliant with her medication  She does understand the dangers of doing this  She has had a stroke before  She promises to be compliant with her blood pressure medication  She will stay on her current regimen  I'll see her back in 6 weeks  Goals and Barriers:  Current Goal:  Prolong Survival from lung cancer  Barriers: None  Patient's Capacity to Self Care:  Patient able to self care      Portions of the record may have been created with voice recognition software   Occasional wrong word or "sound a like" substitutions may have occurred due to the inherent limitations of voice recognition software   Read the chart carefully and recognize, using context, where substitutions have occurred

## 2018-11-29 NOTE — PROGRESS NOTES
Patient tolerate imfinzi well  No adverse events  Ulysses Rosette, RN spoke with Dr Arlen Torres and he gave OK to give imfinzi without waiting for lab results  AVS provided

## 2018-12-13 NOTE — PROGRESS NOTES
Arina Enriquez infusion nurse phoned to report that patient has some dry pink patches on her arms and back since her last cycle of chemo that were not there after the first cycle  Instructed pt to moisturize her skin and  may use benadryl for the itching,patient will see her PCP for this

## 2018-12-13 NOTE — PROGRESS NOTES
Pt tolerated Imfinzi with no adverse reactions; IV d/c'd; pt d/c'd home ambulatory with steady gait

## 2018-12-27 PROBLEM — N30.00 ACUTE CYSTITIS WITHOUT HEMATURIA: Status: ACTIVE | Noted: 2018-01-01

## 2018-12-27 NOTE — TELEPHONE ENCOUNTER
Joby Casiano 1945  CONFIDENTIALTY NOTICE: This fax transmission is intended only for the addressee  It contains information that is legally privileged,  confidential or otherwise protected from use or disclosure  If you are not the intended recipient, you are strictly prohibited from reviewing,  disclosing, copying using or disseminating any of this information or taking any action in reliance on or regarding this information  If you have  received this fax in error, please notify us immediately by telephone so that we can arrange for its return to us  Page: 1  2  Call Id: [id number]  Health Call  Standard Call Report  Health Call  Patient Name: Joby Casiano  Gender: Female  : 1945  Age: 72 Y 10 M 25 D  Return Phone  Number: [phone] (Home)  Address: [address] [place] [address],[address] [facility]  City/State/Zip: [facility]   Practice Name: [facility]  Practice Charged:  Physician:  Eulalia Oppenheim Name: Maricarmen Sender  Relationship To  Patient: Self  Return Phone Number: [phone] (Home)  Presenting Problem: " My body is shaking really bad and  jittery, I might have a fever "  Service Type: Triage  Charged Service 1: N/A  Pharmacy Name and  Number:  Nurse Assessment  Nurse: Williams Burks Date/Time: 2018 6:51:23 PM  Type of assessment required:  ---General (Adult or Child)  Duration of Current S/S  ---Today  Location/Radiation  ---General  Temperature (F) and route:  ---Thermometer does not work  Symptom Specific Meds (Dose/Time):  ---None  Other S/S  ---Feels shaky and jittery all over Just had chemo therapy today  Pain Scale on scale of 1-10, 10 being the worst:  ---Stomach # 4 on scale of 1-10  Symptom progression:  ---worse  Intake and Output  ---WNL  Last Exam/Treatment:  Joby Casiano 1945  CONFIDENTIALTY NOTICE: This fax transmission is intended only for the addressee   It contains information that is legally privileged,  confidential or otherwise protected from use or disclosure  If you are not the intended recipient, you are strictly prohibited from reviewing,  disclosing, copying using or disseminating any of this information or taking any action in reliance on or regarding this information  If you have  received this fax in error, please notify us immediately by telephone so that we can arrange for its return to us  Page: 2 of 2  Call Id: [id number]  Nurse Assessment  ---Was seen 11/12/2018 Just received Chemo therapy today  Protocols  Protocol Title Nurse Date/Time  Cancer - Fever [place] [facility] 12/27/2018 6:58:25 PM  Question Caller Affirmed  Disp  Time Disposition Final User  12/27/2018 6:57:29 PM RN Triaged Peter Bassett RN, Basilio Alcazar  12/27/2018 7:00:04 PM Go to ED Now (or PCP triage) Yes Petre Bassett RN, [facility] Advice Given Per Protocol  GO TO ED NOW (OR PCP TRIAGE): * IF NO PCP TRIAGE: You need to be seen  Go to the [facility] at _[facility] Hospital  within the next hour  Leave as soon as you can  FEVER MEDICINES: * For fever relief, take acetaminophen or ibuprofen  * Treat fevers  above 101° F (38 3° C)  * The goal of fever therapy is to bring the fever down to a comfortable level  Remember that fever medicine  usually lowers fever 2-3° F (1-1 5° C)  ACETAMINOPHEN (E G , TYLENOL): * Take 650 mg (two 325 mg pills) by mouth every  4-6 hours as needed  Each Regular Strength Tylenol pill has 325 mg of acetaminophen  The most you should take each day is 3,250 mg  (10 Regular Strength pills a day)  * Another choice is to take 1,000 mg (two 500 mg pills) every 8 hours as needed  Each Extra Strength  Tylenol pill has 500 mg of acetaminophen  The most you should take each day is 3,000 mg (6 Extra Strength pills a day)  IBUPROFEN  (E G , MOTRIN, ADVIL): * Take 400 mg (two 200 mg pills) by mouth every 6 hours as needed  * Another choice is to take 600 mg  (three 200 mg pills) by mouth every 8 hours as needed   * The most you should take each day is 1,200 mg (six 200 mg pills a day),  unless your doctor has told you to take more  CAUTION - NSAIDS (E G , IBUPROFEN, NAPROXEN): * Do not take nonsteroidal  anti-inflammatory drugs (NSAIDs) if you have stomach problems, kidney disease, heart failure, or other contraindications to using this  type of medication  * Do not take NSAID medications for over 7 days without consulting your PCP  * Do not take NSAID medications  if you are pregnant  * You may take this medicine with or without food  Taking it with food or milk may lessen the chance the drug  will upset your stomach  * GASTROINTESTINAL RISK: There is an increased risk of stomach ulcers, GI bleeding, perforation  *  CARDIOVASCULAR RISK: There may be an increased risk of heart attack and stroke  DRIVING: Another adult should drive  CARE  ADVICE given per Cancer - Fever (Adult) guideline  Caller Understands: Yes  Caller Disagree/Comply: Comply  PreDisposition: Unsure  Comments  User: Teresa Briceño RN Date/Time: 12/27/2018 7:01:11 PM   will drive her to [facility] ER for evaluation

## 2018-12-27 NOTE — PROGRESS NOTES
Shaniqua Carlson RN with Dr Fiona Morris office notified of:  -TSH 0 05  -Creatinine: 2 04  - hypotension: Bp 72/48  assymptomatic  -Diarrhea x "a few days"  Patient to increase PO hydration with Gatoade and/or Pedialyte  Patient to take Imodium per the package dosing instructions for diarrhea  And patient is to follow up with PCP regarding  TSH  Patient ok for treatment today  No new orders at this time

## 2018-12-27 NOTE — PROGRESS NOTES
tx completed with no AR  Pt educated on use of gatorade and immodium for hydration and diarrhea  Pt left unit ambulatory and with a steady gait

## 2018-12-28 NOTE — ASSESSMENT & PLAN NOTE
Malnutrition Findings:           BMI Findings: Body mass index is 23 85 kg/m²     · Continue Ensure TID

## 2018-12-28 NOTE — ASSESSMENT & PLAN NOTE
· CT: 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  · Per vascular, holding off on treatment until patient is finished chemotherapy

## 2018-12-28 NOTE — PROGRESS NOTES
[facility] Progress Note  Patient: Chris Gregorio 72 y o  female   MRN: [id number]  PCP: Niurka Dangelo MD  Unit/Bed#: [address] [place] [address] Encounter: [id number]  Date Of Visit: 18    Assessment:    Principal Problem:    Acute cystitis without hematuria  Active Problems:    Aneurysm, thoracoabdominal aortic (HCC)    NYDIA (acute kidney injury) ([facility] [address] )    Moderate protein-calorie malnutrition ([facility] )      Plan:    · Acute cystitis without hematuria with infected urinalysis and febrile course undergoing chemotherapy for lung cancer will continue on cefepime and vancomycin in relation immune compromise for chemotherapy awaiting culture results she has had symptom relief overnight/procalcitonin was elevated and will trend  · Acute kidney injury resolved with IV fluids which will continue another 12 hr  · Moderate protein calorie malnutrition based on history of chronic disease lung cancer poor intake  · Thoracic abdominal aortic aneurysm 6 5 cm patient aware and pending intervention months completes course of chemo immunotherapy for her lung cancer  We will continue on statin and aspirin and beta-blocker in the form of Toprol-XL 50 mg b i d   · Hypothyroidism continue levothyroxine 100 mcg      VTE Pharmacologic Prophylaxis:   Pharmacologic: Heparin  Mechanical VTE Prophylaxis in Place: Yes    Discussions with Specialists or Other Care Team Provider:  No    Time Spent for Care: 45 minutes  More than 50% of total time spent on counseling and coordination of care as described above  Subjective:   * patient feeling better today no reference of any fever chills this morning and feels stronger no nausea vomiting but does have diminished appetite which is been ongoing  She admits to some back pain also is using a heating pad    Objective:     Vitals:   Temp (24hrs), Av 1 °F (37 3 °C), Min:97 6 °F (36 4 °C), Max:100 6 °F (38 1 °C)    Temp:  [97 6 °F (36 4 °C)-100 6 °F (38 1 °C)] 98 8 °F (37 1 °C)  HR: [] 93  Resp:  [17-23] 17  BP: ()/(48-66) 80/55  SpO2:  [78 %-100 %] 92 %  Body mass index is 24 11 kg/m²  Input and Output Summary (last 24 hours): Intake/Output Summary (Last 24 hours) at 12/28/18 1126  Last data filed at 12/28/18 1018   Gross per 24 hour   Intake                0 ml   Output              650 ml   Net             -650 ml       Physical Exam:     Physical Exam:   General appearance: alert, appears stated age and cooperative  Head: Normocephalic, without obvious abnormality, atraumatic  Lungs: clear to auscultation bilaterally  Heart: regular rate and rhythm  Abdomen: soft, non-tender; bowel sounds normal; no masses,  no organomegaly  Back: negative  Extremities: extremities normal, atraumatic, no cyanosis or edema  Neurologic: Grossly normal      Additional Data:     Labs:      Results from last 7 days  Lab Units 12/28/18  0504   WBC Thousand/uL 7 93   HEMOGLOBIN g/dL 9 8*   HEMATOCRIT % 30 2*   PLATELETS Thousands/uL 132*   NEUTROS PCT % 80*   LYMPHS PCT % 7*   MONOS PCT % 10   EOS PCT % 2       Results from last 7 days  Lab Units 12/28/18  0504 12/27/18  2046   POTASSIUM mmol/L 4 3 4 5   CHLORIDE mmol/L 105 99*   CO2 mmol/L 23 27   BUN mg/dL 35* 35*   CREATININE mg/dL 2 10* 2 30*   CALCIUM mg/dL 8 2* 8 5   ALK PHOS U/L  --  99   ALT U/L  --  22   AST U/L  --  17       Results from last 7 days  Lab Units 12/27/18  2046   INR  1 12       * I Have Reviewed All Lab Data Listed Above  * Additional Pertinent Lab Tests Reviewed: All Labs For Current Hospital Admission Reviewed    Imaging:  Xr Chest Pa & Lateral    Result Date: 12/27/2018  Narrative: CHEST INDICATION:  Fever  COMPARISON:  9/22/2018, CTA chest, abdomen and pelvis 6/11/2018 EXAM PERFORMED/VIEWS:  XR CHEST PA & LATERAL  The frontal view was performed utilizing dual energy radiographic technique  FINDINGS: Cardiomediastinal silhouette appears unchanged with calcific, uncoiled thoracic aorta    Vascular stent seen along the proximal arch  Aneurysmal dilatation of the descending thoracic aorta suggested up to 6 5 cm distally  This measured up to 6 1 cm on the most recent CT abdominal study in October 2018  No acute infiltrates  Calcified granuloma or lymph node along the upper pole left hilum  Opacity in the left upper lobe adjacent to the aortic arch seen on recent CT studies is not as well-visualized by plain film  No pneumothorax or pleural effusion  Degenerative changes of the spine  Impression: No evidence of pneumonia  Left upper lobe periaortic opacity described on previous CT imaging is not well visualized by plain film  Follow-up should be based on previous CT recommendations  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  Workstation performed: [id number]     Imaging Reports Reviewed Today Include:  Reviewed chest x-ray  Imaging Personally Reviewed by Myself Includes:    Procedure: Xr Chest Pa & Lateral    Result Date: 12/27/2018  Narrative: CHEST INDICATION:  Fever  COMPARISON:  9/22/2018, CTA chest, abdomen and pelvis 6/11/2018 EXAM PERFORMED/VIEWS:  XR CHEST PA & LATERAL  The frontal view was performed utilizing dual energy radiographic technique  FINDINGS: Cardiomediastinal silhouette appears unchanged with calcific, uncoiled thoracic aorta  Vascular stent seen along the proximal arch  Aneurysmal dilatation of the descending thoracic aorta suggested up to 6 5 cm distally  This measured up to 6 1 cm on the most recent CT abdominal study in October 2018  No acute infiltrates  Calcified granuloma or lymph node along the upper pole left hilum  Opacity in the left upper lobe adjacent to the aortic arch seen on recent CT studies is not as well-visualized by plain film  No pneumothorax or pleural effusion  Degenerative changes of the spine  Impression: No evidence of pneumonia  Left upper lobe periaortic opacity described on previous CT imaging is not well visualized by plain film   Follow-up should be based on previous CT recommendations  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  Workstation performed: [id number]        Recent Cultures (last 7 days):       Results from last 7 days  Lab Units 12/27/18 2049   INFLUENZA B PCR  None Detected   RSV PCR  None Detected       Last 24 Hours Medication List:     Current Facility-Administered Medications:  acetaminophen 650 mg Oral Q6H PRN Fine Budd, PA-C    albuterol 2 puff Inhalation Q6H PRN Kelly Budd, PA-C    aspirin 325 mg Oral Daily Fine Budd, PA-C    atorvastatin 40 mg Oral Daily Fine Budd, PA-C    calcium carbonate 1,000 mg Oral Daily PRN Kelly Budd, PA-C    cefepime 1,000 mg Intravenous Q24H Kelly Budd, PA-C    co-enzyme Q-10 30 mg Oral Daily Kelly Budd, PA-C    fish oil 1,000 mg Oral Daily Fine Budd, PA-C    gabapentin 200 mg Oral TID Kelly Budd, PA-C    heparin (porcine) 5,000 Units Subcutaneous [facility] Kelly Budd, PA-C    levothyroxine 100 mcg Oral Early Morning Fine Budd, PA-C    metoprolol succinate 50 mg Oral BID Fine Budd, PA-C    ondansetron 4 mg Intravenous Q6H PRN Kelly Budd, PA-C    senna 1 tablet Oral Daily Fine Budd, PA-C    sodium chloride (PF) 3 mL Intravenous PRN Gwenette Rigo, PA-C    sodium chloride 100 mL/hr Intravenous Continuous Kelly Budd, PA-C Last Rate: 100 mL/hr (12/28/18 0222)     Facility-Administered Medications Ordered in Other Encounters:  alteplase 2 mg Intracatheter Once PRN Linh Randolph MD    heparin lock flush 300 Units Intracatheter Q1H PRN Linh Randolph MD    sodium chloride 20 mL/hr Intravenous Continuous Linh Randolph MD Last Rate: Stopped (12/27/18 1550)        Today, Patient Was Seen By: Ernst Booth MD    ** Please Note: Dragon 360 Dictation voice to text software may have been used in the creation of this document   **

## 2018-12-28 NOTE — UTILIZATION REVIEW
Initial Clinical Review    Admission: Date/Time/Statement: 12/27/18 @ 2220 Inpatient Written     Orders Placed This Encounter   Procedures    Inpatient Admission (expected length of stay for this patient is greater than two midnights)     Standing Status:   Standing     Number of Occurrences:   1     Order Specific Question:   Admitting Physician     Answer:   Yolie Yoon [[id number]]     Order Specific Question:   Level of Care     Answer:   Med Surg [[id number]]     Order Specific Question:   Bed request comments     Answer:   droplet precautions, flu swab pending     Order Specific Question:   Estimated length of stay     Answer:   More than 2 Midnights     Order Specific Question:   Certification     Answer:   I certify that inpatient services are medically necessary for this patient for a duration of greater than two midnights  See H&P and MD Progress Notes for additional information about the patient's course of treatment  ED: Date/Time/Mode of Arrival:   ED Arrival Information     Expected Arrival Acuity Means of Arrival Escorted By Service Admission Type    - 12/27/2018 19:25 Urgent Walk-In Family Member General Medicine Urgent    Arrival Complaint    "jittery" abdominal pain          Chief Complaint:   Chief Complaint   Patient presents with    Fever - 9 weeks to 74 years     Patient presents to the ED with c/o fever, chills, dizziness with near syncope after getting an infusion today  History of Illness: Patient is a 71 y/o F with a h/o lung cancer currently getting chemo that presents to the ED with fevers, chills, weakness that started today  Patient states she had chemo today and went home around 4PM and states when she went home she felt "jittery" so she came to the ER  She has a chronic cough  She also has had dysuria for a couple weeks  She denies sick contacts  She did receive a flu vaccine this year       ED Vital Signs:   ED Triage Vitals   Temperature Pulse Respirations Blood Pressure SpO2   12/27/18 2001 12/27/18 2001 12/27/18 2001 12/27/18 2001 12/27/18 2001   (!) 100 6 °F (38 1 °C) (!) 106 20 97/51 92 %      Temp Source Heart Rate Source Patient Position - Orthostatic VS BP Location FiO2 (%)   12/27/18 2001 12/28/18 0722 12/27/18 2001 12/27/18 2001 --   Tympanic Monitor Sitting Right arm       Pain Score       12/27/18 2001       4        Wt Readings from Last 1 Encounters:   12/28/18 56 kg (123 lb 7 3 oz)       Vital Signs (abnormal): , RESPS 23  BP 80/55,  O2 SAT 78% ON RA    Abnormal Labs/Diagnostic Test Results:   WBC 11 32*   HEMOGLOBIN 10 5*   HEMATOCRIT 32 9*   LYMPHS PCT 10*   MONOS PCT 14*     SODIUM 134*   CHLORIDE 99*   BUN 35*   CREATININE 2 30*   ALBUMIN 2 9*     Procalcitonin 0 48       Leukocytes, UA Large     Nitrite, UA Positive     Protein,  (2+)     Blood, UA Large       RBC, UA Field obscured, unable to enumerate     WBC, UA Innumerable     Epithelial Cells Moderate     Bacteria, UA Innumerable       CXR:    No evidence of pneumonia  Left upper lobe periaortic opacity described on previous CT imaging is not well visualized by plain film  Follow-up should be based on previous CT recommendations  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       EKG: ST    ED Treatment:   Medication Administration from 12/27/2018 1925 to 12/28/2018 0048       Date/Time Order Dose Route Action     12/27/2018 2045 sodium chloride 0 9 % bolus 1,000 mL 1,000 mL Intravenous New Bag     12/27/2018 2045 acetaminophen (TYLENOL) tablet 650 mg 650 mg Oral Given     12/27/2018 2347 cefepime (MAXIPIME) 2 g/50 mL dextrose IVPB 2,000 mg Intravenous New Bag     12/27/2018 2220 vancomycin (VANCOCIN) IVPB (premix) 750 mg 750 mg Intravenous New Bag     12/28/2018 0024 gabapentin (NEURONTIN) capsule 200 mg 200 mg Oral Given     12/28/2018 0024 sodium chloride 0 9 % infusion 75 mL/hr Intravenous New Bag     12/28/2018 0024 heparin (porcine) subcutaneous injection 5,000 Units 5,000 Units Subcutaneous Given          Past Medical/Surgical History:    Active Ambulatory Problems     Diagnosis Date Noted    Hypothyroidism 02/18/2016    Pure hypercholesterolemia 02/18/2016    Anxiety 02/18/2016    ASCVD (arteriosclerotic cardiovascular disease) 02/19/2016    Aneurysm of infrarenal abdominal aorta (HCC) 05/27/2015    Aneurysm, thoracoabdominal aortic ([facility] [address] ) 05/27/2015    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 ([facility] [address] ) 05/27/2015    Benign hypertension 04/20/2015    Minor depression 04/20/2015    Spinal stenosis of lumbar region with neurogenic claudication 04/23/2018    PAD (peripheral artery disease) ([facility] [address] ) 05/17/2018    Bilateral carotid artery stenosis 05/17/2018    Stage 3 chronic kidney disease ([facility] [address] ) 05/28/2018    Nonsquamous nonsmall cell neoplasm of left lung ([facility] [address] ) 08/07/2018    C  difficile diarrhea 09/22/2018    Pancytopenia ([facility] [address] ) 09/24/2018    NYDIA (acute kidney injury) ([facility] [address] ) 10/08/2018    Dehydration 10/08/2018    Moderate protein-calorie malnutrition ([facility] [address] ) 10/10/2018     Resolved Ambulatory Problems     Diagnosis Date Noted    Acute respiratory failure with hypoxia ([facility] [address] ) 02/18/2016    Simple chronic bronchitis ([facility] [address] ) 02/18/2016    Aortic aneurysm ([facility] [address] ) 02/18/2016    Mediastinal adenopathy 02/19/2016    Mental health problem 04/14/2016    Substance abuse ([facility] [address] ) 04/14/2016    Left breast lump 06/23/2017    Impaired fasting blood sugar 06/07/2017    Dry cough 01/30/2018    Muscle pain, myofacial 01/30/2018    Chronic bilateral low back pain with right-sided sciatica 04/23/2018    Lumbar radiculopathy 04/23/2018    Chronic pain syndrome 04/23/2018    Preoperative cardiovascular examination 07/03/2018    Severe sepsis ([address] ) 09/21/2018     Past Medical History:   Diagnosis Date    Aortic aneurysm ([facility] [address] )     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HCC)     ASCVD (arteriosclerotic cardiovascular disease)     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 ([place] [address] )     Back problem     Blood type B-     Cancer ([place] [address] )     Cataract     COPD [facility]     Coronary artery disease     CVA (cerebral vascular accident) ([place] [address] ) 2012    Depression     Disease of thyroid gland     History of cataract     Hyperlipidemia     Hypertension     Lung mass     Mediastinal adenopathy     Muscle pain     Prediabetes     PVD (peripheral vascular disease) ([place] [address] )     Thoracic aortic aneurysm (HCC)     Transient insomnia        Admitting Diagnosis: Abdominal pain [R10 9]  Fever [R50 9]  NYDIA (acute kidney injury) ([place] [address] ) [N17 9]    Age/Sex: 72 y o  female    Assessment/Plan:   * Acute cystitis without hematuria   Assessment & Plan     UA:  Nitrites and leukocytes  Cefepime 500 mg Q 24 H, renally dosed  Tylenol 650 mg p o  Q 6h p r n  Fever  Monitor I&Os   NYDIA (acute kidney injury) ([place] [address] )   Assessment & Plan     Cr 2 3, baseline around 1 2-1 4  NSS 75 mL/hour  BMP in a m  Monitor I&Os   Aneurysm, thoracoabdominal aortic (HCC)   Assessment & Plan     CT: 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  Per vascular will hold off on treatment until patient is finished chemotherapy      VTE Prophylaxis: Heparin  / sequential compression device   Anticipated Length of Stay:  Patient will be admitted on an Inpatient basis with an anticipated length of stay of  greater than 2 midnights     Justification for Hospital Stay:  UTI, NYDIA    Admission Orders:  Daily weights, I/O  Pt, ot  Sequential compression device    Scheduled Meds:   Current Facility-Administered Medications:  acetaminophen 650 mg Oral Q6H PRN   albuterol 2 puff Inhalation Q6H PRN   aspirin 325 mg Oral Daily   atorvastatin 40 mg Oral Daily   calcium carbonate 1,000 mg Oral Daily PRN   cefepime 1,000 mg Intravenous Q24H   co-enzyme Q-10 30 mg Oral Daily   fish oil 1,000 mg Oral Daily   gabapentin 200 mg Oral TID   heparin (porcine) 5,000 Units Subcutaneous [facility] levothyroxine 100 mcg Oral Early Morning   metoprolol succinate 50 mg Oral BID   ondansetron 4 mg Intravenous Q6H PRN   senna 1 tablet Oral Daily   sodium chloride (PF) 3 mL Intravenous PRN   sodium chloride 100 mL/hr Intravenous Continuous     Facility-Administered Medications Ordered in Other Encounters:  alteplase 2 mg Intracatheter Once PRN Mannie Muniz MD    sodium chloride 20 mL/hr Intravenous Continuous Mannie Muniz MD Last Rate: Stopped (12/27/18 1550)     Continuous Infusions:   sodium chloride 100 mL/hr Last Rate: 100 mL/hr (12/28/18 1157)     PRN Meds:   acetaminophen    albuterol    calcium carbonate    ondansetron    Insert peripheral IV **AND** sodium chloride (PF)    [address]  Utilization Review Department  Phone: [phone]; Fax [phone]  [email or web address]  ATTENTION: Please call with any questions or concerns to [phone]  and carefully listen to the prompts so that you are directed to the right person  Send all requests for admission clinical reviews, approved or denied determinations and any other requests to fax [phone]   All voicemails are confidential

## 2018-12-28 NOTE — ED NOTES
Notified by nursing supervisor that patient will be help in the ED until a bed is available on the Med Surg unit      Victor Hugo Sanchez RN  12/27/18 9088

## 2018-12-28 NOTE — ASSESSMENT & PLAN NOTE
UA:  Nitrites and leukocytes  Cefepime 500 mg Q 24 H, renally dosed  Tylenol 650 mg p o  Q 6h p r n   Fever  Monitor I&Os

## 2018-12-28 NOTE — ASSESSMENT & PLAN NOTE
· UA:  Nitrites and leukocytes  · Continue cefepime for now  Urine culture showing 50,000-59,000 cfu/mL klebsiella enterobacter and 10,000-19,000 cfu/mL proteus  · Blood culture 1/2 also + for enterobacter cloacae complex A  · Afebrile and hemodynamically stable  · Repeat blood cultures   Will d/w attending regarding need for infectious disease consultation

## 2018-12-28 NOTE — SOCIAL WORK
Met with patient  Explained role of care management  Patient lives in a two story home with ALBER Rey   1 [address]  She is independent adl's and ambulation, SO transports and provides meals  DME - denies  Past services - [facility] - not sure of agency  She has completed her course of chemo and radiation at [facility] and currently goes to [facility] infusion center every two weeks for Imfinzi  She plans on returning home and does not anticipate any discharge needs  Will follow

## 2018-12-28 NOTE — ASSESSMENT & PLAN NOTE
· POA, Creat was 2 3, baseline around 1 2-1 4  As of yesterday was improved  Continue IVF for now   Check BMP in AM  · Monitor I/Os

## 2018-12-28 NOTE — RESPIRATORY THERAPY NOTE
RT Protocol Note  Eder Artis 72 y o  female MRN: [id number]  Unit/Bed#: [address] [place] [address] [id number] Encounter: [id number]    Assessment    Principal Problem:    Acute cystitis without hematuria  Active Problems:    NYDIA (acute kidney injury) ([place] [address] )    Moderate protein-calorie malnutrition ([facility] )      Home Pulmonary Medications:  Pt sounds diminished but clear bilaterally  No extra prn tx needed at this time  On 2l pt is 100%        Past Medical History:   Diagnosis Date    Aortic aneurysm ([place] [address] )     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     ASCVD (arteriosclerotic cardiovascular disease)     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 ([place] [address] )     Back problem     Blood type B-     Cancer ([place] [address] )     lung CA    Cataract     COPD [facility]chronic obstructive pulmonary disease) ([facility])     Coronary artery disease     CVA (cerebral vascular accident) ([place] [address] ) 2012    Depression     Disease of thyroid gland     History of cataract     Hyperlipidemia     Hypertension     Lung mass     Mediastinal adenopathy     Muscle pain     Prediabetes     PVD (peripheral vascular disease) ([place] [address] )     Thoracic aortic aneurysm (HCC)     Transient insomnia      Social History     Social History    Marital status:      Spouse name: N/A    Number of children: N/A    Years of education: N/A     Social History Main Topics    Smoking status: Former Smoker     Packs/day: 0 50     Years: 45 00     Quit date: 3/6/2018    Smokeless tobacco: Never Used    Alcohol use No    Drug use: No    Sexual activity: Not Currently     Other Topics Concern    None     Social History Narrative    Lives with family  Feels safe at home  Sees dentist occas  No living will       Dental care, occasionally     Does not exercise     Living alone    Living situation: supportive and safe    No advance directives     No caffeine use        Subjective         Objective    Physical Exam:   Assessment Type: Assess only  General Appearance: Awake, Alert  Respiratory Pattern: Normal  Chest Assessment: Chest expansion symmetrical  Bilateral Breath Sounds: Diminished, Clear  Cough: Non-productive, Moist    Vitals:  Blood pressure 122/66, pulse 89, temperature 97 6 °F (36 4 °C), temperature source Oral, resp  rate 18, height 5' (1 524 m), weight 55 4 kg (122 lb 2 2 oz), SpO2 100 %  Imaging and other studies: I have personally reviewed pertinent reports              Plan    Respiratory Plan: Home Bronchodilator Patient pathway        Resp Comments: pt sounds clear and seems to be comfortable on 2 l , states no cough and currently no sob

## 2018-12-28 NOTE — ASSESSMENT & PLAN NOTE
CT: 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  Per vascular will hold off on treatment until patient is finished chemotherapy

## 2018-12-28 NOTE — PLAN OF CARE
Problem: Potential for Falls  Goal: Patient will remain free of falls  INTERVENTIONS:  - Assess patient frequently for physical needs  -  Identify physical deficits and behaviors that affect risk of falls  -  [place] fall precautions as indicated by assessment   - Educate patient/family on patient safety including physical limitations  - Instruct patient to call for assistance with activity based on assessment  - Modify environment to reduce risk of injury  - Consider OT/PT consult to assist with strengthening/mobility    Outcome: Progressing      Problem: Nutrition/Hydration-ADULT  Goal: Nutrient/Hydration intake appropriate for improving, restoring or maintaining nutritional needs  Monitor and assess patient's nutrition/hydration status for malnutrition (ex- brittle hair, bruises, dry skin, pale skin and conjunctiva, muscle wasting, smooth red tongue, and disorientation)  Collaborate with interdisciplinary team and initiate plan and interventions as ordered  Monitor patient's weight and dietary intake as ordered or per policy  Utilize nutrition screening tool and intervene per policy  Determine patient's food preferences and provide high-protein, high-caloric foods as appropriate       INTERVENTIONS:  - Monitor oral intake, urinary output, labs, and treatment plans  - Assess nutrition and hydration status and recommend course of action  - Evaluate amount of meals eaten  - Assist patient with eating if necessary   - Allow adequate time for meals  - Recommend/ encourage appropriate diets, oral nutritional supplements, and vitamin/mineral supplements  - Order, calculate, and assess calorie counts as needed  - Recommend, monitor, and adjust tube feedings and TPN/PPN based on assessed needs  - Assess need for intravenous fluids  - Provide specific nutrition/hydration education as appropriate  - Include patient/family/caregiver in decisions related to nutrition   Outcome: Progressing      Problem: PAIN - ADULT  Goal: Verbalizes/displays adequate comfort level or baseline comfort level  Interventions:  - Encourage patient to monitor pain and request assistance  - Assess pain using appropriate pain scale  - Administer analgesics based on type and severity of pain and evaluate response  - Implement non-pharmacological measures as appropriate and evaluate response  - Notify physician/advanced practitioner if interventions unsuccessful or patient reports new pain   Outcome: Progressing      Problem: INFECTION - ADULT  Goal: Absence or prevention of progression during hospitalization  INTERVENTIONS:  - Assess and monitor for signs and symptoms of infection  - Monitor lab/diagnostic results  - Monitor all insertion sites, i e  indwelling lines  - [place] appropriate cooling/warming therapies per order  - Administer medications as ordered  - Instruct and encourage patient and family to use good hand hygiene technique  - Identify and instruct in appropriate isolation precautions for identified infection/condition   Outcome: Progressing      Problem: DISCHARGE PLANNING  Goal: Discharge to home or other facility with appropriate resources  INTERVENTIONS:  - Identify barriers to discharge w/patient and caregiver  - Arrange for needed discharge resources and transportation as appropriate  - Identify discharge learning needs (meds, wound care, etc )  - Refer to Case Management Department for coordinating discharge planning if the patient needs post-hospital services based on physician/advanced practitioner order or complex needs related to functional status, cognitive ability, or social support system   Outcome: Progressing      Problem: Knowledge Deficit  Goal: Patient/family/caregiver demonstrates understanding of disease process, treatment plan, medications, and discharge instructions  Complete learning assessment and assess knowledge base    Interventions:  - Provide teaching at level of understanding  - Provide teaching via preferred learning methods   Outcome: Progressing      Problem: RESPIRATORY - ADULT  Goal: Achieves optimal ventilation and oxygenation  INTERVENTIONS:  - Assess for changes in respiratory status  - Assess for changes in mentation and behavior  - Encourage broncho-pulmonary hygiene including cough, deep breathe, Incentive Spirometry  - Assess and instruct to report SOB or any respiratory difficulty  - Respiratory Therapy support as indicated   Outcome: Progressing      Problem: GASTROINTESTINAL - ADULT  Goal: Minimal or absence of nausea and/or vomiting  INTERVENTIONS:  - Administer IV fluids as ordered to ensure adequate hydration  - Administer ordered antiemetic medications as needed  - Provide nonpharmacologic comfort measures as appropriate  - Advance diet as tolerated, if ordered  - Nutrition services referral to assist patient with adequate nutrition and appropriate food choices   Outcome: Progressing    Goal: Maintains adequate nutritional intake  INTERVENTIONS:  - Monitor percentage of each meal consumed  - Identify factors contributing to decreased intake, treat as appropriate  - Assist with meals as needed  - Monitor I&O, WT and lab values  - Obtain nutrition services referral as needed   Outcome: Progressing      Problem: GENITOURINARY - ADULT  Goal: Maintains or returns to baseline urinary function  INTERVENTIONS:  - Assess urinary function  - Encourage oral fluids to ensure adequate hydration  - Administer IV fluids as ordered to ensure adequate hydration  - Administer ordered medications as needed  - Offer frequent toileting  - Follow urinary retention protocol if ordered   Outcome: Progressing      Problem: METABOLIC, FLUID AND ELECTROLYTES - ADULT  Goal: Electrolytes maintained within normal limits  INTERVENTIONS:  - Monitor labs and assess patient for signs and symptoms of electrolyte imbalances  - Administer electrolyte replacement as ordered  - Monitor response to electrolyte replacements, including repeat lab results as appropriate  - Instruct patient on fluid and nutrition as appropriate   Outcome: Progressing    Goal: Fluid balance maintained  INTERVENTIONS:  - Monitor labs and assess for signs and symptoms of volume excess or deficit  - Monitor I/O and WT  - Instruct patient on fluid and nutrition as appropriate   Outcome: Progressing

## 2018-12-28 NOTE — H&P
[address] Internal Medicine    H&P- Susan Lema 1945, 72 y o  female MRN: [id number]    Unit/Bed#: [address] [place] [address] Encounter: [id number]    Primary Care Provider: Corbin Palacios MD   Date and time admitted to hospital: 12/27/2018  8:20 PM        * Acute cystitis without hematuria   Assessment & Plan    UA:  Nitrites and leukocytes  Cefepime 500 mg Q 24 H, renally dosed  Tylenol 650 mg p o  Q 6h p r n  Fever  Monitor I&Os     NYDIA (acute kidney injury) ([facility] )   Assessment & Plan    Cr 2 3, baseline around 1 2-1 4  NSS 75 mL/hour  BMP in a m  Monitor I&Os     Moderate protein-calorie malnutrition ([facility] )   Assessment & Plan    Malnutrition Findings:           BMI Findings: Body mass index is 22 46 kg/m²  Aneurysm, thoracoabdominal aortic (HCC)   Assessment & Plan    CT: 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  Per vascular will hold off on treatment until patient is finished chemotherapy       VTE Prophylaxis: Heparin  / sequential compression device   Code Status: Full  POLST: There is no POLST form on file for this patient (pre-hospital)  Discussion with family: Spoke with daughter in detail on telephone    Anticipated Length of Stay:  Patient will be admitted on an Inpatient basis with an anticipated length of stay of  greater than 2 midnights  Justification for Hospital Stay:  UTI, NYDIA    Chief Complaint:   Fever    History of Present Illness:    Susan Lema is a 72 y o  female with a PMH for lung cancer undergoing chemo, COPD, HTN who presents with fever that began this afternoon  Patient underwent chemo today and states that around 1600 she began to feel jittery and came to the ED  Upon arrival T a 100 6°  Patient complains of dysuria for several days  She states that she has had a UTI in the past and this feels similar  UA in the ED was positive for leukocytes and nitrites  Her creatinine was also elevated 2 3, her baseline appears to be 1 2 to 1 4    She denies nausea, vomiting, , diarrhea, chills, chest pain, palpitations, shortness of breath, trouble breathing  Review of Systems:    Review of Systems   Constitutional: Positive for fever  Negative for chills, diaphoresis and fatigue  Eyes: Negative for photophobia and visual disturbance  Respiratory: Negative for cough, shortness of breath and wheezing  Cardiovascular: Negative for chest pain, palpitations and leg swelling  Gastrointestinal: Negative for abdominal pain, constipation, diarrhea, nausea and vomiting  Endocrine: Negative for polydipsia, polyphagia and polyuria  Genitourinary: Positive for dysuria  Negative for difficulty urinating, flank pain, frequency, hematuria, pelvic pain and urgency  Musculoskeletal: Negative for back pain, neck pain and neck stiffness  Skin: Negative for pallor, rash and wound  Neurological: Negative for dizziness, tremors, syncope, weakness and headaches         Past Medical and Surgical History:     Past Medical History:   Diagnosis Date    Aortic aneurysm ([facility] )     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HCC)     ASCVD (arteriosclerotic cardiovascular disease)     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 ([facility] )     Back problem     Blood type B-     Cancer (HCC)     lung CA    Cataract     COPD (chronic obstructive pulmonary disease) (HCC)     Coronary artery disease     CVA (cerebral vascular accident) ([facility] ) 2012    Depression     Disease of thyroid gland     History of cataract     Hyperlipidemia     Hypertension     Lung mass     Mediastinal adenopathy     Muscle pain     Prediabetes     PVD (peripheral vascular disease) ([facility] )     Thoracic aortic aneurysm (HCC)     Transient insomnia        Past Surgical History:   Procedure Laterality Date    CAROTID STENT Left     OK COLONOSCOPY FLX DX W/COLLJ SPEC WHEN PFRMD N/A 9/27/2017    Procedure: EGD AND COLONOSCOPY;  Surgeon: Nixon Esteban MD;  Location:  MAIN OR;  Service: Gastroenterology    HI EDG US EXAM SURGICAL ALTER STOM DUODENUM/JEJUNUM N/A 8/1/2018    Procedure: LINEAR ENDOSCOPIC U/S;  Surgeon: Cory Verde MD;  Location: [facility]; Service: Gastroenterology    TUBAL LIGATION         Meds/Allergies:    Prior to Admission medications    Medication Sig Start Date End Date Taking? Authorizing Provider   albuterol (VENTOLIN HFA) 90 mcg/act inhaler Inhale 2 puffs every 6 (six) hours as needed for wheezing or shortness of breath 9/11/18   Gideon Perrin MD   aspirin (ECOTRIN) 325 mg EC tablet Take 325 mg by mouth daily  Historical Provider, MD   atorvastatin (LIPITOR) 40 mg tablet Take 1 tablet (40 mg total) by mouth daily  Patient taking differently: Take 40 mg by mouth daily in the early morning   4/5/18   Ramses Khanna MD   co-enzyme Q-10 30 MG capsule Take 30 mg by mouth daily      Historical Provider, MD   FLUZONE HIGH-DOSE 0 5 ML ADRIANNA  9/17/18   Historical Provider, MD   gabapentin (NEURONTIN) 100 mg capsule Take 2 capsules (200 mg total) by mouth 3 (three) times a day 12/17/18   Bree Lane PA-C   levothyroxine 100 mcg tablet Take 1 tablet (100 mcg total) by mouth daily 3/9/18   Bree Lane PA-C   Methylcobalamin (B12-ACTIVE PO) Take by mouth daily      Historical Provider, MD   metoprolol succinate (TOPROL-XL) 50 mg 24 hr tablet Take 1 tablet (50 mg total) by mouth 2 (two) times a day 10/16/18   Bree Lane PA-C   Omega-3 Fatty Acids (FISH OIL PO) Take 1 capsule by mouth daily      Historical Provider, MD   predniSONE 10 mg tablet  11/24/18   Historical Provider, MD     I have reviewed home medications with patient personally  Allergies: Allergies   Allergen Reactions    Penicillins Rash       Social History:     Marital Status:     Occupation:  Noncontributory  Patient Pre-hospital Living Situation:  Self  Patient Pre-hospital Level of Mobility:  Full  Patient Pre-hospital Diet Restrictions:  None  Substance Use History:   History Alcohol Use No     History   Smoking Status    Former Smoker    Packs/day: 0 50    Years: 45 00    Quit date: 3/6/2018   Smokeless Tobacco    Never Used     History   Drug Use No       Family History:    Family History   Problem Relation Age of Onset    Hypertension Mother     Heart disease Mother     Diabetes Father     Liver disease Father         hepatic failure     Breast cancer Daughter     Substance Abuse Neg Hx     Mental illness Neg Hx        Physical Exam:     Vitals:   Blood Pressure: 122/66 (12/28/18 0142)  Pulse: 89 (12/28/18 0156)  Temperature: 97 6 °F (36 4 °C) (12/28/18 0142)  Temp Source: Oral (12/28/18 0142)  Respirations: 18 (12/28/18 0142)  Height: 5' (152 4 cm) (12/28/18 0142)  Weight - Scale: 55 4 kg (122 lb 2 2 oz) (12/28/18 0142)  SpO2: 100 % (12/28/18 0156)    Physical Exam   Constitutional: She is oriented to person, place, and time  Vital signs are normal  She appears well-developed and well-nourished  Non-toxic appearance  No distress  HENT:   Head: Normocephalic and atraumatic  Mouth/Throat: Oropharynx is clear and moist and mucous membranes are normal  Normal dentition  No oropharyngeal exudate  Eyes: Pupils are equal, round, and reactive to light  Conjunctivae are normal  Right eye exhibits no discharge  Left eye exhibits no discharge  No scleral icterus  Neck: No JVD present  No tracheal deviation and no erythema present  Cardiovascular: Normal rate, regular rhythm, normal heart sounds, intact distal pulses and normal pulses  Exam reveals no gallop and no friction rub  No murmur heard  Pulmonary/Chest: Effort normal and breath sounds normal  No accessory muscle usage or stridor  No respiratory distress  She has no decreased breath sounds  She has no wheezes  She has no rales  Abdominal: Soft  Bowel sounds are normal  She exhibits no distension and no mass  There is tenderness in the suprapubic area  There is no rebound     Musculoskeletal: She exhibits no edema, tenderness or deformity  Neurological: She is alert and oriented to person, place, and time  GCS eye subscore is 4  GCS verbal subscore is 5  GCS motor subscore is 6  Skin: Skin is warm and dry  No rash noted  She is not diaphoretic  No erythema  There is pallor  Psychiatric: She has a normal mood and affect  Her behavior is normal    Nursing note and vitals reviewed  Additional Data:     Lab Results: I have personally reviewed pertinent reports  Results from last 7 days  Lab Units 12/28/18  0014 12/27/18  2046   WBC Thousand/uL  --  11 32*   HEMOGLOBIN g/dL  --  10 5*   HEMATOCRIT %  --  32 9*   PLATELETS Thousands/uL 146* 173   NEUTROS PCT %  --  74   LYMPHS PCT %  --  10*   MONOS PCT %  --  14*   EOS PCT %  --  1       Results from last 7 days  Lab Units 12/27/18  2046   SODIUM mmol/L 134*   POTASSIUM mmol/L 4 5   CHLORIDE mmol/L 99*   CO2 mmol/L 27   BUN mg/dL 35*   CREATININE mg/dL 2 30*   ANION GAP mmol/L 8   CALCIUM mg/dL 8 5   ALBUMIN g/dL 2 9*   TOTAL BILIRUBIN mg/dL 0 40   ALK PHOS U/L 99   ALT U/L 22   AST U/L 17   GLUCOSE RANDOM mg/dL 118       Results from last 7 days  Lab Units 12/27/18  2046   INR  1 12               Results from last 7 days  Lab Units 12/27/18  2055   LACTIC ACID mmol/L 1 2       Imaging: I have personally reviewed pertinent reports  XR chest pa & lateral   Final Result by William Haas DO (12/27 2215)      No evidence of pneumonia  Left upper lobe periaortic opacity described on previous CT imaging is not well visualized by plain film  Follow-up should be based on previous CT recommendations  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  Workstation performed: [id number]             EKG, Pathology, and Other Studies Reviewed on Admission:   · EKG: Sinus Tachycardia  Vent   rate 101 BPM  NC interval 124 ms  QRS duration 84 ms  QT/QTc 346/448 ms  P-R-T axes 76 53 74  /61 mmHg    Allscripts / Epic Records Reviewed: Yes     ** Please Note: This note has been constructed using a voice recognition system   **

## 2018-12-29 NOTE — PROGRESS NOTES
[facility] Progress Note  Patient: Krysten Mejia 72 y o  female   MRN: [id number]  PCP: Krishan Rios MD  Unit/Bed#: [address] [place] [address] Encounter: [id number]  Date Of Visit: 18    Assessment:    Principal Problem:    Acute cystitis without hematuria  Active Problems:    Aneurysm, thoracoabdominal aortic (HCC)    NYDIA (acute kidney injury) ([facility] )    Moderate protein-calorie malnutrition ([facility] )      Plan:    · Acute cystitis without hematuria with infected urinalysis and febrile course undergoing chemotherapy for lung cancer will continue on cefepime and vancomycin in relation immune compromise for chemotherapy awaiting culture results she has had symptom relief overnight/procalcitonin was elevated and will trend  · Acute kidney injury resolved with IV fluids which will continue another 12 hr at 50 mL an hour  · Moderate protein calorie malnutrition based on history of chronic disease lung cancer poor intake  Will order Ensure t i d  · Thoracic abdominal aortic aneurysm 6 5 cm patient aware and pending intervention months completes course of chemo immunotherapy for her lung cancer  We will continue on statin and aspirin and beta-blocker in the form of Toprol-XL 50 mg b i d   · Hypothyroidism continue levothyroxine 100 mcg      VTE Pharmacologic Prophylaxis:   Pharmacologic: Heparin  Mechanical VTE Prophylaxis in Place: Yes    Discussions with Specialists or Other Care Team Provider:  No    Time Spent for Care: 45 minutes  More than 50% of total time spent on counseling and coordination of care as described above  Subjective:   Patient reports mild chills earlier at night  Denies any at this time  She remained hemodynamically stable  An afebrile    Objective:     Vitals:   Temp (24hrs), Av 1 °F (36 7 °C), Min:98 °F (36 7 °C), Max:98 2 °F (36 8 °C)    Temp:  [98 °F (36 7 °C)-98 2 °F (36 8 °C)] 98 °F (36 7 °C)  HR:  [82-95] 95  Resp:  [18] 18  BP: (133-161)/(63-81) 140/77  SpO2:  [91 %-97 %] 96 %  Body mass index is 24 11 kg/m²  Input and Output Summary (last 24 hours): Intake/Output Summary (Last 24 hours) at 12/29/18 1032  Last data filed at 12/29/18 0445   Gross per 24 hour   Intake                0 ml   Output             1150 ml   Net            -1150 ml       Physical Exam:     Physical Exam:   General appearance: alert, appears stated age and cooperative  Head: Normocephalic, without obvious abnormality, atraumatic  Lungs: clear to auscultation bilaterally  Heart: regular rate and rhythm  Abdomen: soft, non-tender; bowel sounds normal; no masses,  no organomegaly  Back: negative  Extremities: extremities normal, atraumatic, no cyanosis or edema  Neurologic: Grossly normal      Additional Data:     Labs:      Results from last 7 days  Lab Units 12/29/18  0510   WBC Thousand/uL 5 77   HEMOGLOBIN g/dL 9 7*   HEMATOCRIT % 31 2*   PLATELETS Thousands/uL 156   NEUTROS PCT % 64   LYMPHS PCT % 18   MONOS PCT % 15*   EOS PCT % 3       Results from last 7 days  Lab Units 12/29/18  0510  12/27/18  2046   POTASSIUM mmol/L 4 0  < > 4 5   CHLORIDE mmol/L 109*  < > 99*   CO2 mmol/L 24  < > 27   BUN mg/dL 29*  < > 35*   CREATININE mg/dL 1 83*  < > 2 30*   CALCIUM mg/dL 8 8  < > 8 5   ALK PHOS U/L  --   --  99   ALT U/L  --   --  22   AST U/L  --   --  17   < > = values in this interval not displayed  Results from last 7 days  Lab Units 12/27/18  2046   INR  1 12       * I Have Reviewed All Lab Data Listed Above  * Additional Pertinent Lab Tests Reviewed: All Labs For Current Hospital Admission Reviewed    Imaging:  Xr Chest Pa & Lateral    Result Date: 12/27/2018  Narrative: CHEST INDICATION:  Fever  COMPARISON:  9/22/2018, CTA chest, abdomen and pelvis 6/11/2018 EXAM PERFORMED/VIEWS:  XR CHEST PA & LATERAL  The frontal view was performed utilizing dual energy radiographic technique  FINDINGS: Cardiomediastinal silhouette appears unchanged with calcific, uncoiled thoracic aorta    Vascular stent seen along the proximal arch  Aneurysmal dilatation of the descending thoracic aorta suggested up to 6 5 cm distally  This measured up to 6 1 cm on the most recent CT abdominal study in October 2018  No acute infiltrates  Calcified granuloma or lymph node along the upper pole left hilum  Opacity in the left upper lobe adjacent to the aortic arch seen on recent CT studies is not as well-visualized by plain film  No pneumothorax or pleural effusion  Degenerative changes of the spine  Impression: No evidence of pneumonia  Left upper lobe periaortic opacity described on previous CT imaging is not well visualized by plain film  Follow-up should be based on previous CT recommendations  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  Workstation performed: [id number]     Imaging Reports Reviewed Today Include:  Reviewed chest x-ray  Imaging Personally Reviewed by Myself Includes:    Procedure: Xr Chest Pa & Lateral    Result Date: 12/27/2018  Narrative: CHEST INDICATION:  Fever  COMPARISON:  9/22/2018, CTA chest, abdomen and pelvis 6/11/2018 EXAM PERFORMED/VIEWS:  XR CHEST PA & LATERAL  The frontal view was performed utilizing dual energy radiographic technique  FINDINGS: Cardiomediastinal silhouette appears unchanged with calcific, uncoiled thoracic aorta  Vascular stent seen along the proximal arch  Aneurysmal dilatation of the descending thoracic aorta suggested up to 6 5 cm distally  This measured up to 6 1 cm on the most recent CT abdominal study in October 2018  No acute infiltrates  Calcified granuloma or lymph node along the upper pole left hilum  Opacity in the left upper lobe adjacent to the aortic arch seen on recent CT studies is not as well-visualized by plain film  No pneumothorax or pleural effusion  Degenerative changes of the spine  Impression: No evidence of pneumonia  Left upper lobe periaortic opacity described on previous CT imaging is not well visualized by plain film   Follow-up should be based on previous CT recommendations  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   Workstation performed: [id number]        Recent Cultures (last 7 days):       Results from last 7 days  Lab Units 12/27/18 2206 12/27/18 2055 12/27/18 2049   BLOOD CULTURE   --  Gram Negative [place] Enteric Like*  No Growth at 24 hrs   --    GRAM STAIN RESULT   --  Gram negative rods  --    URINE CULTURE  50,000-59,000 cfu/ml Klebsiella-Enterobacter  group*  10,000-19,000 cfu/ml Proteus species*  --   --    INFLUENZA B PCR   --   --  None Detected   RSV PCR   --   --  None Detected       Last 24 Hours Medication List:     Current Facility-Administered Medications:  acetaminophen 650 mg Oral Q6H PRN Marilyn Peopless, PA-C    albuterol 2 puff Inhalation Q6H PRN Marilyn Peopless, PA-C    aspirin 325 mg Oral Daily Marilyn Peopless, PA-C    atorvastatin 40 mg Oral Daily Marilyn Peopless, PA-C    calcium carbonate 1,000 mg Oral Daily PRN Marilyn Peopless, PA-C    cefepime 1,000 mg Intravenous Q24H Marilyn Peopless, PA-C Last Rate: 1,000 mg (12/28/18 2322)   co-enzyme Q-10 30 mg Oral Daily Marilyn Peopless, PA-C    fish oil 1,000 mg Oral Daily Viridianae Nuria, PA-C    gabapentin 200 mg Oral TID Marilyn Quiñones, PA-C    heparin (porcine) 5,000 Units Subcutaneous Q8H [facility] Marilyn Quiñones, PA-C    levothyroxine 100 mcg Oral Early Morning Marilyn Peopless, PA-C    metoprolol succinate 50 mg Oral BID Marilyn Quiñones, PA-C    ondansetron 4 mg Intravenous Q6H PRN Marilyn Peopless, PA-C    senna 1 tablet Oral Daily Marilyn Quiñones, PA-C    sodium chloride (PF) 3 mL Intravenous PRN Anusha Gell, PA-C    sodium chloride 100 mL/hr Intravenous Continuous Marilyn Peopless, PA-C Last Rate: 100 mL/hr (12/29/18 0854)     Facility-Administered Medications Ordered in Other Encounters:  alteplase 2 mg Intracatheter Once PRN Kenia Solares MD        Today, Patient Was Seen By: Dahlia Connors Karla Bella    ** Please Note: Dragon 360 Dictation voice to text software may have been used in the creation of this document   **

## 2018-12-29 NOTE — PLAN OF CARE
Problem: Potential for Falls  Goal: Patient will remain free of falls  INTERVENTIONS:  - Assess patient frequently for physical needs  -  Identify physical deficits and behaviors that affect risk of falls  -  [place] fall precautions as indicated by assessment   - Educate patient/family on patient safety including physical limitations  - Instruct patient to call for assistance with activity based on assessment  - Modify environment to reduce risk of injury  - Consider OT/PT consult to assist with strengthening/mobility    Outcome: Progressing      Problem: Nutrition/Hydration-ADULT  Goal: Nutrient/Hydration intake appropriate for improving, restoring or maintaining nutritional needs  Monitor and assess patient's nutrition/hydration status for malnutrition (ex- brittle hair, bruises, dry skin, pale skin and conjunctiva, muscle wasting, smooth red tongue, and disorientation)  Collaborate with interdisciplinary team and initiate plan and interventions as ordered  Monitor patient's weight and dietary intake as ordered or per policy  Utilize nutrition screening tool and intervene per policy  Determine patient's food preferences and provide high-protein, high-caloric foods as appropriate       INTERVENTIONS:  - Monitor oral intake, urinary output, labs, and treatment plans  - Assess nutrition and hydration status and recommend course of action  - Evaluate amount of meals eaten  - Assist patient with eating if necessary   - Allow adequate time for meals  - Recommend/ encourage appropriate diets, oral nutritional supplements, and vitamin/mineral supplements  - Order, calculate, and assess calorie counts as needed  - Recommend, monitor, and adjust tube feedings and TPN/PPN based on assessed needs  - Assess need for intravenous fluids  - Provide specific nutrition/hydration education as appropriate  - Include patient/family/caregiver in decisions related to nutrition   Outcome: Progressing      Problem: PAIN - ADULT  Goal: Verbalizes/displays adequate comfort level or baseline comfort level  Interventions:  - Encourage patient to monitor pain and request assistance  - Assess pain using appropriate pain scale  - Administer analgesics based on type and severity of pain and evaluate response  - Implement non-pharmacological measures as appropriate and evaluate response  - Notify physician/advanced practitioner if interventions unsuccessful or patient reports new pain   Outcome: Progressing      Problem: INFECTION - ADULT  Goal: Absence or prevention of progression during hospitalization  INTERVENTIONS:  - Assess and monitor for signs and symptoms of infection  - Monitor lab/diagnostic results  - Monitor all insertion sites, i e  indwelling lines  - [place] appropriate cooling/warming therapies per order  - Administer medications as ordered  - Instruct and encourage patient and family to use good hand hygiene technique  - Identify and instruct in appropriate isolation precautions for identified infection/condition   Outcome: Progressing      Problem: DISCHARGE PLANNING  Goal: Discharge to home or other facility with appropriate resources  INTERVENTIONS:  - Identify barriers to discharge w/patient and caregiver  - Arrange for needed discharge resources and transportation as appropriate  - Identify discharge learning needs (meds, wound care, etc )  - Refer to Case Management Department for coordinating discharge planning if the patient needs post-hospital services based on physician/advanced practitioner order or complex needs related to functional status, cognitive ability, or social support system   Outcome: Progressing      Problem: Knowledge Deficit  Goal: Patient/family/caregiver demonstrates understanding of disease process, treatment plan, medications, and discharge instructions  Complete learning assessment and assess knowledge base    Interventions:  - Provide teaching at level of understanding  - Provide teaching via preferred learning methods   Outcome: Progressing      Problem: RESPIRATORY - ADULT  Goal: Achieves optimal ventilation and oxygenation  INTERVENTIONS:  - Assess for changes in respiratory status  - Assess for changes in mentation and behavior  - Encourage broncho-pulmonary hygiene including cough, deep breathe, Incentive Spirometry  - Assess and instruct to report SOB or any respiratory difficulty  - Respiratory Therapy support as indicated   Outcome: Progressing      Problem: GASTROINTESTINAL - ADULT  Goal: Minimal or absence of nausea and/or vomiting  INTERVENTIONS:  - Administer IV fluids as ordered to ensure adequate hydration  - Administer ordered antiemetic medications as needed  - Provide nonpharmacologic comfort measures as appropriate  - Advance diet as tolerated, if ordered  - Nutrition services referral to assist patient with adequate nutrition and appropriate food choices   Outcome: Progressing    Goal: Maintains adequate nutritional intake  INTERVENTIONS:  - Monitor percentage of each meal consumed  - Identify factors contributing to decreased intake, treat as appropriate  - Assist with meals as needed  - Monitor I&O, WT and lab values  - Obtain nutrition services referral as needed   Outcome: Progressing      Problem: GENITOURINARY - ADULT  Goal: Maintains or returns to baseline urinary function  INTERVENTIONS:  - Assess urinary function  - Encourage oral fluids to ensure adequate hydration  - Administer IV fluids as ordered to ensure adequate hydration  - Administer ordered medications as needed  - Offer frequent toileting  - Follow urinary retention protocol if ordered   Outcome: Progressing      Problem: METABOLIC, FLUID AND ELECTROLYTES - ADULT  Goal: Electrolytes maintained within normal limits  INTERVENTIONS:  - Monitor labs and assess patient for signs and symptoms of electrolyte imbalances  - Administer electrolyte replacement as ordered  - Monitor response to electrolyte replacements, including repeat lab results as appropriate  - Instruct patient on fluid and nutrition as appropriate   Outcome: Progressing    Goal: Fluid balance maintained  INTERVENTIONS:  - Monitor labs and assess for signs and symptoms of volume excess or deficit  - Monitor I/O and WT  - Instruct patient on fluid and nutrition as appropriate   Outcome: Progressing

## 2018-12-30 PROBLEM — N17.9 ACUTE KIDNEY INJURY SUPERIMPOSED ON CHRONIC KIDNEY DISEASE (HCC): Chronic | Status: ACTIVE | Noted: 2018-01-01

## 2018-12-30 PROBLEM — R78.81 GRAM-NEGATIVE BACTEREMIA: Status: ACTIVE | Noted: 2018-01-01

## 2018-12-30 PROBLEM — N18.9 ACUTE KIDNEY INJURY SUPERIMPOSED ON CHRONIC KIDNEY DISEASE (HCC): Chronic | Status: ACTIVE | Noted: 2018-01-01

## 2018-12-30 NOTE — PROGRESS NOTES
Progress Note - China Charles 1945, 72 y o  female MRN: [id number]    Unit/Bed#: [address] [place] [address] Encounter: [id number]    Primary Care Provider: Donna Long MD   Date and time admitted to hospital: 12/27/2018  8:20 PM    * Acute cystitis without hematuria   Assessment & Plan    · UA:  Nitrites and leukocytes  · Continue cefepime for now  Urine culture showing 50,000-59,000 cfu/mL klebsiella enterobacter and 10,000-19,000 cfu/mL proteus  · Blood culture 1/2 also + for enterobacter cloacae complex A  · Afebrile and hemodynamically stable  · Repeat blood cultures  Will d/w attending regarding need for infectious disease consultation      Gram-negative bacteremia   Assessment & Plan    · 1/2 blood cultures on admission positive for enterobacter cloacae complex A, other negative x 48 hours  · Currently receviing cefepime which we will continue  · Check repeat blood cultures  · Will d/w attending physician in regards to ID consult given immunosuppression      Acute kidney injury superimposed on chronic kidney disease ([facility] )   Assessment & Plan    · POA, Creat was 2 3, baseline around 1 2-1 4  As of yesterday was improved  Continue IVF for now  Check BMP in AM  · Monitor I/Os     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)   Assessment & Plan    · Follows with Dr Mayra Yanes  · Currently on Imfinzi q2 weeks  · Hx of chemoradiation   · Monitor CBC closely      Moderate protein-calorie malnutrition ([facility] )   Assessment & Plan    Malnutrition Findings:           BMI Findings: Body mass index is 23 85 kg/m²     · Continue Ensure TID      Benign hypertension   Assessment & Plan    · Continue metoprolol      Aneurysm, thoracoabdominal aortic (HCC)   Assessment & Plan    · CT: 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  · Per vascular, holding off on treatment until patient is finished chemotherapy     Hypothyroidism   Assessment & Plan    · Continue synthroid        VTE Pharmacologic Prophylaxis:   Pharmacologic: Heparin  Mechanical VTE Prophylaxis in Place: Yes    Patient Centered Rounds: I have performed bedside rounds with nursing staff today  Discussions with Specialists or Other Care Team Provider: discussed with attending, Dr Elizabeth Gonzalez  Education and Discussions with Family / Patient: patient  Time Spent for Care: 30 minutes  More than 50% of total time spent on counseling and coordination of care as described above  Current Length of Stay: 3 day(s)    Current Patient Status: Inpatient   Certification Statement: The patient will continue to require additional inpatient hospital stay due to repeat blood cultures, consideration of ID consultation     Discharge Plan: Not yet medically stable  Code Status: Level 1 - Full Code      Subjective:   Patient reports doing well today  She denies dysuria but is having some frequency  No low back pain  No fevers/chills  No chest pain, SOB, cough  Objective:     Vitals:   Temp (24hrs), Av 3 °F (36 8 °C), Min:98 2 °F (36 8 °C), Max:98 4 °F (36 9 °C)    Temp:  [98 2 °F (36 8 °C)-98 4 °F (36 9 °C)] 98 4 °F (36 9 °C)  HR:  [71-94] 90  Resp:  [16-20] 20  BP: ()/(57-74) 128/74  SpO2:  [92 %-94 %] 93 %  Body mass index is 24 11 kg/m²  Input and Output Summary (last 24 hours): Intake/Output Summary (Last 24 hours) at 18 0837  Last data filed at 18 0457   Gross per 24 hour   Intake           849 17 ml   Output             1200 ml   Net          -350 83 ml       Physical Exam:     Physical Exam   Constitutional: No distress  Frail    Cardiovascular: Normal rate and regular rhythm  Pulmonary/Chest: Effort normal and breath sounds normal  No respiratory distress  She has no wheezes  Abdominal: Soft  Bowel sounds are normal  She exhibits no distension  There is no tenderness  Musculoskeletal: She exhibits no edema  Skin: There is pallor     Psychiatric:   Appears mildly anxious    Nursing note and vitals reviewed  Additional Data:     Labs:      Results from last 7 days  Lab Units 12/30/18  0512   WBC Thousand/uL 5 75   HEMOGLOBIN g/dL 9 6*   HEMATOCRIT % 30 6*   PLATELETS Thousands/uL 160   NEUTROS PCT % 66   LYMPHS PCT % 14   MONOS PCT % 16*   EOS PCT % 4       Results from last 7 days  Lab Units 12/29/18  2355  12/27/18 2046   POTASSIUM mmol/L 4 3  < > 4 5   CHLORIDE mmol/L 109*  < > 99*   CO2 mmol/L 24  < > 27   BUN mg/dL 22  < > 35*   CREATININE mg/dL 1 44*  < > 2 30*   CALCIUM mg/dL 8 4  < > 8 5   ALK PHOS U/L  --   --  99   ALT U/L  --   --  22   AST U/L  --   --  17   < > = values in this interval not displayed  Results from last 7 days  Lab Units 12/27/18  2046   INR  1 12       * I Have Reviewed All Lab Data Listed Above  * Additional Pertinent Lab Tests Reviewed: No New Labs Available For Today    Imaging:    Imaging Reports Reviewed Today Include: all  Imaging Personally Reviewed by Myself Includes:  none    Recent Cultures (last 7 days):       Results from last 7 days  Lab Units 12/27/18  2206 12/27/18 2055 12/27/18 2049   BLOOD CULTURE   --  No Growth at 48 hrs    Enterobacter cloacae complex*  --    GRAM STAIN RESULT   --  Gram negative rods  --    URINE CULTURE  50,000-59,000 cfu/ml Klebsiella-Enterobacter  group*  10,000-19,000 cfu/ml Proteus species*  --   --    INFLUENZA B PCR   --   --  None Detected   RSV PCR   --   --  None Detected       Last 24 Hours Medication List:     Current Facility-Administered Medications:  acetaminophen 650 mg Oral Q6H PRN Maupin Frandy, PA-C    albuterol 2 puff Inhalation Q6H PRN Emeka Frandy, PA-C    aspirin 325 mg Oral Daily Maupin Frandy, PA-C    atorvastatin 40 mg Oral Daily Emeka Frandy, PA-C    calcium carbonate 1,000 mg Oral Daily PRN Maupin Frandy, PA-C    cefepime 1,000 mg Intravenous Q24H Maupin Frandy, PA-C Last Rate: 1,000 mg (12/29/18 1641)   co-enzyme Q-10 30 mg Oral Daily Maupin Frandy, PA-WILLY    diphenhydrAMINE 25 mg Oral Q6H PRN Wilton Schafer    fish oil 1,000 mg Oral Daily Marie Arellano, PA-WILLY    gabapentin 200 mg Oral TID Marie Arellano, PA-WILLY    heparin (porcine) 5,000 Units Subcutaneous [facility] Marie Arellano, PA-WILLY    levothyroxine 100 mcg Oral Early Morning Marie Arellano, PA-WILLY    metoprolol succinate 50 mg Oral BID Marie Arellano, PA-WILLY    ondansetron 4 mg Intravenous Q6H PRN Marie Arellano, PA-WILLY    senna 1 tablet Oral Daily Marie Arellano PA-C    sodium chloride (PF) 3 mL Intravenous PRN Jimi Levy, CESAR    sodium chloride 50 mL/hr Intravenous Continuous Wilton Schafer Last Rate: 50 mL/hr (12/30/18 0457)        Today, Patient Was Seen By: Gianni Rogers PA-C    ** Please Note: Dictation voice to text software may have been used in the creation of this document   **

## 2018-12-30 NOTE — PLAN OF CARE
Problem: Potential for Falls  Goal: Patient will remain free of falls  INTERVENTIONS:  - Assess patient frequently for physical needs  -  Identify physical deficits and behaviors that affect risk of falls  -  [place] fall precautions as indicated by assessment   - Educate patient/family on patient safety including physical limitations  - Instruct patient to call for assistance with activity based on assessment  - Modify environment to reduce risk of injury  - Consider OT/PT consult to assist with strengthening/mobility    Outcome: Progressing      Problem: Nutrition/Hydration-ADULT  Goal: Nutrient/Hydration intake appropriate for improving, restoring or maintaining nutritional needs  Monitor and assess patient's nutrition/hydration status for malnutrition (ex- brittle hair, bruises, dry skin, pale skin and conjunctiva, muscle wasting, smooth red tongue, and disorientation)  Collaborate with interdisciplinary team and initiate plan and interventions as ordered  Monitor patient's weight and dietary intake as ordered or per policy  Utilize nutrition screening tool and intervene per policy  Determine patient's food preferences and provide high-protein, high-caloric foods as appropriate       INTERVENTIONS:  - Monitor oral intake, urinary output, labs, and treatment plans  - Assess nutrition and hydration status and recommend course of action  - Evaluate amount of meals eaten  - Assist patient with eating if necessary   - Allow adequate time for meals  - Recommend/ encourage appropriate diets, oral nutritional supplements, and vitamin/mineral supplements  - Order, calculate, and assess calorie counts as needed  - Recommend, monitor, and adjust tube feedings and TPN/PPN based on assessed needs  - Assess need for intravenous fluids  - Provide specific nutrition/hydration education as appropriate  - Include patient/family/caregiver in decisions related to nutrition   Outcome: Progressing      Problem: PAIN - ADULT  Goal: Verbalizes/displays adequate comfort level or baseline comfort level  Interventions:  - Encourage patient to monitor pain and request assistance  - Assess pain using appropriate pain scale  - Administer analgesics based on type and severity of pain and evaluate response  - Implement non-pharmacological measures as appropriate and evaluate response  - Notify physician/advanced practitioner if interventions unsuccessful or patient reports new pain   Outcome: Progressing      Problem: INFECTION - ADULT  Goal: Absence or prevention of progression during hospitalization  INTERVENTIONS:  - Assess and monitor for signs and symptoms of infection  - Monitor lab/diagnostic results  - Monitor all insertion sites, i e  indwelling lines  - [place] appropriate cooling/warming therapies per order  - Administer medications as ordered  - Instruct and encourage patient and family to use good hand hygiene technique  - Identify and instruct in appropriate isolation precautions for identified infection/condition   Outcome: Progressing      Problem: DISCHARGE PLANNING  Goal: Discharge to home or other facility with appropriate resources  INTERVENTIONS:  - Identify barriers to discharge w/patient and caregiver  - Arrange for needed discharge resources and transportation as appropriate  - Identify discharge learning needs (meds, wound care, etc )  - Refer to Case Management Department for coordinating discharge planning if the patient needs post-hospital services based on physician/advanced practitioner order or complex needs related to functional status, cognitive ability, or social support system   Outcome: Progressing      Problem: Knowledge Deficit  Goal: Patient/family/caregiver demonstrates understanding of disease process, treatment plan, medications, and discharge instructions  Complete learning assessment and assess knowledge base    Interventions:  - Provide teaching at level of understanding  - Provide teaching via preferred learning methods   Outcome: Progressing      Problem: RESPIRATORY - ADULT  Goal: Achieves optimal ventilation and oxygenation  INTERVENTIONS:  - Assess for changes in respiratory status  - Assess for changes in mentation and behavior  - Encourage broncho-pulmonary hygiene including cough, deep breathe, Incentive Spirometry  - Assess and instruct to report SOB or any respiratory difficulty  - Respiratory Therapy support as indicated   Outcome: Progressing      Problem: GASTROINTESTINAL - ADULT  Goal: Minimal or absence of nausea and/or vomiting  INTERVENTIONS:  - Administer IV fluids as ordered to ensure adequate hydration  - Administer ordered antiemetic medications as needed  - Provide nonpharmacologic comfort measures as appropriate  - Advance diet as tolerated, if ordered  - Nutrition services referral to assist patient with adequate nutrition and appropriate food choices   Outcome: Progressing    Goal: Maintains adequate nutritional intake  INTERVENTIONS:  - Monitor percentage of each meal consumed  - Identify factors contributing to decreased intake, treat as appropriate  - Assist with meals as needed  - Monitor I&O, WT and lab values  - Obtain nutrition services referral as needed   Outcome: Progressing      Problem: GENITOURINARY - ADULT  Goal: Maintains or returns to baseline urinary function  INTERVENTIONS:  - Assess urinary function  - Encourage oral fluids to ensure adequate hydration  - Administer IV fluids as ordered to ensure adequate hydration  - Administer ordered medications as needed  - Offer frequent toileting  - Follow urinary retention protocol if ordered   Outcome: Progressing      Problem: METABOLIC, FLUID AND ELECTROLYTES - ADULT  Goal: Electrolytes maintained within normal limits  INTERVENTIONS:  - Monitor labs and assess patient for signs and symptoms of electrolyte imbalances  - Administer electrolyte replacement as ordered  - Monitor response to electrolyte replacements, including repeat lab results as appropriate  - Instruct patient on fluid and nutrition as appropriate   Outcome: Progressing    Goal: Fluid balance maintained  INTERVENTIONS:  - Monitor labs and assess for signs and symptoms of volume excess or deficit  - Monitor I/O and WT  - Instruct patient on fluid and nutrition as appropriate   Outcome: Progressing      Problem: Prexisting or High Potential for Compromised Skin Integrity  Goal: Skin integrity is maintained or improved  INTERVENTIONS:  - Identify patients at risk for skin breakdown  - Assess and monitor skin integrity  - Assess and monitor nutrition and hydration status  - Monitor labs (i e  albumin)  - Assess for incontinence   - Turn and reposition patient  - Assist with mobility/ambulation  - Relieve pressure over bony prominences  - Avoid friction and shearing  - Provide appropriate hygiene as needed including keeping skin clean and dry  - Evaluate need for skin moisturizer/barrier cream  - Collaborate with interdisciplinary team (i e  Nutrition, Rehabilitation, etc )   - Patient/family teaching   Outcome: Progressing

## 2018-12-30 NOTE — CONSULTS
Consultation - Infectious Disease   China Charles 72 y o  female MRN: [id number]  Unit/Bed#: [address] [place] [address] [id number] Encounter: [id number]      Assessment/Plan   1  Enterobacter septicemia/Acute pyelonephritis/Fever/Leukocytosis: Pt presented with fever, chills, and urinary sx's  WBC count was elevated  UA showed pyuria  Bld cx's are positive for Enterobacter  Urine cx is positive for Enterobacter and Proteus  She has improved clinically on Cefepime  A  Cont Cefepime for now  B  Awaiting susceptibility results of the bacterial isolates from her urine cx to determine oral abx tx for time of D/C - If the Proteus isolate is also sensitive to Levaquin, will be able to D/C Pt on Levaquin 250 mg po Qday and cont through 1/9/19 to complete 2 week course of abx tx if no evidence of hydro  C  Will do renal U/S to look for hydro or presence of stone since urine cx is positive for Proteus in addition to the Enterobacter        History of Present Illness   Physician Requesting Consult: Eliot Mg MD  Reason for Consult / Principal Problem: Fever and gm negative septicemia    HPI: China Charles is a 72y o  year old female with H/O lung CA, hypothyroidism, and S/P TAA stent placement was admitted on 12/27/18 with c/o fever, chills, and dysuria x several days  She was febrile and had elevated WBC count on admission  UA showed pyuria  She was given one dose of Vanco and was continued on Cefepime  Urine cx is positive for Enterobacter and Proteus  Bld cx's are positive for Enterobacter  Repeat bld cx's were drawn today  Pt has improved clinically  CXR did not show any pulmonary infilts  Pt denied cough, SOB, CP, N/V/D, abd pain, or back pain    Inpatient consult to Infectious Diseases  Consult performed by: Kenia Rodrigues ordered by: Jayden Conley          ROS: 12 systems reviewed, remainder is neg      Historical Information   Past Medical History:   Diagnosis Date    Aortic aneurysm ([address] )     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[place] [address] )     ASCVD (arteriosclerotic cardiovascular disease)     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 ([place] [address] )     Back problem     Blood type B-     Cancer ([place] [address] )     lung CA    Cataract     COPD (chronic obstructive pulmonary disease) (HCC)     Coronary artery disease     CVA (cerebral vascular accident) ([place] [address] ) 2012    Depression     Disease of thyroid gland     History of cataract     Hyperlipidemia     Hypertension     Lung mass     Mediastinal adenopathy     Muscle pain     Prediabetes     PVD (peripheral vascular disease) ([place] [address] )     Thoracic aortic aneurysm (HCC)     Transient insomnia      Past Surgical History:   Procedure Laterality Date    CAROTID STENT Left     ME COLONOSCOPY FLX DX W/COLLJ SPEC WHEN PFRMD N/A 9/27/2017    Procedure: EGD AND COLONOSCOPY;  Surgeon: Myrna Siddiqui MD;  Location: [facility];  Service: Gastroenterology    ME EDG US EXAM SURGICAL ALTER STOM DUODENUM/JEJUNUM N/A 8/1/2018    Procedure: LINEAR ENDOSCOPIC U/S;  Surgeon: Ed Hawkins MD;  Location: [facility];   Service: Gastroenterology    TUBAL LIGATION       Social History   History   Alcohol Use No     History   Drug Use No     History   Smoking Status    Former Smoker    Packs/day: 0 50    Years: 45 00    Quit date: 3/6/2018   Smokeless Tobacco    Never Used     Family History   Problem Relation Age of Onset    Hypertension Mother     Heart disease Mother     Diabetes Father     Liver disease Father         hepatic failure     Breast cancer Daughter     Substance Abuse Neg Hx     Mental illness Neg Hx        Meds/Allergies   MEDS:  Cefepime: #4      Current Facility-Administered Medications:     acetaminophen (TYLENOL) tablet 650 mg, 650 mg, Oral, Q6H PRN, Karma Million, PA-C, 650 mg at 12/29/18 1750    albuterol (PROVENTIL HFA,VENTOLIN HFA) inhaler 2 puff, 2 puff, Inhalation, Q6H PRN, Karma Million, PA-C    aspirin (ECOTRIN) EC tablet 325 mg, 325 mg, Oral, Daily, Vannessa Quale, PA-C, 325 mg at 12/30/18 0911    atorvastatin (LIPITOR) tablet 40 mg, 40 mg, Oral, Daily, Vannessa Quale, PA-C, 40 mg at 12/30/18 0911    calcium carbonate (TUMS) chewable tablet 1,000 mg, 1,000 mg, Oral, Daily PRN, Vannessa Quale, PA-C, 1,000 mg at 12/28/18 1017    cefepime (MAXIPIME) 1 g/50 mL dextrose IVPB, 1,000 mg, Intravenous, Q24H, Vannessa Quale, PA-C, Last Rate: 100 mL/hr at 12/29/18 2254, 1,000 mg at 12/29/18 2254    co-enzyme Q-10 capsule 30 mg, 30 mg, Oral, Daily, Vannessa Quale, PA-C, 30 mg at 12/30/18 2618    diphenhydrAMINE (BENADRYL) oral elixir 25 mg, 25 mg, Oral, Q6H PRN, Jerrilyn Bar, 25 mg at 12/29/18 1305    fish oil capsule 1,000 mg, 1,000 mg, Oral, Daily, Vannessa Quale, PA-C, 1,000 mg at 12/30/18 1399    gabapentin (NEURONTIN) capsule 200 mg, 200 mg, Oral, TID, Vannessa Quale, PA-C, 200 mg at 12/30/18 6500    heparin (porcine) subcutaneous injection 5,000 Units, 5,000 Units, Subcutaneous, Q8H [address], 5,000 Units at 12/30/18 0542 **AND** Platelet count, , , Once, Vannessa Quale, PA-C    levothyroxine tablet 100 mcg, 100 mcg, Oral, Early Morning, Vannessa Quale, PA-C, 100 mcg at 12/30/18 3562    metoprolol succinate (TOPROL-XL) 24 hr tablet 50 mg, 50 mg, Oral, BID, Vannessa Quale, PA-C, 50 mg at 12/30/18 6203    ondansetron [facility]) injection 4 mg, 4 mg, Intravenous, Q6H PRN, Vannessa Quale, PA-C, 4 mg at 12/29/18 1258    senna (SENOKOT) tablet 8 6 mg, 1 tablet, Oral, Daily, Vannessa Quale, PA-C, 8 6 mg at 12/29/18 0848    Insert peripheral IV, , , Once **AND** sodium chloride (PF) 0 9 % injection 3 mL, 3 mL, Intravenous, PRN, ARJUN Rene-WILLY    sodium chloride 0 9 % infusion, 50 mL/hr, Intravenous, Continuous, Wilton Schafer, Last Rate: 50 mL/hr at 12/30/18 0457, 50 mL/hr at 12/30/18 0457    Allergies   Allergen Reactions    Penicillins Rash Intake/Output Summary (Last 24 hours) at 12/30/18 1249  Last data filed at 12/30/18 0457   Gross per 24 hour   Intake           849 17 ml   Output              950 ml   Net          -100 83 ml       PE:  WD, WN, WF in NAD  VSS, Tmax: 98 8  HEENT:  No scleral icterus, pharynx clear  NECK: Supple  CARDIAC:  RRR, nml S1, S2  LUNGS: Clear  ABDOMEN:  +BS, soft, nontender  BACK: No CVAT  EXTREMITIES:  No calf tenderness  SKIN: No rash  NEURO: Grossly nonfocal    Invasive Devices:   Peripheral IV 12/30/18 Left Forearm (Active)   Site Assessment Clean;Dry; Intact 12/30/2018 10:58 AM   Dressing Type Transparent 12/30/2018 10:58 AM   Line Status Infusing;Flushed;Blood return noted 12/30/2018 10:58 AM   Dressing Status Clean; Intact;Dry 12/30/2018 10:58 AM           Lab Results:   Admission on 12/27/2018   Component Date Value    WBC 12/27/2018 11 32*    RBC 12/27/2018 3 17*    Hemoglobin 12/27/2018 10 5*    Hematocrit 12/27/2018 32 9*    MCV 12/27/2018 104*    MCH 12/27/2018 33 1     MCHC 12/27/2018 31 9     RDW 12/27/2018 14 3     MPV 12/27/2018 11 1     Platelets 94/74/5312 173     nRBC 12/27/2018 0     Neutrophils Relative 12/27/2018 74     Immat GRANS % 12/27/2018 1     Lymphocytes Relative 12/27/2018 10*    Monocytes Relative 12/27/2018 14*    Eosinophils Relative 12/27/2018 1     Basophils Relative 12/27/2018 0     Neutrophils Absolute 12/27/2018 8 47*    Immature Grans Absolute 12/27/2018 0 06     Lymphocytes Absolute 12/27/2018 1 07     Monocytes Absolute 12/27/2018 1 56*    Eosinophils Absolute 12/27/2018 0 13     Basophils Absolute 12/27/2018 0 03     Sodium 12/27/2018 134*    Potassium 12/27/2018 4 5     Chloride 12/27/2018 99*    CO2 12/27/2018 27     ANION GAP 12/27/2018 8     BUN 12/27/2018 35*    Creatinine 12/27/2018 2 30*    Glucose 12/27/2018 118     Calcium 12/27/2018 8 5     AST 12/27/2018 17     ALT 12/27/2018 22     Alkaline Phosphatase 12/27/2018 99     Total Protein 12/27/2018 6 7     Albumin 12/27/2018 2 9*    Total Bilirubin 12/27/2018 0 40     eGFR 12/27/2018 21     LACTIC ACID 12/27/2018 1 2     Procalcitonin 12/27/2018 0 48*    Protime 12/27/2018 13 8     INR 12/27/2018 1 12     PTT 12/27/2018 32     Color, UA 12/27/2018 Yellow     Clarity, UA 12/27/2018 Cloudy     Specific Gravity, UA 12/27/2018 1 020     pH, UA 12/27/2018 6 0     Leukocytes, UA 12/27/2018 Large*    Nitrite, UA 12/27/2018 Positive*    Protein, UA 12/27/2018 100 (2+)*    Glucose, UA 12/27/2018 Negative     Ketones, UA 12/27/2018 Negative     Urobilinogen, UA 12/27/2018 0 2     Bilirubin, UA 12/27/2018 Negative     Blood, UA 12/27/2018 Large*    Blood Culture 12/27/2018 No Growth at 48 hrs       Blood Culture 12/27/2018 Enterobacter cloacae complex*    Gram Stain Result 12/27/2018 Gram negative rods     INFLU A PCR 12/27/2018 None Detected     INFLU B PCR 12/27/2018 None Detected     RSV PCR 12/27/2018 None Detected     RBC, UA 12/27/2018 Field obscured, unable to enumerate*    WBC, UA 12/27/2018 Innumerable*    Epithelial Cells 12/27/2018 Moderate*    Bacteria, UA 12/27/2018 Innumerable*    WBC Clumps 12/27/2018 PRESENT     URINE COMMENT 12/27/2018 TOTAL VOLUME <3CC       Urine Culture 12/27/2018 50,000-59,000 cfu/ml Enterobacter cloacae complex*    Urine Culture 12/27/2018 10,000-19,000 cfu/ml Proteus mirabilis*    Procalcitonin 12/28/2018 0 54*    Platelets 10/03/6381 146*    MPV 12/28/2018 11 1     WBC 12/28/2018 7 93     RBC 12/28/2018 2 92*    Hemoglobin 12/28/2018 9 8*    Hematocrit 12/28/2018 30 2*    MCV 12/28/2018 103*    MCH 12/28/2018 33 6     MCHC 12/28/2018 32 5     RDW 12/28/2018 14 1     MPV 12/28/2018 11 1     Platelets 16/51/1265 132*    nRBC 12/28/2018 0     Neutrophils Relative 12/28/2018 80*    Immat GRANS % 12/28/2018 1     Lymphocytes Relative 12/28/2018 7*    Monocytes Relative 12/28/2018 10     Eosinophils Relative 12/28/2018 2     Basophils Relative 12/28/2018 0     Neutrophils Absolute 12/28/2018 6 35     Immature Grans Absolute 12/28/2018 0 06     Lymphocytes Absolute 12/28/2018 0 55*    Monocytes Absolute 12/28/2018 0 81     Eosinophils Absolute 12/28/2018 0 14     Basophils Absolute 12/28/2018 0 02     Sodium 12/28/2018 137     Potassium 12/28/2018 4 3     Chloride 12/28/2018 105     CO2 12/28/2018 23     ANION GAP 12/28/2018 9     BUN 12/28/2018 35*    Creatinine 12/28/2018 2 10*    Glucose 12/28/2018 105     Calcium 12/28/2018 8 2*    eGFR 12/28/2018 23     Ventricular Rate 12/27/2018 101     Atrial Rate 12/27/2018 101     MI Interval 12/27/2018 124     QRSD Interval 12/27/2018 84     QT Interval 12/27/2018 346     QTC Interval 12/27/2018 448     P Axis 12/27/2018 76     QRS Axis 12/27/2018 53     T Wave Axis 12/27/2018 74     Sodium 12/29/2018 144     Potassium 12/29/2018 4 0     Chloride 12/29/2018 109*    CO2 12/29/2018 24     ANION GAP 12/29/2018 11     BUN 12/29/2018 29*    Creatinine 12/29/2018 1 83*    Glucose 12/29/2018 85     Calcium 12/29/2018 8 8     eGFR 12/29/2018 27     WBC 12/29/2018 5 77     RBC 12/29/2018 3 00*    Hemoglobin 12/29/2018 9 7*    Hematocrit 12/29/2018 31 2*    MCV 12/29/2018 104*    MCH 12/29/2018 32 3     MCHC 12/29/2018 31 1*    RDW 12/29/2018 14 5     MPV 12/29/2018 11 3     Platelets 00/06/8055 156     Neutrophils Relative 12/29/2018 64     Immat GRANS % 12/29/2018 0     Lymphocytes Relative 12/29/2018 18     Monocytes Relative 12/29/2018 15*    Eosinophils Relative 12/29/2018 3     Basophils Relative 12/29/2018 0     Neutrophils Absolute 12/29/2018 3 69     Immature Grans Absolute 12/29/2018 0 02     Lymphocytes Absolute 12/29/2018 1 03     Monocytes Absolute 12/29/2018 0 84     Eosinophils Absolute 12/29/2018 0 18     Basophils Absolute 12/29/2018 0 01     Magnesium 12/29/2018 1 5*    Sodium 12/29/2018 140     Potassium 12/29/2018 4 3     Chloride 12/29/2018 109*    CO2 12/29/2018 24     ANION GAP 12/29/2018 7     BUN 12/29/2018 22     Creatinine 12/29/2018 1 44*    Glucose 12/29/2018 103     Calcium 12/29/2018 8 4     eGFR 12/29/2018 37     WBC 12/30/2018 5 75     RBC 12/30/2018 2 94*    Hemoglobin 12/30/2018 9 6*    Hematocrit 12/30/2018 30 6*    MCV 12/30/2018 104*    MCH 12/30/2018 32 7     MCHC 12/30/2018 31 4     RDW 12/30/2018 14 7     MPV 12/30/2018 11 5     Platelets 94/99/6963 160     Neutrophils Relative 12/30/2018 66     Immat GRANS % 12/30/2018 0     Lymphocytes Relative 12/30/2018 14     Monocytes Relative 12/30/2018 16*    Eosinophils Relative 12/30/2018 4     Basophils Relative 12/30/2018 0     Neutrophils Absolute 12/30/2018 3 80     Immature Grans Absolute 12/30/2018 0 01     Lymphocytes Absolute 12/30/2018 0 80     Monocytes Absolute 12/30/2018 0 92     Eosinophils Absolute 12/30/2018 0 21     Basophils Absolute 12/30/2018 0 01     Magnesium 12/30/2018 2 0      Imaging Studies: I have personally reviewed pertinent reports  EKG, Pathology, and Other Studies: I have personally reviewed pertinent reports  Culture  Lab Results   Component Value Date    BLOODCX No Growth at 48 hrs  12/27/2018    BLOODCX Enterobacter cloacae complex (A) 12/27/2018    BLOODCX No Growth After 5 Days  09/21/2018    BLOODCX No Growth After 5 Days  09/21/2018    BLOODCX No Growth After 5 Days  02/18/2016    BLOODCX No Growth After 5 Days   02/18/2016     No results found for: Wm Hernadez  Lab Results   Component Value Date    URINECX 50,000-59,000 cfu/ml Enterobacter cloacae complex (A) 12/27/2018    URINECX 10,000-19,000 cfu/ml Proteus mirabilis (A) 12/27/2018    URINECX <10,000 cfu/ml Mixed Contaminants X2 02/18/2016     No results found for: SPUTUMCULTUR    Principal Problem:    Acute cystitis without hematuria  Active Problems:    Hypothyroidism    Aneurysm, thoracoabdominal aortic (HCC)    Benign hypertension    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)    Acute kidney injury superimposed on chronic kidney disease (HCC)    Moderate protein-calorie malnutrition (HCC)    Gram-negative bacteremia

## 2018-12-30 NOTE — ASSESSMENT & PLAN NOTE
· 1/2 blood cultures on admission positive for enterobacter cloacae complex A, other negative x 48 hours  · Currently receviing cefepime which we will continue  · Check repeat blood cultures  · Will d/w attending physician in regards to ID consult given immunosuppression

## 2018-12-30 NOTE — ASSESSMENT & PLAN NOTE
· Follows with Dr Adriana Martinez  · Currently on Imfinzi q2 weeks  · Hx of chemoradiation   · Monitor CBC closely

## 2018-12-31 NOTE — QUICK NOTE
Consult received for right hydronephrosis  I looked at old studies, CT scan of October 8, 2018 shows what looks to me like a right extrarenal pelvis  It was not called hydronephrosis at that time by Radiology  Creatinine has trended down since admission and is now normal   In October, similar creatinine levels, 2 5 on October 8, and then down to 1 23 on October 10th  Will order CT scan for better determination of whether there is hydronephrosis or not, and I will see patient tomorrow in consult

## 2018-12-31 NOTE — PROGRESS NOTES
Aftab Farfan  72 y o   female  1945  mrn [id number]    Assessment/Plan:  1  Enterobacter septicemia/Acute pyelonephritis/Fever/Leukocytosis: No further fever and WBC count has decreased to nml  UA showed pyuria  Bld cx's were positive for Enterobacter  Urine cx was positive for Enterobacter and Proteus  She has improved clinically on Cefepime  U/S of kidneys shows presence of right hydro  Pt presented with fever, chills, and urinary sx's  WBC count was elevated       A  Cont Cefepime while Pt remains in the hospital  B  Pt needs to be evaluated by Urology for her right hydro - ?need for placement of JJ stent  C  When Pt is ready for D/C, can change to Levaquin 500 mg po Qday and cont through 1/9/19 to complete 2 week course of abx tx if no evidence of hydro  D  Will sign off case, please re-consult if any new probs arise       Subjective: Feels better  Dysuria has improved    Objective:  Tmax: 98 9  Lungs; Clear  Abd: +BS, soft, nontender      Labs:  CBC w/diff  Recent Labs      12/31/18   0616   WBC  5 12   HGB  9 5*   HCT  30 2*   PLT  176   NEUTOPHILPCT  54   LYMPHOPCT  22   MONOPCT  19*   EOSPCT  5     BMP  Recent Labs      12/31/18   0634   K  4 7   CL  111*   CO2  24   BUN  16   CREATININE  1 19   CALCIUM  8 3     CMP  Recent Labs      12/31/18   0634   K  4 7   CL  111*   CO2  24   BUN  16   CREATININE  1 19   CALCIUM  8 3        labrc    Cultures:  Lab Results   Component Value Date    BLOODCX No Growth at 72 hrs  12/27/2018    BLOODCX Enterobacter cloacae complex (A) 12/27/2018    BLOODCX No Growth After 5 Days  09/21/2018    BLOODCX No Growth After 5 Days  09/21/2018    BLOODCX No Growth After 5 Days  02/18/2016    BLOODCX No Growth After 5 Days   02/18/2016     No results found for: [facility]   Lab Results   Component Value Date    URINECX 50,000-59,000 cfu/ml Enterobacter cloacae complex (A) 12/27/2018    URINECX 10,000-19,000 cfu/ml Proteus mirabilis (A) 12/27/2018    URINECX <10,000 cfu/ml Mixed Contaminants X2 02/18/2016     No results found for: [facility]UR    MED:  Cefepime: #5       Current Facility-Administered Medications:     acetaminophen (TYLENOL) tablet 650 mg, 650 mg, Oral, Q6H PRN, Caprice Purchase, PA-C, 650 mg at 12/29/18 1750    albuterol (PROVENTIL HFA,VENTOLIN HFA) inhaler 2 puff, 2 puff, Inhalation, Q6H PRN, Caprice Purchase, PA-C    aspirin (ECOTRIN) EC tablet 325 mg, 325 mg, Oral, Daily, Caprice Purchase, PA-C, 325 mg at 12/31/18 3018    atorvastatin (LIPITOR) tablet 40 mg, 40 mg, Oral, Daily, Caprice Purchase, PA-C, 40 mg at 12/31/18 4729    calcium carbonate (TUMS) chewable tablet 1,000 mg, 1,000 mg, Oral, Daily PRN, Caprice Purchase, PA-C, 1,000 mg at 12/28/18 1017    cefepime (MAXIPIME) 1 g/50 mL dextrose IVPB, 1,000 mg, Intravenous, Q24H, Caprice Purchase, PA-C, Last Rate: 100 mL/hr at 12/31/18 0035, 1,000 mg at 12/31/18 0035    co-enzyme Q-10 capsule 30 mg, 30 mg, Oral, Daily, Caprice Purchase, PA-C, 30 mg at 12/31/18 9867    diphenhydrAMINE (BENADRYL) oral elixir 25 mg, 25 mg, Oral, Q6H PRN, Mort Cirri, 25 mg at 12/29/18 1305    fish oil capsule 1,000 mg, 1,000 mg, Oral, Daily, Caprice Purchase, PA-C, 1,000 mg at 12/31/18 0421    gabapentin (NEURONTIN) capsule 200 mg, 200 mg, Oral, TID, Caprice Purchase, PA-C, 200 mg at 12/31/18 7620    heparin (porcine) subcutaneous injection 5,000 Units, 5,000 Units, Subcutaneous, Q8H [address], 5,000 Units at 12/31/18 1335 **AND** Platelet count, , , Once, Caprice Purchase, PA-C    levothyroxine tablet 100 mcg, 100 mcg, Oral, Early Morning, Caprice Purchase, PA-C, 100 mcg at 12/31/18 1084    metoprolol succinate (TOPROL-XL) 24 hr tablet 50 mg, 50 mg, Oral, BID, Caprice Purchase, PA-C, 50 mg at 12/31/18 0856    ondansetron [facility]) injection 4 mg, 4 mg, Intravenous, Q6H PRN, Caprice Purchase, PA-C, 4 mg at 12/29/18 1258    senna (SENOKOT) tablet 8 6 mg, 1 tablet, Oral, Daily, Rose Martin PA-C, 8 6 mg at 12/29/18 0848    Insert peripheral IV, , , Once **AND** sodium chloride (PF) 0 9 % injection 3 mL, 3 mL, Intravenous, PRN, Alan Jeter PA-C    sodium chloride 0 9 % infusion, 50 mL/hr, Intravenous, Continuous, Wilton Schafer, Last Rate: 50 mL/hr at 12/30/18 0457, 50 mL/hr at 12/30/18 0457    Principal Problem:    Acute cystitis without hematuria  Active Problems:    Hypothyroidism    Aneurysm, thoracoabdominal aortic (HCC)    Benign hypertension    Nonsquamous nonsmall cell neoplasm of left lung ([address] )    Acute kidney injury superimposed on chronic kidney disease ([address] )    Moderate protein-calorie malnutrition ([address] )    Gram-negative bacteremia      Jose Ortega MD

## 2018-12-31 NOTE — PLAN OF CARE
Problem: Potential for Falls  Goal: Patient will remain free of falls  INTERVENTIONS:  - Assess patient frequently for physical needs  -  Identify physical deficits and behaviors that affect risk of falls  -  [place] fall precautions as indicated by assessment   - Educate patient/family on patient safety including physical limitations  - Instruct patient to call for assistance with activity based on assessment  - Modify environment to reduce risk of injury  - Consider OT/PT consult to assist with strengthening/mobility    Outcome: Progressing      Problem: Nutrition/Hydration-ADULT  Goal: Nutrient/Hydration intake appropriate for improving, restoring or maintaining nutritional needs  Monitor and assess patient's nutrition/hydration status for malnutrition (ex- brittle hair, bruises, dry skin, pale skin and conjunctiva, muscle wasting, smooth red tongue, and disorientation)  Collaborate with interdisciplinary team and initiate plan and interventions as ordered  Monitor patient's weight and dietary intake as ordered or per policy  Utilize nutrition screening tool and intervene per policy  Determine patient's food preferences and provide high-protein, high-caloric foods as appropriate       INTERVENTIONS:  - Monitor oral intake, urinary output, labs, and treatment plans  - Assess nutrition and hydration status and recommend course of action  - Evaluate amount of meals eaten  - Assist patient with eating if necessary   - Allow adequate time for meals  - Recommend/ encourage appropriate diets, oral nutritional supplements, and vitamin/mineral supplements  - Order, calculate, and assess calorie counts as needed  - Recommend, monitor, and adjust tube feedings and TPN/PPN based on assessed needs  - Assess need for intravenous fluids  - Provide specific nutrition/hydration education as appropriate  - Include patient/family/caregiver in decisions related to nutrition   Outcome: Progressing      Problem: PAIN - ADULT  Goal: Verbalizes/displays adequate comfort level or baseline comfort level  Interventions:  - Encourage patient to monitor pain and request assistance  - Assess pain using appropriate pain scale  - Administer analgesics based on type and severity of pain and evaluate response  - Implement non-pharmacological measures as appropriate and evaluate response  - Notify physician/advanced practitioner if interventions unsuccessful or patient reports new pain   Outcome: Progressing      Problem: INFECTION - ADULT  Goal: Absence or prevention of progression during hospitalization  INTERVENTIONS:  - Assess and monitor for signs and symptoms of infection  - Monitor lab/diagnostic results  - Monitor all insertion sites, i e  indwelling lines  - [place] appropriate cooling/warming therapies per order  - Administer medications as ordered  - Instruct and encourage patient and family to use good hand hygiene technique  - Identify and instruct in appropriate isolation precautions for identified infection/condition   Outcome: Progressing      Problem: DISCHARGE PLANNING  Goal: Discharge to home or other facility with appropriate resources  INTERVENTIONS:  - Identify barriers to discharge w/patient and caregiver  - Arrange for needed discharge resources and transportation as appropriate  - Identify discharge learning needs (meds, wound care, etc )  - Refer to Case Management Department for coordinating discharge planning if the patient needs post-hospital services based on physician/advanced practitioner order or complex needs related to functional status, cognitive ability, or social support system   Outcome: Progressing      Problem: Knowledge Deficit  Goal: Patient/family/caregiver demonstrates understanding of disease process, treatment plan, medications, and discharge instructions  Complete learning assessment and assess knowledge base    Interventions:  - Provide teaching at level of understanding  - Provide teaching via preferred learning methods   Outcome: Progressing      Problem: RESPIRATORY - ADULT  Goal: Achieves optimal ventilation and oxygenation  INTERVENTIONS:  - Assess for changes in respiratory status  - Assess for changes in mentation and behavior  - Encourage broncho-pulmonary hygiene including cough, deep breathe, Incentive Spirometry  - Assess and instruct to report SOB or any respiratory difficulty  - Respiratory Therapy support as indicated   Outcome: Progressing      Problem: GASTROINTESTINAL - ADULT  Goal: Minimal or absence of nausea and/or vomiting  INTERVENTIONS:  - Administer IV fluids as ordered to ensure adequate hydration  - Administer ordered antiemetic medications as needed  - Provide nonpharmacologic comfort measures as appropriate  - Advance diet as tolerated, if ordered  - Nutrition services referral to assist patient with adequate nutrition and appropriate food choices   Outcome: Progressing    Goal: Maintains adequate nutritional intake  INTERVENTIONS:  - Monitor percentage of each meal consumed  - Identify factors contributing to decreased intake, treat as appropriate  - Assist with meals as needed  - Monitor I&O, WT and lab values  - Obtain nutrition services referral as needed   Outcome: Progressing      Problem: GENITOURINARY - ADULT  Goal: Maintains or returns to baseline urinary function  INTERVENTIONS:  - Assess urinary function  - Encourage oral fluids to ensure adequate hydration  - Administer IV fluids as ordered to ensure adequate hydration  - Administer ordered medications as needed  - Offer frequent toileting  - Follow urinary retention protocol if ordered   Outcome: Progressing      Problem: METABOLIC, FLUID AND ELECTROLYTES - ADULT  Goal: Electrolytes maintained within normal limits  INTERVENTIONS:  - Monitor labs and assess patient for signs and symptoms of electrolyte imbalances  - Administer electrolyte replacement as ordered  - Monitor response to electrolyte replacements, including repeat lab results as appropriate  - Instruct patient on fluid and nutrition as appropriate   Outcome: Progressing    Goal: Fluid balance maintained  INTERVENTIONS:  - Monitor labs and assess for signs and symptoms of volume excess or deficit  - Monitor I/O and WT  - Instruct patient on fluid and nutrition as appropriate   Outcome: Progressing      Problem: Prexisting or High Potential for Compromised Skin Integrity  Goal: Skin integrity is maintained or improved  INTERVENTIONS:  - Identify patients at risk for skin breakdown  - Assess and monitor skin integrity  - Assess and monitor nutrition and hydration status  - Monitor labs (i e  albumin)  - Assess for incontinence   - Turn and reposition patient  - Assist with mobility/ambulation  - Relieve pressure over bony prominences  - Avoid friction and shearing  - Provide appropriate hygiene as needed including keeping skin clean and dry  - Evaluate need for skin moisturizer/barrier cream  - Collaborate with interdisciplinary team (i e  Nutrition, Rehabilitation, etc )   - Patient/family teaching   Outcome: Progressing

## 2018-12-31 NOTE — PROGRESS NOTES
Progress Note - Mohit Box 1945, 72 y o  female MRN: [id number]    Unit/Bed#: [address] [place] [address] Encounter: [id number]    Primary Care Provider: Nicolas Estrada MD   Date and time admitted to hospital: 12/27/2018  8:20 PM        Gram-negative bacteremia   Assessment & Plan    · 1/2 blood cultures on admission positive for enterobacter cloacae complex A, other negative x 48 hours  · Currently receviing cefepime which we will continue  · Check repeat blood cultures  · ID following     Moderate protein-calorie malnutrition (HCC)   Assessment & Plan    Malnutrition Findings:           BMI Findings: Body mass index is 24 41 kg/m²  · Continue Ensure TID      Acute kidney injury superimposed on chronic kidney disease (HCC)   Assessment & Plan    · Improved, creatinine at baseline  · Continue to monitor creatinine  · Monitor ins and outs     Benign hypertension   Assessment & Plan    · Continue metoprolol   · Monitor blood pressure with routine vitals     * Acute cystitis without hematuria   Assessment & Plan    · Continue cefepime for now  Urine culture showing 50,000-59,000 cfu/mL klebsiella enterobacter and 10,000-19,000 cfu/mL proteus  · Blood culture 1/2 also + for enterobacter cloacae complex   · Afebrile and hemodynamically stable  · F/u Repeat blood cultures  · ID following       VTE Pharmacologic Prophylaxis:   Pharmacologic: Heparin  Mechanical VTE Prophylaxis in Place: Yes    Patient Centered Rounds: I have performed bedside rounds with nursing staff today  Discussions with Specialists or Other Care Team Provider:      Education and Discussions with Family / Patient: at bedside    Time Spent for Care: 45 minutes  More than 50% of total time spent on counseling and coordination of care as described above      Current Length of Stay: 4 day(s)    Current Patient Status: Inpatient   Certification Statement: The patient will continue to require additional inpatient hospital stay due to Bacteremia    Discharge Plan:      Code Status: Level 1 - Full Code      Subjective:   No acute events overnight patient feels well as without complaint this morning  Denies nausea vomiting back pain chest pain shortness of breath  Objective:     Vitals:   Temp (24hrs), Av 4 °F (36 9 °C), Min:98 1 °F (36 7 °C), Max:98 9 °F (37 2 °C)    Temp:  [98 1 °F (36 7 °C)-98 9 °F (37 2 °C)] 98 4 °F (36 9 °C)  HR:  [] 84  Resp:  [17-20] 17  BP: (109-139)/(53-75) 132/62  SpO2:  [90 %-96 %] 96 %  Body mass index is 24 41 kg/m²  Input and Output Summary (last 24 hours): Intake/Output Summary (Last 24 hours) at 18 0750  Last data filed at 18 1300   Gross per 24 hour   Intake                0 ml   Output              400 ml   Net             -400 ml       Physical Exam:     Physical Exam   Constitutional: She is oriented to person, place, and time  She appears well-developed  No distress  HENT:   Head: Normocephalic and atraumatic  Right Ear: External ear normal    Left Ear: External ear normal    Nose: Nose normal    Mouth/Throat: Oropharynx is clear and moist  No oropharyngeal exudate  Eyes: Pupils are equal, round, and reactive to light  Conjunctivae and EOM are normal  Right eye exhibits no discharge  Left eye exhibits no discharge  No scleral icterus  Neck: Normal range of motion  Neck supple  No JVD present  No tracheal deviation present  No thyromegaly present  Cardiovascular: Normal rate, regular rhythm, normal heart sounds and intact distal pulses  Exam reveals no gallop and no friction rub  No murmur heard  Pulmonary/Chest: Breath sounds normal  No stridor  No respiratory distress  She has no wheezes  She has no rales  She exhibits no tenderness  Abdominal: Soft  Bowel sounds are normal  She exhibits no distension  There is no tenderness  There is no rebound and no guarding  Musculoskeletal: Normal range of motion  She exhibits no edema, tenderness or deformity  Neurological: She is alert and oriented to person, place, and time  She has normal reflexes  No cranial nerve deficit  She exhibits normal muscle tone  Coordination normal    Skin: Skin is warm and dry  No rash noted  She is not diaphoretic  No erythema  No pallor  Psychiatric: She has a normal mood and affect  Her behavior is normal  Judgment and thought content normal    Nursing note and vitals reviewed  Additional Data:     Labs:      Results from last 7 days  Lab Units 12/31/18  0616   WBC Thousand/uL 5 12   HEMOGLOBIN g/dL 9 5*   HEMATOCRIT % 30 2*   PLATELETS Thousands/uL 176   NEUTROS PCT % 54   LYMPHS PCT % 22   MONOS PCT % 19*   EOS PCT % 5       Results from last 7 days  Lab Units 12/31/18  0634  12/27/18  2046   SODIUM mmol/L 143  < > 134*   POTASSIUM mmol/L 4 7  < > 4 5   CHLORIDE mmol/L 111*  < > 99*   CO2 mmol/L 24  < > 27   BUN mg/dL 16  < > 35*   CREATININE mg/dL 1 19  < > 2 30*   ANION GAP mmol/L 8  < > 8   CALCIUM mg/dL 8 3  < > 8 5   ALBUMIN g/dL  --   --  2 9*   TOTAL BILIRUBIN mg/dL  --   --  0 40   ALK PHOS U/L  --   --  99   ALT U/L  --   --  22   AST U/L  --   --  17   GLUCOSE RANDOM mg/dL 91  < > 118   < > = values in this interval not displayed  Results from last 7 days  Lab Units 12/27/18  2046   INR  1 12               Results from last 7 days  Lab Units 12/30/18  1046 12/28/18  0014 12/27/18 2055   LACTIC ACID mmol/L  --   --  1 2   PROCALCITONIN ng/ml 0 51* 0 54* 0 48*           * I Have Reviewed All Lab Data Listed Above  * Additional Pertinent Lab Tests Reviewed: [address] Admission Reviewed    Imaging:    Imaging Reports Reviewed Today Include:    Imaging Personally Reviewed by Myself Includes:       Recent Cultures (last 7 days):       Results from last 7 days  Lab Units 12/27/18  2206 12/27/18 2055 12/27/18 2049   BLOOD CULTURE   --  No Growth at 48 hrs    Enterobacter cloacae complex*  --    GRAM STAIN RESULT   --  Gram negative rods  -- URINE CULTURE  50,000-59,000 cfu/ml Enterobacter cloacae complex*  10,000-19,000 cfu/ml Proteus mirabilis*  --   --    INFLUENZA B PCR   --   --  None Detected   RSV PCR   --   --  None Detected       Last 24 Hours Medication List:     Current Facility-Administered Medications:  acetaminophen 650 mg Oral Q6H PRN Mirtha Ax, PA-C    albuterol 2 puff Inhalation Q6H PRN Mirtha Ax, PA-C    aspirin 325 mg Oral Daily Mirtha Ax, PA-C    atorvastatin 40 mg Oral Daily [place] Ax, PA-C    calcium carbonate 1,000 mg Oral Daily PRN [place] Ax, PA-C    cefepime 1,000 mg Intravenous Q24H Mritha Ax, PA-C Last Rate: 1,000 mg (12/31/18 0035)   co-enzyme Q-10 30 mg Oral Daily [place] Ax, PA-C    diphenhydrAMINE 25 mg Oral Q6H PRN Laural Oven Ruzijarad    fish oil 1,000 mg Oral Daily Mirtha Ax, PA-C    gabapentin 200 mg Oral TID [place] Ax, PA-C    heparin (porcine) 5,000 Units Subcutaneous [facility] [place] Ax, PA-C    levothyroxine 100 mcg Oral Early Morning Mirtha Ax, PA-C    metoprolol succinate 50 mg Oral BID Mirtha Ax, PA-C    ondansetron 4 mg Intravenous Q6H PRN Mirtha Ax, PA-C    senna 1 tablet Oral Daily Mirtha Ax, PA-C    sodium chloride (PF) 3 mL Intravenous PRN Sherri Ice, PA-C    sodium chloride 50 mL/hr Intravenous Continuous Shavkat Ruziev Last Rate: 50 mL/hr (12/30/18 5051)        Today, Patient Was Seen By: Jez Galvan MD    ** Please Note: Dictation voice to text software may have been used in the creation of this document   **

## 2018-12-31 NOTE — ASSESSMENT & PLAN NOTE
· 1/2 blood cultures on admission positive for enterobacter cloacae complex A, other negative x 48 hours  · Currently receviing cefepime which we will continue  · Check repeat blood cultures  · ID following

## 2018-12-31 NOTE — ASSESSMENT & PLAN NOTE
Malnutrition Findings:           BMI Findings: Body mass index is 24 41 kg/m²     · Continue Ensure TID

## 2018-12-31 NOTE — ASSESSMENT & PLAN NOTE
· Continue cefepime for now   Urine culture showing 50,000-59,000 cfu/mL klebsiella enterobacter and 10,000-19,000 cfu/mL proteus  · Blood culture 1/2 also + for enterobacter cloacae complex   · Afebrile and hemodynamically stable  · F/u Repeat blood cultures  · ID following

## 2019-01-01 ENCOUNTER — APPOINTMENT (EMERGENCY)
Dept: CT IMAGING | Facility: HOSPITAL | Age: 74
End: 2019-01-01
Payer: COMMERCIAL

## 2019-01-01 ENCOUNTER — TELEPHONE (OUTPATIENT)
Dept: OTHER | Facility: OTHER | Age: 74
End: 2019-01-01

## 2019-01-01 ENCOUNTER — TELEPHONE (OUTPATIENT)
Dept: FAMILY MEDICINE CLINIC | Facility: HOSPITAL | Age: 74
End: 2019-01-01

## 2019-01-01 ENCOUNTER — HOSPITAL ENCOUNTER (OUTPATIENT)
Dept: INFUSION CENTER | Facility: HOSPITAL | Age: 74
Discharge: HOME | End: 2019-04-04

## 2019-01-01 ENCOUNTER — HOSPITAL ENCOUNTER (INPATIENT)
Facility: HOSPITAL | Age: 74
LOS: 2 days | Discharge: HOME HEALTH | DRG: 189 | End: 2019-06-17
Attending: EMERGENCY MEDICINE | Admitting: INTERNAL MEDICINE
Payer: COMMERCIAL

## 2019-01-01 ENCOUNTER — HOSPITAL ENCOUNTER (OUTPATIENT)
Dept: INFUSION CENTER | Facility: HOSPITAL | Age: 74
Discharge: HOME | End: 2019-01-10
Payer: COMMERCIAL

## 2019-01-01 ENCOUNTER — TELEPHONE (OUTPATIENT)
Dept: NEPHROLOGY | Facility: CLINIC | Age: 74
End: 2019-01-01

## 2019-01-01 ENCOUNTER — HOSPITAL ENCOUNTER (OUTPATIENT)
Dept: INFUSION CENTER | Facility: HOSPITAL | Age: 74
Discharge: HOME | End: 2019-02-14

## 2019-01-01 ENCOUNTER — APPOINTMENT (EMERGENCY)
Dept: RADIOLOGY | Facility: HOSPITAL | Age: 74
DRG: 189 | End: 2019-01-01
Payer: COMMERCIAL

## 2019-01-01 ENCOUNTER — HOSPITAL ENCOUNTER (OUTPATIENT)
Dept: INFUSION CENTER | Facility: HOSPITAL | Age: 74
Discharge: HOME | End: 2019-03-21
Payer: COMMERCIAL

## 2019-01-01 ENCOUNTER — OFFICE VISIT (OUTPATIENT)
Dept: FAMILY MEDICINE CLINIC | Facility: HOSPITAL | Age: 74
End: 2019-01-01
Payer: COMMERCIAL

## 2019-01-01 ENCOUNTER — TRANSITIONAL CARE MANAGEMENT (OUTPATIENT)
Dept: FAMILY MEDICINE CLINIC | Facility: HOSPITAL | Age: 74
End: 2019-01-01

## 2019-01-01 ENCOUNTER — OFFICE VISIT (OUTPATIENT)
Dept: PAIN MEDICINE | Facility: CLINIC | Age: 74
End: 2019-01-01
Payer: COMMERCIAL

## 2019-01-01 ENCOUNTER — HOSPITAL ENCOUNTER (OUTPATIENT)
Dept: INFUSION CENTER | Facility: HOSPITAL | Age: 74
Discharge: HOME | End: 2019-04-19

## 2019-01-01 ENCOUNTER — ANESTHESIA (INPATIENT)
Dept: GASTROENTEROLOGY | Facility: HOSPITAL | Age: 74
DRG: 377 | End: 2019-01-01
Payer: COMMERCIAL

## 2019-01-01 ENCOUNTER — HOSPITAL ENCOUNTER (INPATIENT)
Facility: HOSPITAL | Age: 74
LOS: 5 days | DRG: 304 | End: 2019-06-26
Attending: EMERGENCY MEDICINE | Admitting: INTERNAL MEDICINE
Payer: COMMERCIAL

## 2019-01-01 ENCOUNTER — APPOINTMENT (EMERGENCY)
Dept: RADIOLOGY | Facility: HOSPITAL | Age: 74
End: 2019-01-01
Attending: EMERGENCY MEDICINE
Payer: COMMERCIAL

## 2019-01-01 ENCOUNTER — OFFICE VISIT (OUTPATIENT)
Dept: VASCULAR SURGERY | Facility: CLINIC | Age: 74
End: 2019-01-01
Payer: COMMERCIAL

## 2019-01-01 ENCOUNTER — TELEPHONE (OUTPATIENT)
Dept: VASCULAR SURGERY | Facility: CLINIC | Age: 74
End: 2019-01-01

## 2019-01-01 ENCOUNTER — HOSPITAL ENCOUNTER (OUTPATIENT)
Dept: INFUSION CENTER | Facility: HOSPITAL | Age: 74
Discharge: HOME | End: 2019-01-31
Payer: COMMERCIAL

## 2019-01-01 ENCOUNTER — HOSPITAL ENCOUNTER (OUTPATIENT)
Dept: INFUSION CENTER | Facility: HOSPITAL | Age: 74
End: 2019-01-01

## 2019-01-01 ENCOUNTER — OFFICE VISIT (OUTPATIENT)
Dept: NEPHROLOGY | Facility: CLINIC | Age: 74
End: 2019-01-01
Payer: COMMERCIAL

## 2019-01-01 ENCOUNTER — HOSPITAL ENCOUNTER (OUTPATIENT)
Dept: MAMMOGRAPHY | Facility: CLINIC | Age: 74
Discharge: HOME | DRG: 377 | End: 2019-05-30
Payer: COMMERCIAL

## 2019-01-01 ENCOUNTER — APPOINTMENT (INPATIENT)
Dept: RADIOLOGY | Facility: HOSPITAL | Age: 74
DRG: 377 | End: 2019-01-01
Payer: COMMERCIAL

## 2019-01-01 ENCOUNTER — APPOINTMENT (INPATIENT)
Dept: CT IMAGING | Facility: HOSPITAL | Age: 74
DRG: 683 | End: 2019-01-01
Payer: COMMERCIAL

## 2019-01-01 ENCOUNTER — TELEPHONE (OUTPATIENT)
Dept: ADMINISTRATIVE | Facility: HOSPITAL | Age: 74
End: 2019-01-01

## 2019-01-01 ENCOUNTER — TRANSCRIBE ORDERS (OUTPATIENT)
Dept: ADMINISTRATIVE | Facility: HOSPITAL | Age: 74
End: 2019-01-01

## 2019-01-01 ENCOUNTER — DOCUMENTATION (OUTPATIENT)
Dept: HEMATOLOGY ONCOLOGY | Facility: HOSPITAL | Age: 74
End: 2019-01-01

## 2019-01-01 ENCOUNTER — APPOINTMENT (EMERGENCY)
Dept: RADIOLOGY | Facility: HOSPITAL | Age: 74
DRG: 191 | End: 2019-01-01
Payer: COMMERCIAL

## 2019-01-01 ENCOUNTER — HOSPITAL ENCOUNTER (EMERGENCY)
Facility: HOSPITAL | Age: 74
Discharge: HOME | End: 2019-05-05
Attending: EMERGENCY MEDICINE | Admitting: EMERGENCY MEDICINE
Payer: COMMERCIAL

## 2019-01-01 ENCOUNTER — APPOINTMENT (INPATIENT)
Dept: CT IMAGING | Facility: HOSPITAL | Age: 74
DRG: 189 | End: 2019-01-01
Payer: COMMERCIAL

## 2019-01-01 ENCOUNTER — OFFICE VISIT (OUTPATIENT)
Dept: HEMATOLOGY ONCOLOGY | Facility: HOSPITAL | Age: 74
End: 2019-01-01
Payer: COMMERCIAL

## 2019-01-01 ENCOUNTER — TRANSCRIBE ORDERS (OUTPATIENT)
Dept: FAMILY MEDICINE CLINIC | Facility: HOSPITAL | Age: 74
End: 2019-01-01

## 2019-01-01 ENCOUNTER — HOSPITAL ENCOUNTER (OUTPATIENT)
Dept: NON INVASIVE DIAGNOSTICS | Facility: CLINIC | Age: 74
Discharge: HOME | End: 2019-05-20
Payer: COMMERCIAL

## 2019-01-01 ENCOUNTER — HOSPITAL ENCOUNTER (OUTPATIENT)
Dept: NON INVASIVE DIAGNOSTICS | Facility: CLINIC | Age: 74
Discharge: HOME | DRG: 304 | End: 2019-06-20
Payer: COMMERCIAL

## 2019-01-01 ENCOUNTER — APPOINTMENT (EMERGENCY)
Dept: RADIOLOGY | Facility: HOSPITAL | Age: 74
End: 2019-01-01
Payer: COMMERCIAL

## 2019-01-01 ENCOUNTER — HOSPITAL ENCOUNTER (INPATIENT)
Facility: HOSPITAL | Age: 74
LOS: 3 days | Discharge: HOME HEALTH | DRG: 377 | End: 2019-06-04
Attending: EMERGENCY MEDICINE | Admitting: INTERNAL MEDICINE
Payer: COMMERCIAL

## 2019-01-01 ENCOUNTER — APPOINTMENT (OUTPATIENT)
Dept: LAB | Facility: HOSPITAL | Age: 74
End: 2019-01-01
Attending: INTERNAL MEDICINE
Payer: COMMERCIAL

## 2019-01-01 ENCOUNTER — HOSPITAL ENCOUNTER (OUTPATIENT)
Dept: INFUSION CENTER | Facility: HOSPITAL | Age: 74
Discharge: HOME | End: 2019-02-15
Payer: COMMERCIAL

## 2019-01-01 ENCOUNTER — APPOINTMENT (INPATIENT)
Dept: GASTROENTEROLOGY | Facility: HOSPITAL | Age: 74
DRG: 377 | End: 2019-01-01
Attending: INTERNAL MEDICINE
Payer: COMMERCIAL

## 2019-01-01 ENCOUNTER — RADIATION ONCOLOGY FOLLOW-UP (OUTPATIENT)
Dept: RADIATION ONCOLOGY | Facility: HOSPITAL | Age: 74
End: 2019-01-01
Attending: RADIOLOGY
Payer: COMMERCIAL

## 2019-01-01 ENCOUNTER — HOSPITAL ENCOUNTER (OUTPATIENT)
Dept: INFUSION CENTER | Facility: HOSPITAL | Age: 74
Discharge: HOME | End: 2019-04-08

## 2019-01-01 ENCOUNTER — APPOINTMENT (EMERGENCY)
Dept: CT IMAGING | Facility: HOSPITAL | Age: 74
DRG: 377 | End: 2019-01-01
Payer: COMMERCIAL

## 2019-01-01 ENCOUNTER — CONSULT (OUTPATIENT)
Dept: NEPHROLOGY | Facility: CLINIC | Age: 74
End: 2019-01-01
Payer: COMMERCIAL

## 2019-01-01 ENCOUNTER — APPOINTMENT (EMERGENCY)
Dept: CT IMAGING | Facility: HOSPITAL | Age: 74
DRG: 304 | End: 2019-01-01
Payer: COMMERCIAL

## 2019-01-01 ENCOUNTER — TELEPHONE (OUTPATIENT)
Dept: OBGYN CLINIC | Facility: HOSPITAL | Age: 74
End: 2019-01-01

## 2019-01-01 ENCOUNTER — HOSPITAL ENCOUNTER (OUTPATIENT)
Dept: INFUSION CENTER | Facility: HOSPITAL | Age: 74
Discharge: HOME | End: 2019-05-30

## 2019-01-01 ENCOUNTER — HOSPITAL ENCOUNTER (EMERGENCY)
Facility: HOSPITAL | Age: 74
Discharge: ANOTHER HOSPITAL | End: 2019-04-10
Attending: EMERGENCY MEDICINE | Admitting: EMERGENCY MEDICINE
Payer: COMMERCIAL

## 2019-01-01 ENCOUNTER — HOSPITAL ENCOUNTER (OUTPATIENT)
Dept: RADIOLOGY | Age: 74
Discharge: HOME | End: 2019-01-29
Payer: COMMERCIAL

## 2019-01-01 ENCOUNTER — APPOINTMENT (INPATIENT)
Dept: NON INVASIVE DIAGNOSTICS | Facility: HOSPITAL | Age: 74
DRG: 191 | End: 2019-01-01
Payer: COMMERCIAL

## 2019-01-01 ENCOUNTER — TELEPHONE (OUTPATIENT)
Dept: GASTROENTEROLOGY | Facility: CLINIC | Age: 74
End: 2019-01-01

## 2019-01-01 ENCOUNTER — HOSPITAL ENCOUNTER (OUTPATIENT)
Dept: INFUSION CENTER | Facility: HOSPITAL | Age: 74
Discharge: HOME | End: 2019-04-18

## 2019-01-01 ENCOUNTER — TELEPHONE (OUTPATIENT)
Dept: HEMATOLOGY ONCOLOGY | Facility: CLINIC | Age: 74
End: 2019-01-01

## 2019-01-01 ENCOUNTER — HOSPITAL ENCOUNTER (OUTPATIENT)
Dept: INFUSION CENTER | Facility: HOSPITAL | Age: 74
Discharge: HOME | End: 2019-03-07
Payer: COMMERCIAL

## 2019-01-01 ENCOUNTER — ANESTHESIA EVENT (INPATIENT)
Dept: GASTROENTEROLOGY | Facility: HOSPITAL | Age: 74
DRG: 377 | End: 2019-01-01
Payer: COMMERCIAL

## 2019-01-01 ENCOUNTER — TELEPHONE (OUTPATIENT)
Dept: HEMATOLOGY ONCOLOGY | Facility: HOSPITAL | Age: 74
End: 2019-01-01

## 2019-01-01 ENCOUNTER — TELEPHONE (OUTPATIENT)
Dept: PAIN MEDICINE | Facility: CLINIC | Age: 74
End: 2019-01-01

## 2019-01-01 ENCOUNTER — APPOINTMENT (EMERGENCY)
Dept: RADIOLOGY | Facility: HOSPITAL | Age: 74
DRG: 304 | End: 2019-01-01
Payer: COMMERCIAL

## 2019-01-01 ENCOUNTER — HOSPITAL ENCOUNTER (INPATIENT)
Facility: HOSPITAL | Age: 74
LOS: 2 days | Discharge: HOME | DRG: 191 | End: 2019-04-08
Attending: EMERGENCY MEDICINE | Admitting: INTERNAL MEDICINE
Payer: COMMERCIAL

## 2019-01-01 ENCOUNTER — HOSPITAL ENCOUNTER (EMERGENCY)
Facility: HOSPITAL | Age: 74
Discharge: HOME | End: 2019-04-29
Attending: EMERGENCY MEDICINE | Admitting: EMERGENCY MEDICINE
Payer: COMMERCIAL

## 2019-01-01 VITALS
OXYGEN SATURATION: 94 % | WEIGHT: 105 LBS | HEIGHT: 61 IN | BODY MASS INDEX: 18.69 KG/M2 | SYSTOLIC BLOOD PRESSURE: 114 MMHG | RESPIRATION RATE: 20 BRPM | HEART RATE: 88 BPM | RESPIRATION RATE: 16 BRPM | BODY MASS INDEX: 19.83 KG/M2 | WEIGHT: 98.99 LBS | HEIGHT: 61 IN | OXYGEN SATURATION: 100 % | DIASTOLIC BLOOD PRESSURE: 84 MMHG | TEMPERATURE: 98.3 F | HEART RATE: 82 BPM | SYSTOLIC BLOOD PRESSURE: 139 MMHG | TEMPERATURE: 97.3 F | DIASTOLIC BLOOD PRESSURE: 70 MMHG

## 2019-01-01 VITALS
RESPIRATION RATE: 18 BRPM | OXYGEN SATURATION: 96 % | BODY MASS INDEX: 24.93 KG/M2 | WEIGHT: 126.98 LBS | HEIGHT: 60 IN | SYSTOLIC BLOOD PRESSURE: 119 MMHG | DIASTOLIC BLOOD PRESSURE: 66 MMHG | TEMPERATURE: 98.1 F | HEART RATE: 94 BPM

## 2019-01-01 VITALS
HEIGHT: 61 IN | WEIGHT: 101 LBS | SYSTOLIC BLOOD PRESSURE: 100 MMHG | HEART RATE: 80 BPM | DIASTOLIC BLOOD PRESSURE: 60 MMHG | BODY MASS INDEX: 19.07 KG/M2

## 2019-01-01 VITALS
DIASTOLIC BLOOD PRESSURE: 63 MMHG | OXYGEN SATURATION: 97 % | RESPIRATION RATE: 18 BRPM | BODY MASS INDEX: 22.09 KG/M2 | WEIGHT: 113.1 LBS | SYSTOLIC BLOOD PRESSURE: 122 MMHG | HEART RATE: 96 BPM | TEMPERATURE: 98.3 F

## 2019-01-01 VITALS
SYSTOLIC BLOOD PRESSURE: 120 MMHG | BODY MASS INDEX: 22.58 KG/M2 | DIASTOLIC BLOOD PRESSURE: 70 MMHG | HEART RATE: 70 BPM | HEIGHT: 60 IN | WEIGHT: 115 LBS

## 2019-01-01 VITALS
HEART RATE: 86 BPM | OXYGEN SATURATION: 98 % | TEMPERATURE: 98.9 F | RESPIRATION RATE: 18 BRPM | WEIGHT: 114.2 LBS | BODY MASS INDEX: 21.58 KG/M2 | SYSTOLIC BLOOD PRESSURE: 121 MMHG | DIASTOLIC BLOOD PRESSURE: 54 MMHG

## 2019-01-01 VITALS
HEIGHT: 61 IN | TEMPERATURE: 96.1 F | WEIGHT: 115 LBS | SYSTOLIC BLOOD PRESSURE: 92 MMHG | DIASTOLIC BLOOD PRESSURE: 78 MMHG | RESPIRATION RATE: 16 BRPM | BODY MASS INDEX: 21.71 KG/M2

## 2019-01-01 VITALS
WEIGHT: 100 LBS | BODY MASS INDEX: 18.88 KG/M2 | HEIGHT: 61 IN | HEART RATE: 112 BPM | SYSTOLIC BLOOD PRESSURE: 156 MMHG | RESPIRATION RATE: 24 BRPM | OXYGEN SATURATION: 94 % | TEMPERATURE: 98.5 F | DIASTOLIC BLOOD PRESSURE: 96 MMHG

## 2019-01-01 VITALS
RESPIRATION RATE: 18 BRPM | OXYGEN SATURATION: 97 % | BODY MASS INDEX: 21.66 KG/M2 | SYSTOLIC BLOOD PRESSURE: 143 MMHG | DIASTOLIC BLOOD PRESSURE: 97 MMHG | HEART RATE: 95 BPM | TEMPERATURE: 96 F | WEIGHT: 114.64 LBS

## 2019-01-01 VITALS
HEIGHT: 61 IN | BODY MASS INDEX: 18.12 KG/M2 | TEMPERATURE: 98.3 F | WEIGHT: 96 LBS | DIASTOLIC BLOOD PRESSURE: 78 MMHG | HEART RATE: 72 BPM | SYSTOLIC BLOOD PRESSURE: 102 MMHG

## 2019-01-01 VITALS
TEMPERATURE: 97.9 F | DIASTOLIC BLOOD PRESSURE: 71 MMHG | WEIGHT: 89.73 LBS | HEIGHT: 61 IN | RESPIRATION RATE: 20 BRPM | SYSTOLIC BLOOD PRESSURE: 117 MMHG | HEART RATE: 80 BPM | OXYGEN SATURATION: 96 % | BODY MASS INDEX: 16.94 KG/M2

## 2019-01-01 VITALS
BODY MASS INDEX: 26.64 KG/M2 | SYSTOLIC BLOOD PRESSURE: 161 MMHG | OXYGEN SATURATION: 96 % | DIASTOLIC BLOOD PRESSURE: 95 MMHG | RESPIRATION RATE: 24 BRPM | TEMPERATURE: 97.6 F | WEIGHT: 141 LBS | HEART RATE: 102 BPM

## 2019-01-01 VITALS
DIASTOLIC BLOOD PRESSURE: 70 MMHG | SYSTOLIC BLOOD PRESSURE: 122 MMHG | WEIGHT: 100 LBS | HEIGHT: 61 IN | BODY MASS INDEX: 18.88 KG/M2 | HEART RATE: 94 BPM | OXYGEN SATURATION: 98 %

## 2019-01-01 VITALS
HEART RATE: 78 BPM | BODY MASS INDEX: 18.7 KG/M2 | SYSTOLIC BLOOD PRESSURE: 130 MMHG | RESPIRATION RATE: 16 BRPM | DIASTOLIC BLOOD PRESSURE: 76 MMHG | HEIGHT: 61 IN

## 2019-01-01 VITALS
WEIGHT: 111.6 LBS | HEART RATE: 88 BPM | RESPIRATION RATE: 18 BRPM | DIASTOLIC BLOOD PRESSURE: 62 MMHG | TEMPERATURE: 97.8 F | OXYGEN SATURATION: 93 % | SYSTOLIC BLOOD PRESSURE: 110 MMHG | BODY MASS INDEX: 21.8 KG/M2

## 2019-01-01 VITALS
BODY MASS INDEX: 18.12 KG/M2 | WEIGHT: 96 LBS | HEIGHT: 61 IN | SYSTOLIC BLOOD PRESSURE: 110 MMHG | DIASTOLIC BLOOD PRESSURE: 78 MMHG | HEART RATE: 110 BPM

## 2019-01-01 VITALS
HEART RATE: 78 BPM | DIASTOLIC BLOOD PRESSURE: 73 MMHG | RESPIRATION RATE: 18 BRPM | BODY MASS INDEX: 22.39 KG/M2 | SYSTOLIC BLOOD PRESSURE: 124 MMHG | WEIGHT: 114.64 LBS | TEMPERATURE: 97.5 F

## 2019-01-01 VITALS — BODY MASS INDEX: 21.68 KG/M2 | WEIGHT: 111 LBS

## 2019-01-01 VITALS
WEIGHT: 114 LBS | SYSTOLIC BLOOD PRESSURE: 132 MMHG | RESPIRATION RATE: 18 BRPM | TEMPERATURE: 98.2 F | HEIGHT: 61 IN | DIASTOLIC BLOOD PRESSURE: 82 MMHG | HEART RATE: 84 BPM | BODY MASS INDEX: 21.52 KG/M2

## 2019-01-01 VITALS
OXYGEN SATURATION: 90 % | RESPIRATION RATE: 24 BRPM | WEIGHT: 86.64 LBS | DIASTOLIC BLOOD PRESSURE: 116 MMHG | SYSTOLIC BLOOD PRESSURE: 161 MMHG | BODY MASS INDEX: 16.36 KG/M2 | HEIGHT: 61 IN | HEART RATE: 101 BPM | TEMPERATURE: 97.3 F

## 2019-01-01 VITALS
SYSTOLIC BLOOD PRESSURE: 78 MMHG | OXYGEN SATURATION: 78 % | HEART RATE: 110 BPM | DIASTOLIC BLOOD PRESSURE: 56 MMHG | WEIGHT: 115 LBS | BODY MASS INDEX: 22.58 KG/M2 | TEMPERATURE: 97.2 F | HEIGHT: 60 IN | RESPIRATION RATE: 16 BRPM

## 2019-01-01 VITALS
HEIGHT: 61 IN | SYSTOLIC BLOOD PRESSURE: 124 MMHG | WEIGHT: 111.4 LBS | BODY MASS INDEX: 21.03 KG/M2 | HEART RATE: 88 BPM | DIASTOLIC BLOOD PRESSURE: 78 MMHG

## 2019-01-01 VITALS
HEART RATE: 77 BPM | SYSTOLIC BLOOD PRESSURE: 100 MMHG | WEIGHT: 92.5 LBS | DIASTOLIC BLOOD PRESSURE: 60 MMHG | HEIGHT: 61 IN | BODY MASS INDEX: 17.47 KG/M2

## 2019-01-01 VITALS
DIASTOLIC BLOOD PRESSURE: 80 MMHG | SYSTOLIC BLOOD PRESSURE: 132 MMHG | BODY MASS INDEX: 22.26 KG/M2 | RESPIRATION RATE: 18 BRPM | WEIGHT: 113.98 LBS | HEART RATE: 88 BPM | TEMPERATURE: 98.1 F

## 2019-01-01 VITALS
RESPIRATION RATE: 28 BRPM | TEMPERATURE: 97.7 F | OXYGEN SATURATION: 93 % | HEIGHT: 61 IN | HEART RATE: 85 BPM | DIASTOLIC BLOOD PRESSURE: 90 MMHG | SYSTOLIC BLOOD PRESSURE: 167 MMHG | WEIGHT: 102.25 LBS | BODY MASS INDEX: 19.3 KG/M2

## 2019-01-01 VITALS
OXYGEN SATURATION: 91 % | HEIGHT: 61 IN | TEMPERATURE: 98.5 F | SYSTOLIC BLOOD PRESSURE: 170 MMHG | DIASTOLIC BLOOD PRESSURE: 86 MMHG | RESPIRATION RATE: 18 BRPM | HEART RATE: 111 BPM | BODY MASS INDEX: 20.81 KG/M2 | WEIGHT: 110.23 LBS

## 2019-01-01 VITALS
HEART RATE: 86 BPM | WEIGHT: 113 LBS | BODY MASS INDEX: 22.19 KG/M2 | HEIGHT: 60 IN | DIASTOLIC BLOOD PRESSURE: 60 MMHG | SYSTOLIC BLOOD PRESSURE: 110 MMHG

## 2019-01-01 VITALS
HEART RATE: 115 BPM | SYSTOLIC BLOOD PRESSURE: 138 MMHG | DIASTOLIC BLOOD PRESSURE: 70 MMHG | WEIGHT: 113 LBS | BODY MASS INDEX: 21.34 KG/M2 | HEIGHT: 61 IN

## 2019-01-01 DIAGNOSIS — R10.9 ABDOMINAL PAIN: Primary | ICD-10-CM

## 2019-01-01 DIAGNOSIS — J96.21 ACUTE ON CHRONIC RESPIRATORY FAILURE WITH HYPOXIA (HCC): ICD-10-CM

## 2019-01-01 DIAGNOSIS — I10 HYPERTENSION: ICD-10-CM

## 2019-01-01 DIAGNOSIS — E03.9 HYPOTHYROIDISM, UNSPECIFIED TYPE: Chronic | ICD-10-CM

## 2019-01-01 DIAGNOSIS — R77.8 ELEVATED TROPONIN I LEVEL: ICD-10-CM

## 2019-01-01 DIAGNOSIS — N18.9 ACUTE ON CHRONIC KIDNEY FAILURE (HCC): ICD-10-CM

## 2019-01-01 DIAGNOSIS — C34.92 NONSQUAMOUS NONSMALL CELL NEOPLASM OF LEFT LUNG (HCC): ICD-10-CM

## 2019-01-01 DIAGNOSIS — N17.9 ACUTE ON CHRONIC KIDNEY FAILURE (HCC): ICD-10-CM

## 2019-01-01 DIAGNOSIS — M54.9 CHRONIC BACK PAIN: ICD-10-CM

## 2019-01-01 DIAGNOSIS — I71.6 THORACOABDOMINAL AORTIC ANEURYSM (TAAA) WITHOUT RUPTURE (HCC): Primary | ICD-10-CM

## 2019-01-01 DIAGNOSIS — N30.00 ACUTE CYSTITIS WITHOUT HEMATURIA: ICD-10-CM

## 2019-01-01 DIAGNOSIS — I10 BENIGN HYPERTENSION: ICD-10-CM

## 2019-01-01 DIAGNOSIS — I71.6: ICD-10-CM

## 2019-01-01 DIAGNOSIS — M79.18 MYOFASCIAL PAIN SYNDROME: ICD-10-CM

## 2019-01-01 DIAGNOSIS — N18.4 CHRONIC KIDNEY DISEASE, STAGE 4 (SEVERE) (HCC): Primary | ICD-10-CM

## 2019-01-01 DIAGNOSIS — N26.1 RENAL ATROPHY, LEFT: ICD-10-CM

## 2019-01-01 DIAGNOSIS — E87.6 HYPOKALEMIA: ICD-10-CM

## 2019-01-01 DIAGNOSIS — N18.4 BENIGN HYPERTENSION WITH CHRONIC KIDNEY DISEASE, STAGE IV (HCC): ICD-10-CM

## 2019-01-01 DIAGNOSIS — J44.1 COPD EXACERBATION (HCC): Primary | ICD-10-CM

## 2019-01-01 DIAGNOSIS — G47.00 INSOMNIA, UNSPECIFIED TYPE: ICD-10-CM

## 2019-01-01 DIAGNOSIS — I12.9 BENIGN HYPERTENSION WITH CHRONIC KIDNEY DISEASE, STAGE IV (HCC): ICD-10-CM

## 2019-01-01 DIAGNOSIS — J42 CHRONIC BRONCHITIS, UNSPECIFIED CHRONIC BRONCHITIS TYPE (HCC): Chronic | ICD-10-CM

## 2019-01-01 DIAGNOSIS — C34.92 NONSQUAMOUS NONSMALL CELL NEOPLASM OF LEFT LUNG (HCC): Primary | ICD-10-CM

## 2019-01-01 DIAGNOSIS — J01.90 ACUTE SINUSITIS, RECURRENCE NOT SPECIFIED, UNSPECIFIED LOCATION: ICD-10-CM

## 2019-01-01 DIAGNOSIS — I16.1 HYPERTENSIVE EMERGENCY: ICD-10-CM

## 2019-01-01 DIAGNOSIS — D64.9 ANEMIA, UNSPECIFIED TYPE: ICD-10-CM

## 2019-01-01 DIAGNOSIS — G89.29 CHRONIC BACK PAIN: ICD-10-CM

## 2019-01-01 DIAGNOSIS — M81.0 POST-MENOPAUSAL OSTEOPOROSIS: ICD-10-CM

## 2019-01-01 DIAGNOSIS — N17.9 ACUTE KIDNEY INJURY (HCC): Primary | ICD-10-CM

## 2019-01-01 DIAGNOSIS — E78.00 PURE HYPERCHOLESTEROLEMIA: ICD-10-CM

## 2019-01-01 DIAGNOSIS — N17.9 AKI (ACUTE KIDNEY INJURY) (HCC): ICD-10-CM

## 2019-01-01 DIAGNOSIS — I71.2 THORACIC AORTIC ANEURYSM WITHOUT RUPTURE (HCC): Primary | ICD-10-CM

## 2019-01-01 DIAGNOSIS — C34.92 SQUAMOUS CARCINOMA OF LUNG, LEFT (HCC): Primary | ICD-10-CM

## 2019-01-01 DIAGNOSIS — N18.4 CHRONIC KIDNEY DISEASE, STAGE 4 (SEVERE) (HCC): ICD-10-CM

## 2019-01-01 DIAGNOSIS — Z79.899 OTHER LONG TERM (CURRENT) DRUG THERAPY: ICD-10-CM

## 2019-01-01 DIAGNOSIS — Z12.39 ENCOUNTER FOR SCREENING FOR MALIGNANT NEOPLASM OF BREAST: ICD-10-CM

## 2019-01-01 DIAGNOSIS — E03.9 HYPOTHYROIDISM, UNSPECIFIED TYPE: ICD-10-CM

## 2019-01-01 DIAGNOSIS — M48.062 SPINAL STENOSIS OF LUMBAR REGION WITH NEUROGENIC CLAUDICATION: ICD-10-CM

## 2019-01-01 DIAGNOSIS — N18.30 STAGE 3 CHRONIC KIDNEY DISEASE (HCC): ICD-10-CM

## 2019-01-01 DIAGNOSIS — I25.10 ASCVD (ARTERIOSCLEROTIC CARDIOVASCULAR DISEASE): ICD-10-CM

## 2019-01-01 DIAGNOSIS — K92.0 HEMATEMESIS: ICD-10-CM

## 2019-01-01 DIAGNOSIS — I16.1 HYPERTENSIVE EMERGENCY: Primary | ICD-10-CM

## 2019-01-01 DIAGNOSIS — I71.2 THORACIC AORTIC ANEURYSM WITHOUT RUPTURE (HCC): ICD-10-CM

## 2019-01-01 DIAGNOSIS — N17.9 ACUTE KIDNEY INJURY (HCC): ICD-10-CM

## 2019-01-01 DIAGNOSIS — I25.10 ASCVD (ARTERIOSCLEROTIC CARDIOVASCULAR DISEASE): Primary | ICD-10-CM

## 2019-01-01 DIAGNOSIS — I71.9 AORTIC ANEURYSM (HCC): ICD-10-CM

## 2019-01-01 DIAGNOSIS — J44.9 CHRONIC OBSTRUCTIVE PULMONARY DISEASE, UNSPECIFIED COPD TYPE (HCC): ICD-10-CM

## 2019-01-01 DIAGNOSIS — N13.30 HYDRONEPHROSIS, UNSPECIFIED HYDRONEPHROSIS TYPE: Primary | ICD-10-CM

## 2019-01-01 DIAGNOSIS — N13.30 HYDRONEPHROSIS, UNSPECIFIED HYDRONEPHROSIS TYPE: ICD-10-CM

## 2019-01-01 DIAGNOSIS — I71.6 THORACOABDOMINAL AORTIC ANEURYSM (TAAA) WITHOUT RUPTURE (HCC): ICD-10-CM

## 2019-01-01 DIAGNOSIS — R80.1 PERSISTENT PROTEINURIA: ICD-10-CM

## 2019-01-01 DIAGNOSIS — C34.92 MALIGNANT NEOPLASM OF UNSPECIFIED PART OF LEFT BRONCHUS OR LUNG (HCC): Primary | ICD-10-CM

## 2019-01-01 DIAGNOSIS — M79.10 MUSCLE PAIN: ICD-10-CM

## 2019-01-01 DIAGNOSIS — I10 ESSENTIAL HYPERTENSION: ICD-10-CM

## 2019-01-01 DIAGNOSIS — I71.6: Primary | ICD-10-CM

## 2019-01-01 DIAGNOSIS — G89.29 CHRONIC BILATERAL LOW BACK PAIN WITHOUT SCIATICA: ICD-10-CM

## 2019-01-01 DIAGNOSIS — N28.9 RENAL FUNCTION IMPAIRMENT: Primary | ICD-10-CM

## 2019-01-01 DIAGNOSIS — R09.02 HYPOXIA: ICD-10-CM

## 2019-01-01 DIAGNOSIS — E43 SEVERE PROTEIN-CALORIE MALNUTRITION (GOMEZ: LESS THAN 60% OF STANDARD WEIGHT) (HCC): ICD-10-CM

## 2019-01-01 DIAGNOSIS — R11.0 NAUSEA: Primary | ICD-10-CM

## 2019-01-01 DIAGNOSIS — I65.23 BILATERAL CAROTID ARTERY STENOSIS: Chronic | ICD-10-CM

## 2019-01-01 DIAGNOSIS — J44.1 COPD EXACERBATION (HCC): ICD-10-CM

## 2019-01-01 DIAGNOSIS — G89.4 CHRONIC PAIN SYNDROME: Primary | ICD-10-CM

## 2019-01-01 DIAGNOSIS — J44.9 COPD WITH ASTHMA (HCC): Primary | ICD-10-CM

## 2019-01-01 DIAGNOSIS — J44.1 CHRONIC OBSTRUCTIVE PULMONARY DISEASE WITH ACUTE EXACERBATION (HCC): ICD-10-CM

## 2019-01-01 DIAGNOSIS — R52 DIFFUSE PAIN: ICD-10-CM

## 2019-01-01 DIAGNOSIS — J96.21 ACUTE ON CHRONIC RESPIRATORY FAILURE WITH HYPOXIA (HCC): Primary | ICD-10-CM

## 2019-01-01 DIAGNOSIS — E03.9 HYPOTHYROIDISM, UNSPECIFIED TYPE: Primary | ICD-10-CM

## 2019-01-01 DIAGNOSIS — M54.9 BACK PAIN: ICD-10-CM

## 2019-01-01 DIAGNOSIS — R10.9 ABDOMINAL PAIN: ICD-10-CM

## 2019-01-01 DIAGNOSIS — I65.23 CAROTID STENOSIS, BILATERAL: Primary | ICD-10-CM

## 2019-01-01 DIAGNOSIS — I65.23 CAROTID STENOSIS, BILATERAL: ICD-10-CM

## 2019-01-01 DIAGNOSIS — R77.8 ELEVATED TROPONIN: ICD-10-CM

## 2019-01-01 DIAGNOSIS — M54.50 CHRONIC BILATERAL LOW BACK PAIN WITHOUT SCIATICA: ICD-10-CM

## 2019-01-01 LAB
ABO GROUP BLD: NORMAL
ACANTHOCYTES BLD QL SMEAR: PRESENT
ALBUMIN ?TM MFR UR ELPH: 48 %
ALBUMIN SERPL BCP-MCNC: 2.5 G/DL (ref 3.5–5)
ALBUMIN SERPL BCP-MCNC: 3.1 G/DL (ref 3.5–5)
ALBUMIN SERPL BCP-MCNC: 3.2 G/DL (ref 3.5–5)
ALBUMIN SERPL BCP-MCNC: 3.3 G/DL (ref 3.5–5)
ALBUMIN SERPL ELPH-MCNC: 3.64 G/DL (ref 3.5–5)
ALBUMIN SERPL ELPH-MCNC: 52.8 % (ref 52–65)
ALBUMIN SERPL-MCNC: 3.5 G/DL (ref 3.6–5.1)
ALBUMIN SERPL-MCNC: 3.7 G/DL (ref 3.6–5.1)
ALBUMIN SERPL-MCNC: 3.7 G/DL (ref 3.6–5.1)
ALBUMIN SERPL-MCNC: 3.9 G/DL (ref 3.6–5.1)
ALBUMIN SERPL-MCNC: 4 G/DL (ref 3.6–5.1)
ALBUMIN UR ELPH-MCNC: 42.8 %
ALBUMIN/GLOB SERPL: 1.2 (CALC) (ref 1–2.5)
ALBUMIN/GLOB SERPL: 1.3 (CALC) (ref 1–2.5)
ALBUMIN/GLOB SERPL: 1.4 (CALC) (ref 1–2.5)
ALBUMIN/GLOB SERPL: 1.5 (CALC) (ref 1–2.5)
ALBUMIN/GLOB SERPL: 1.5 (CALC) (ref 1–2.5)
ALP SERPL-CCNC: 109 U/L (ref 33–130)
ALP SERPL-CCNC: 114 U/L (ref 46–116)
ALP SERPL-CCNC: 116 U/L (ref 33–130)
ALP SERPL-CCNC: 118 U/L (ref 33–130)
ALP SERPL-CCNC: 128 U/L (ref 46–116)
ALP SERPL-CCNC: 131 U/L (ref 33–130)
ALP SERPL-CCNC: 131 U/L (ref 46–116)
ALP SERPL-CCNC: 137 U/L (ref 46–116)
ALP SERPL-CCNC: 157 U/L (ref 46–116)
ALP SERPL-CCNC: 162 U/L (ref 33–130)
ALP SERPL-CCNC: 177 U/L (ref 46–116)
ALP SERPL-CCNC: 200 U/L (ref 46–116)
ALPHA1 GLOB ?TM MFR UR ELPH: 10 %
ALPHA1 GLOB MFR UR ELPH: 16.8 %
ALPHA1 GLOB SERPL ELPH-MCNC: 0.65 G/DL (ref 0.1–0.4)
ALPHA1 GLOB SERPL ELPH-MCNC: 9.4 % (ref 2.5–5)
ALPHA2 GLOB ?TM MFR UR ELPH: 19 %
ALPHA2 GLOB MFR UR ELPH: 9.1 %
ALPHA2 GLOB SERPL ELPH-MCNC: 1.05 G/DL (ref 0.4–1.2)
ALPHA2 GLOB SERPL ELPH-MCNC: 15.2 % (ref 7–13)
ALT SERPL W P-5'-P-CCNC: 17 U/L (ref 12–78)
ALT SERPL W P-5'-P-CCNC: 20 U/L (ref 12–78)
ALT SERPL W P-5'-P-CCNC: 21 U/L (ref 12–78)
ALT SERPL W P-5'-P-CCNC: 25 U/L (ref 12–78)
ALT SERPL W P-5'-P-CCNC: 36 U/L (ref 12–78)
ALT SERPL W P-5'-P-CCNC: 40 U/L (ref 12–78)
ALT SERPL W P-5'-P-CCNC: 89 U/L (ref 12–78)
ALT SERPL-CCNC: 10 U/L (ref 6–29)
ALT SERPL-CCNC: 13 U/L (ref 6–29)
ALT SERPL-CCNC: 13 U/L (ref 6–29)
ALT SERPL-CCNC: 23 U/L (ref 6–29)
ALT SERPL-CCNC: 8 U/L (ref 6–29)
ANION GAP BLD CALC-SCNC: 13 MMOL/L (ref 4–13)
ANION GAP BLD CALC-SCNC: 15 MMOL/L (ref 4–13)
ANION GAP SERPL CALCULATED.3IONS-SCNC: 10 MMOL/L (ref 4–13)
ANION GAP SERPL CALCULATED.3IONS-SCNC: 12 MMOL/L (ref 4–13)
ANION GAP SERPL CALCULATED.3IONS-SCNC: 14 MMOL/L (ref 4–13)
ANION GAP SERPL CALCULATED.3IONS-SCNC: 5 MMOL/L (ref 4–13)
ANION GAP SERPL CALCULATED.3IONS-SCNC: 6 MMOL/L (ref 4–13)
ANION GAP SERPL CALCULATED.3IONS-SCNC: 7 MMOL/L (ref 4–13)
ANION GAP SERPL CALCULATED.3IONS-SCNC: 8 MMOL/L (ref 4–13)
ANION GAP SERPL CALCULATED.3IONS-SCNC: 9 MMOL/L (ref 4–13)
ANION GAP SERPL CALCULATED.3IONS-SCNC: 9 MMOL/L (ref 4–13)
ANISOCYTOSIS BLD QL SMEAR: PRESENT
APPEARANCE UR: ABNORMAL
APTT PPP: 27 SECONDS (ref 26–38)
APTT PPP: 28 SECONDS (ref 26–38)
AST SERPL W P-5'-P-CCNC: 12 U/L (ref 5–45)
AST SERPL W P-5'-P-CCNC: 13 U/L (ref 5–45)
AST SERPL W P-5'-P-CCNC: 16 U/L (ref 5–45)
AST SERPL W P-5'-P-CCNC: 17 U/L (ref 5–45)
AST SERPL W P-5'-P-CCNC: 21 U/L (ref 5–45)
AST SERPL W P-5'-P-CCNC: 26 U/L (ref 5–45)
AST SERPL W P-5'-P-CCNC: 32 U/L (ref 5–45)
AST SERPL-CCNC: 11 U/L (ref 10–35)
AST SERPL-CCNC: 12 U/L (ref 10–35)
AST SERPL-CCNC: 12 U/L (ref 10–35)
AST SERPL-CCNC: 13 U/L (ref 10–35)
AST SERPL-CCNC: 13 U/L (ref 10–35)
ATRIAL RATE: 100 BPM
ATRIAL RATE: 112 BPM
ATRIAL RATE: 114 BPM
ATRIAL RATE: 114 BPM
ATRIAL RATE: 122 BPM
ATRIAL RATE: 122 BPM
ATRIAL RATE: 127 BPM
ATRIAL RATE: 84 BPM
ATRIAL RATE: 86 BPM
ATRIAL RATE: 87 BPM
ATRIAL RATE: 87 BPM
ATRIAL RATE: 94 BPM
ATRIAL RATE: 95 BPM
B-GLOBULIN ?TM MFR UR ELPH: 15 %
B-GLOBULIN MFR UR ELPH: 6.7 %
BACTERIA BLD CULT: NORMAL
BACTERIA UR QL AUTO: ABNORMAL /HPF
BASOPHILS # BLD AUTO: 0 THOUSANDS/ΜL (ref 0–0.1)
BASOPHILS # BLD AUTO: 0.01 THOUSANDS/ΜL (ref 0–0.1)
BASOPHILS # BLD AUTO: 0.02 THOUSANDS/ΜL (ref 0–0.1)
BASOPHILS # BLD AUTO: 0.03 THOUSANDS/ΜL (ref 0–0.1)
BASOPHILS # BLD AUTO: 0.04 THOUSANDS/ΜL (ref 0–0.1)
BASOPHILS # BLD AUTO: 0.05 THOUSANDS/ΜL (ref 0–0.1)
BASOPHILS # BLD AUTO: 40 CELLS/UL (ref 0–200)
BASOPHILS # BLD AUTO: 40 CELLS/UL (ref 0–200)
BASOPHILS # BLD AUTO: 42 CELLS/UL (ref 0–200)
BASOPHILS # BLD AUTO: 50 CELLS/UL (ref 0–200)
BASOPHILS # BLD AUTO: 57 CELLS/UL (ref 0–200)
BASOPHILS # BLD MANUAL: 0 THOUSAND/UL (ref 0–0.1)
BASOPHILS # BLD MANUAL: 0 THOUSAND/UL (ref 0–0.1)
BASOPHILS NFR BLD AUTO: 0 % (ref 0–1)
BASOPHILS NFR BLD AUTO: 0.4 %
BASOPHILS NFR BLD AUTO: 0.6 %
BASOPHILS NFR BLD AUTO: 0.7 %
BASOPHILS NFR BLD AUTO: 1 % (ref 0–1)
BASOPHILS NFR MAR MANUAL: 0 % (ref 0–1)
BASOPHILS NFR MAR MANUAL: 0 % (ref 0–1)
BETA GLOB ABNORMAL SERPL ELPH-MCNC: 0.45 G/DL (ref 0.4–0.8)
BETA1 GLOB SERPL ELPH-MCNC: 6.5 % (ref 5–13)
BETA2 GLOB SERPL ELPH-MCNC: 5.5 % (ref 2–8)
BETA2+GAMMA GLOB SERPL ELPH-MCNC: 0.38 G/DL (ref 0.2–0.5)
BILIRUB SERPL-MCNC: 0.3 MG/DL (ref 0.2–1)
BILIRUB SERPL-MCNC: 0.3 MG/DL (ref 0.2–1.2)
BILIRUB SERPL-MCNC: 0.4 MG/DL (ref 0.2–1)
BILIRUB SERPL-MCNC: 0.4 MG/DL (ref 0.2–1.2)
BILIRUB SERPL-MCNC: 0.4 MG/DL (ref 0.2–1.2)
BILIRUB SERPL-MCNC: 0.5 MG/DL (ref 0.2–1)
BILIRUB SERPL-MCNC: 0.5 MG/DL (ref 0.2–1.2)
BILIRUB SERPL-MCNC: 0.5 MG/DL (ref 0.2–1.2)
BILIRUB SERPL-MCNC: 0.6 MG/DL (ref 0.2–1)
BILIRUB SERPL-MCNC: 0.6 MG/DL (ref 0.2–1)
BILIRUB UR QL STRIP: NEGATIVE
BLD GP AB SCN SERPL QL: NEGATIVE
BUN BLD-MCNC: 31 MG/DL (ref 5–25)
BUN BLD-MCNC: 34 MG/DL (ref 5–25)
BUN SERPL-MCNC: 16 MG/DL (ref 5–25)
BUN SERPL-MCNC: 17 MG/DL (ref 5–25)
BUN SERPL-MCNC: 18 MG/DL (ref 5–25)
BUN SERPL-MCNC: 19 MG/DL (ref 5–25)
BUN SERPL-MCNC: 20 MG/DL (ref 5–25)
BUN SERPL-MCNC: 21 MG/DL (ref 5–25)
BUN SERPL-MCNC: 22 MG/DL (ref 5–25)
BUN SERPL-MCNC: 24 MG/DL (ref 5–25)
BUN SERPL-MCNC: 25 MG/DL (ref 5–25)
BUN SERPL-MCNC: 25 MG/DL (ref 7–25)
BUN SERPL-MCNC: 26 MG/DL (ref 5–25)
BUN SERPL-MCNC: 27 MG/DL (ref 5–25)
BUN SERPL-MCNC: 28 MG/DL (ref 5–25)
BUN SERPL-MCNC: 28 MG/DL (ref 7–25)
BUN SERPL-MCNC: 29 MG/DL (ref 5–25)
BUN SERPL-MCNC: 29 MG/DL (ref 5–25)
BUN SERPL-MCNC: 29 MG/DL (ref 7–25)
BUN SERPL-MCNC: 29 MG/DL (ref 7–25)
BUN SERPL-MCNC: 31 MG/DL (ref 5–25)
BUN SERPL-MCNC: 33 MG/DL (ref 7–25)
BUN SERPL-MCNC: 34 MG/DL (ref 5–25)
BUN SERPL-MCNC: 34 MG/DL (ref 7–25)
BUN SERPL-MCNC: 35 MG/DL (ref 5–25)
BUN/CREAT SERPL: 11 (CALC) (ref 6–22)
BUN/CREAT SERPL: 13 (CALC) (ref 6–22)
BUN/CREAT SERPL: 13 (CALC) (ref 6–22)
BUN/CREAT SERPL: 15 (CALC) (ref 6–22)
BUN/CREAT SERPL: 15 (CALC) (ref 6–22)
BUN/CREAT SERPL: 9 (CALC) (ref 6–22)
CA-I BLD-SCNC: 1.04 MMOL/L (ref 1.12–1.32)
CA-I BLD-SCNC: 1.17 MMOL/L (ref 1.12–1.32)
CALCIUM SERPL-MCNC: 10 MG/DL (ref 8.3–10.1)
CALCIUM SERPL-MCNC: 10.4 MG/DL (ref 8.3–10.1)
CALCIUM SERPL-MCNC: 10.4 MG/DL (ref 8.6–10.4)
CALCIUM SERPL-MCNC: 10.4 MG/DL (ref 8.6–10.4)
CALCIUM SERPL-MCNC: 8.7 MG/DL (ref 8.3–10.1)
CALCIUM SERPL-MCNC: 8.8 MG/DL (ref 8.3–10.1)
CALCIUM SERPL-MCNC: 9.2 MG/DL (ref 8.3–10.1)
CALCIUM SERPL-MCNC: 9.3 MG/DL (ref 8.3–10.1)
CALCIUM SERPL-MCNC: 9.3 MG/DL (ref 8.6–10.4)
CALCIUM SERPL-MCNC: 9.3 MG/DL (ref 8.6–10.4)
CALCIUM SERPL-MCNC: 9.4 MG/DL (ref 8.3–10.1)
CALCIUM SERPL-MCNC: 9.4 MG/DL (ref 8.6–10.4)
CALCIUM SERPL-MCNC: 9.5 MG/DL (ref 8.6–10.4)
CALCIUM SERPL-MCNC: 9.6 MG/DL (ref 8.3–10.1)
CALCIUM SERPL-MCNC: 9.6 MG/DL (ref 8.6–10.4)
CALCIUM SERPL-MCNC: 9.7 MG/DL (ref 8.3–10.1)
CALCIUM SERPL-MCNC: 9.8 MG/DL (ref 8.3–10.1)
CALCIUM SERPL-MCNC: 9.8 MG/DL (ref 8.3–10.1)
CALCIUM SERPL-MCNC: 9.9 MG/DL (ref 8.3–10.1)
CHLORIDE BLD-SCNC: 100 MMOL/L (ref 100–108)
CHLORIDE BLD-SCNC: 95 MMOL/L (ref 100–108)
CHLORIDE SERPL-SCNC: 100 MMOL/L (ref 100–108)
CHLORIDE SERPL-SCNC: 100 MMOL/L (ref 98–110)
CHLORIDE SERPL-SCNC: 101 MMOL/L (ref 100–108)
CHLORIDE SERPL-SCNC: 101 MMOL/L (ref 98–110)
CHLORIDE SERPL-SCNC: 101 MMOL/L (ref 98–110)
CHLORIDE SERPL-SCNC: 102 MMOL/L (ref 100–108)
CHLORIDE SERPL-SCNC: 103 MMOL/L (ref 100–108)
CHLORIDE SERPL-SCNC: 103 MMOL/L (ref 98–110)
CHLORIDE SERPL-SCNC: 103 MMOL/L (ref 98–110)
CHLORIDE SERPL-SCNC: 104 MMOL/L (ref 100–108)
CHLORIDE SERPL-SCNC: 107 MMOL/L (ref 100–108)
CHLORIDE SERPL-SCNC: 107 MMOL/L (ref 100–108)
CHLORIDE SERPL-SCNC: 91 MMOL/L (ref 100–108)
CHLORIDE SERPL-SCNC: 94 MMOL/L (ref 100–108)
CHLORIDE SERPL-SCNC: 94 MMOL/L (ref 98–110)
CHLORIDE SERPL-SCNC: 96 MMOL/L (ref 100–108)
CHLORIDE SERPL-SCNC: 98 MMOL/L (ref 100–108)
CHOLEST SERPL-MCNC: 132 MG/DL (ref 50–200)
CLARITY, POC: CLEAR
CO2 SERPL-SCNC: 24 MMOL/L (ref 21–32)
CO2 SERPL-SCNC: 24 MMOL/L (ref 21–32)
CO2 SERPL-SCNC: 25 MMOL/L (ref 20–32)
CO2 SERPL-SCNC: 25 MMOL/L (ref 21–32)
CO2 SERPL-SCNC: 25 MMOL/L (ref 21–32)
CO2 SERPL-SCNC: 26 MMOL/L (ref 20–32)
CO2 SERPL-SCNC: 26 MMOL/L (ref 20–32)
CO2 SERPL-SCNC: 27 MMOL/L (ref 21–32)
CO2 SERPL-SCNC: 28 MMOL/L (ref 20–32)
CO2 SERPL-SCNC: 28 MMOL/L (ref 21–32)
CO2 SERPL-SCNC: 29 MMOL/L (ref 20–32)
CO2 SERPL-SCNC: 29 MMOL/L (ref 21–32)
CO2 SERPL-SCNC: 30 MMOL/L (ref 20–32)
CO2 SERPL-SCNC: 30 MMOL/L (ref 21–32)
CO2 SERPL-SCNC: 31 MMOL/L (ref 21–32)
CO2 SERPL-SCNC: 31 MMOL/L (ref 21–32)
CO2 SERPL-SCNC: 32 MMOL/L (ref 21–32)
CO2 SERPL-SCNC: 32 MMOL/L (ref 21–32)
COLOR UR: ABNORMAL
COLOR, POC: YELLOW
CREAT BLD-MCNC: 1.8 MG/DL (ref 0.6–1.3)
CREAT BLD-MCNC: 2.1 MG/DL (ref 0.6–1.3)
CREAT SERPL-MCNC: 1.33 MG/DL (ref 0.6–1.3)
CREAT SERPL-MCNC: 1.6 MG/DL (ref 0.6–1.3)
CREAT SERPL-MCNC: 1.62 MG/DL (ref 0.6–1.3)
CREAT SERPL-MCNC: 1.63 MG/DL (ref 0.6–1.3)
CREAT SERPL-MCNC: 1.7 MG/DL (ref 0.6–1.3)
CREAT SERPL-MCNC: 1.75 MG/DL (ref 0.6–1.3)
CREAT SERPL-MCNC: 1.77 MG/DL (ref 0.6–1.3)
CREAT SERPL-MCNC: 1.8 MG/DL (ref 0.6–1.3)
CREAT SERPL-MCNC: 1.82 MG/DL (ref 0.6–1.3)
CREAT SERPL-MCNC: 1.84 MG/DL (ref 0.6–1.3)
CREAT SERPL-MCNC: 1.85 MG/DL (ref 0.6–1.3)
CREAT SERPL-MCNC: 1.87 MG/DL (ref 0.6–1.3)
CREAT SERPL-MCNC: 1.9 MG/DL (ref 0.6–1.3)
CREAT SERPL-MCNC: 1.91 MG/DL (ref 0.6–1.3)
CREAT SERPL-MCNC: 1.97 MG/DL (ref 0.6–0.93)
CREAT SERPL-MCNC: 2.06 MG/DL (ref 0.6–1.3)
CREAT SERPL-MCNC: 2.1 MG/DL (ref 0.6–0.93)
CREAT SERPL-MCNC: 2.2 MG/DL (ref 0.6–0.93)
CREAT SERPL-MCNC: 2.26 MG/DL (ref 0.6–0.93)
CREAT SERPL-MCNC: 2.35 MG/DL (ref 0.6–1.3)
CREAT SERPL-MCNC: 2.35 MG/DL (ref 0.6–1.3)
CREAT SERPL-MCNC: 2.38 MG/DL (ref 0.6–1.3)
CREAT SERPL-MCNC: 2.47 MG/DL (ref 0.6–1.3)
CREAT SERPL-MCNC: 2.64 MG/DL (ref 0.6–0.93)
CREAT SERPL-MCNC: 3 MG/DL (ref 0.6–0.93)
CREAT UR-MCNC: 84 MG/DL (ref 20–275)
EOSINOPHIL # BLD AUTO: 0 THOUSAND/ΜL (ref 0–0.61)
EOSINOPHIL # BLD AUTO: 0.01 THOUSAND/ΜL (ref 0–0.61)
EOSINOPHIL # BLD AUTO: 0.04 THOUSAND/ΜL (ref 0–0.61)
EOSINOPHIL # BLD AUTO: 0.04 THOUSAND/ΜL (ref 0–0.61)
EOSINOPHIL # BLD AUTO: 0.05 THOUSAND/ΜL (ref 0–0.61)
EOSINOPHIL # BLD AUTO: 0.07 THOUSAND/ΜL (ref 0–0.61)
EOSINOPHIL # BLD AUTO: 0.08 THOUSAND/ΜL (ref 0–0.61)
EOSINOPHIL # BLD AUTO: 0.09 THOUSAND/ΜL (ref 0–0.61)
EOSINOPHIL # BLD AUTO: 0.13 THOUSAND/ΜL (ref 0–0.61)
EOSINOPHIL # BLD AUTO: 0.21 THOUSAND/ΜL (ref 0–0.61)
EOSINOPHIL # BLD AUTO: 0.23 THOUSAND/ΜL (ref 0–0.61)
EOSINOPHIL # BLD AUTO: 0.25 THOUSAND/ΜL (ref 0–0.61)
EOSINOPHIL # BLD AUTO: 0.28 THOUSAND/ΜL (ref 0–0.61)
EOSINOPHIL # BLD AUTO: 127 CELLS/UL (ref 15–500)
EOSINOPHIL # BLD AUTO: 248 CELLS/UL (ref 15–500)
EOSINOPHIL # BLD AUTO: 265 CELLS/UL (ref 15–500)
EOSINOPHIL # BLD AUTO: 295 CELLS/UL (ref 15–500)
EOSINOPHIL # BLD AUTO: 437 CELLS/UL (ref 15–500)
EOSINOPHIL # BLD MANUAL: 0 THOUSAND/UL (ref 0–0.4)
EOSINOPHIL # BLD MANUAL: 0 THOUSAND/UL (ref 0–0.4)
EOSINOPHIL NFR BLD AUTO: 0 % (ref 0–6)
EOSINOPHIL NFR BLD AUTO: 1 % (ref 0–6)
EOSINOPHIL NFR BLD AUTO: 1.9 %
EOSINOPHIL NFR BLD AUTO: 2 % (ref 0–6)
EOSINOPHIL NFR BLD AUTO: 2.5 %
EOSINOPHIL NFR BLD AUTO: 3 % (ref 0–6)
EOSINOPHIL NFR BLD AUTO: 3.7 %
EOSINOPHIL NFR BLD AUTO: 4.1 %
EOSINOPHIL NFR BLD AUTO: 4.6 %
EOSINOPHIL NFR BLD MANUAL: 0 % (ref 0–6)
EOSINOPHIL NFR BLD MANUAL: 0 % (ref 0–6)
ERYTHROCYTE [DISTWIDTH] IN BLOOD BY AUTOMATED COUNT: 12.3 % (ref 11–15)
ERYTHROCYTE [DISTWIDTH] IN BLOOD BY AUTOMATED COUNT: 12.5 % (ref 11–15)
ERYTHROCYTE [DISTWIDTH] IN BLOOD BY AUTOMATED COUNT: 13.6 % (ref 11.6–15.1)
ERYTHROCYTE [DISTWIDTH] IN BLOOD BY AUTOMATED COUNT: 13.6 % (ref 11–15)
ERYTHROCYTE [DISTWIDTH] IN BLOOD BY AUTOMATED COUNT: 13.7 % (ref 11–15)
ERYTHROCYTE [DISTWIDTH] IN BLOOD BY AUTOMATED COUNT: 13.9 % (ref 11.6–15.1)
ERYTHROCYTE [DISTWIDTH] IN BLOOD BY AUTOMATED COUNT: 14.1 % (ref 11.6–15.1)
ERYTHROCYTE [DISTWIDTH] IN BLOOD BY AUTOMATED COUNT: 14.1 % (ref 11–15)
ERYTHROCYTE [DISTWIDTH] IN BLOOD BY AUTOMATED COUNT: 14.2 % (ref 11.6–15.1)
ERYTHROCYTE [DISTWIDTH] IN BLOOD BY AUTOMATED COUNT: 14.4 % (ref 11.6–15.1)
ERYTHROCYTE [DISTWIDTH] IN BLOOD BY AUTOMATED COUNT: 14.6 % (ref 11.6–15.1)
ERYTHROCYTE [DISTWIDTH] IN BLOOD BY AUTOMATED COUNT: 14.6 % (ref 11.6–15.1)
ERYTHROCYTE [DISTWIDTH] IN BLOOD BY AUTOMATED COUNT: 14.7 % (ref 11.6–15.1)
ERYTHROCYTE [DISTWIDTH] IN BLOOD BY AUTOMATED COUNT: 14.9 % (ref 11.6–15.1)
ERYTHROCYTE [DISTWIDTH] IN BLOOD BY AUTOMATED COUNT: 14.9 % (ref 11.6–15.1)
ERYTHROCYTE [DISTWIDTH] IN BLOOD BY AUTOMATED COUNT: 15.1 % (ref 11.6–15.1)
ERYTHROCYTE [DISTWIDTH] IN BLOOD BY AUTOMATED COUNT: 15.1 % (ref 11.6–15.1)
ERYTHROCYTE [DISTWIDTH] IN BLOOD BY AUTOMATED COUNT: 15.4 % (ref 11.6–15.1)
ERYTHROCYTE [DISTWIDTH] IN BLOOD BY AUTOMATED COUNT: 15.5 % (ref 11.6–15.1)
ERYTHROCYTE [DISTWIDTH] IN BLOOD BY AUTOMATED COUNT: 15.6 % (ref 11.6–15.1)
ERYTHROCYTE [DISTWIDTH] IN BLOOD BY AUTOMATED COUNT: 15.9 % (ref 11.6–15.1)
ERYTHROCYTE [DISTWIDTH] IN BLOOD BY AUTOMATED COUNT: 16 % (ref 11.6–15.1)
EXT BILIRUBIN, UA: ABNORMAL
EXT BLOOD URINE: ABNORMAL
EXT GLUCOSE, UA: ABNORMAL
EXT KETONES: ABNORMAL
EXT NITRITE, UA: ABNORMAL
EXT PH, UA: 6.5
EXT PROTEIN, UA: ABNORMAL
EXT SPECIFIC GRAVITY, UA: 1.02
EXT UROBILINOGEN: 0.2
GAMMA GLOB ?TM MFR UR ELPH: 9 %
GAMMA GLOB ABNORMAL SERPL ELPH-MCNC: 0.73 G/DL (ref 0.5–1.6)
GAMMA GLOB MFR UR ELPH: 24.6 %
GAMMA GLOB SERPL ELPH-MCNC: 10.6 % (ref 12–22)
GFR SERPL CREATININE-BSD FRML MDRD: 19 ML/MIN/1.73SQ M
GFR SERPL CREATININE-BSD FRML MDRD: 20 ML/MIN/1.73SQ M
GFR SERPL CREATININE-BSD FRML MDRD: 23 ML/MIN/1.73SQ M
GFR SERPL CREATININE-BSD FRML MDRD: 24 ML/MIN/1.73SQ M
GFR SERPL CREATININE-BSD FRML MDRD: 26 ML/MIN/1.73SQ M
GFR SERPL CREATININE-BSD FRML MDRD: 27 ML/MIN/1.73SQ M
GFR SERPL CREATININE-BSD FRML MDRD: 27 ML/MIN/1.73SQ M
GFR SERPL CREATININE-BSD FRML MDRD: 28 ML/MIN/1.73SQ M
GFR SERPL CREATININE-BSD FRML MDRD: 29 ML/MIN/1.73SQ M
GFR SERPL CREATININE-BSD FRML MDRD: 30 ML/MIN/1.73SQ M
GFR SERPL CREATININE-BSD FRML MDRD: 31 ML/MIN/1.73SQ M
GFR SERPL CREATININE-BSD FRML MDRD: 32 ML/MIN/1.73SQ M
GFR SERPL CREATININE-BSD FRML MDRD: 32 ML/MIN/1.73SQ M
GFR SERPL CREATININE-BSD FRML MDRD: 40 ML/MIN/1.73SQ M
GLOBULIN SER CALC-MCNC: 2.5 G/DL (CALC) (ref 1.9–3.7)
GLOBULIN SER CALC-MCNC: 2.6 G/DL (CALC) (ref 1.9–3.7)
GLOBULIN SER CALC-MCNC: 2.7 G/DL (CALC) (ref 1.9–3.7)
GLOBULIN SER CALC-MCNC: 2.9 G/DL (CALC) (ref 1.9–3.7)
GLOBULIN SER CALC-MCNC: 2.9 G/DL (CALC) (ref 1.9–3.7)
GLUCOSE SERPL-MCNC: 100 MG/DL (ref 65–140)
GLUCOSE SERPL-MCNC: 104 MG/DL (ref 65–140)
GLUCOSE SERPL-MCNC: 108 MG/DL (ref 65–140)
GLUCOSE SERPL-MCNC: 112 MG/DL (ref 65–140)
GLUCOSE SERPL-MCNC: 112 MG/DL (ref 65–140)
GLUCOSE SERPL-MCNC: 113 MG/DL (ref 65–140)
GLUCOSE SERPL-MCNC: 113 MG/DL (ref 65–140)
GLUCOSE SERPL-MCNC: 116 MG/DL (ref 65–140)
GLUCOSE SERPL-MCNC: 120 MG/DL (ref 65–140)
GLUCOSE SERPL-MCNC: 126 MG/DL (ref 65–140)
GLUCOSE SERPL-MCNC: 132 MG/DL (ref 65–99)
GLUCOSE SERPL-MCNC: 138 MG/DL (ref 65–140)
GLUCOSE SERPL-MCNC: 139 MG/DL (ref 65–99)
GLUCOSE SERPL-MCNC: 151 MG/DL (ref 65–140)
GLUCOSE SERPL-MCNC: 153 MG/DL (ref 65–140)
GLUCOSE SERPL-MCNC: 157 MG/DL (ref 65–139)
GLUCOSE SERPL-MCNC: 177 MG/DL (ref 65–140)
GLUCOSE SERPL-MCNC: 203 MG/DL (ref 65–140)
GLUCOSE SERPL-MCNC: 259 MG/DL (ref 65–140)
GLUCOSE SERPL-MCNC: 269 MG/DL (ref 65–140)
GLUCOSE SERPL-MCNC: 80 MG/DL (ref 65–140)
GLUCOSE SERPL-MCNC: 82 MG/DL (ref 65–140)
GLUCOSE SERPL-MCNC: 83 MG/DL (ref 65–140)
GLUCOSE SERPL-MCNC: 83 MG/DL (ref 65–140)
GLUCOSE SERPL-MCNC: 84 MG/DL (ref 65–99)
GLUCOSE SERPL-MCNC: 88 MG/DL (ref 65–140)
GLUCOSE SERPL-MCNC: 89 MG/DL (ref 65–140)
GLUCOSE SERPL-MCNC: 93 MG/DL (ref 65–99)
GLUCOSE SERPL-MCNC: 94 MG/DL (ref 65–140)
GLUCOSE SERPL-MCNC: 97 MG/DL (ref 65–99)
GLUCOSE UR QL STRIP: NEGATIVE
GRAN CASTS #/AREA URNS LPF: ABNORMAL /LPF
HCT VFR BLD AUTO: 29.1 % (ref 34.8–46.1)
HCT VFR BLD AUTO: 29.3 % (ref 34.8–46.1)
HCT VFR BLD AUTO: 30.5 % (ref 34.8–46.1)
HCT VFR BLD AUTO: 30.6 % (ref 35–45)
HCT VFR BLD AUTO: 30.9 % (ref 34.8–46.1)
HCT VFR BLD AUTO: 31.6 % (ref 34.8–46.1)
HCT VFR BLD AUTO: 31.9 % (ref 35–45)
HCT VFR BLD AUTO: 32 % (ref 34.8–46.1)
HCT VFR BLD AUTO: 32.1 % (ref 34.8–46.1)
HCT VFR BLD AUTO: 32.4 % (ref 34.8–46.1)
HCT VFR BLD AUTO: 32.5 % (ref 34.8–46.1)
HCT VFR BLD AUTO: 32.9 % (ref 34.8–46.1)
HCT VFR BLD AUTO: 33.2 % (ref 34.8–46.1)
HCT VFR BLD AUTO: 33.2 % (ref 34.8–46.1)
HCT VFR BLD AUTO: 33.5 % (ref 35–45)
HCT VFR BLD AUTO: 33.7 % (ref 34.8–46.1)
HCT VFR BLD AUTO: 33.8 % (ref 34.8–46.1)
HCT VFR BLD AUTO: 33.9 % (ref 35–45)
HCT VFR BLD AUTO: 34 % (ref 35–45)
HCT VFR BLD AUTO: 35.2 % (ref 34.8–46.1)
HCT VFR BLD AUTO: 35.5 % (ref 34.8–46.1)
HCT VFR BLD AUTO: 35.9 % (ref 34.8–46.1)
HCT VFR BLD AUTO: 36.1 % (ref 34.8–46.1)
HCT VFR BLD AUTO: 36.1 % (ref 34.8–46.1)
HCT VFR BLD AUTO: 36.8 % (ref 34.8–46.1)
HCT VFR BLD AUTO: 36.9 % (ref 34.8–46.1)
HCT VFR BLD AUTO: 38 % (ref 34.8–46.1)
HCT VFR BLD AUTO: 39.5 % (ref 34.8–46.1)
HCT VFR BLD CALC: 32 % (ref 34.8–46.1)
HCT VFR BLD CALC: 39 % (ref 34.8–46.1)
HDLC SERPL-MCNC: 37 MG/DL (ref 40–60)
HGB BLD-MCNC: 10 G/DL (ref 11.5–15.4)
HGB BLD-MCNC: 10.1 G/DL (ref 11.5–15.4)
HGB BLD-MCNC: 10.2 G/DL (ref 11.7–15.5)
HGB BLD-MCNC: 10.4 G/DL (ref 11.5–15.4)
HGB BLD-MCNC: 10.5 G/DL (ref 11.5–15.4)
HGB BLD-MCNC: 10.5 G/DL (ref 11.5–15.4)
HGB BLD-MCNC: 10.6 G/DL (ref 11.5–15.4)
HGB BLD-MCNC: 10.6 G/DL (ref 11.5–15.4)
HGB BLD-MCNC: 10.6 G/DL (ref 11.7–15.5)
HGB BLD-MCNC: 10.7 G/DL (ref 11.5–15.4)
HGB BLD-MCNC: 10.7 G/DL (ref 11.5–15.4)
HGB BLD-MCNC: 11 G/DL (ref 11.5–15.4)
HGB BLD-MCNC: 11 G/DL (ref 11.5–15.4)
HGB BLD-MCNC: 11.2 G/DL (ref 11.5–15.4)
HGB BLD-MCNC: 11.3 G/DL (ref 11.5–15.4)
HGB BLD-MCNC: 11.3 G/DL (ref 11.7–15.5)
HGB BLD-MCNC: 11.4 G/DL (ref 11.7–15.5)
HGB BLD-MCNC: 11.5 G/DL (ref 11.5–15.4)
HGB BLD-MCNC: 11.6 G/DL (ref 11.5–15.4)
HGB BLD-MCNC: 11.6 G/DL (ref 11.7–15.5)
HGB BLD-MCNC: 11.8 G/DL (ref 11.5–15.4)
HGB BLD-MCNC: 11.8 G/DL (ref 11.5–15.4)
HGB BLD-MCNC: 12 G/DL (ref 11.5–15.4)
HGB BLD-MCNC: 12.1 G/DL (ref 11.5–15.4)
HGB BLD-MCNC: 12.9 G/DL (ref 11.5–15.4)
HGB BLD-MCNC: 9.2 G/DL (ref 11.5–15.4)
HGB BLD-MCNC: 9.2 G/DL (ref 11.5–15.4)
HGB BLD-MCNC: 9.4 G/DL (ref 11.5–15.4)
HGB BLDA-MCNC: 10.9 G/DL (ref 11.5–15.4)
HGB BLDA-MCNC: 13.3 G/DL (ref 11.5–15.4)
HGB UR QL STRIP: NEGATIVE
HYALINE CASTS #/AREA URNS LPF: ABNORMAL /LPF
HYPERCHROMIA BLD QL SMEAR: PRESENT
IGG/ALB SER: 1.12 {RATIO} (ref 1.1–1.8)
IMM GRANULOCYTES # BLD AUTO: 0.02 THOUSAND/UL (ref 0–0.2)
IMM GRANULOCYTES # BLD AUTO: 0.04 THOUSAND/UL (ref 0–0.2)
IMM GRANULOCYTES # BLD AUTO: 0.05 THOUSAND/UL (ref 0–0.2)
IMM GRANULOCYTES # BLD AUTO: 0.06 THOUSAND/UL (ref 0–0.2)
IMM GRANULOCYTES # BLD AUTO: 0.06 THOUSAND/UL (ref 0–0.2)
IMM GRANULOCYTES # BLD AUTO: 0.07 THOUSAND/UL (ref 0–0.2)
IMM GRANULOCYTES # BLD AUTO: 0.08 THOUSAND/UL (ref 0–0.2)
IMM GRANULOCYTES # BLD AUTO: 0.09 THOUSAND/UL (ref 0–0.2)
IMM GRANULOCYTES # BLD AUTO: 0.09 THOUSAND/UL (ref 0–0.2)
IMM GRANULOCYTES # BLD AUTO: 0.1 THOUSAND/UL (ref 0–0.2)
IMM GRANULOCYTES # BLD AUTO: 0.1 THOUSAND/UL (ref 0–0.2)
IMM GRANULOCYTES # BLD AUTO: 0.12 THOUSAND/UL (ref 0–0.2)
IMM GRANULOCYTES NFR BLD AUTO: 0 % (ref 0–2)
IMM GRANULOCYTES NFR BLD AUTO: 0 % (ref 0–2)
IMM GRANULOCYTES NFR BLD AUTO: 1 % (ref 0–2)
INR PPP: 1.11 (ref 0.86–1.17)
INR PPP: 1.23 (ref 0.86–1.17)
KAPPA LC FREE SER-MCNC: 49.9 MG/L (ref 3.3–19.4)
KAPPA LC FREE UR-MCNC: 442 MG/L (ref 1.35–24.19)
KAPPA LC FREE/LAMBDA FREE SER: 1.4 {RATIO} (ref 0.26–1.65)
KAPPA LC FREE/LAMBDA FREE UR: 7.95 (ref 2.04–10.37)
KETONES UR QL STRIP: NEGATIVE
LAMBDA LC FREE SERPL-MCNC: 35.7 MG/L (ref 5.7–26.3)
LAMBDA LC FREE UR-MCNC: 55.6 MG/L (ref 0.24–6.66)
LDLC SERPL CALC-MCNC: 85 MG/DL (ref 0–100)
LEUKOCYTE ESTERASE UR QL STRIP: NEGATIVE
LG PLATELETS BLD QL SMEAR: PRESENT
LIPASE SERPL-CCNC: 157 U/L (ref 73–393)
LYMPHOCYTES # BLD AUTO: 0.12 THOUSAND/UL (ref 0.6–4.47)
LYMPHOCYTES # BLD AUTO: 0.32 THOUSAND/UL (ref 0.6–4.47)
LYMPHOCYTES # BLD AUTO: 0.34 THOUSANDS/ΜL (ref 0.6–4.47)
LYMPHOCYTES # BLD AUTO: 0.59 THOUSANDS/ΜL (ref 0.6–4.47)
LYMPHOCYTES # BLD AUTO: 0.61 THOUSANDS/ΜL (ref 0.6–4.47)
LYMPHOCYTES # BLD AUTO: 0.65 THOUSANDS/ΜL (ref 0.6–4.47)
LYMPHOCYTES # BLD AUTO: 0.82 THOUSANDS/ΜL (ref 0.6–4.47)
LYMPHOCYTES # BLD AUTO: 0.91 THOUSANDS/ΜL (ref 0.6–4.47)
LYMPHOCYTES # BLD AUTO: 0.96 THOUSANDS/ΜL (ref 0.6–4.47)
LYMPHOCYTES # BLD AUTO: 1 % (ref 14–44)
LYMPHOCYTES # BLD AUTO: 1.02 THOUSANDS/ΜL (ref 0.6–4.47)
LYMPHOCYTES # BLD AUTO: 1.03 THOUSANDS/ΜL (ref 0.6–4.47)
LYMPHOCYTES # BLD AUTO: 1.03 THOUSANDS/ΜL (ref 0.6–4.47)
LYMPHOCYTES # BLD AUTO: 1.05 THOUSANDS/ΜL (ref 0.6–4.47)
LYMPHOCYTES # BLD AUTO: 1.12 THOUSANDS/ΜL (ref 0.6–4.47)
LYMPHOCYTES # BLD AUTO: 1.15 THOUSANDS/ΜL (ref 0.6–4.47)
LYMPHOCYTES # BLD AUTO: 1.23 THOUSANDS/ΜL (ref 0.6–4.47)
LYMPHOCYTES # BLD AUTO: 1.28 THOUSANDS/ΜL (ref 0.6–4.47)
LYMPHOCYTES # BLD AUTO: 1.47 THOUSANDS/ΜL (ref 0.6–4.47)
LYMPHOCYTES # BLD AUTO: 1025 CELLS/UL (ref 850–3900)
LYMPHOCYTES # BLD AUTO: 1238 CELLS/UL (ref 850–3900)
LYMPHOCYTES # BLD AUTO: 1286 CELLS/UL (ref 850–3900)
LYMPHOCYTES # BLD AUTO: 1539 CELLS/UL (ref 850–3900)
LYMPHOCYTES # BLD AUTO: 4 % (ref 14–44)
LYMPHOCYTES # BLD AUTO: 890 CELLS/UL (ref 850–3900)
LYMPHOCYTES NFR BLD AUTO: 10 % (ref 14–44)
LYMPHOCYTES NFR BLD AUTO: 11 % (ref 14–44)
LYMPHOCYTES NFR BLD AUTO: 12 % (ref 14–44)
LYMPHOCYTES NFR BLD AUTO: 13 % (ref 14–44)
LYMPHOCYTES NFR BLD AUTO: 15 % (ref 14–44)
LYMPHOCYTES NFR BLD AUTO: 15.3 %
LYMPHOCYTES NFR BLD AUTO: 16.2 %
LYMPHOCYTES NFR BLD AUTO: 17 % (ref 14–44)
LYMPHOCYTES NFR BLD AUTO: 17.2 %
LYMPHOCYTES NFR BLD AUTO: 19.2 %
LYMPHOCYTES NFR BLD AUTO: 4 % (ref 14–44)
LYMPHOCYTES NFR BLD AUTO: 4 % (ref 14–44)
LYMPHOCYTES NFR BLD AUTO: 5 % (ref 14–44)
LYMPHOCYTES NFR BLD AUTO: 6 % (ref 14–44)
LYMPHOCYTES NFR BLD AUTO: 6 % (ref 14–44)
LYMPHOCYTES NFR BLD AUTO: 8.4 %
LYMPHOCYTES NFR BLD AUTO: 9 % (ref 14–44)
M PROTEIN UR ELPH-MCNC: 4 MG/DL
MACROCYTES BLD QL AUTO: PRESENT
MACROCYTES BLD QL AUTO: PRESENT
MAGNESIUM SERPL-MCNC: 1.6 MG/DL (ref 1.6–2.6)
MAGNESIUM SERPL-MCNC: 1.9 MG/DL (ref 1.6–2.6)
MAGNESIUM SERPL-MCNC: 2.8 MG/DL (ref 1.6–2.6)
MCH RBC QN AUTO: 29.3 PG (ref 26.8–34.3)
MCH RBC QN AUTO: 29.7 PG (ref 26.8–34.3)
MCH RBC QN AUTO: 29.8 PG (ref 26.8–34.3)
MCH RBC QN AUTO: 29.9 PG (ref 26.8–34.3)
MCH RBC QN AUTO: 30 PG (ref 26.8–34.3)
MCH RBC QN AUTO: 30 PG (ref 26.8–34.3)
MCH RBC QN AUTO: 30.2 PG (ref 26.8–34.3)
MCH RBC QN AUTO: 30.2 PG (ref 26.8–34.3)
MCH RBC QN AUTO: 30.3 PG (ref 26.8–34.3)
MCH RBC QN AUTO: 30.4 PG (ref 26.8–34.3)
MCH RBC QN AUTO: 30.5 PG (ref 26.8–34.3)
MCH RBC QN AUTO: 30.7 PG (ref 26.8–34.3)
MCH RBC QN AUTO: 31.3 PG (ref 26.8–34.3)
MCH RBC QN AUTO: 31.6 PG (ref 26.8–34.3)
MCH RBC QN AUTO: 31.8 PG (ref 26.8–34.3)
MCH RBC QN AUTO: 31.8 PG (ref 26.8–34.3)
MCH RBC QN AUTO: 31.8 PG (ref 27–33)
MCH RBC QN AUTO: 32.3 PG (ref 27–33)
MCH RBC QN AUTO: 32.5 PG (ref 26.8–34.3)
MCH RBC QN AUTO: 32.5 PG (ref 26.8–34.3)
MCH RBC QN AUTO: 32.7 PG (ref 26.8–34.3)
MCH RBC QN AUTO: 33.4 PG (ref 27–33)
MCH RBC QN AUTO: 33.6 PG (ref 27–33)
MCH RBC QN AUTO: 33.7 PG (ref 27–33)
MCHC RBC AUTO-ENTMCNC: 30.8 G/DL (ref 31.4–37.4)
MCHC RBC AUTO-ENTMCNC: 31 G/DL (ref 31.4–37.4)
MCHC RBC AUTO-ENTMCNC: 31.4 G/DL (ref 31.4–37.4)
MCHC RBC AUTO-ENTMCNC: 31.6 G/DL (ref 31.4–37.4)
MCHC RBC AUTO-ENTMCNC: 31.8 G/DL (ref 31.4–37.4)
MCHC RBC AUTO-ENTMCNC: 31.9 G/DL (ref 31.4–37.4)
MCHC RBC AUTO-ENTMCNC: 32 G/DL (ref 31.4–37.4)
MCHC RBC AUTO-ENTMCNC: 32 G/DL (ref 31.4–37.4)
MCHC RBC AUTO-ENTMCNC: 32.1 G/DL (ref 31.4–37.4)
MCHC RBC AUTO-ENTMCNC: 32.2 G/DL (ref 31.4–37.4)
MCHC RBC AUTO-ENTMCNC: 32.4 G/DL (ref 31.4–37.4)
MCHC RBC AUTO-ENTMCNC: 32.5 G/DL (ref 31.4–37.4)
MCHC RBC AUTO-ENTMCNC: 32.6 G/DL (ref 31.4–37.4)
MCHC RBC AUTO-ENTMCNC: 32.6 G/DL (ref 31.4–37.4)
MCHC RBC AUTO-ENTMCNC: 32.7 G/DL (ref 31.4–37.4)
MCHC RBC AUTO-ENTMCNC: 32.9 G/DL (ref 31.4–37.4)
MCHC RBC AUTO-ENTMCNC: 32.9 G/DL (ref 31.4–37.4)
MCHC RBC AUTO-ENTMCNC: 33.2 G/DL (ref 32–36)
MCHC RBC AUTO-ENTMCNC: 33.2 G/DL (ref 32–36)
MCHC RBC AUTO-ENTMCNC: 33.3 G/DL (ref 32–36)
MCHC RBC AUTO-ENTMCNC: 33.6 G/DL (ref 32–36)
MCHC RBC AUTO-ENTMCNC: 34.6 G/DL (ref 32–36)
MCV RBC AUTO: 100 FL (ref 82–98)
MCV RBC AUTO: 100.7 FL (ref 80–100)
MCV RBC AUTO: 102 FL (ref 82–98)
MCV RBC AUTO: 103 FL (ref 82–98)
MCV RBC AUTO: 104 FL (ref 82–98)
MCV RBC AUTO: 105 FL (ref 82–98)
MCV RBC AUTO: 91 FL (ref 82–98)
MCV RBC AUTO: 92 FL (ref 82–98)
MCV RBC AUTO: 93 FL (ref 82–98)
MCV RBC AUTO: 94 FL (ref 82–98)
MCV RBC AUTO: 95 FL (ref 82–98)
MCV RBC AUTO: 95.8 FL (ref 80–100)
MCV RBC AUTO: 96 FL (ref 82–98)
MCV RBC AUTO: 97.1 FL (ref 80–100)
MCV RBC AUTO: 97.4 FL (ref 80–100)
MCV RBC AUTO: 99 FL (ref 82–98)
MCV RBC AUTO: 99.4 FL (ref 80–100)
MONOCYTES # BLD AUTO: 0.18 THOUSAND/ΜL (ref 0.17–1.22)
MONOCYTES # BLD AUTO: 0.24 THOUSAND/UL (ref 0–1.22)
MONOCYTES # BLD AUTO: 0.35 THOUSAND/UL (ref 0–1.22)
MONOCYTES # BLD AUTO: 0.5 THOUSAND/ΜL (ref 0.17–1.22)
MONOCYTES # BLD AUTO: 0.92 THOUSAND/ΜL (ref 0.17–1.22)
MONOCYTES # BLD AUTO: 0.93 THOUSAND/ΜL (ref 0.17–1.22)
MONOCYTES # BLD AUTO: 0.95 THOUSAND/ΜL (ref 0.17–1.22)
MONOCYTES # BLD AUTO: 1.01 THOUSAND/ΜL (ref 0.17–1.22)
MONOCYTES # BLD AUTO: 1.02 THOUSAND/ΜL (ref 0.17–1.22)
MONOCYTES # BLD AUTO: 1.07 THOUSAND/ΜL (ref 0.17–1.22)
MONOCYTES # BLD AUTO: 1.1 THOUSAND/ΜL (ref 0.17–1.22)
MONOCYTES # BLD AUTO: 1.16 THOUSAND/ΜL (ref 0.17–1.22)
MONOCYTES # BLD AUTO: 1.24 THOUSAND/ΜL (ref 0.17–1.22)
MONOCYTES # BLD AUTO: 1.26 THOUSAND/ΜL (ref 0.17–1.22)
MONOCYTES # BLD AUTO: 1.3 THOUSAND/ΜL (ref 0.17–1.22)
MONOCYTES # BLD AUTO: 1.37 THOUSAND/ΜL (ref 0.17–1.22)
MONOCYTES # BLD AUTO: 1.4 THOUSAND/ΜL (ref 0.17–1.22)
MONOCYTES # BLD AUTO: 1.44 THOUSAND/ΜL (ref 0.17–1.22)
MONOCYTES # BLD AUTO: 1064 CELLS/UL (ref 200–950)
MONOCYTES # BLD AUTO: 757 CELLS/UL (ref 200–950)
MONOCYTES # BLD AUTO: 784 CELLS/UL (ref 200–950)
MONOCYTES # BLD AUTO: 878 CELLS/UL (ref 200–950)
MONOCYTES # BLD AUTO: 912 CELLS/UL (ref 200–950)
MONOCYTES NFR BLD AUTO: 10 % (ref 4–12)
MONOCYTES NFR BLD AUTO: 11 % (ref 4–12)
MONOCYTES NFR BLD AUTO: 11 % (ref 4–12)
MONOCYTES NFR BLD AUTO: 11.2 %
MONOCYTES NFR BLD AUTO: 11.3 %
MONOCYTES NFR BLD AUTO: 11.7 %
MONOCYTES NFR BLD AUTO: 12 % (ref 4–12)
MONOCYTES NFR BLD AUTO: 12.2 %
MONOCYTES NFR BLD AUTO: 13 % (ref 4–12)
MONOCYTES NFR BLD AUTO: 14 % (ref 4–12)
MONOCYTES NFR BLD AUTO: 15 % (ref 4–12)
MONOCYTES NFR BLD AUTO: 4 % (ref 4–12)
MONOCYTES NFR BLD AUTO: 6 % (ref 4–12)
MONOCYTES NFR BLD AUTO: 7 % (ref 4–12)
MONOCYTES NFR BLD AUTO: 8 % (ref 4–12)
MONOCYTES NFR BLD AUTO: 8.6 %
MONOCYTES NFR BLD AUTO: 9 % (ref 4–12)
MONOCYTES NFR BLD: 3 % (ref 4–12)
MONOCYTES NFR BLD: 3 % (ref 4–12)
NEUTROPHILS # BLD AUTO: 10.92 THOUSANDS/ΜL (ref 1.85–7.62)
NEUTROPHILS # BLD AUTO: 11.96 THOUSANDS/ΜL (ref 1.85–7.62)
NEUTROPHILS # BLD AUTO: 12.52 THOUSANDS/ΜL (ref 1.85–7.62)
NEUTROPHILS # BLD AUTO: 14.68 THOUSANDS/ΜL (ref 1.85–7.62)
NEUTROPHILS # BLD AUTO: 4.14 THOUSANDS/ΜL (ref 1.85–7.62)
NEUTROPHILS # BLD AUTO: 4368 CELLS/UL (ref 1500–7800)
NEUTROPHILS # BLD AUTO: 4724 CELLS/UL (ref 1500–7800)
NEUTROPHILS # BLD AUTO: 4738 CELLS/UL (ref 1500–7800)
NEUTROPHILS # BLD AUTO: 6.21 THOUSANDS/ΜL (ref 1.85–7.62)
NEUTROPHILS # BLD AUTO: 6.21 THOUSANDS/ΜL (ref 1.85–7.62)
NEUTROPHILS # BLD AUTO: 6.24 THOUSANDS/ΜL (ref 1.85–7.62)
NEUTROPHILS # BLD AUTO: 6.46 THOUSANDS/ΜL (ref 1.85–7.62)
NEUTROPHILS # BLD AUTO: 6.54 THOUSANDS/ΜL (ref 1.85–7.62)
NEUTROPHILS # BLD AUTO: 6.83 THOUSANDS/ΜL (ref 1.85–7.62)
NEUTROPHILS # BLD AUTO: 6.85 THOUSANDS/ΜL (ref 1.85–7.62)
NEUTROPHILS # BLD AUTO: 6.85 THOUSANDS/ΜL (ref 1.85–7.62)
NEUTROPHILS # BLD AUTO: 6403 CELLS/UL (ref 1500–7800)
NEUTROPHILS # BLD AUTO: 7.11 THOUSANDS/ΜL (ref 1.85–7.62)
NEUTROPHILS # BLD AUTO: 7.8 THOUSANDS/ΜL (ref 1.85–7.62)
NEUTROPHILS # BLD AUTO: 8.74 THOUSANDS/ΜL (ref 1.85–7.62)
NEUTROPHILS # BLD AUTO: 8491 CELLS/UL (ref 1500–7800)
NEUTROPHILS # BLD MANUAL: 11.24 THOUSAND/UL (ref 1.85–7.62)
NEUTROPHILS # BLD MANUAL: 7.44 THOUSAND/UL (ref 1.85–7.62)
NEUTROPHILS NFR BLD AUTO: 65.2 %
NEUTROPHILS NFR BLD AUTO: 65.8 %
NEUTROPHILS NFR BLD AUTO: 67.4 %
NEUTROPHILS NFR BLD AUTO: 70.5 %
NEUTROPHILS NFR BLD AUTO: 80.1 %
NEUTS BAND NFR BLD MANUAL: 1 % (ref 0–8)
NEUTS HYPERSEG BLD QL SMEAR: PRESENT
NEUTS SEG NFR BLD AUTO: 68 % (ref 43–75)
NEUTS SEG NFR BLD AUTO: 69 % (ref 43–75)
NEUTS SEG NFR BLD AUTO: 71 % (ref 43–75)
NEUTS SEG NFR BLD AUTO: 71 % (ref 43–75)
NEUTS SEG NFR BLD AUTO: 73 % (ref 43–75)
NEUTS SEG NFR BLD AUTO: 73 % (ref 43–75)
NEUTS SEG NFR BLD AUTO: 75 % (ref 43–75)
NEUTS SEG NFR BLD AUTO: 75 % (ref 43–75)
NEUTS SEG NFR BLD AUTO: 77 % (ref 43–75)
NEUTS SEG NFR BLD AUTO: 77 % (ref 43–75)
NEUTS SEG NFR BLD AUTO: 83 % (ref 43–75)
NEUTS SEG NFR BLD AUTO: 84 % (ref 43–75)
NEUTS SEG NFR BLD AUTO: 85 % (ref 43–75)
NEUTS SEG NFR BLD AUTO: 86 % (ref 43–75)
NEUTS SEG NFR BLD AUTO: 86 % (ref 43–75)
NEUTS SEG NFR BLD AUTO: 87 % (ref 43–75)
NEUTS SEG NFR BLD AUTO: 93 % (ref 43–75)
NEUTS SEG NFR BLD AUTO: 95 % (ref 43–75)
NITRITE UR QL STRIP: NEGATIVE
NRBC BLD AUTO-RTO: 0 /100 WBCS
NT-PROBNP SERPL-MCNC: ABNORMAL PG/ML
OVALOCYTES BLD QL SMEAR: PRESENT
OVALOCYTES BLD QL SMEAR: PRESENT
P AXIS: 48 DEGREES
P AXIS: 57 DEGREES
P AXIS: 59 DEGREES
P AXIS: 71 DEGREES
P AXIS: 76 DEGREES
P AXIS: 77 DEGREES
P AXIS: 79 DEGREES
P AXIS: 79 DEGREES
P AXIS: 80 DEGREES
P AXIS: 81 DEGREES
P AXIS: 81 DEGREES
P AXIS: 82 DEGREES
PCO2 BLD: 28 MMOL/L (ref 21–32)
PCO2 BLD: 31 MMOL/L (ref 21–32)
PH UR STRIP: 5.5 [PH] (ref 5–8)
PHOSPHATE SERPL-MCNC: 3.2 MG/DL (ref 2.3–4.1)
PHOSPHATE SERPL-MCNC: 3.7 MG/DL (ref 2.1–4.3)
PLATELET # BLD AUTO: 123 THOUSANDS/UL (ref 149–390)
PLATELET # BLD AUTO: 128 THOUSANDS/UL (ref 149–390)
PLATELET # BLD AUTO: 130 THOUSANDS/UL (ref 149–390)
PLATELET # BLD AUTO: 159 THOUSANDS/UL (ref 149–390)
PLATELET # BLD AUTO: 169 THOUSANDS/UL (ref 149–390)
PLATELET # BLD AUTO: 177 THOUSANDS/UL (ref 149–390)
PLATELET # BLD AUTO: 178 THOUSAND/UL (ref 140–400)
PLATELET # BLD AUTO: 185 THOUSANDS/UL (ref 149–390)
PLATELET # BLD AUTO: 186 THOUSANDS/UL (ref 149–390)
PLATELET # BLD AUTO: 189 THOUSAND/UL (ref 140–400)
PLATELET # BLD AUTO: 189 THOUSANDS/UL (ref 149–390)
PLATELET # BLD AUTO: 191 THOUSANDS/UL (ref 149–390)
PLATELET # BLD AUTO: 201 THOUSANDS/UL (ref 149–390)
PLATELET # BLD AUTO: 202 THOUSANDS/UL (ref 149–390)
PLATELET # BLD AUTO: 203 THOUSANDS/UL (ref 149–390)
PLATELET # BLD AUTO: 204 THOUSANDS/UL (ref 149–390)
PLATELET # BLD AUTO: 214 THOUSANDS/UL (ref 149–390)
PLATELET # BLD AUTO: 217 THOUSANDS/UL (ref 149–390)
PLATELET # BLD AUTO: 223 THOUSAND/UL (ref 140–400)
PLATELET # BLD AUTO: 227 THOUSANDS/UL (ref 149–390)
PLATELET # BLD AUTO: 251 THOUSAND/UL (ref 140–400)
PLATELET # BLD AUTO: 256 THOUSANDS/UL (ref 149–390)
PLATELET # BLD AUTO: 259 THOUSANDS/UL (ref 149–390)
PLATELET # BLD AUTO: 280 THOUSAND/UL (ref 140–400)
PLATELET # BLD AUTO: 281 THOUSANDS/UL (ref 149–390)
PLATELET BLD QL SMEAR: ADEQUATE
PLATELET BLD QL SMEAR: ADEQUATE
PMV BLD AUTO: 10.2 FL (ref 8.9–12.7)
PMV BLD AUTO: 10.4 FL (ref 8.9–12.7)
PMV BLD AUTO: 10.6 FL (ref 8.9–12.7)
PMV BLD AUTO: 10.8 FL (ref 8.9–12.7)
PMV BLD AUTO: 10.9 FL (ref 8.9–12.7)
PMV BLD AUTO: 10.9 FL (ref 8.9–12.7)
PMV BLD AUTO: 11 FL (ref 8.9–12.7)
PMV BLD AUTO: 11.1 FL (ref 8.9–12.7)
PMV BLD AUTO: 11.1 FL (ref 8.9–12.7)
PMV BLD AUTO: 11.2 FL (ref 8.9–12.7)
PMV BLD AUTO: 11.2 FL (ref 8.9–12.7)
PMV BLD AUTO: 11.4 FL (ref 8.9–12.7)
PMV BLD AUTO: 11.6 FL (ref 8.9–12.7)
PMV BLD AUTO: 11.8 FL (ref 8.9–12.7)
PMV BLD AUTO: 12.4 FL (ref 8.9–12.7)
PMV BLD REES-ECKER: 10.5 FL (ref 7.5–12.5)
PMV BLD REES-ECKER: 10.9 FL (ref 7.5–12.5)
PMV BLD REES-ECKER: 11.1 FL (ref 7.5–12.5)
PMV BLD REES-ECKER: 11.2 FL (ref 7.5–12.5)
PMV BLD REES-ECKER: 11.4 FL (ref 7.5–12.5)
POIKILOCYTOSIS BLD QL SMEAR: PRESENT
POLYCHROMASIA BLD QL SMEAR: PRESENT
POLYCHROMASIA BLD QL SMEAR: PRESENT
POTASSIUM BLD-SCNC: 3.7 MMOL/L (ref 3.5–5.3)
POTASSIUM BLD-SCNC: 5.3 MMOL/L (ref 3.5–5.3)
POTASSIUM SERPL-SCNC: 2.9 MMOL/L (ref 3.5–5.3)
POTASSIUM SERPL-SCNC: 2.9 MMOL/L (ref 3.5–5.3)
POTASSIUM SERPL-SCNC: 3.2 MMOL/L (ref 3.5–5.3)
POTASSIUM SERPL-SCNC: 3.3 MMOL/L (ref 3.5–5.3)
POTASSIUM SERPL-SCNC: 3.4 MMOL/L (ref 3.5–5.3)
POTASSIUM SERPL-SCNC: 3.4 MMOL/L (ref 3.5–5.3)
POTASSIUM SERPL-SCNC: 3.5 MMOL/L (ref 3.5–5.3)
POTASSIUM SERPL-SCNC: 3.5 MMOL/L (ref 3.5–5.3)
POTASSIUM SERPL-SCNC: 3.6 MMOL/L (ref 3.5–5.3)
POTASSIUM SERPL-SCNC: 3.7 MMOL/L (ref 3.5–5.3)
POTASSIUM SERPL-SCNC: 3.8 MMOL/L (ref 3.5–5.3)
POTASSIUM SERPL-SCNC: 3.9 MMOL/L (ref 3.5–5.3)
POTASSIUM SERPL-SCNC: 4.4 MMOL/L (ref 3.5–5.3)
POTASSIUM SERPL-SCNC: 4.5 MMOL/L (ref 3.5–5.3)
POTASSIUM SERPL-SCNC: 4.6 MMOL/L (ref 3.5–5.3)
POTASSIUM SERPL-SCNC: 4.8 MMOL/L (ref 3.5–5.3)
POTASSIUM SERPL-SCNC: 4.9 MMOL/L (ref 3.5–5.3)
POTASSIUM SERPL-SCNC: 5 MMOL/L (ref 3.5–5.3)
POTASSIUM SERPL-SCNC: 5.2 MMOL/L (ref 3.5–5.3)
PR INTERVAL: 104 MS
PR INTERVAL: 110 MS
PR INTERVAL: 118 MS
PR INTERVAL: 122 MS
PR INTERVAL: 124 MS
PR INTERVAL: 126 MS
PR INTERVAL: 128 MS
PR INTERVAL: 128 MS
PR INTERVAL: 132 MS
PR INTERVAL: 134 MS
PR INTERVAL: 134 MS
PROCALCITONIN SERPL-MCNC: 0.06 NG/ML
PROCALCITONIN SERPL-MCNC: 0.18 NG/ML
PROCALCITONIN SERPL-MCNC: <0.05 NG/ML
PROCALCITONIN SERPL-MCNC: <0.05 NG/ML
PROT PATTERN SERPL ELPH-IMP: ABNORMAL
PROT PATTERN UR ELPH-IMP: ABNORMAL
PROT SERPL-MCNC: 5.5 G/DL (ref 6.4–8.2)
PROT SERPL-MCNC: 6.2 G/DL (ref 6.1–8.1)
PROT SERPL-MCNC: 6.4 G/DL (ref 6.1–8.1)
PROT SERPL-MCNC: 6.4 G/DL (ref 6.1–8.1)
PROT SERPL-MCNC: 6.6 G/DL (ref 6.1–8.1)
PROT SERPL-MCNC: 6.8 G/DL (ref 6.1–8.1)
PROT SERPL-MCNC: 6.8 G/DL (ref 6.4–8.2)
PROT SERPL-MCNC: 6.9 G/DL (ref 6.4–8.2)
PROT SERPL-MCNC: 7 G/DL (ref 6.4–8.2)
PROT SERPL-MCNC: 7.6 G/DL (ref 6.4–8.2)
PROT SERPL-MCNC: 7.6 G/DL (ref 6.4–8.2)
PROT UR QL STRIP: ABNORMAL
PROT UR-MCNC: 110 MG/DL (ref 5–24)
PROT UR-MCNC: 97 MG/DL
PROT/CREAT UR: 1310 MG/G CREAT (ref 21–161)
PROTHROMBIN TIME: 13.7 SECONDS (ref 11.8–14.2)
PROTHROMBIN TIME: 14.8 SECONDS (ref 11.8–14.2)
PTH-INTACT SERPL-MCNC: 69 PG/ML (ref 14–64)
QRS AXIS: -2 DEGREES
QRS AXIS: 20 DEGREES
QRS AXIS: 23 DEGREES
QRS AXIS: 35 DEGREES
QRS AXIS: 39 DEGREES
QRS AXIS: 40 DEGREES
QRS AXIS: 46 DEGREES
QRS AXIS: 47 DEGREES
QRS AXIS: 48 DEGREES
QRS AXIS: 56 DEGREES
QRS AXIS: 61 DEGREES
QRS AXIS: 69 DEGREES
QRS AXIS: 70 DEGREES
QRSD INTERVAL: 78 MS
QRSD INTERVAL: 80 MS
QRSD INTERVAL: 82 MS
QRSD INTERVAL: 82 MS
QRSD INTERVAL: 84 MS
QRSD INTERVAL: 88 MS
QRSD INTERVAL: 90 MS
QRSD INTERVAL: 90 MS
QRSD INTERVAL: 92 MS
QRSD INTERVAL: 92 MS
QT INTERVAL: 304 MS
QT INTERVAL: 316 MS
QT INTERVAL: 344 MS
QT INTERVAL: 344 MS
QT INTERVAL: 348 MS
QT INTERVAL: 356 MS
QT INTERVAL: 360 MS
QT INTERVAL: 364 MS
QT INTERVAL: 378 MS
QT INTERVAL: 394 MS
QT INTERVAL: 396 MS
QT INTERVAL: 402 MS
QT INTERVAL: 404 MS
QTC INTERVAL: 433 MS
QTC INTERVAL: 433 MS
QTC INTERVAL: 452 MS
QTC INTERVAL: 459 MS
QTC INTERVAL: 465 MS
QTC INTERVAL: 469 MS
QTC INTERVAL: 474 MS
QTC INTERVAL: 475 MS
QTC INTERVAL: 476 MS
QTC INTERVAL: 490 MS
QTC INTERVAL: 490 MS
QTC INTERVAL: 505 MS
QTC INTERVAL: 507 MS
RBC # BLD AUTO: 2.81 MILLION/UL (ref 3.81–5.12)
RBC # BLD AUTO: 2.91 MILLION/UL (ref 3.81–5.12)
RBC # BLD AUTO: 2.96 MILLION/UL (ref 3.81–5.12)
RBC # BLD AUTO: 3.04 MILLION/UL (ref 3.8–5.1)
RBC # BLD AUTO: 3.11 MILLION/UL (ref 3.81–5.12)
RBC # BLD AUTO: 3.33 MILLION/UL (ref 3.8–5.1)
RBC # BLD AUTO: 3.35 MILLION/UL (ref 3.81–5.12)
RBC # BLD AUTO: 3.41 MILLION/UL (ref 3.8–5.1)
RBC # BLD AUTO: 3.44 MILLION/UL (ref 3.8–5.1)
RBC # BLD AUTO: 3.45 MILLION/UL (ref 3.81–5.12)
RBC # BLD AUTO: 3.48 MILLION/UL (ref 3.81–5.12)
RBC # BLD AUTO: 3.5 MILLION/UL (ref 3.81–5.12)
RBC # BLD AUTO: 3.5 MILLION/UL (ref 3.8–5.1)
RBC # BLD AUTO: 3.51 MILLION/UL (ref 3.81–5.12)
RBC # BLD AUTO: 3.52 MILLION/UL (ref 3.81–5.12)
RBC # BLD AUTO: 3.64 MILLION/UL (ref 3.81–5.12)
RBC # BLD AUTO: 3.65 MILLION/UL (ref 3.81–5.12)
RBC # BLD AUTO: 3.69 MILLION/UL (ref 3.81–5.12)
RBC # BLD AUTO: 3.83 MILLION/UL (ref 3.81–5.12)
RBC # BLD AUTO: 3.83 MILLION/UL (ref 3.81–5.12)
RBC # BLD AUTO: 3.89 MILLION/UL (ref 3.81–5.12)
RBC # BLD AUTO: 3.97 MILLION/UL (ref 3.81–5.12)
RBC # BLD AUTO: 4.13 MILLION/UL (ref 3.81–5.12)
RBC # BLD AUTO: 4.23 MILLION/UL (ref 3.81–5.12)
RBC #/AREA URNS HPF: ABNORMAL /HPF
RBC MORPH BLD: PRESENT
RBC MORPH BLD: PRESENT
RH BLD: NEGATIVE
SL AMB  POCT GLUCOSE, UA: ABNORMAL
SL AMB EGFR AFRICAN AMERICAN: 17 ML/MIN/1.73M2
SL AMB EGFR AFRICAN AMERICAN: 20 ML/MIN/1.73M2
SL AMB EGFR AFRICAN AMERICAN: 24 ML/MIN/1.73M2
SL AMB EGFR AFRICAN AMERICAN: 25 ML/MIN/1.73M2
SL AMB EGFR AFRICAN AMERICAN: 27 ML/MIN/1.73M2
SL AMB EGFR AFRICAN AMERICAN: 29 ML/MIN/1.73M2
SL AMB EGFR NON AFRICAN AMERICAN: 15 ML/MIN/1.73M2
SL AMB EGFR NON AFRICAN AMERICAN: 18 ML/MIN/1.73M2
SL AMB EGFR NON AFRICAN AMERICAN: 21 ML/MIN/1.73M2
SL AMB EGFR NON AFRICAN AMERICAN: 22 ML/MIN/1.73M2
SL AMB EGFR NON AFRICAN AMERICAN: 23 ML/MIN/1.73M2
SL AMB EGFR NON AFRICAN AMERICAN: 25 ML/MIN/1.73M2
SL AMB LEUKOCYTE ESTERASE,UA: ABNORMAL
SL AMB POCT BILIRUBIN,UA: ABNORMAL
SL AMB POCT BLOOD,UA: ABNORMAL
SL AMB POCT CLARITY,UA: CLEAR
SL AMB POCT COLOR,UA: YELLOW
SL AMB POCT KETONES,UA: ABNORMAL
SL AMB POCT NITRITE,UA: ABNORMAL
SL AMB POCT PH,UA: 6
SL AMB POCT SPECIFIC GRAVITY,UA: 1.02
SL AMB POCT URINE PROTEIN: ABNORMAL
SL AMB POCT UROBILINOGEN: 0.2
SODIUM BLD-SCNC: 135 MMOL/L (ref 136–145)
SODIUM BLD-SCNC: 137 MMOL/L (ref 136–145)
SODIUM SERPL-SCNC: 133 MMOL/L (ref 136–145)
SODIUM SERPL-SCNC: 134 MMOL/L (ref 136–145)
SODIUM SERPL-SCNC: 135 MMOL/L (ref 136–145)
SODIUM SERPL-SCNC: 136 MMOL/L (ref 136–145)
SODIUM SERPL-SCNC: 137 MMOL/L (ref 135–146)
SODIUM SERPL-SCNC: 137 MMOL/L (ref 136–145)
SODIUM SERPL-SCNC: 138 MMOL/L (ref 135–146)
SODIUM SERPL-SCNC: 138 MMOL/L (ref 135–146)
SODIUM SERPL-SCNC: 138 MMOL/L (ref 136–145)
SODIUM SERPL-SCNC: 138 MMOL/L (ref 136–145)
SODIUM SERPL-SCNC: 139 MMOL/L (ref 135–146)
SODIUM SERPL-SCNC: 139 MMOL/L (ref 136–145)
SODIUM SERPL-SCNC: 139 MMOL/L (ref 136–145)
SODIUM SERPL-SCNC: 140 MMOL/L (ref 135–146)
SODIUM SERPL-SCNC: 140 MMOL/L (ref 135–146)
SODIUM SERPL-SCNC: 140 MMOL/L (ref 136–145)
SODIUM SERPL-SCNC: 142 MMOL/L (ref 136–145)
SP GR UR STRIP: 1.02 (ref 1–1.03)
SPECIMEN EXPIRATION DATE: NORMAL
SPECIMEN SOURCE: ABNORMAL
SPECIMEN SOURCE: ABNORMAL
SQUAMOUS #/AREA URNS HPF: ABNORMAL /HPF
T WAVE AXIS: 32 DEGREES
T WAVE AXIS: 48 DEGREES
T WAVE AXIS: 50 DEGREES
T WAVE AXIS: 66 DEGREES
T WAVE AXIS: 69 DEGREES
T WAVE AXIS: 70 DEGREES
T WAVE AXIS: 75 DEGREES
T WAVE AXIS: 81 DEGREES
T WAVE AXIS: 82 DEGREES
T WAVE AXIS: 86 DEGREES
T WAVE AXIS: 89 DEGREES
T3FREE SERPL-MCNC: 2.32 PG/ML (ref 2.3–4.2)
T4 FREE SERPL-MCNC: 0.7 NG/DL (ref 0.8–1.8)
T4 FREE SERPL-MCNC: 1.5 NG/DL (ref 0.8–1.8)
T4 FREE SERPL-MCNC: 2.64 NG/DL (ref 0.76–1.46)
T4 FREE SERPL-MCNC: 3 NG/DL (ref 0.8–1.8)
TOTAL CELLS COUNTED SPEC: 100
TOTAL CELLS COUNTED SPEC: 100
TRIGL SERPL-MCNC: 48 MG/DL
TROPONIN I SERPL-MCNC: 0.02 NG/ML
TROPONIN I SERPL-MCNC: 0.05 NG/ML
TROPONIN I SERPL-MCNC: 0.05 NG/ML
TROPONIN I SERPL-MCNC: 0.07 NG/ML
TROPONIN I SERPL-MCNC: 0.08 NG/ML
TROPONIN I SERPL-MCNC: 0.08 NG/ML
TROPONIN I SERPL-MCNC: 0.09 NG/ML
TROPONIN I SERPL-MCNC: 0.21 NG/ML
TROPONIN I SERPL-MCNC: 0.58 NG/ML
TROPONIN I SERPL-MCNC: 0.72 NG/ML
TROPONIN I SERPL-MCNC: 1.46 NG/ML
TROPONIN I SERPL-MCNC: 1.47 NG/ML
TROPONIN I SERPL-MCNC: 1.93 NG/ML
TROPONIN I SERPL-MCNC: <0.02 NG/ML
TROPONIN I SERPL-MCNC: <0.02 NG/ML
TSH SERPL DL<=0.05 MIU/L-ACNC: 0.02 UIU/ML (ref 0.36–3.74)
TSH SERPL-ACNC: 0.12 MIU/L (ref 0.4–4.5)
TSH SERPL-ACNC: 0.36 MIU/L (ref 0.4–4.5)
TSH SERPL-ACNC: 82.2 MIU/L (ref 0.4–4.5)
VENTRICULAR RATE: 100 BPM
VENTRICULAR RATE: 112 BPM
VENTRICULAR RATE: 114 BPM
VENTRICULAR RATE: 114 BPM
VENTRICULAR RATE: 122 BPM
VENTRICULAR RATE: 122 BPM
VENTRICULAR RATE: 127 BPM
VENTRICULAR RATE: 84 BPM
VENTRICULAR RATE: 86 BPM
VENTRICULAR RATE: 87 BPM
VENTRICULAR RATE: 87 BPM
VENTRICULAR RATE: 95 BPM
VENTRICULAR RATE: 95 BPM
WBC # BLD AUTO: 10.33 THOUSAND/UL (ref 4.31–10.16)
WBC # BLD AUTO: 10.6 THOUSAND/UL (ref 3.8–10.8)
WBC # BLD AUTO: 11.22 THOUSAND/UL (ref 4.31–10.16)
WBC # BLD AUTO: 11.71 THOUSAND/UL (ref 4.31–10.16)
WBC # BLD AUTO: 13.1 THOUSAND/UL (ref 4.31–10.16)
WBC # BLD AUTO: 13.6 THOUSAND/UL (ref 4.31–10.16)
WBC # BLD AUTO: 14.7 THOUSAND/UL (ref 4.31–10.16)
WBC # BLD AUTO: 17.19 THOUSAND/UL (ref 4.31–10.16)
WBC # BLD AUTO: 4.94 THOUSAND/UL (ref 4.31–10.16)
WBC # BLD AUTO: 6.7 THOUSAND/UL (ref 3.8–10.8)
WBC # BLD AUTO: 6.7 THOUSAND/UL (ref 3.8–10.8)
WBC # BLD AUTO: 7.2 THOUSAND/UL (ref 3.8–10.8)
WBC # BLD AUTO: 7.7 THOUSAND/UL (ref 4.31–10.16)
WBC # BLD AUTO: 7.95 THOUSAND/UL (ref 4.31–10.16)
WBC # BLD AUTO: 8 THOUSAND/UL (ref 4.31–10.16)
WBC # BLD AUTO: 8.6 THOUSAND/UL (ref 4.31–10.16)
WBC # BLD AUTO: 8.73 THOUSAND/UL (ref 4.31–10.16)
WBC # BLD AUTO: 8.81 THOUSAND/UL (ref 4.31–10.16)
WBC # BLD AUTO: 8.83 THOUSAND/UL (ref 4.31–10.16)
WBC # BLD AUTO: 8.95 THOUSAND/UL (ref 4.31–10.16)
WBC # BLD AUTO: 8.95 THOUSAND/UL (ref 4.31–10.16)
WBC # BLD AUTO: 9.5 THOUSAND/UL (ref 3.8–10.8)
WBC # BLD AUTO: 9.5 THOUSAND/UL (ref 4.31–10.16)
WBC # BLD AUTO: 9.66 THOUSAND/UL (ref 4.31–10.16)
WBC # BLD EST: ABNORMAL 10*3/UL
WBC #/AREA URNS HPF: ABNORMAL /HPF

## 2019-01-01 PROCEDURE — 94640 AIRWAY INHALATION TREATMENT: CPT

## 2019-01-01 PROCEDURE — 99213 OFFICE O/P EST LOW 20 MIN: CPT | Performed by: SURGERY

## 2019-01-01 PROCEDURE — 96365 THER/PROPH/DIAG IV INF INIT: CPT

## 2019-01-01 PROCEDURE — C9113 INJ PANTOPRAZOLE SODIUM, VIA: HCPCS | Performed by: INTERNAL MEDICINE

## 2019-01-01 PROCEDURE — 80047 BASIC METABLC PNL IONIZED CA: CPT

## 2019-01-01 PROCEDURE — 85025 COMPLETE CBC W/AUTO DIFF WBC: CPT | Performed by: INTERNAL MEDICINE

## 2019-01-01 PROCEDURE — 99285 EMERGENCY DEPT VISIT HI MDM: CPT | Performed by: EMERGENCY MEDICINE

## 2019-01-01 PROCEDURE — 85007 BL SMEAR W/DIFF WBC COUNT: CPT | Performed by: INTERNAL MEDICINE

## 2019-01-01 PROCEDURE — 99284 EMERGENCY DEPT VISIT MOD MDM: CPT | Performed by: EMERGENCY MEDICINE

## 2019-01-01 PROCEDURE — 36415 COLL VENOUS BLD VENIPUNCTURE: CPT | Performed by: EMERGENCY MEDICINE

## 2019-01-01 PROCEDURE — 93010 ELECTROCARDIOGRAM REPORT: CPT | Performed by: INTERNAL MEDICINE

## 2019-01-01 PROCEDURE — 99285 EMERGENCY DEPT VISIT HI MDM: CPT

## 2019-01-01 PROCEDURE — 96415 CHEMO IV INFUSION ADDL HR: CPT

## 2019-01-01 PROCEDURE — 96361 HYDRATE IV INFUSION ADD-ON: CPT

## 2019-01-01 PROCEDURE — 93880 EXTRACRANIAL BILAT STUDY: CPT | Performed by: SURGERY

## 2019-01-01 PROCEDURE — 94664 DEMO&/EVAL PT USE INHALER: CPT

## 2019-01-01 PROCEDURE — 97166 OT EVAL MOD COMPLEX 45 MIN: CPT

## 2019-01-01 PROCEDURE — 71046 X-RAY EXAM CHEST 2 VIEWS: CPT

## 2019-01-01 PROCEDURE — 84484 ASSAY OF TROPONIN QUANT: CPT | Performed by: EMERGENCY MEDICINE

## 2019-01-01 PROCEDURE — 80048 BASIC METABOLIC PNL TOTAL CA: CPT | Performed by: NURSE PRACTITIONER

## 2019-01-01 PROCEDURE — 80048 BASIC METABOLIC PNL TOTAL CA: CPT | Performed by: INTERNAL MEDICINE

## 2019-01-01 PROCEDURE — 74176 CT ABD & PELVIS W/O CONTRAST: CPT

## 2019-01-01 PROCEDURE — 81002 URINALYSIS NONAUTO W/O SCOPE: CPT | Performed by: PHYSICIAN ASSISTANT

## 2019-01-01 PROCEDURE — 80053 COMPREHEN METABOLIC PANEL: CPT | Performed by: INTERNAL MEDICINE

## 2019-01-01 PROCEDURE — 99214 OFFICE O/P EST MOD 30 MIN: CPT | Performed by: INTERNAL MEDICINE

## 2019-01-01 PROCEDURE — 99232 SBSQ HOSP IP/OBS MODERATE 35: CPT | Performed by: INTERNAL MEDICINE

## 2019-01-01 PROCEDURE — NC001 PR NO CHARGE: Performed by: INTERNAL MEDICINE

## 2019-01-01 PROCEDURE — 85025 COMPLETE CBC W/AUTO DIFF WBC: CPT | Performed by: EMERGENCY MEDICINE

## 2019-01-01 PROCEDURE — 84481 FREE ASSAY (FT-3): CPT | Performed by: INTERNAL MEDICINE

## 2019-01-01 PROCEDURE — 96413 CHEMO IV INFUSION 1 HR: CPT

## 2019-01-01 PROCEDURE — 96374 THER/PROPH/DIAG INJ IV PUSH: CPT

## 2019-01-01 PROCEDURE — 93005 ELECTROCARDIOGRAM TRACING: CPT

## 2019-01-01 PROCEDURE — 84165 PROTEIN E-PHORESIS SERUM: CPT | Performed by: PATHOLOGY

## 2019-01-01 PROCEDURE — 80053 COMPREHEN METABOLIC PANEL: CPT

## 2019-01-01 PROCEDURE — 93975 VASCULAR STUDY: CPT | Performed by: INTERNAL MEDICINE

## 2019-01-01 PROCEDURE — 99233 SBSQ HOSP IP/OBS HIGH 50: CPT | Performed by: INTERNAL MEDICINE

## 2019-01-01 PROCEDURE — 94760 N-INVAS EAR/PLS OXIMETRY 1: CPT

## 2019-01-01 PROCEDURE — 80053 COMPREHEN METABOLIC PANEL: CPT | Performed by: EMERGENCY MEDICINE

## 2019-01-01 PROCEDURE — 99284 EMERGENCY DEPT VISIT MOD MDM: CPT | Performed by: PHYSICIAN ASSISTANT

## 2019-01-01 PROCEDURE — 82948 REAGENT STRIP/BLOOD GLUCOSE: CPT

## 2019-01-01 PROCEDURE — 80061 LIPID PANEL: CPT | Performed by: NURSE PRACTITIONER

## 2019-01-01 PROCEDURE — 80048 BASIC METABOLIC PNL TOTAL CA: CPT | Performed by: EMERGENCY MEDICINE

## 2019-01-01 PROCEDURE — 96375 TX/PRO/DX INJ NEW DRUG ADDON: CPT

## 2019-01-01 PROCEDURE — 94761 N-INVAS EAR/PLS OXIMETRY MLT: CPT

## 2019-01-01 PROCEDURE — 99238 HOSP IP/OBS DSCHRG MGMT 30/<: CPT | Performed by: INTERNAL MEDICINE

## 2019-01-01 PROCEDURE — 99215 OFFICE O/P EST HI 40 MIN: CPT | Performed by: INTERNAL MEDICINE

## 2019-01-01 PROCEDURE — 86850 RBC ANTIBODY SCREEN: CPT | Performed by: EMERGENCY MEDICINE

## 2019-01-01 PROCEDURE — 85014 HEMATOCRIT: CPT | Performed by: INTERNAL MEDICINE

## 2019-01-01 PROCEDURE — 85018 HEMOGLOBIN: CPT | Performed by: INTERNAL MEDICINE

## 2019-01-01 PROCEDURE — 99204 OFFICE O/P NEW MOD 45 MIN: CPT | Performed by: INTERNAL MEDICINE

## 2019-01-01 PROCEDURE — 96368 THER/DIAG CONCURRENT INF: CPT

## 2019-01-01 PROCEDURE — 85610 PROTHROMBIN TIME: CPT | Performed by: INTERNAL MEDICINE

## 2019-01-01 PROCEDURE — 99223 1ST HOSP IP/OBS HIGH 75: CPT | Performed by: NURSE PRACTITIONER

## 2019-01-01 PROCEDURE — 83880 ASSAY OF NATRIURETIC PEPTIDE: CPT | Performed by: INTERNAL MEDICINE

## 2019-01-01 PROCEDURE — 96376 TX/PRO/DX INJ SAME DRUG ADON: CPT

## 2019-01-01 PROCEDURE — 1101F PT FALLS ASSESS-DOCD LE1/YR: CPT | Performed by: INTERNAL MEDICINE

## 2019-01-01 PROCEDURE — 99239 HOSP IP/OBS DSCHRG MGMT >30: CPT | Performed by: INTERNAL MEDICINE

## 2019-01-01 PROCEDURE — 84484 ASSAY OF TROPONIN QUANT: CPT

## 2019-01-01 PROCEDURE — 71250 CT THORAX DX C-: CPT

## 2019-01-01 PROCEDURE — 84443 ASSAY THYROID STIM HORMONE: CPT | Performed by: INTERNAL MEDICINE

## 2019-01-01 PROCEDURE — 84165 PROTEIN E-PHORESIS SERUM: CPT

## 2019-01-01 PROCEDURE — 0D568ZZ DESTRUCTION OF STOMACH, VIA NATURAL OR ARTIFICIAL OPENING ENDOSCOPIC: ICD-10-PCS | Performed by: INTERNAL MEDICINE

## 2019-01-01 PROCEDURE — 84484 ASSAY OF TROPONIN QUANT: CPT | Performed by: NURSE PRACTITIONER

## 2019-01-01 PROCEDURE — 3074F SYST BP LT 130 MM HG: CPT | Performed by: INTERNAL MEDICINE

## 2019-01-01 PROCEDURE — A9552 F18 FDG: HCPCS

## 2019-01-01 PROCEDURE — 83735 ASSAY OF MAGNESIUM: CPT | Performed by: INTERNAL MEDICINE

## 2019-01-01 PROCEDURE — C9113 INJ PANTOPRAZOLE SODIUM, VIA: HCPCS | Performed by: EMERGENCY MEDICINE

## 2019-01-01 PROCEDURE — G8979 MOBILITY GOAL STATUS: HCPCS

## 2019-01-01 PROCEDURE — 71045 X-RAY EXAM CHEST 1 VIEW: CPT

## 2019-01-01 PROCEDURE — 36415 COLL VENOUS BLD VENIPUNCTURE: CPT

## 2019-01-01 PROCEDURE — 85007 BL SMEAR W/DIFF WBC COUNT: CPT | Performed by: NURSE PRACTITIONER

## 2019-01-01 PROCEDURE — 31500 INSERT EMERGENCY AIRWAY: CPT | Performed by: INTERNAL MEDICINE

## 2019-01-01 PROCEDURE — G8988 SELF CARE GOAL STATUS: HCPCS

## 2019-01-01 PROCEDURE — 71275 CT ANGIOGRAPHY CHEST: CPT

## 2019-01-01 PROCEDURE — 3078F DIAST BP <80 MM HG: CPT | Performed by: INTERNAL MEDICINE

## 2019-01-01 PROCEDURE — 85027 COMPLETE CBC AUTOMATED: CPT | Performed by: INTERNAL MEDICINE

## 2019-01-01 PROCEDURE — 94150 VITAL CAPACITY TEST: CPT

## 2019-01-01 PROCEDURE — 84100 ASSAY OF PHOSPHORUS: CPT | Performed by: INTERNAL MEDICINE

## 2019-01-01 PROCEDURE — 96366 THER/PROPH/DIAG IV INF ADDON: CPT

## 2019-01-01 PROCEDURE — 99231 SBSQ HOSP IP/OBS SF/LOW 25: CPT | Performed by: PHYSICIAN ASSISTANT

## 2019-01-01 PROCEDURE — 99285 EMERGENCY DEPT VISIT HI MDM: CPT | Performed by: PHYSICIAN ASSISTANT

## 2019-01-01 PROCEDURE — 81002 URINALYSIS NONAUTO W/O SCOPE: CPT | Performed by: INTERNAL MEDICINE

## 2019-01-01 PROCEDURE — 5A1935Z RESPIRATORY VENTILATION, LESS THAN 24 CONSECUTIVE HOURS: ICD-10-PCS | Performed by: INTERNAL MEDICINE

## 2019-01-01 PROCEDURE — 99232 SBSQ HOSP IP/OBS MODERATE 35: CPT | Performed by: NURSE PRACTITIONER

## 2019-01-01 PROCEDURE — 93306 TTE W/DOPPLER COMPLETE: CPT | Performed by: INTERNAL MEDICINE

## 2019-01-01 PROCEDURE — 83735 ASSAY OF MAGNESIUM: CPT | Performed by: NURSE PRACTITIONER

## 2019-01-01 PROCEDURE — 93306 TTE W/DOPPLER COMPLETE: CPT

## 2019-01-01 PROCEDURE — G8978 MOBILITY CURRENT STATUS: HCPCS

## 2019-01-01 PROCEDURE — 85025 COMPLETE CBC W/AUTO DIFF WBC: CPT

## 2019-01-01 PROCEDURE — 99495 TRANSJ CARE MGMT MOD F2F 14D: CPT | Performed by: INTERNAL MEDICINE

## 2019-01-01 PROCEDURE — 83690 ASSAY OF LIPASE: CPT | Performed by: EMERGENCY MEDICINE

## 2019-01-01 PROCEDURE — 77080 DXA BONE DENSITY AXIAL: CPT

## 2019-01-01 PROCEDURE — 99223 1ST HOSP IP/OBS HIGH 75: CPT | Performed by: INTERNAL MEDICINE

## 2019-01-01 PROCEDURE — G8989 SELF CARE D/C STATUS: HCPCS

## 2019-01-01 PROCEDURE — 99222 1ST HOSP IP/OBS MODERATE 55: CPT | Performed by: UROLOGY

## 2019-01-01 PROCEDURE — 84439 ASSAY OF FREE THYROXINE: CPT | Performed by: INTERNAL MEDICINE

## 2019-01-01 PROCEDURE — 85014 HEMATOCRIT: CPT

## 2019-01-01 PROCEDURE — G8980 MOBILITY D/C STATUS: HCPCS

## 2019-01-01 PROCEDURE — G8987 SELF CARE CURRENT STATUS: HCPCS

## 2019-01-01 PROCEDURE — 99291 CRITICAL CARE FIRST HOUR: CPT | Performed by: EMERGENCY MEDICINE

## 2019-01-01 PROCEDURE — 84166 PROTEIN E-PHORESIS/URINE/CSF: CPT

## 2019-01-01 PROCEDURE — 84145 PROCALCITONIN (PCT): CPT | Performed by: NURSE PRACTITIONER

## 2019-01-01 PROCEDURE — 93975 VASCULAR STUDY: CPT

## 2019-01-01 PROCEDURE — 85025 COMPLETE CBC W/AUTO DIFF WBC: CPT | Performed by: NURSE PRACTITIONER

## 2019-01-01 PROCEDURE — 99496 TRANSJ CARE MGMT HIGH F2F 7D: CPT | Performed by: INTERNAL MEDICINE

## 2019-01-01 PROCEDURE — 74175 CTA ABDOMEN W/CONTRAST: CPT

## 2019-01-01 PROCEDURE — 99222 1ST HOSP IP/OBS MODERATE 55: CPT | Performed by: INTERNAL MEDICINE

## 2019-01-01 PROCEDURE — 84166 PROTEIN E-PHORESIS/URINE/CSF: CPT | Performed by: PATHOLOGY

## 2019-01-01 PROCEDURE — 78815 PET IMAGE W/CT SKULL-THIGH: CPT

## 2019-01-01 PROCEDURE — 85049 AUTOMATED PLATELET COUNT: CPT | Performed by: NURSE PRACTITIONER

## 2019-01-01 PROCEDURE — 86900 BLOOD TYPING SEROLOGIC ABO: CPT | Performed by: EMERGENCY MEDICINE

## 2019-01-01 PROCEDURE — 85730 THROMBOPLASTIN TIME PARTIAL: CPT

## 2019-01-01 PROCEDURE — 85610 PROTHROMBIN TIME: CPT

## 2019-01-01 PROCEDURE — 43270 EGD LESION ABLATION: CPT | Performed by: INTERNAL MEDICINE

## 2019-01-01 PROCEDURE — 99291 CRITICAL CARE FIRST HOUR: CPT | Performed by: INTERNAL MEDICINE

## 2019-01-01 PROCEDURE — 99214 OFFICE O/P EST MOD 30 MIN: CPT | Performed by: RADIOLOGY

## 2019-01-01 PROCEDURE — 85730 THROMBOPLASTIN TIME PARTIAL: CPT | Performed by: INTERNAL MEDICINE

## 2019-01-01 PROCEDURE — 97163 PT EVAL HIGH COMPLEX 45 MIN: CPT

## 2019-01-01 PROCEDURE — 85027 COMPLETE CBC AUTOMATED: CPT | Performed by: NURSE PRACTITIONER

## 2019-01-01 PROCEDURE — 93880 EXTRACRANIAL BILAT STUDY: CPT

## 2019-01-01 PROCEDURE — 96360 HYDRATION IV INFUSION INIT: CPT

## 2019-01-01 PROCEDURE — 99213 OFFICE O/P EST LOW 20 MIN: CPT | Performed by: NURSE PRACTITIONER

## 2019-01-01 PROCEDURE — 86901 BLOOD TYPING SEROLOGIC RH(D): CPT | Performed by: EMERGENCY MEDICINE

## 2019-01-01 PROCEDURE — NC001 PR NO CHARGE: Performed by: SURGERY

## 2019-01-01 PROCEDURE — 99214 OFFICE O/P EST MOD 30 MIN: CPT | Performed by: PHYSICIAN ASSISTANT

## 2019-01-01 PROCEDURE — 0BH17EZ INSERTION OF ENDOTRACHEAL AIRWAY INTO TRACHEA, VIA NATURAL OR ARTIFICIAL OPENING: ICD-10-PCS | Performed by: INTERNAL MEDICINE

## 2019-01-01 PROCEDURE — 83883 ASSAY NEPHELOMETRY NOT SPEC: CPT

## 2019-01-01 PROCEDURE — 3008F BODY MASS INDEX DOCD: CPT | Performed by: INTERNAL MEDICINE

## 2019-01-01 RX ORDER — LANOLIN ALCOHOL/MO/W.PET/CERES
3 CREAM (GRAM) TOPICAL
Status: DISCONTINUED | OUTPATIENT
Start: 2019-01-01 | End: 2019-01-01 | Stop reason: HOSPADM

## 2019-01-01 RX ORDER — ALPRAZOLAM 0.25 MG/1
0.25 TABLET ORAL
Status: DISCONTINUED | OUTPATIENT
Start: 2019-01-01 | End: 2019-01-01 | Stop reason: HOSPADM

## 2019-01-01 RX ORDER — ATROPINE SULFATE 0.1 MG/ML
0.5 INJECTION INTRAVENOUS ONCE
Status: COMPLETED | OUTPATIENT
Start: 2019-01-01 | End: 2019-01-01

## 2019-01-01 RX ORDER — FENTANYL CITRATE/PF 50 MCG/ML
25 SYRINGE (ML) INJECTION EVERY 4 HOURS PRN
Status: DISCONTINUED | OUTPATIENT
Start: 2019-01-01 | End: 2019-01-01

## 2019-01-01 RX ORDER — LABETALOL 20 MG/4 ML (5 MG/ML) INTRAVENOUS SYRINGE
5 AS NEEDED
Status: DISCONTINUED | OUTPATIENT
Start: 2019-01-01 | End: 2019-01-01 | Stop reason: HOSPADM

## 2019-01-01 RX ORDER — FENTANYL CITRATE/PF 50 MCG/ML
25 SYRINGE (ML) INJECTION
Status: DISCONTINUED | OUTPATIENT
Start: 2019-01-01 | End: 2019-01-01 | Stop reason: HOSPADM

## 2019-01-01 RX ORDER — METOCLOPRAMIDE HYDROCHLORIDE 5 MG/ML
10 INJECTION INTRAMUSCULAR; INTRAVENOUS ONCE AS NEEDED
Status: DISCONTINUED | OUTPATIENT
Start: 2019-01-01 | End: 2019-01-01 | Stop reason: HOSPADM

## 2019-01-01 RX ORDER — LEVOTHYROXINE SODIUM 0.07 MG/1
150 TABLET ORAL
Status: DISCONTINUED | OUTPATIENT
Start: 2019-01-01 | End: 2019-01-01

## 2019-01-01 RX ORDER — METOPROLOL SUCCINATE 50 MG/1
50 TABLET, EXTENDED RELEASE ORAL 2 TIMES DAILY
Status: CANCELLED | OUTPATIENT
Start: 2019-01-01

## 2019-01-01 RX ORDER — TRAMADOL HYDROCHLORIDE 50 MG/1
50 TABLET ORAL ONCE
Status: COMPLETED | OUTPATIENT
Start: 2019-01-01 | End: 2019-01-01

## 2019-01-01 RX ORDER — PREDNISONE 10 MG/1
TABLET ORAL
Qty: 26 TABLET | Refills: 0 | Status: SHIPPED | OUTPATIENT
Start: 2019-01-01

## 2019-01-01 RX ORDER — GINSENG 100 MG
30 CAPSULE ORAL DAILY
Status: DISCONTINUED | OUTPATIENT
Start: 2019-01-01 | End: 2019-01-01 | Stop reason: HOSPADM

## 2019-01-01 RX ORDER — POTASSIUM CHLORIDE 20 MEQ/1
40 TABLET, EXTENDED RELEASE ORAL ONCE
Status: COMPLETED | OUTPATIENT
Start: 2019-01-01 | End: 2019-01-01

## 2019-01-01 RX ORDER — SIMETHICONE 80 MG
80 TABLET,CHEWABLE ORAL EVERY 6 HOURS PRN
Status: DISCONTINUED | OUTPATIENT
Start: 2019-01-01 | End: 2019-01-01

## 2019-01-01 RX ORDER — TRAMADOL HYDROCHLORIDE 50 MG/1
50 TABLET ORAL EVERY 12 HOURS PRN
Qty: 60 TABLET | Refills: 0 | Status: SHIPPED | OUTPATIENT
Start: 2019-01-01 | End: 2019-01-01 | Stop reason: HOSPADM

## 2019-01-01 RX ORDER — TRAMADOL HYDROCHLORIDE 50 MG/1
50 TABLET ORAL 2 TIMES DAILY PRN
Status: DISCONTINUED | OUTPATIENT
Start: 2019-01-01 | End: 2019-01-01 | Stop reason: HOSPADM

## 2019-01-01 RX ORDER — ASPIRIN 325 MG
325 TABLET, DELAYED RELEASE (ENTERIC COATED) ORAL DAILY
Status: DISCONTINUED | OUTPATIENT
Start: 2019-01-01 | End: 2019-01-01 | Stop reason: HOSPADM

## 2019-01-01 RX ORDER — PROPOFOL 10 MG/ML
INJECTION, EMULSION INTRAVENOUS AS NEEDED
Status: DISCONTINUED | OUTPATIENT
Start: 2019-01-01 | End: 2019-01-01 | Stop reason: SURG

## 2019-01-01 RX ORDER — ACETAMINOPHEN 325 MG/1
650 TABLET ORAL EVERY 6 HOURS PRN
Status: DISCONTINUED | OUTPATIENT
Start: 2019-01-01 | End: 2019-01-01 | Stop reason: HOSPADM

## 2019-01-01 RX ORDER — MAGNESIUM SULFATE HEPTAHYDRATE 40 MG/ML
2 INJECTION, SOLUTION INTRAVENOUS ONCE
Status: COMPLETED | OUTPATIENT
Start: 2019-01-01 | End: 2019-01-01

## 2019-01-01 RX ORDER — PREDNISONE 10 MG/1
TABLET ORAL
Qty: 5 TABLET | Refills: 2 | Status: SHIPPED | OUTPATIENT
Start: 2019-01-01 | End: 2019-01-01 | Stop reason: DRUGHIGH

## 2019-01-01 RX ORDER — ATORVASTATIN CALCIUM 40 MG/1
40 TABLET, FILM COATED ORAL
Status: DISCONTINUED | OUTPATIENT
Start: 2019-01-01 | End: 2019-01-01 | Stop reason: HOSPADM

## 2019-01-01 RX ORDER — CHLORAL HYDRATE 500 MG
1000 CAPSULE ORAL DAILY
Status: DISCONTINUED | OUTPATIENT
Start: 2019-01-01 | End: 2019-01-01 | Stop reason: HOSPADM

## 2019-01-01 RX ORDER — FENTANYL CITRATE 50 UG/ML
25 INJECTION, SOLUTION INTRAMUSCULAR; INTRAVENOUS ONCE
Status: DISCONTINUED | OUTPATIENT
Start: 2019-01-01 | End: 2019-01-01

## 2019-01-01 RX ORDER — LISINOPRIL 10 MG/1
TABLET ORAL
Qty: 90 TABLET | Refills: 1 | Status: SHIPPED | OUTPATIENT
Start: 2019-01-01 | End: 2019-01-01 | Stop reason: DRUGHIGH

## 2019-01-01 RX ORDER — SODIUM CHLORIDE 9 MG/ML
20 INJECTION, SOLUTION INTRAVENOUS CONTINUOUS
Status: DISCONTINUED | OUTPATIENT
Start: 2019-01-01 | End: 2019-01-01 | Stop reason: HOSPADM

## 2019-01-01 RX ORDER — AMLODIPINE BESYLATE 5 MG/1
10 TABLET ORAL DAILY
Status: DISCONTINUED | OUTPATIENT
Start: 2019-01-01 | End: 2019-01-01 | Stop reason: HOSPADM

## 2019-01-01 RX ORDER — ALPRAZOLAM 0.5 MG/1
0.5 TABLET ORAL 2 TIMES DAILY PRN
Status: DISCONTINUED | OUTPATIENT
Start: 2019-01-01 | End: 2019-01-01

## 2019-01-01 RX ORDER — AMLODIPINE BESYLATE 5 MG/1
5 TABLET ORAL EVERY 12 HOURS SCHEDULED
Status: DISCONTINUED | OUTPATIENT
Start: 2019-01-01 | End: 2019-01-01 | Stop reason: HOSPADM

## 2019-01-01 RX ORDER — FENTANYL CITRATE 50 UG/ML
25 INJECTION, SOLUTION INTRAMUSCULAR; INTRAVENOUS ONCE
Status: COMPLETED | OUTPATIENT
Start: 2019-01-01 | End: 2019-01-01

## 2019-01-01 RX ORDER — LABETALOL 20 MG/4 ML (5 MG/ML) INTRAVENOUS SYRINGE
10 EVERY 2 HOUR PRN
Status: DISCONTINUED | OUTPATIENT
Start: 2019-01-01 | End: 2019-01-01 | Stop reason: HOSPADM

## 2019-01-01 RX ORDER — PANTOPRAZOLE SODIUM 40 MG/1
40 TABLET, DELAYED RELEASE ORAL
Status: CANCELLED | OUTPATIENT
Start: 2019-01-01

## 2019-01-01 RX ORDER — PANTOPRAZOLE SODIUM 40 MG/1
40 TABLET, DELAYED RELEASE ORAL
Status: DISCONTINUED | OUTPATIENT
Start: 2019-01-01 | End: 2019-01-01 | Stop reason: HOSPADM

## 2019-01-01 RX ORDER — PREDNISONE 10 MG/1
10 TABLET ORAL DAILY
Status: DISCONTINUED | OUTPATIENT
Start: 2019-01-01 | End: 2019-01-01 | Stop reason: HOSPADM

## 2019-01-01 RX ORDER — CALCIUM CARBONATE 200(500)MG
750 TABLET,CHEWABLE ORAL 3 TIMES DAILY PRN
Status: DISCONTINUED | OUTPATIENT
Start: 2019-01-01 | End: 2019-01-01 | Stop reason: HOSPADM

## 2019-01-01 RX ORDER — HEPARIN SODIUM 5000 [USP'U]/ML
5000 INJECTION, SOLUTION INTRAVENOUS; SUBCUTANEOUS EVERY 8 HOURS SCHEDULED
Status: DISCONTINUED | OUTPATIENT
Start: 2019-01-01 | End: 2019-01-01 | Stop reason: HOSPADM

## 2019-01-01 RX ORDER — HYDRALAZINE HYDROCHLORIDE 20 MG/ML
10 INJECTION INTRAMUSCULAR; INTRAVENOUS EVERY 6 HOURS PRN
Status: DISCONTINUED | OUTPATIENT
Start: 2019-01-01 | End: 2019-01-01 | Stop reason: HOSPADM

## 2019-01-01 RX ORDER — TRAMADOL HYDROCHLORIDE 50 MG/1
50 TABLET ORAL EVERY 8 HOURS SCHEDULED
Status: DISCONTINUED | OUTPATIENT
Start: 2019-01-01 | End: 2019-01-01

## 2019-01-01 RX ORDER — HYDROMORPHONE HCL/PF 1 MG/ML
0.5 SYRINGE (ML) INJECTION ONCE
Status: COMPLETED | OUTPATIENT
Start: 2019-01-01 | End: 2019-01-01

## 2019-01-01 RX ORDER — ATORVASTATIN CALCIUM 40 MG/1
40 TABLET, FILM COATED ORAL DAILY
Qty: 90 TABLET | Refills: 3 | Status: CANCELLED | OUTPATIENT
Start: 2019-01-01

## 2019-01-01 RX ORDER — POTASSIUM CHLORIDE 750 MG/1
10 TABLET, EXTENDED RELEASE ORAL DAILY
Qty: 30 TABLET | Refills: 1
Start: 2019-01-01 | End: 2019-01-01

## 2019-01-01 RX ORDER — SODIUM CHLORIDE 9 MG/ML
20 INJECTION, SOLUTION INTRAVENOUS CONTINUOUS
Status: DISPENSED | OUTPATIENT
Start: 2019-01-01 | End: 2019-01-01

## 2019-01-01 RX ORDER — ALBUTEROL SULFATE 2.5 MG/3ML
2.5 SOLUTION RESPIRATORY (INHALATION) 2 TIMES DAILY
Qty: 120 VIAL | Refills: 11 | Status: SHIPPED | OUTPATIENT
Start: 2019-01-01

## 2019-01-01 RX ORDER — ATORVASTATIN CALCIUM 40 MG/1
40 TABLET, FILM COATED ORAL DAILY
Status: DISCONTINUED | OUTPATIENT
Start: 2019-01-01 | End: 2019-01-01 | Stop reason: HOSPADM

## 2019-01-01 RX ORDER — METOPROLOL TARTRATE 5 MG/5ML
5 INJECTION INTRAVENOUS ONCE
Status: COMPLETED | OUTPATIENT
Start: 2019-01-01 | End: 2019-01-01

## 2019-01-01 RX ORDER — POTASSIUM CHLORIDE 20 MEQ/1
20 TABLET, EXTENDED RELEASE ORAL DAILY
Status: COMPLETED | OUTPATIENT
Start: 2019-01-01 | End: 2019-01-01

## 2019-01-01 RX ORDER — TRAMADOL HYDROCHLORIDE 50 MG/1
50 TABLET ORAL EVERY 6 HOURS PRN
Qty: 10 TABLET | Refills: 0 | Status: SHIPPED | OUTPATIENT
Start: 2019-01-01 | End: 2019-01-01 | Stop reason: HOSPADM

## 2019-01-01 RX ORDER — LABETALOL 20 MG/4 ML (5 MG/ML) INTRAVENOUS SYRINGE
10 ONCE
Status: COMPLETED | OUTPATIENT
Start: 2019-01-01 | End: 2019-01-01

## 2019-01-01 RX ORDER — ONDANSETRON 2 MG/ML
4 INJECTION INTRAMUSCULAR; INTRAVENOUS EVERY 6 HOURS PRN
Status: DISCONTINUED | OUTPATIENT
Start: 2019-01-01 | End: 2019-01-01 | Stop reason: HOSPADM

## 2019-01-01 RX ORDER — AMLODIPINE BESYLATE 10 MG/1
10 TABLET ORAL DAILY
COMMUNITY
End: 2019-01-01

## 2019-01-01 RX ORDER — METOPROLOL SUCCINATE 50 MG/1
50 TABLET, EXTENDED RELEASE ORAL 2 TIMES DAILY
Status: DISCONTINUED | OUTPATIENT
Start: 2019-01-01 | End: 2019-01-01 | Stop reason: HOSPADM

## 2019-01-01 RX ORDER — SODIUM CHLORIDE 9 MG/ML
500 INJECTION, SOLUTION INTRAVENOUS ONCE
Status: COMPLETED | OUTPATIENT
Start: 2019-01-01 | End: 2019-01-01

## 2019-01-01 RX ORDER — FENTANYL CITRATE 50 UG/ML
INJECTION, SOLUTION INTRAMUSCULAR; INTRAVENOUS
Status: DISPENSED
Start: 2019-01-01 | End: 2019-01-01

## 2019-01-01 RX ORDER — POTASSIUM CHLORIDE 14.9 MG/ML
20 INJECTION INTRAVENOUS ONCE
Status: DISCONTINUED | OUTPATIENT
Start: 2019-01-01 | End: 2019-01-01

## 2019-01-01 RX ORDER — METOPROLOL SUCCINATE 50 MG/1
100 TABLET, EXTENDED RELEASE ORAL DAILY
Status: DISCONTINUED | OUTPATIENT
Start: 2019-01-01 | End: 2019-01-01

## 2019-01-01 RX ORDER — IPRATROPIUM BROMIDE AND ALBUTEROL SULFATE 2.5; .5 MG/3ML; MG/3ML
3 SOLUTION RESPIRATORY (INHALATION) ONCE
Status: COMPLETED | OUTPATIENT
Start: 2019-01-01 | End: 2019-01-01

## 2019-01-01 RX ORDER — FENTANYL CITRATE 50 UG/ML
50 INJECTION, SOLUTION INTRAMUSCULAR; INTRAVENOUS ONCE
Status: COMPLETED | OUTPATIENT
Start: 2019-01-01 | End: 2019-01-01

## 2019-01-01 RX ORDER — METHYLPREDNISOLONE SODIUM SUCCINATE 40 MG/ML
40 INJECTION, POWDER, LYOPHILIZED, FOR SOLUTION INTRAMUSCULAR; INTRAVENOUS ONCE
Status: COMPLETED | OUTPATIENT
Start: 2019-01-01 | End: 2019-01-01

## 2019-01-01 RX ORDER — HYDRALAZINE HYDROCHLORIDE 20 MG/ML
5 INJECTION INTRAMUSCULAR; INTRAVENOUS AS NEEDED
Status: DISCONTINUED | OUTPATIENT
Start: 2019-01-01 | End: 2019-01-01 | Stop reason: HOSPADM

## 2019-01-01 RX ORDER — PANTOPRAZOLE SODIUM 40 MG/1
40 TABLET, DELAYED RELEASE ORAL
Qty: 60 TABLET | Refills: 0 | Status: SHIPPED | OUTPATIENT
Start: 2019-01-01 | End: 2019-07-04

## 2019-01-01 RX ORDER — METOPROLOL SUCCINATE 50 MG/1
50 TABLET, EXTENDED RELEASE ORAL 2 TIMES DAILY
Status: DISCONTINUED | OUTPATIENT
Start: 2019-01-01 | End: 2019-01-01

## 2019-01-01 RX ORDER — AMLODIPINE BESYLATE 5 MG/1
5 TABLET ORAL DAILY
Status: DISCONTINUED | OUTPATIENT
Start: 2019-01-01 | End: 2019-01-01

## 2019-01-01 RX ORDER — SODIUM CHLORIDE 9 MG/ML
40 INJECTION, SOLUTION INTRAVENOUS ONCE
Status: DISCONTINUED | OUTPATIENT
Start: 2019-01-01 | End: 2019-01-01

## 2019-01-01 RX ORDER — LANOLIN ALCOHOL/MO/W.PET/CERES
6 CREAM (GRAM) TOPICAL
Status: DISCONTINUED | OUTPATIENT
Start: 2019-01-01 | End: 2019-01-01 | Stop reason: HOSPADM

## 2019-01-01 RX ORDER — ONDANSETRON 4 MG/1
4 TABLET, FILM COATED ORAL EVERY 6 HOURS PRN
Qty: 20 TABLET | Refills: 0 | Status: SHIPPED | OUTPATIENT
Start: 2019-01-01

## 2019-01-01 RX ORDER — FENTANYL CITRATE 50 UG/ML
25 INJECTION, SOLUTION INTRAMUSCULAR; INTRAVENOUS EVERY 4 HOURS PRN
Status: DISCONTINUED | OUTPATIENT
Start: 2019-01-01 | End: 2019-01-01 | Stop reason: HOSPADM

## 2019-01-01 RX ORDER — PANTOPRAZOLE SODIUM 40 MG/1
40 INJECTION, POWDER, FOR SOLUTION INTRAVENOUS ONCE
Status: COMPLETED | OUTPATIENT
Start: 2019-01-01 | End: 2019-01-01

## 2019-01-01 RX ORDER — ONDANSETRON 2 MG/ML
INJECTION INTRAMUSCULAR; INTRAVENOUS
Status: COMPLETED
Start: 2019-01-01 | End: 2019-01-01

## 2019-01-01 RX ORDER — POTASSIUM CHLORIDE 20 MEQ/1
20 TABLET, EXTENDED RELEASE ORAL 2 TIMES DAILY WITH MEALS
Qty: 60 TABLET | Refills: 3 | Status: SHIPPED | OUTPATIENT
Start: 2019-01-01 | End: 2019-01-01

## 2019-01-01 RX ORDER — LABETALOL 20 MG/4 ML (5 MG/ML) INTRAVENOUS SYRINGE
20 EVERY 4 HOURS PRN
Status: DISCONTINUED | OUTPATIENT
Start: 2019-01-01 | End: 2019-01-01 | Stop reason: HOSPADM

## 2019-01-01 RX ORDER — LEVOFLOXACIN 500 MG/1
500 TABLET, FILM COATED ORAL EVERY 24 HOURS
Qty: 17 TABLET | Refills: 0 | Status: SHIPPED | OUTPATIENT
Start: 2019-01-01 | End: 2019-01-01

## 2019-01-01 RX ORDER — SODIUM CHLORIDE 9 MG/ML
500 INJECTION, SOLUTION INTRAVENOUS CONTINUOUS
Status: DISPENSED | OUTPATIENT
Start: 2019-01-01 | End: 2019-01-01

## 2019-01-01 RX ORDER — METOPROLOL TARTRATE 5 MG/5ML
5 INJECTION INTRAVENOUS EVERY 6 HOURS PRN
Status: DISCONTINUED | OUTPATIENT
Start: 2019-01-01 | End: 2019-01-01

## 2019-01-01 RX ORDER — ALBUTEROL SULFATE 2.5 MG/3ML
2.5 SOLUTION RESPIRATORY (INHALATION) EVERY 4 HOURS PRN
Status: DISCONTINUED | OUTPATIENT
Start: 2019-01-01 | End: 2019-01-01 | Stop reason: HOSPADM

## 2019-01-01 RX ORDER — PROMETHAZINE HYDROCHLORIDE 25 MG/ML
6.25 INJECTION, SOLUTION INTRAMUSCULAR; INTRAVENOUS ONCE AS NEEDED
Status: DISCONTINUED | OUTPATIENT
Start: 2019-01-01 | End: 2019-01-01 | Stop reason: HOSPADM

## 2019-01-01 RX ORDER — PROMETHAZINE HYDROCHLORIDE 25 MG/ML
12.5 INJECTION, SOLUTION INTRAMUSCULAR; INTRAVENOUS EVERY 6 HOURS PRN
Status: DISCONTINUED | OUTPATIENT
Start: 2019-01-01 | End: 2019-01-01 | Stop reason: HOSPADM

## 2019-01-01 RX ORDER — SODIUM CHLORIDE 9 MG/ML
50 INJECTION, SOLUTION INTRAVENOUS CONTINUOUS
Status: DISCONTINUED | OUTPATIENT
Start: 2019-01-01 | End: 2019-01-01

## 2019-01-01 RX ORDER — TRAMADOL HYDROCHLORIDE 50 MG/1
50 TABLET ORAL EVERY 6 HOURS PRN
Qty: 30 TABLET | Refills: 2 | Status: SHIPPED | OUTPATIENT
Start: 2019-01-01 | End: 2019-01-01

## 2019-01-01 RX ORDER — LORAZEPAM 2 MG/ML
0.25 INJECTION INTRAMUSCULAR ONCE
Status: COMPLETED | OUTPATIENT
Start: 2019-01-01 | End: 2019-01-01

## 2019-01-01 RX ORDER — IPRATROPIUM BROMIDE AND ALBUTEROL SULFATE 2.5; .5 MG/3ML; MG/3ML
3 SOLUTION RESPIRATORY (INHALATION)
Status: DISCONTINUED | OUTPATIENT
Start: 2019-01-01 | End: 2019-01-01 | Stop reason: HOSPADM

## 2019-01-01 RX ORDER — LEVOTHYROXINE SODIUM 175 UG/1
175 TABLET ORAL
Qty: 90 TABLET | Refills: 1 | Status: SHIPPED | OUTPATIENT
Start: 2019-01-01

## 2019-01-01 RX ORDER — METHYLPREDNISOLONE SODIUM SUCCINATE 40 MG/ML
40 INJECTION, POWDER, LYOPHILIZED, FOR SOLUTION INTRAMUSCULAR; INTRAVENOUS EVERY 8 HOURS
Status: DISCONTINUED | OUTPATIENT
Start: 2019-01-01 | End: 2019-01-01

## 2019-01-01 RX ORDER — LABETALOL 20 MG/4 ML (5 MG/ML) INTRAVENOUS SYRINGE
Status: COMPLETED
Start: 2019-01-01 | End: 2019-01-01

## 2019-01-01 RX ORDER — NALOXONE HYDROCHLORIDE 0.4 MG/ML
INJECTION, SOLUTION INTRAMUSCULAR; INTRAVENOUS; SUBCUTANEOUS
Status: DISCONTINUED
Start: 2019-01-01 | End: 2019-01-01 | Stop reason: WASHOUT

## 2019-01-01 RX ORDER — LEVOFLOXACIN 500 MG/1
500 TABLET, FILM COATED ORAL EVERY 24 HOURS
Qty: 7 TABLET | Refills: 0 | Status: SHIPPED | OUTPATIENT
Start: 2019-01-01 | End: 2019-01-01

## 2019-01-01 RX ORDER — LEVOTHYROXINE SODIUM 0.07 MG/1
150 TABLET ORAL
Status: DISCONTINUED | OUTPATIENT
Start: 2019-01-01 | End: 2019-01-01 | Stop reason: HOSPADM

## 2019-01-01 RX ORDER — PREDNISONE 20 MG/1
40 TABLET ORAL DAILY
Status: DISCONTINUED | OUTPATIENT
Start: 2019-01-01 | End: 2019-01-01 | Stop reason: HOSPADM

## 2019-01-01 RX ORDER — POTASSIUM CHLORIDE 14.9 MG/ML
20 INJECTION INTRAVENOUS ONCE
Status: COMPLETED | OUTPATIENT
Start: 2019-01-01 | End: 2019-01-01

## 2019-01-01 RX ORDER — ONDANSETRON 2 MG/ML
4 INJECTION INTRAMUSCULAR; INTRAVENOUS ONCE AS NEEDED
Status: DISCONTINUED | OUTPATIENT
Start: 2019-01-01 | End: 2019-01-01 | Stop reason: HOSPADM

## 2019-01-01 RX ORDER — AMLODIPINE BESYLATE 5 MG/1
5 TABLET ORAL DAILY
Qty: 90 TABLET | Refills: 3 | Status: SHIPPED | OUTPATIENT
Start: 2019-01-01

## 2019-01-01 RX ORDER — METOPROLOL SUCCINATE 50 MG/1
50 TABLET, EXTENDED RELEASE ORAL EVERY 12 HOURS
Status: DISCONTINUED | OUTPATIENT
Start: 2019-01-01 | End: 2019-01-01 | Stop reason: HOSPADM

## 2019-01-01 RX ORDER — POTASSIUM CHLORIDE 20 MEQ/1
20 TABLET, EXTENDED RELEASE ORAL 2 TIMES DAILY WITH MEALS
Status: DISCONTINUED | OUTPATIENT
Start: 2019-01-01 | End: 2019-01-01 | Stop reason: HOSPADM

## 2019-01-01 RX ORDER — ONDANSETRON 2 MG/ML
4 INJECTION INTRAMUSCULAR; INTRAVENOUS EVERY 4 HOURS PRN
Status: DISCONTINUED | OUTPATIENT
Start: 2019-01-01 | End: 2019-01-01 | Stop reason: HOSPADM

## 2019-01-01 RX ORDER — TRAMADOL HYDROCHLORIDE 50 MG/1
50 TABLET ORAL EVERY 12 HOURS PRN
Qty: 60 TABLET | Refills: 2 | Status: ON HOLD | OUTPATIENT
Start: 2019-01-01 | End: 2019-01-01 | Stop reason: SDUPTHER

## 2019-01-01 RX ORDER — DOCUSATE SODIUM 100 MG/1
100 CAPSULE, LIQUID FILLED ORAL 2 TIMES DAILY
Status: DISCONTINUED | OUTPATIENT
Start: 2019-01-01 | End: 2019-01-01 | Stop reason: HOSPADM

## 2019-01-01 RX ORDER — NICARDIPINE HYDROCHLORIDE 2.5 MG/ML
INJECTION INTRAVENOUS
Status: DISPENSED
Start: 2019-01-01 | End: 2019-01-01

## 2019-01-01 RX ORDER — HYDRALAZINE HYDROCHLORIDE 20 MG/ML
10 INJECTION INTRAMUSCULAR; INTRAVENOUS EVERY 6 HOURS PRN
Status: DISCONTINUED | OUTPATIENT
Start: 2019-01-01 | End: 2019-01-01

## 2019-01-01 RX ORDER — HYDROCODONE BITARTRATE AND ACETAMINOPHEN 5; 325 MG/1; MG/1
1 TABLET ORAL EVERY 6 HOURS PRN
Qty: 20 TABLET | Refills: 0 | Status: SHIPPED | OUTPATIENT
Start: 2019-01-01 | End: 2019-01-01 | Stop reason: ALTCHOICE

## 2019-01-01 RX ORDER — SODIUM CHLORIDE 9 MG/ML
20 INJECTION, SOLUTION INTRAVENOUS ONCE
Status: COMPLETED | OUTPATIENT
Start: 2019-01-01 | End: 2019-01-01

## 2019-01-01 RX ORDER — ACETAMINOPHEN 325 MG/1
650 TABLET ORAL ONCE
Status: COMPLETED | OUTPATIENT
Start: 2019-01-01 | End: 2019-01-01

## 2019-01-01 RX ORDER — AMLODIPINE BESYLATE 5 MG/1
5 TABLET ORAL DAILY
Status: CANCELLED | OUTPATIENT
Start: 2019-01-01

## 2019-01-01 RX ORDER — BISACODYL 10 MG
10 SUPPOSITORY, RECTAL RECTAL DAILY PRN
Status: DISCONTINUED | OUTPATIENT
Start: 2019-01-01 | End: 2019-01-01 | Stop reason: HOSPADM

## 2019-01-01 RX ORDER — ATORVASTATIN CALCIUM 40 MG/1
40 TABLET, FILM COATED ORAL DAILY
Qty: 90 TABLET | Refills: 3 | Status: SHIPPED | OUTPATIENT
Start: 2019-01-01

## 2019-01-01 RX ORDER — TRAMADOL HYDROCHLORIDE 50 MG/1
50 TABLET ORAL EVERY 8 HOURS PRN
Status: DISCONTINUED | OUTPATIENT
Start: 2019-01-01 | End: 2019-01-01 | Stop reason: HOSPADM

## 2019-01-01 RX ORDER — TRAMADOL HYDROCHLORIDE 50 MG/1
50 TABLET ORAL EVERY 12 HOURS PRN
COMMUNITY
End: 2019-01-01 | Stop reason: SDUPTHER

## 2019-01-01 RX ORDER — SODIUM CHLORIDE 9 MG/ML
20 INJECTION, SOLUTION INTRAVENOUS ONCE
Status: CANCELLED | OUTPATIENT
Start: 2019-01-01

## 2019-01-01 RX ORDER — LEVOTHYROXINE SODIUM 0.15 MG/1
150 TABLET ORAL
Qty: 30 TABLET | Refills: 5 | Status: ON HOLD | OUTPATIENT
Start: 2019-01-01 | End: 2019-01-01 | Stop reason: SDUPTHER

## 2019-01-01 RX ORDER — TRAMADOL HYDROCHLORIDE 50 MG/1
50 TABLET ORAL 2 TIMES DAILY PRN
Status: DISCONTINUED | OUTPATIENT
Start: 2019-01-01 | End: 2019-01-01

## 2019-01-01 RX ORDER — POTASSIUM CHLORIDE 20 MEQ/1
40 TABLET, EXTENDED RELEASE ORAL ONCE
Status: DISCONTINUED | OUTPATIENT
Start: 2019-01-01 | End: 2019-01-01 | Stop reason: HOSPADM

## 2019-01-01 RX ORDER — METOPROLOL TARTRATE 50 MG/1
50 TABLET, FILM COATED ORAL EVERY 12 HOURS SCHEDULED
Status: DISCONTINUED | OUTPATIENT
Start: 2019-01-01 | End: 2019-01-01 | Stop reason: HOSPADM

## 2019-01-01 RX ORDER — IPRATROPIUM BROMIDE AND ALBUTEROL SULFATE 2.5; .5 MG/3ML; MG/3ML
3 SOLUTION RESPIRATORY (INHALATION)
Status: DISCONTINUED | OUTPATIENT
Start: 2019-01-01 | End: 2019-01-01

## 2019-01-01 RX ORDER — AMLODIPINE BESYLATE 5 MG/1
5 TABLET ORAL EVERY 12 HOURS SCHEDULED
Status: DISCONTINUED | OUTPATIENT
Start: 2019-01-01 | End: 2019-01-01

## 2019-01-01 RX ORDER — METOPROLOL SUCCINATE 50 MG/1
50 TABLET, EXTENDED RELEASE ORAL DAILY
Status: DISCONTINUED | OUTPATIENT
Start: 2019-01-01 | End: 2019-01-01

## 2019-01-01 RX ORDER — SODIUM CHLORIDE 9 MG/ML
INJECTION, SOLUTION INTRAVENOUS CONTINUOUS PRN
Status: DISCONTINUED | OUTPATIENT
Start: 2019-01-01 | End: 2019-01-01 | Stop reason: SURG

## 2019-01-01 RX ORDER — LEVOTHYROXINE SODIUM 0.15 MG/1
150 TABLET ORAL
Qty: 30 TABLET | Refills: 0 | Status: SHIPPED | OUTPATIENT
Start: 2019-01-01 | End: 2019-01-01 | Stop reason: SDUPTHER

## 2019-01-01 RX ORDER — METHYLPREDNISOLONE SODIUM SUCCINATE 125 MG/2ML
60 INJECTION, POWDER, LYOPHILIZED, FOR SOLUTION INTRAMUSCULAR; INTRAVENOUS ONCE
Status: COMPLETED | OUTPATIENT
Start: 2019-01-01 | End: 2019-01-01

## 2019-01-01 RX ORDER — SODIUM CHLORIDE 9 MG/ML
75 INJECTION, SOLUTION INTRAVENOUS CONTINUOUS
Status: DISCONTINUED | OUTPATIENT
Start: 2019-01-01 | End: 2019-01-01

## 2019-01-01 RX ORDER — ATORVASTATIN CALCIUM 40 MG/1
40 TABLET, FILM COATED ORAL DAILY
Status: CANCELLED | OUTPATIENT
Start: 2019-01-01

## 2019-01-01 RX ORDER — PREDNISONE 20 MG/1
TABLET ORAL
Qty: 15 TABLET | Refills: 0 | Status: SHIPPED | OUTPATIENT
Start: 2019-01-01 | End: 2019-01-01 | Stop reason: ALTCHOICE

## 2019-01-01 RX ORDER — LEVOTHYROXINE SODIUM 0.1 MG/1
TABLET ORAL
Qty: 30 TABLET | Refills: 3 | Status: SHIPPED | OUTPATIENT
Start: 2019-01-01 | End: 2019-01-01 | Stop reason: DRUGHIGH

## 2019-01-01 RX ORDER — PREDNISONE 20 MG/1
40 TABLET ORAL
Status: DISCONTINUED | OUTPATIENT
Start: 2019-01-01 | End: 2019-01-01 | Stop reason: HOSPADM

## 2019-01-01 RX ORDER — LANOLIN ALCOHOL/MO/W.PET/CERES
6 CREAM (GRAM) TOPICAL
Status: DISCONTINUED | OUTPATIENT
Start: 2019-01-01 | End: 2019-01-01

## 2019-01-01 RX ORDER — SENNA PLUS 8.6 MG/1
1 TABLET ORAL DAILY
Status: DISCONTINUED | OUTPATIENT
Start: 2019-01-01 | End: 2019-01-01 | Stop reason: HOSPADM

## 2019-01-01 RX ORDER — ALBUTEROL SULFATE 2.5 MG/3ML
2.5 SOLUTION RESPIRATORY (INHALATION)
Status: DISCONTINUED | OUTPATIENT
Start: 2019-01-01 | End: 2019-01-01

## 2019-01-01 RX ORDER — ACETAMINOPHEN 325 MG/1
650 TABLET ORAL EVERY 8 HOURS PRN
Status: DISCONTINUED | OUTPATIENT
Start: 2019-01-01 | End: 2019-01-01 | Stop reason: HOSPADM

## 2019-01-01 RX ORDER — SODIUM CHLORIDE 9 MG/ML
125 INJECTION, SOLUTION INTRAVENOUS CONTINUOUS
Status: DISCONTINUED | OUTPATIENT
Start: 2019-01-01 | End: 2019-01-01

## 2019-01-01 RX ORDER — PREDNISONE 20 MG/1
20 TABLET ORAL DAILY
Status: COMPLETED | OUTPATIENT
Start: 2019-01-01 | End: 2019-01-01

## 2019-01-01 RX ORDER — GABAPENTIN 100 MG/1
200 CAPSULE ORAL 3 TIMES DAILY
Qty: 180 CAPSULE | Refills: 3 | Status: SHIPPED | OUTPATIENT
Start: 2019-01-01 | End: 2019-01-01 | Stop reason: ALTCHOICE

## 2019-01-01 RX ORDER — LIDOCAINE 50 MG/G
1 PATCH TOPICAL DAILY
Status: DISCONTINUED | OUTPATIENT
Start: 2019-01-01 | End: 2019-01-01

## 2019-01-01 RX ORDER — MEPERIDINE HYDROCHLORIDE 25 MG/ML
12.5 INJECTION INTRAMUSCULAR; INTRAVENOUS; SUBCUTANEOUS
Status: DISCONTINUED | OUTPATIENT
Start: 2019-01-01 | End: 2019-01-01 | Stop reason: HOSPADM

## 2019-01-01 RX ORDER — METHYLPREDNISOLONE SODIUM SUCCINATE 40 MG/ML
40 INJECTION, POWDER, LYOPHILIZED, FOR SOLUTION INTRAMUSCULAR; INTRAVENOUS EVERY 8 HOURS SCHEDULED
Status: DISCONTINUED | OUTPATIENT
Start: 2019-01-01 | End: 2019-01-01

## 2019-01-01 RX ORDER — GABAPENTIN 100 MG/1
200 CAPSULE ORAL 3 TIMES DAILY
Status: DISCONTINUED | OUTPATIENT
Start: 2019-01-01 | End: 2019-01-01 | Stop reason: HOSPADM

## 2019-01-01 RX ORDER — LIDOCAINE 50 MG/G
1 PATCH TOPICAL
Status: DISCONTINUED | OUTPATIENT
Start: 2019-01-01 | End: 2019-01-01 | Stop reason: HOSPADM

## 2019-01-01 RX ORDER — SODIUM CHLORIDE 9 MG/ML
20 INJECTION, SOLUTION INTRAVENOUS CONTINUOUS
Status: DISCONTINUED | OUTPATIENT
Start: 2019-01-01 | End: 2019-01-01

## 2019-01-01 RX ORDER — ACETAMINOPHEN 325 MG/1
650 TABLET ORAL EVERY 4 HOURS PRN
Status: DISCONTINUED | OUTPATIENT
Start: 2019-01-01 | End: 2019-01-01 | Stop reason: HOSPADM

## 2019-01-01 RX ADMIN — PANTOPRAZOLE SODIUM 40 MG: 40 TABLET, DELAYED RELEASE ORAL at 04:54

## 2019-01-01 RX ADMIN — FENTANYL CITRATE 25 MCG: 50 INJECTION, SOLUTION INTRAMUSCULAR; INTRAVENOUS at 12:47

## 2019-01-01 RX ADMIN — TRAMADOL HYDROCHLORIDE 50 MG: 50 TABLET, COATED ORAL at 23:15

## 2019-01-01 RX ADMIN — SODIUM CHLORIDE 50 ML/HR: 0.9 INJECTION, SOLUTION INTRAVENOUS at 23:17

## 2019-01-01 RX ADMIN — ATORVASTATIN CALCIUM 40 MG: 40 TABLET, FILM COATED ORAL at 08:01

## 2019-01-01 RX ADMIN — METOPROLOL SUCCINATE 50 MG: 50 TABLET, EXTENDED RELEASE ORAL at 08:03

## 2019-01-01 RX ADMIN — PANTOPRAZOLE SODIUM 40 MG: 40 INJECTION, POWDER, FOR SOLUTION INTRAVENOUS at 16:53

## 2019-01-01 RX ADMIN — ONDANSETRON 4 MG: 2 INJECTION INTRAMUSCULAR; INTRAVENOUS at 21:00

## 2019-01-01 RX ADMIN — ACETAMINOPHEN 650 MG: 325 TABLET, FILM COATED ORAL at 11:17

## 2019-01-01 RX ADMIN — IPRATROPIUM BROMIDE AND ALBUTEROL SULFATE 3 ML: 2.5; .5 SOLUTION RESPIRATORY (INHALATION) at 10:29

## 2019-01-01 RX ADMIN — FENTANYL CITRATE 25 MCG: 50 INJECTION, SOLUTION INTRAMUSCULAR; INTRAVENOUS at 06:35

## 2019-01-01 RX ADMIN — Medication 8 MG/HR: at 01:29

## 2019-01-01 RX ADMIN — FENTANYL CITRATE 25 MCG: 50 INJECTION, SOLUTION INTRAMUSCULAR; INTRAVENOUS at 10:04

## 2019-01-01 RX ADMIN — HEPARIN SODIUM 5000 UNITS: 5000 INJECTION, SOLUTION INTRAVENOUS; SUBCUTANEOUS at 05:09

## 2019-01-01 RX ADMIN — LEVOTHYROXINE SODIUM 150 MCG: 75 TABLET ORAL at 06:36

## 2019-01-01 RX ADMIN — SODIUM CHLORIDE 7.5 MG/HR: 900 INJECTION, SOLUTION INTRAVENOUS at 14:05

## 2019-01-01 RX ADMIN — LIDOCAINE 1 PATCH: 50 PATCH CUTANEOUS at 21:53

## 2019-01-01 RX ADMIN — DOCUSATE SODIUM 100 MG: 100 CAPSULE, LIQUID FILLED ORAL at 09:18

## 2019-01-01 RX ADMIN — POTASSIUM CHLORIDE 20 MEQ: 1500 TABLET, EXTENDED RELEASE ORAL at 16:34

## 2019-01-01 RX ADMIN — LABETALOL 20 MG/4 ML (5 MG/ML) INTRAVENOUS SYRINGE 10 MG: at 01:04

## 2019-01-01 RX ADMIN — IPRATROPIUM BROMIDE AND ALBUTEROL SULFATE 3 ML: 2.5; .5 SOLUTION RESPIRATORY (INHALATION) at 21:31

## 2019-01-01 RX ADMIN — PROMETHAZINE HYDROCHLORIDE 12.5 MG: 25 INJECTION INTRAMUSCULAR; INTRAVENOUS at 07:26

## 2019-01-01 RX ADMIN — HEPARIN SODIUM 5000 UNITS: 5000 INJECTION, SOLUTION INTRAVENOUS; SUBCUTANEOUS at 22:14

## 2019-01-01 RX ADMIN — FENTANYL CITRATE 50 MCG: 50 INJECTION, SOLUTION INTRAMUSCULAR; INTRAVENOUS at 00:25

## 2019-01-01 RX ADMIN — METOPROLOL SUCCINATE 50 MG: 50 TABLET, EXTENDED RELEASE ORAL at 07:45

## 2019-01-01 RX ADMIN — LABETALOL 20 MG/4 ML (5 MG/ML) INTRAVENOUS SYRINGE 10 MG: at 00:00

## 2019-01-01 RX ADMIN — IPRATROPIUM BROMIDE AND ALBUTEROL SULFATE 3 ML: 2.5; .5 SOLUTION RESPIRATORY (INHALATION) at 20:16

## 2019-01-01 RX ADMIN — Medication 30 MG: at 08:34

## 2019-01-01 RX ADMIN — IPRATROPIUM BROMIDE AND ALBUTEROL SULFATE 3 ML: 2.5; .5 SOLUTION RESPIRATORY (INHALATION) at 21:28

## 2019-01-01 RX ADMIN — MELATONIN 3 MG: at 21:11

## 2019-01-01 RX ADMIN — HEPARIN SODIUM 5000 UNITS: 5000 INJECTION, SOLUTION INTRAVENOUS; SUBCUTANEOUS at 14:08

## 2019-01-01 RX ADMIN — IPRATROPIUM BROMIDE AND ALBUTEROL SULFATE 3 ML: 2.5; .5 SOLUTION RESPIRATORY (INHALATION) at 13:50

## 2019-01-01 RX ADMIN — Medication 30 MG: at 08:28

## 2019-01-01 RX ADMIN — SENNOSIDES 8.6 MG: 8.6 TABLET, FILM COATED ORAL at 08:57

## 2019-01-01 RX ADMIN — PANTOPRAZOLE SODIUM 40 MG: 40 TABLET, DELAYED RELEASE ORAL at 06:29

## 2019-01-01 RX ADMIN — ASPIRIN 325 MG: 325 TABLET, DELAYED RELEASE ORAL at 08:48

## 2019-01-01 RX ADMIN — FENTANYL CITRATE 25 MCG: 50 INJECTION, SOLUTION INTRAMUSCULAR; INTRAVENOUS at 05:00

## 2019-01-01 RX ADMIN — LEVOTHYROXINE SODIUM 175 MCG: 100 TABLET ORAL at 05:08

## 2019-01-01 RX ADMIN — FENTANYL CITRATE 25 MCG: 50 INJECTION, SOLUTION INTRAMUSCULAR; INTRAVENOUS at 12:15

## 2019-01-01 RX ADMIN — METOPROLOL SUCCINATE 50 MG: 50 TABLET, EXTENDED RELEASE ORAL at 08:57

## 2019-01-01 RX ADMIN — SODIUM CHLORIDE 2.5 MG/HR: 900 INJECTION, SOLUTION INTRAVENOUS at 03:33

## 2019-01-01 RX ADMIN — POTASSIUM CHLORIDE 40 MEQ: 1500 TABLET, EXTENDED RELEASE ORAL at 15:30

## 2019-01-01 RX ADMIN — SODIUM CHLORIDE 125 ML/HR: 0.9 INJECTION, SOLUTION INTRAVENOUS at 14:53

## 2019-01-01 RX ADMIN — HYDRALAZINE HYDROCHLORIDE 10 MG: 20 INJECTION INTRAMUSCULAR; INTRAVENOUS at 07:54

## 2019-01-01 RX ADMIN — HEPARIN SODIUM 5000 UNITS: 5000 INJECTION INTRAVENOUS; SUBCUTANEOUS at 15:28

## 2019-01-01 RX ADMIN — ACETAMINOPHEN 650 MG: 325 TABLET ORAL at 22:13

## 2019-01-01 RX ADMIN — Medication 30 MG: at 08:48

## 2019-01-01 RX ADMIN — DOCUSATE SODIUM 100 MG: 100 CAPSULE, LIQUID FILLED ORAL at 09:08

## 2019-01-01 RX ADMIN — ATROPINE SULFATE 0.5 MG: 0.1 INJECTION, SOLUTION ENDOTRACHEAL; INTRAMUSCULAR; INTRAVENOUS; SUBCUTANEOUS at 10:50

## 2019-01-01 RX ADMIN — DOCUSATE SODIUM 100 MG: 100 CAPSULE, LIQUID FILLED ORAL at 08:13

## 2019-01-01 RX ADMIN — FENTANYL CITRATE 50 MCG: 50 INJECTION, SOLUTION INTRAMUSCULAR; INTRAVENOUS at 00:11

## 2019-01-01 RX ADMIN — IPRATROPIUM BROMIDE AND ALBUTEROL SULFATE 3 ML: 2.5; .5 SOLUTION RESPIRATORY (INHALATION) at 05:52

## 2019-01-01 RX ADMIN — LEVOTHYROXINE SODIUM 100 MCG: 100 TABLET ORAL at 06:36

## 2019-01-01 RX ADMIN — HEPARIN SODIUM 5000 UNITS: 5000 INJECTION, SOLUTION INTRAVENOUS; SUBCUTANEOUS at 14:48

## 2019-01-01 RX ADMIN — PANTOPRAZOLE SODIUM 40 MG: 40 TABLET, DELAYED RELEASE ORAL at 16:07

## 2019-01-01 RX ADMIN — ASPIRIN 325 MG: 325 TABLET, DELAYED RELEASE ORAL at 08:58

## 2019-01-01 RX ADMIN — DOCUSATE SODIUM 100 MG: 100 CAPSULE, LIQUID FILLED ORAL at 17:33

## 2019-01-01 RX ADMIN — Medication 30 MG: at 08:52

## 2019-01-01 RX ADMIN — PROPOFOL 20 MG: 10 INJECTION, EMULSION INTRAVENOUS at 11:16

## 2019-01-01 RX ADMIN — SODIUM CHLORIDE 1000 ML: 0.9 INJECTION, SOLUTION INTRAVENOUS at 12:17

## 2019-01-01 RX ADMIN — PANTOPRAZOLE SODIUM 40 MG: 40 TABLET, DELAYED RELEASE ORAL at 17:26

## 2019-01-01 RX ADMIN — POTASSIUM CHLORIDE 20 MEQ: 1500 TABLET, EXTENDED RELEASE ORAL at 08:51

## 2019-01-01 RX ADMIN — TRAMADOL HYDROCHLORIDE 50 MG: 50 TABLET, COATED ORAL at 21:08

## 2019-01-01 RX ADMIN — ACETAMINOPHEN 650 MG: 325 TABLET ORAL at 21:12

## 2019-01-01 RX ADMIN — PROPOFOL 20 MG: 10 INJECTION, EMULSION INTRAVENOUS at 11:12

## 2019-01-01 RX ADMIN — IPRATROPIUM BROMIDE AND ALBUTEROL SULFATE 3 ML: 2.5; .5 SOLUTION RESPIRATORY (INHALATION) at 19:45

## 2019-01-01 RX ADMIN — DOCUSATE SODIUM 100 MG: 100 CAPSULE, LIQUID FILLED ORAL at 09:04

## 2019-01-01 RX ADMIN — LEVOTHYROXINE SODIUM 175 MCG: 100 TABLET ORAL at 06:45

## 2019-01-01 RX ADMIN — DOCUSATE SODIUM 100 MG: 100 CAPSULE, LIQUID FILLED ORAL at 10:10

## 2019-01-01 RX ADMIN — ATORVASTATIN CALCIUM 40 MG: 40 TABLET, FILM COATED ORAL at 08:03

## 2019-01-01 RX ADMIN — IPRATROPIUM BROMIDE AND ALBUTEROL SULFATE 3 ML: 2.5; .5 SOLUTION RESPIRATORY (INHALATION) at 20:13

## 2019-01-01 RX ADMIN — AMLODIPINE BESYLATE 5 MG: 5 TABLET ORAL at 09:05

## 2019-01-01 RX ADMIN — LABETALOL 20 MG/4 ML (5 MG/ML) INTRAVENOUS SYRINGE 10 MG: at 00:25

## 2019-01-01 RX ADMIN — MELATONIN 3 MG: at 02:30

## 2019-01-01 RX ADMIN — ATORVASTATIN CALCIUM 40 MG: 40 TABLET, FILM COATED ORAL at 05:11

## 2019-01-01 RX ADMIN — ALBUTEROL SULFATE 5 MG: 2.5 SOLUTION RESPIRATORY (INHALATION) at 07:07

## 2019-01-01 RX ADMIN — TRAMADOL HYDROCHLORIDE 50 MG: 50 TABLET, COATED ORAL at 12:17

## 2019-01-01 RX ADMIN — FENTANYL CITRATE 25 MCG: 50 INJECTION, SOLUTION INTRAMUSCULAR; INTRAVENOUS at 11:52

## 2019-01-01 RX ADMIN — SODIUM CHLORIDE 75 ML/HR: 0.9 INJECTION, SOLUTION INTRAVENOUS at 08:14

## 2019-01-01 RX ADMIN — FENTANYL CITRATE 25 MCG: 50 INJECTION, SOLUTION INTRAMUSCULAR; INTRAVENOUS at 05:02

## 2019-01-01 RX ADMIN — SODIUM CHLORIDE 125 ML/HR: 0.9 INJECTION, SOLUTION INTRAVENOUS at 21:10

## 2019-01-01 RX ADMIN — SENNOSIDES 8.6 MG: 8.6 TABLET, FILM COATED ORAL at 09:08

## 2019-01-01 RX ADMIN — Medication 30 MG: at 09:54

## 2019-01-01 RX ADMIN — LEVOTHYROXINE SODIUM 150 MCG: 75 TABLET ORAL at 05:05

## 2019-01-01 RX ADMIN — SODIUM CHLORIDE 5 MG/HR: 0.9 INJECTION, SOLUTION INTRAVENOUS at 02:12

## 2019-01-01 RX ADMIN — POTASSIUM CHLORIDE 40 MEQ: 20 TABLET, EXTENDED RELEASE ORAL at 09:07

## 2019-01-01 RX ADMIN — MELATONIN 6 MG: at 23:15

## 2019-01-01 RX ADMIN — PANTOPRAZOLE SODIUM 40 MG: 40 TABLET, DELAYED RELEASE ORAL at 05:39

## 2019-01-01 RX ADMIN — TRAMADOL HYDROCHLORIDE 50 MG: 50 TABLET, COATED ORAL at 22:38

## 2019-01-01 RX ADMIN — TRAMADOL HYDROCHLORIDE 50 MG: 50 TABLET, COATED ORAL at 19:36

## 2019-01-01 RX ADMIN — PROPOFOL 20 MG: 10 INJECTION, EMULSION INTRAVENOUS at 11:21

## 2019-01-01 RX ADMIN — DOCUSATE SODIUM 100 MG: 100 CAPSULE, LIQUID FILLED ORAL at 17:14

## 2019-01-01 RX ADMIN — ACETAMINOPHEN 650 MG: 325 TABLET ORAL at 04:36

## 2019-01-01 RX ADMIN — Medication 30 MG: at 08:55

## 2019-01-01 RX ADMIN — AMLODIPINE BESYLATE 10 MG: 5 TABLET ORAL at 08:03

## 2019-01-01 RX ADMIN — MELATONIN TAB 3 MG 3 MG: 3 TAB at 23:49

## 2019-01-01 RX ADMIN — TRAMADOL HYDROCHLORIDE 50 MG: 50 TABLET, COATED ORAL at 02:28

## 2019-01-01 RX ADMIN — Medication 30 MG: at 09:19

## 2019-01-01 RX ADMIN — SODIUM CHLORIDE 20 ML/HR: 0.9 INJECTION, SOLUTION INTRAVENOUS at 10:09

## 2019-01-01 RX ADMIN — METOPROLOL SUCCINATE 50 MG: 50 TABLET, EXTENDED RELEASE ORAL at 21:11

## 2019-01-01 RX ADMIN — PROPOFOL 40 MG: 10 INJECTION, EMULSION INTRAVENOUS at 11:11

## 2019-01-01 RX ADMIN — ATORVASTATIN CALCIUM 40 MG: 40 TABLET, FILM COATED ORAL at 08:45

## 2019-01-01 RX ADMIN — POTASSIUM CHLORIDE 20 MEQ: 14.9 INJECTION, SOLUTION INTRAVENOUS at 13:17

## 2019-01-01 RX ADMIN — LABETALOL 20 MG/4 ML (5 MG/ML) INTRAVENOUS SYRINGE 10 MG: at 07:43

## 2019-01-01 RX ADMIN — POTASSIUM CHLORIDE 20 MEQ: 1500 TABLET, EXTENDED RELEASE ORAL at 08:57

## 2019-01-01 RX ADMIN — DURVALUMAB 500 MG: 500 INJECTION, SOLUTION INTRAVENOUS at 15:23

## 2019-01-01 RX ADMIN — FENTANYL CITRATE 25 MCG: 50 INJECTION, SOLUTION INTRAMUSCULAR; INTRAVENOUS at 08:55

## 2019-01-01 RX ADMIN — METHYLPREDNISOLONE SODIUM SUCCINATE 40 MG: 40 INJECTION, POWDER, FOR SOLUTION INTRAMUSCULAR; INTRAVENOUS at 11:20

## 2019-01-01 RX ADMIN — IPRATROPIUM BROMIDE AND ALBUTEROL SULFATE 3 ML: 2.5; .5 SOLUTION RESPIRATORY (INHALATION) at 01:24

## 2019-01-01 RX ADMIN — SODIUM CHLORIDE 125 ML/HR: 0.9 INJECTION, SOLUTION INTRAVENOUS at 12:46

## 2019-01-01 RX ADMIN — AMLODIPINE BESYLATE 5 MG: 5 TABLET ORAL at 21:53

## 2019-01-01 RX ADMIN — Medication 1000 MG: at 08:48

## 2019-01-01 RX ADMIN — GABAPENTIN 200 MG: 100 CAPSULE ORAL at 08:48

## 2019-01-01 RX ADMIN — HEPARIN SODIUM 5000 UNITS: 5000 INJECTION, SOLUTION INTRAVENOUS; SUBCUTANEOUS at 14:47

## 2019-01-01 RX ADMIN — LABETALOL 20 MG/4 ML (5 MG/ML) INTRAVENOUS SYRINGE 10 MG: at 05:01

## 2019-01-01 RX ADMIN — METOPROLOL TARTRATE 5 MG: 5 INJECTION, SOLUTION INTRAVENOUS at 23:15

## 2019-01-01 RX ADMIN — ATORVASTATIN CALCIUM 40 MG: 40 TABLET, FILM COATED ORAL at 06:36

## 2019-01-01 RX ADMIN — PANTOPRAZOLE SODIUM 40 MG: 40 TABLET, DELAYED RELEASE ORAL at 06:45

## 2019-01-01 RX ADMIN — PANTOPRAZOLE SODIUM 40 MG: 40 TABLET, DELAYED RELEASE ORAL at 17:13

## 2019-01-01 RX ADMIN — METOPROLOL SUCCINATE 50 MG: 50 TABLET, EXTENDED RELEASE ORAL at 17:10

## 2019-01-01 RX ADMIN — MELATONIN 3 MG: at 22:09

## 2019-01-01 RX ADMIN — FENTANYL CITRATE 50 MCG: 50 INJECTION, SOLUTION INTRAMUSCULAR; INTRAVENOUS at 14:09

## 2019-01-01 RX ADMIN — GABAPENTIN 200 MG: 100 CAPSULE ORAL at 08:57

## 2019-01-01 RX ADMIN — METOPROLOL SUCCINATE 50 MG: 50 TABLET, EXTENDED RELEASE ORAL at 21:53

## 2019-01-01 RX ADMIN — IPRATROPIUM BROMIDE AND ALBUTEROL SULFATE 3 ML: 2.5; .5 SOLUTION RESPIRATORY (INHALATION) at 02:04

## 2019-01-01 RX ADMIN — FENTANYL CITRATE 25 MCG: 50 INJECTION, SOLUTION INTRAMUSCULAR; INTRAVENOUS at 21:47

## 2019-01-01 RX ADMIN — MELATONIN 6 MG: at 22:43

## 2019-01-01 RX ADMIN — METOPROLOL SUCCINATE 50 MG: 50 TABLET, EXTENDED RELEASE ORAL at 18:03

## 2019-01-01 RX ADMIN — TRAMADOL HYDROCHLORIDE 50 MG: 50 TABLET, COATED ORAL at 05:04

## 2019-01-01 RX ADMIN — PROPOFOL 20 MG: 10 INJECTION, EMULSION INTRAVENOUS at 11:14

## 2019-01-01 RX ADMIN — GABAPENTIN 200 MG: 100 CAPSULE ORAL at 16:02

## 2019-01-01 RX ADMIN — AMLODIPINE BESYLATE 5 MG: 5 TABLET ORAL at 21:00

## 2019-01-01 RX ADMIN — METOPROLOL SUCCINATE 50 MG: 50 TABLET, EXTENDED RELEASE ORAL at 08:23

## 2019-01-01 RX ADMIN — MELATONIN 6 MG: at 21:20

## 2019-01-01 RX ADMIN — LEVOTHYROXINE SODIUM 175 MCG: 100 TABLET ORAL at 04:57

## 2019-01-01 RX ADMIN — ATORVASTATIN CALCIUM 40 MG: 40 TABLET, FILM COATED ORAL at 09:04

## 2019-01-01 RX ADMIN — PREDNISONE 20 MG: 20 TABLET ORAL at 08:58

## 2019-01-01 RX ADMIN — Medication 10 MG/HR: at 02:18

## 2019-01-01 RX ADMIN — HEPARIN SODIUM 5000 UNITS: 5000 INJECTION, SOLUTION INTRAVENOUS; SUBCUTANEOUS at 06:28

## 2019-01-01 RX ADMIN — IPRATROPIUM BROMIDE AND ALBUTEROL SULFATE 3 ML: 2.5; .5 SOLUTION RESPIRATORY (INHALATION) at 14:56

## 2019-01-01 RX ADMIN — SODIUM CHLORIDE 500 ML/HR: 0.9 INJECTION, SOLUTION INTRAVENOUS at 13:30

## 2019-01-01 RX ADMIN — METOPROLOL SUCCINATE 50 MG: 50 TABLET, EXTENDED RELEASE ORAL at 09:08

## 2019-01-01 RX ADMIN — METOPROLOL SUCCINATE 50 MG: 50 TABLET, EXTENDED RELEASE ORAL at 18:48

## 2019-01-01 RX ADMIN — GABAPENTIN 200 MG: 100 CAPSULE ORAL at 17:06

## 2019-01-01 RX ADMIN — PROPOFOL 20 MG: 10 INJECTION, EMULSION INTRAVENOUS at 11:18

## 2019-01-01 RX ADMIN — METOPROLOL TARTRATE 50 MG: 50 TABLET, FILM COATED ORAL at 21:11

## 2019-01-01 RX ADMIN — TRAMADOL HYDROCHLORIDE 50 MG: 50 TABLET, COATED ORAL at 03:18

## 2019-01-01 RX ADMIN — HEPARIN SODIUM 5000 UNITS: 5000 INJECTION INTRAVENOUS; SUBCUTANEOUS at 05:59

## 2019-01-01 RX ADMIN — Medication 8 MG/HR: at 16:33

## 2019-01-01 RX ADMIN — METOPROLOL TARTRATE 5 MG: 1 INJECTION, SOLUTION INTRAVENOUS at 00:27

## 2019-01-01 RX ADMIN — FENTANYL CITRATE 25 MCG: 50 INJECTION, SOLUTION INTRAMUSCULAR; INTRAVENOUS at 12:41

## 2019-01-01 RX ADMIN — FENTANYL CITRATE 25 MCG: 50 INJECTION, SOLUTION INTRAMUSCULAR; INTRAVENOUS at 00:37

## 2019-01-01 RX ADMIN — SENNOSIDES 8.6 MG: 8.6 TABLET, FILM COATED ORAL at 10:10

## 2019-01-01 RX ADMIN — PANTOPRAZOLE SODIUM 40 MG: 40 TABLET, DELAYED RELEASE ORAL at 05:08

## 2019-01-01 RX ADMIN — DOCUSATE SODIUM 100 MG: 100 CAPSULE, LIQUID FILLED ORAL at 17:27

## 2019-01-01 RX ADMIN — Medication 30 MG: at 09:07

## 2019-01-01 RX ADMIN — METHYLPREDNISOLONE SODIUM SUCCINATE 40 MG: 40 INJECTION, POWDER, FOR SOLUTION INTRAMUSCULAR; INTRAVENOUS at 00:54

## 2019-01-01 RX ADMIN — IPRATROPIUM BROMIDE AND ALBUTEROL SULFATE 3 ML: 2.5; .5 SOLUTION RESPIRATORY (INHALATION) at 13:33

## 2019-01-01 RX ADMIN — TRAMADOL HYDROCHLORIDE 50 MG: 50 TABLET, COATED ORAL at 22:09

## 2019-01-01 RX ADMIN — SODIUM CHLORIDE 7.5 MG/HR: 900 INJECTION, SOLUTION INTRAVENOUS at 17:35

## 2019-01-01 RX ADMIN — IPRATROPIUM BROMIDE AND ALBUTEROL SULFATE 3 ML: 2.5; .5 SOLUTION RESPIRATORY (INHALATION) at 20:03

## 2019-01-01 RX ADMIN — IODIXANOL 100 ML: 320 INJECTION, SOLUTION INTRAVASCULAR at 00:31

## 2019-01-01 RX ADMIN — IPRATROPIUM BROMIDE 0.5 MG: 0.5 SOLUTION RESPIRATORY (INHALATION) at 07:07

## 2019-01-01 RX ADMIN — SENNOSIDES 8.6 MG: 8.6 TABLET, FILM COATED ORAL at 08:13

## 2019-01-01 RX ADMIN — CALCIUM CARBONATE (ANTACID) CHEW TAB 500 MG 750 MG: 500 CHEW TAB at 23:34

## 2019-01-01 RX ADMIN — LABETALOL 20 MG/4 ML (5 MG/ML) INTRAVENOUS SYRINGE 10 MG: at 01:01

## 2019-01-01 RX ADMIN — SODIUM CHLORIDE 7.5 MG/HR: 900 INJECTION, SOLUTION INTRAVENOUS at 21:26

## 2019-01-01 RX ADMIN — AMLODIPINE BESYLATE 5 MG: 5 TABLET ORAL at 21:23

## 2019-01-01 RX ADMIN — AMLODIPINE BESYLATE 5 MG: 5 TABLET ORAL at 08:58

## 2019-01-01 RX ADMIN — PREDNISONE 30 MG: 20 TABLET ORAL at 09:04

## 2019-01-01 RX ADMIN — ATORVASTATIN CALCIUM 40 MG: 40 TABLET, FILM COATED ORAL at 08:28

## 2019-01-01 RX ADMIN — SODIUM CHLORIDE 7.5 MG/HR: 900 INJECTION, SOLUTION INTRAVENOUS at 04:57

## 2019-01-01 RX ADMIN — HYDROMORPHONE HYDROCHLORIDE 0.5 MG: 1 INJECTION, SOLUTION INTRAMUSCULAR; INTRAVENOUS; SUBCUTANEOUS at 00:41

## 2019-01-01 RX ADMIN — PREDNISONE 40 MG: 20 TABLET ORAL at 14:49

## 2019-01-01 RX ADMIN — METHYLPREDNISOLONE SODIUM SUCCINATE 40 MG: 40 INJECTION, POWDER, FOR SOLUTION INTRAMUSCULAR; INTRAVENOUS at 06:35

## 2019-01-01 RX ADMIN — IPRATROPIUM BROMIDE AND ALBUTEROL SULFATE 3 ML: 2.5; .5 SOLUTION RESPIRATORY (INHALATION) at 14:46

## 2019-01-01 RX ADMIN — PREDNISONE 40 MG: 20 TABLET ORAL at 08:04

## 2019-01-01 RX ADMIN — GABAPENTIN 200 MG: 100 CAPSULE ORAL at 22:04

## 2019-01-01 RX ADMIN — ALBUTEROL SULFATE 2.5 MG: 2.5 SOLUTION RESPIRATORY (INHALATION) at 23:36

## 2019-01-01 RX ADMIN — GABAPENTIN 200 MG: 100 CAPSULE ORAL at 08:51

## 2019-01-01 RX ADMIN — Medication 1000 MG: at 08:58

## 2019-01-01 RX ADMIN — IPRATROPIUM BROMIDE AND ALBUTEROL SULFATE 3 ML: 2.5; .5 SOLUTION RESPIRATORY (INHALATION) at 13:32

## 2019-01-01 RX ADMIN — HEPARIN SODIUM 5000 UNITS: 5000 INJECTION, SOLUTION INTRAVENOUS; SUBCUTANEOUS at 14:51

## 2019-01-01 RX ADMIN — AMLODIPINE BESYLATE 5 MG: 5 TABLET ORAL at 09:18

## 2019-01-01 RX ADMIN — Medication 8 MG/HR: at 15:16

## 2019-01-01 RX ADMIN — IPRATROPIUM BROMIDE 0.5 MG: 0.5 SOLUTION RESPIRATORY (INHALATION) at 00:54

## 2019-01-01 RX ADMIN — IPRATROPIUM BROMIDE AND ALBUTEROL SULFATE 3 ML: 2.5; .5 SOLUTION RESPIRATORY (INHALATION) at 07:57

## 2019-01-01 RX ADMIN — TRAMADOL HYDROCHLORIDE 50 MG: 50 TABLET, COATED ORAL at 08:55

## 2019-01-01 RX ADMIN — LABETALOL 20 MG/4 ML (5 MG/ML) INTRAVENOUS SYRINGE 10 MG: at 03:03

## 2019-01-01 RX ADMIN — LABETALOL 20 MG/4 ML (5 MG/ML) INTRAVENOUS SYRINGE 10 MG: at 22:06

## 2019-01-01 RX ADMIN — TRAMADOL HYDROCHLORIDE 50 MG: 50 TABLET, COATED ORAL at 07:37

## 2019-01-01 RX ADMIN — FENTANYL CITRATE 25 MCG: 50 INJECTION, SOLUTION INTRAMUSCULAR; INTRAVENOUS at 23:54

## 2019-01-01 RX ADMIN — MELATONIN 6 MG: at 21:04

## 2019-01-01 RX ADMIN — Medication 1000 MG: at 08:51

## 2019-01-01 RX ADMIN — IPRATROPIUM BROMIDE AND ALBUTEROL SULFATE 3 ML: 2.5; .5 SOLUTION RESPIRATORY (INHALATION) at 08:23

## 2019-01-01 RX ADMIN — IPRATROPIUM BROMIDE AND ALBUTEROL SULFATE 3 ML: 2.5; .5 SOLUTION RESPIRATORY (INHALATION) at 03:31

## 2019-01-01 RX ADMIN — LEVOTHYROXINE SODIUM 150 MCG: 75 TABLET ORAL at 05:11

## 2019-01-01 RX ADMIN — Medication 30 MG: at 08:57

## 2019-01-01 RX ADMIN — ATORVASTATIN CALCIUM 40 MG: 40 TABLET, FILM COATED ORAL at 09:18

## 2019-01-01 RX ADMIN — MELATONIN 3 MG: at 21:53

## 2019-01-01 RX ADMIN — PANTOPRAZOLE SODIUM 40 MG: 40 TABLET, DELAYED RELEASE ORAL at 04:56

## 2019-01-01 RX ADMIN — CEFEPIME HYDROCHLORIDE 1000 MG: 1 INJECTION, SOLUTION INTRAVENOUS at 22:04

## 2019-01-01 RX ADMIN — SENNOSIDES 8.6 MG: 8.6 TABLET, FILM COATED ORAL at 09:04

## 2019-01-01 RX ADMIN — ACETAMINOPHEN 650 MG: 325 TABLET, FILM COATED ORAL at 08:30

## 2019-01-01 RX ADMIN — LABETALOL 20 MG/4 ML (5 MG/ML) INTRAVENOUS SYRINGE 10 MG: at 00:42

## 2019-01-01 RX ADMIN — DURVALUMAB 500 MG: 500 INJECTION, SOLUTION INTRAVENOUS at 14:29

## 2019-01-01 RX ADMIN — FENTANYL CITRATE 50 MCG: 50 INJECTION, SOLUTION INTRAMUSCULAR; INTRAVENOUS at 17:42

## 2019-01-01 RX ADMIN — PANTOPRAZOLE SODIUM 40 MG: 40 INJECTION, POWDER, FOR SOLUTION INTRAVENOUS at 12:47

## 2019-01-01 RX ADMIN — PANTOPRAZOLE SODIUM 40 MG: 40 TABLET, DELAYED RELEASE ORAL at 09:00

## 2019-01-01 RX ADMIN — MELATONIN 3 MG: at 21:00

## 2019-01-01 RX ADMIN — GABAPENTIN 200 MG: 100 CAPSULE ORAL at 21:12

## 2019-01-01 RX ADMIN — SODIUM CHLORIDE 20 ML/HR: 9 INJECTION, SOLUTION INTRAVENOUS at 15:21

## 2019-01-01 RX ADMIN — SODIUM CHLORIDE 2.5 MG/HR: 900 INJECTION, SOLUTION INTRAVENOUS at 23:10

## 2019-01-01 RX ADMIN — POTASSIUM CHLORIDE 20 MEQ: 1500 TABLET, EXTENDED RELEASE ORAL at 16:07

## 2019-01-01 RX ADMIN — IPRATROPIUM BROMIDE AND ALBUTEROL SULFATE 3 ML: 2.5; .5 SOLUTION RESPIRATORY (INHALATION) at 14:01

## 2019-01-01 RX ADMIN — HYDROMORPHONE HYDROCHLORIDE 0.5 MG: 1 INJECTION, SOLUTION INTRAMUSCULAR; INTRAVENOUS; SUBCUTANEOUS at 08:01

## 2019-01-01 RX ADMIN — IPRATROPIUM BROMIDE AND ALBUTEROL SULFATE 3 ML: 2.5; .5 SOLUTION RESPIRATORY (INHALATION) at 09:35

## 2019-01-01 RX ADMIN — AMLODIPINE BESYLATE 5 MG: 5 TABLET ORAL at 21:12

## 2019-01-01 RX ADMIN — GABAPENTIN 200 MG: 100 CAPSULE ORAL at 08:44

## 2019-01-01 RX ADMIN — IPRATROPIUM BROMIDE AND ALBUTEROL SULFATE 3 ML: 2.5; .5 SOLUTION RESPIRATORY (INHALATION) at 07:38

## 2019-01-01 RX ADMIN — METOPROLOL SUCCINATE 50 MG: 50 TABLET, EXTENDED RELEASE ORAL at 08:55

## 2019-01-01 RX ADMIN — SODIUM CHLORIDE 75 ML/HR: 0.9 INJECTION, SOLUTION INTRAVENOUS at 21:51

## 2019-01-01 RX ADMIN — LEVOTHYROXINE SODIUM 175 MCG: 100 TABLET ORAL at 06:28

## 2019-01-01 RX ADMIN — Medication 30 MG: at 08:59

## 2019-01-01 RX ADMIN — SENNOSIDES 8.6 MG: 8.6 TABLET, FILM COATED ORAL at 09:18

## 2019-01-01 RX ADMIN — DURVALUMAB 500 MG: 500 INJECTION, SOLUTION INTRAVENOUS at 15:13

## 2019-01-01 RX ADMIN — TRAMADOL HYDROCHLORIDE 50 MG: 50 TABLET, COATED ORAL at 15:30

## 2019-01-01 RX ADMIN — AMLODIPINE BESYLATE 10 MG: 5 TABLET ORAL at 08:54

## 2019-01-01 RX ADMIN — IPRATROPIUM BROMIDE AND ALBUTEROL SULFATE 3 ML: 2.5; .5 SOLUTION RESPIRATORY (INHALATION) at 09:08

## 2019-01-01 RX ADMIN — LEVOTHYROXINE SODIUM 175 MCG: 100 TABLET ORAL at 05:39

## 2019-01-01 RX ADMIN — LEVOTHYROXINE SODIUM 175 MCG: 100 TABLET ORAL at 06:32

## 2019-01-01 RX ADMIN — HEPARIN SODIUM 5000 UNITS: 5000 INJECTION INTRAVENOUS; SUBCUTANEOUS at 22:04

## 2019-01-01 RX ADMIN — PANTOPRAZOLE SODIUM 40 MG: 40 TABLET, DELAYED RELEASE ORAL at 08:23

## 2019-01-01 RX ADMIN — Medication 30 MG: at 09:05

## 2019-01-01 RX ADMIN — METOPROLOL TARTRATE 50 MG: 50 TABLET, FILM COATED ORAL at 09:08

## 2019-01-01 RX ADMIN — FENTANYL CITRATE 25 MCG: 50 INJECTION, SOLUTION INTRAMUSCULAR; INTRAVENOUS at 03:49

## 2019-01-01 RX ADMIN — HEPARIN SODIUM 5000 UNITS: 5000 INJECTION, SOLUTION INTRAVENOUS; SUBCUTANEOUS at 07:42

## 2019-01-01 RX ADMIN — PREDNISONE 40 MG: 20 TABLET ORAL at 08:51

## 2019-01-01 RX ADMIN — HEPARIN SODIUM 5000 UNITS: 5000 INJECTION, SOLUTION INTRAVENOUS; SUBCUTANEOUS at 05:29

## 2019-01-01 RX ADMIN — IPRATROPIUM BROMIDE AND ALBUTEROL SULFATE 3 ML: 2.5; .5 SOLUTION RESPIRATORY (INHALATION) at 14:37

## 2019-01-01 RX ADMIN — METOPROLOL SUCCINATE 50 MG: 50 TABLET, EXTENDED RELEASE ORAL at 08:45

## 2019-01-01 RX ADMIN — PANTOPRAZOLE SODIUM 40 MG: 40 TABLET, DELAYED RELEASE ORAL at 05:01

## 2019-01-01 RX ADMIN — HEPARIN SODIUM 5000 UNITS: 5000 INJECTION, SOLUTION INTRAVENOUS; SUBCUTANEOUS at 21:13

## 2019-01-01 RX ADMIN — IPRATROPIUM BROMIDE AND ALBUTEROL SULFATE 3 ML: 2.5; .5 SOLUTION RESPIRATORY (INHALATION) at 20:05

## 2019-01-01 RX ADMIN — SODIUM CHLORIDE 20 ML/HR: 0.9 INJECTION, SOLUTION INTRAVENOUS at 14:14

## 2019-01-01 RX ADMIN — AMLODIPINE BESYLATE 10 MG: 5 TABLET ORAL at 08:28

## 2019-01-01 RX ADMIN — FENTANYL CITRATE 25 MCG: 50 INJECTION, SOLUTION INTRAMUSCULAR; INTRAVENOUS at 11:11

## 2019-01-01 RX ADMIN — LABETALOL 20 MG/4 ML (5 MG/ML) INTRAVENOUS SYRINGE 10 MG: at 04:24

## 2019-01-01 RX ADMIN — HEPARIN SODIUM 5000 UNITS: 5000 INJECTION, SOLUTION INTRAVENOUS; SUBCUTANEOUS at 21:20

## 2019-01-01 RX ADMIN — ACETAMINOPHEN 650 MG: 325 TABLET ORAL at 08:51

## 2019-01-01 RX ADMIN — DOCUSATE SODIUM 100 MG: 100 CAPSULE, LIQUID FILLED ORAL at 17:08

## 2019-01-01 RX ADMIN — METHYLPREDNISOLONE SODIUM SUCCINATE 40 MG: 40 INJECTION, POWDER, FOR SOLUTION INTRAMUSCULAR; INTRAVENOUS at 04:30

## 2019-01-01 RX ADMIN — ASPIRIN 325 MG: 325 TABLET, DELAYED RELEASE ORAL at 08:45

## 2019-01-01 RX ADMIN — IPRATROPIUM BROMIDE AND ALBUTEROL SULFATE 3 ML: 2.5; .5 SOLUTION RESPIRATORY (INHALATION) at 20:10

## 2019-01-01 RX ADMIN — PREDNISONE 20 MG: 20 TABLET ORAL at 09:08

## 2019-01-01 RX ADMIN — LABETALOL 20 MG/4 ML (5 MG/ML) INTRAVENOUS SYRINGE 10 MG: at 11:51

## 2019-01-01 RX ADMIN — SODIUM CHLORIDE 5 MG/HR: 0.9 INJECTION, SOLUTION INTRAVENOUS at 15:00

## 2019-01-01 RX ADMIN — METHYLPREDNISOLONE SODIUM SUCCINATE 40 MG: 40 INJECTION, POWDER, FOR SOLUTION INTRAMUSCULAR; INTRAVENOUS at 14:47

## 2019-01-01 RX ADMIN — SODIUM CHLORIDE 500 ML/HR: 0.9 INJECTION, SOLUTION INTRAVENOUS at 14:14

## 2019-01-01 RX ADMIN — CEFEPIME HYDROCHLORIDE 1000 MG: 1 INJECTION, SOLUTION INTRAVENOUS at 00:20

## 2019-01-01 RX ADMIN — AMLODIPINE BESYLATE 5 MG: 5 TABLET ORAL at 09:08

## 2019-01-01 RX ADMIN — SODIUM CHLORIDE 250 ML: 0.9 INJECTION, SOLUTION INTRAVENOUS at 23:16

## 2019-01-01 RX ADMIN — LABETALOL 20 MG/4 ML (5 MG/ML) INTRAVENOUS SYRINGE 10 MG: at 06:20

## 2019-01-01 RX ADMIN — ATORVASTATIN CALCIUM 40 MG: 40 TABLET, FILM COATED ORAL at 08:13

## 2019-01-01 RX ADMIN — FENTANYL CITRATE 25 MCG: 50 INJECTION, SOLUTION INTRAMUSCULAR; INTRAVENOUS at 07:57

## 2019-01-01 RX ADMIN — TRAMADOL HYDROCHLORIDE 50 MG: 50 TABLET, COATED ORAL at 21:54

## 2019-01-01 RX ADMIN — ASPIRIN 325 MG: 325 TABLET, DELAYED RELEASE ORAL at 08:52

## 2019-01-01 RX ADMIN — TRAMADOL HYDROCHLORIDE 50 MG: 50 TABLET, COATED ORAL at 07:23

## 2019-01-01 RX ADMIN — METOPROLOL TARTRATE 50 MG: 50 TABLET, FILM COATED ORAL at 08:58

## 2019-01-01 RX ADMIN — ACETAMINOPHEN 650 MG: 325 TABLET ORAL at 15:36

## 2019-01-01 RX ADMIN — DURVALUMAB 540 MG: 500 INJECTION, SOLUTION INTRAVENOUS at 15:34

## 2019-01-01 RX ADMIN — ATORVASTATIN CALCIUM 40 MG: 40 TABLET, FILM COATED ORAL at 08:24

## 2019-01-01 RX ADMIN — LIDOCAINE 1 PATCH: 50 PATCH CUTANEOUS at 23:22

## 2019-01-01 RX ADMIN — GABAPENTIN 200 MG: 100 CAPSULE ORAL at 08:36

## 2019-01-01 RX ADMIN — Medication 8 MG/HR: at 17:29

## 2019-01-01 RX ADMIN — TRAMADOL HYDROCHLORIDE 50 MG: 50 TABLET, COATED ORAL at 17:14

## 2019-01-01 RX ADMIN — ALBUTEROL SULFATE 5 MG: 2.5 SOLUTION RESPIRATORY (INHALATION) at 00:53

## 2019-01-01 RX ADMIN — MAGNESIUM SULFATE HEPTAHYDRATE 2 G: 40 INJECTION, SOLUTION INTRAVENOUS at 21:32

## 2019-01-01 RX ADMIN — PANTOPRAZOLE SODIUM 40 MG: 40 TABLET, DELAYED RELEASE ORAL at 17:10

## 2019-01-01 RX ADMIN — AMLODIPINE BESYLATE 5 MG: 5 TABLET ORAL at 08:23

## 2019-01-01 RX ADMIN — HYDRALAZINE HYDROCHLORIDE 10 MG: 20 INJECTION INTRAMUSCULAR; INTRAVENOUS at 22:41

## 2019-01-01 RX ADMIN — ATORVASTATIN CALCIUM 40 MG: 40 TABLET, FILM COATED ORAL at 10:10

## 2019-01-01 RX ADMIN — HYDROMORPHONE HYDROCHLORIDE 0.5 MG: 1 INJECTION, SOLUTION INTRAMUSCULAR; INTRAVENOUS; SUBCUTANEOUS at 16:20

## 2019-01-01 RX ADMIN — IPRATROPIUM BROMIDE AND ALBUTEROL SULFATE 3 ML: 2.5; .5 SOLUTION RESPIRATORY (INHALATION) at 01:16

## 2019-01-01 RX ADMIN — IPRATROPIUM BROMIDE AND ALBUTEROL SULFATE 3 ML: 2.5; .5 SOLUTION RESPIRATORY (INHALATION) at 07:21

## 2019-01-01 RX ADMIN — METOPROLOL SUCCINATE 50 MG: 50 TABLET, EXTENDED RELEASE ORAL at 08:39

## 2019-01-01 RX ADMIN — SODIUM CHLORIDE 50 ML/HR: 0.9 INJECTION, SOLUTION INTRAVENOUS at 00:21

## 2019-01-01 RX ADMIN — Medication 8 MG/HR: at 03:11

## 2019-01-01 RX ADMIN — TRAMADOL HYDROCHLORIDE 50 MG: 50 TABLET, COATED ORAL at 10:29

## 2019-01-01 RX ADMIN — MAGNESIUM SULFATE IN WATER 2 G: 40 INJECTION, SOLUTION INTRAVENOUS at 07:22

## 2019-01-01 RX ADMIN — NITROGLYCERIN 1 INCH: 20 OINTMENT TOPICAL at 11:50

## 2019-01-01 RX ADMIN — FENTANYL CITRATE 25 MCG: 50 INJECTION, SOLUTION INTRAMUSCULAR; INTRAVENOUS at 04:22

## 2019-01-01 RX ADMIN — LEVOTHYROXINE SODIUM 100 MCG: 100 TABLET ORAL at 05:59

## 2019-01-01 RX ADMIN — TRAMADOL HYDROCHLORIDE 50 MG: 50 TABLET, COATED ORAL at 21:12

## 2019-01-01 RX ADMIN — SODIUM CHLORIDE 5 MG/HR: 900 INJECTION, SOLUTION INTRAVENOUS at 23:50

## 2019-01-01 RX ADMIN — PREDNISONE 10 MG: 10 TABLET ORAL at 10:10

## 2019-01-01 RX ADMIN — PREDNISONE 20 MG: 20 TABLET ORAL at 09:18

## 2019-01-01 RX ADMIN — FENTANYL CITRATE 25 MCG: 50 INJECTION, SOLUTION INTRAMUSCULAR; INTRAVENOUS at 15:57

## 2019-01-01 RX ADMIN — PROPOFOL 20 MG: 10 INJECTION, EMULSION INTRAVENOUS at 11:25

## 2019-01-01 RX ADMIN — AMLODIPINE BESYLATE 10 MG: 5 TABLET ORAL at 07:44

## 2019-01-01 RX ADMIN — SODIUM CHLORIDE 500 ML: 0.9 INJECTION, SOLUTION INTRAVENOUS at 08:21

## 2019-01-01 RX ADMIN — AMLODIPINE BESYLATE 5 MG: 5 TABLET ORAL at 10:10

## 2019-01-01 RX ADMIN — OMEGA-3 FATTY ACIDS CAP 1000 MG 1000 MG: 1000 CAP at 08:36

## 2019-01-01 RX ADMIN — TRAMADOL HYDROCHLORIDE 50 MG: 50 TABLET, COATED ORAL at 04:33

## 2019-01-01 RX ADMIN — LEVOTHYROXINE SODIUM 150 MCG: 75 TABLET ORAL at 06:09

## 2019-01-01 RX ADMIN — METOPROLOL SUCCINATE 50 MG: 50 TABLET, EXTENDED RELEASE ORAL at 10:09

## 2019-01-01 RX ADMIN — METOPROLOL SUCCINATE 50 MG: 50 TABLET, EXTENDED RELEASE ORAL at 17:06

## 2019-01-01 RX ADMIN — ALPRAZOLAM 0.5 MG: 0.5 TABLET ORAL at 12:41

## 2019-01-01 RX ADMIN — ALPRAZOLAM 0.25 MG: 0.25 TABLET ORAL at 21:52

## 2019-01-01 RX ADMIN — METOPROLOL TARTRATE 5 MG: 5 INJECTION, SOLUTION INTRAVENOUS at 12:01

## 2019-01-01 RX ADMIN — POTASSIUM CHLORIDE 20 MEQ: 1500 TABLET, EXTENDED RELEASE ORAL at 16:03

## 2019-01-01 RX ADMIN — METOPROLOL SUCCINATE 50 MG: 50 TABLET, EXTENDED RELEASE ORAL at 09:18

## 2019-01-01 RX ADMIN — METOPROLOL SUCCINATE 50 MG: 50 TABLET, EXTENDED RELEASE ORAL at 08:13

## 2019-01-01 RX ADMIN — IPRATROPIUM BROMIDE AND ALBUTEROL SULFATE 3 ML: 2.5; .5 SOLUTION RESPIRATORY (INHALATION) at 10:12

## 2019-01-01 RX ADMIN — IODIXANOL 100 ML: 320 INJECTION, SOLUTION INTRAVASCULAR at 09:44

## 2019-01-01 RX ADMIN — DURVALUMAB 500 MG: 500 INJECTION, SOLUTION INTRAVENOUS at 10:26

## 2019-01-01 RX ADMIN — DOCUSATE SODIUM 100 MG: 100 CAPSULE, LIQUID FILLED ORAL at 08:57

## 2019-01-01 RX ADMIN — IPRATROPIUM BROMIDE AND ALBUTEROL SULFATE 3 ML: 2.5; .5 SOLUTION RESPIRATORY (INHALATION) at 08:18

## 2019-01-01 RX ADMIN — IPRATROPIUM BROMIDE AND ALBUTEROL SULFATE 3 ML: 2.5; .5 SOLUTION RESPIRATORY (INHALATION) at 08:04

## 2019-01-01 RX ADMIN — ATORVASTATIN CALCIUM 40 MG: 40 TABLET, FILM COATED ORAL at 08:54

## 2019-01-01 RX ADMIN — SODIUM CHLORIDE 20 ML/HR: 9 INJECTION, SOLUTION INTRAVENOUS at 14:29

## 2019-01-01 RX ADMIN — METHYLPREDNISOLONE SODIUM SUCCINATE 40 MG: 40 INJECTION, POWDER, FOR SOLUTION INTRAMUSCULAR; INTRAVENOUS at 21:12

## 2019-01-01 RX ADMIN — PROMETHAZINE HYDROCHLORIDE 12.5 MG: 25 INJECTION INTRAMUSCULAR; INTRAVENOUS at 00:55

## 2019-01-01 RX ADMIN — PREDNISONE 30 MG: 20 TABLET ORAL at 08:13

## 2019-01-01 RX ADMIN — METOPROLOL TARTRATE 50 MG: 50 TABLET, FILM COATED ORAL at 22:14

## 2019-01-01 RX ADMIN — HYDROMORPHONE HYDROCHLORIDE 0.5 MG: 1 INJECTION, SOLUTION INTRAMUSCULAR; INTRAVENOUS; SUBCUTANEOUS at 01:26

## 2019-01-01 RX ADMIN — MELATONIN 6 MG: at 21:07

## 2019-01-01 RX ADMIN — PANTOPRAZOLE SODIUM 40 MG: 40 TABLET, DELAYED RELEASE ORAL at 15:08

## 2019-01-01 RX ADMIN — ATORVASTATIN CALCIUM 40 MG: 40 TABLET, FILM COATED ORAL at 08:57

## 2019-01-01 RX ADMIN — IPRATROPIUM BROMIDE AND ALBUTEROL SULFATE 3 ML: 2.5; .5 SOLUTION RESPIRATORY (INHALATION) at 12:57

## 2019-01-01 RX ADMIN — METOPROLOL SUCCINATE 50 MG: 50 TABLET, EXTENDED RELEASE ORAL at 08:28

## 2019-01-01 RX ADMIN — LEVOTHYROXINE SODIUM 175 MCG: 100 TABLET ORAL at 05:01

## 2019-01-01 RX ADMIN — ATORVASTATIN CALCIUM 40 MG: 40 TABLET, FILM COATED ORAL at 05:29

## 2019-01-01 RX ADMIN — SODIUM CHLORIDE: 0.9 INJECTION, SOLUTION INTRAVENOUS at 11:06

## 2019-01-01 RX ADMIN — ASPIRIN 325 MG: 325 TABLET, DELAYED RELEASE ORAL at 08:36

## 2019-01-01 RX ADMIN — ATORVASTATIN CALCIUM 40 MG: 40 TABLET, FILM COATED ORAL at 08:36

## 2019-01-01 RX ADMIN — FENTANYL CITRATE 25 MCG: 50 INJECTION, SOLUTION INTRAMUSCULAR; INTRAVENOUS at 02:28

## 2019-01-01 RX ADMIN — GABAPENTIN 200 MG: 100 CAPSULE ORAL at 17:20

## 2019-01-01 RX ADMIN — PANTOPRAZOLE SODIUM 40 MG: 40 TABLET, DELAYED RELEASE ORAL at 16:22

## 2019-01-01 RX ADMIN — IPRATROPIUM BROMIDE AND ALBUTEROL SULFATE 3 ML: 2.5; .5 SOLUTION RESPIRATORY (INHALATION) at 20:02

## 2019-01-01 RX ADMIN — METHYLPREDNISOLONE SODIUM SUCCINATE 40 MG: 40 INJECTION, POWDER, FOR SOLUTION INTRAMUSCULAR; INTRAVENOUS at 05:29

## 2019-01-01 RX ADMIN — METHYLPREDNISOLONE SODIUM SUCCINATE 60 MG: 125 INJECTION, POWDER, FOR SOLUTION INTRAMUSCULAR; INTRAVENOUS at 20:03

## 2019-01-01 RX ADMIN — TRAMADOL HYDROCHLORIDE 50 MG: 50 TABLET, COATED ORAL at 10:31

## 2019-01-01 RX ADMIN — POTASSIUM CHLORIDE 20 MEQ: 1500 TABLET, EXTENDED RELEASE ORAL at 11:12

## 2019-01-01 RX ADMIN — AMLODIPINE BESYLATE 5 MG: 5 TABLET ORAL at 08:13

## 2019-01-01 RX ADMIN — LEVOTHYROXINE SODIUM 175 MCG: 100 TABLET ORAL at 08:23

## 2019-01-01 RX ADMIN — NITROGLYCERIN 1 INCH: 20 OINTMENT TOPICAL at 08:03

## 2019-01-01 RX ADMIN — IPRATROPIUM BROMIDE AND ALBUTEROL SULFATE 3 ML: 2.5; .5 SOLUTION RESPIRATORY (INHALATION) at 20:25

## 2019-01-01 RX ADMIN — POTASSIUM CHLORIDE 20 MEQ: 1500 TABLET, EXTENDED RELEASE ORAL at 08:54

## 2019-01-01 RX ADMIN — SODIUM CHLORIDE 7.5 MG/HR: 900 INJECTION, SOLUTION INTRAVENOUS at 10:49

## 2019-01-01 RX ADMIN — LABETALOL 20 MG/4 ML (5 MG/ML) INTRAVENOUS SYRINGE 10 MG: at 13:12

## 2019-01-01 RX ADMIN — ATORVASTATIN CALCIUM 40 MG: 40 TABLET, FILM COATED ORAL at 09:07

## 2019-01-01 RX ADMIN — IPRATROPIUM BROMIDE AND ALBUTEROL SULFATE 3 ML: 2.5; .5 SOLUTION RESPIRATORY (INHALATION) at 20:04

## 2019-01-01 RX ADMIN — SODIUM CHLORIDE 7.5 MG/HR: 900 INJECTION, SOLUTION INTRAVENOUS at 01:17

## 2019-01-01 RX ADMIN — LORAZEPAM 0.25 MG: 2 INJECTION INTRAMUSCULAR; INTRAVENOUS at 04:02

## 2019-01-01 RX ADMIN — ACETAMINOPHEN 650 MG: 325 TABLET, FILM COATED ORAL at 12:52

## 2019-01-01 RX ADMIN — LABETALOL 20 MG/4 ML (5 MG/ML) INTRAVENOUS SYRINGE 10 MG: at 19:36

## 2019-01-01 RX ADMIN — DOCUSATE SODIUM 100 MG: 100 CAPSULE, LIQUID FILLED ORAL at 17:04

## 2019-01-01 RX ADMIN — Medication 30 MG: at 08:45

## 2019-01-01 RX ADMIN — GABAPENTIN 200 MG: 100 CAPSULE ORAL at 16:07

## 2019-01-01 RX ADMIN — OMEGA-3 FATTY ACIDS CAP 1000 MG 1000 MG: 1000 CAP at 08:45

## 2019-01-01 RX ADMIN — LEVOTHYROXINE SODIUM 175 MCG: 100 TABLET ORAL at 04:53

## 2019-01-01 RX ADMIN — METOPROLOL SUCCINATE 50 MG: 50 TABLET, EXTENDED RELEASE ORAL at 08:48

## 2019-01-01 RX ADMIN — GABAPENTIN 200 MG: 100 CAPSULE ORAL at 22:13

## 2019-01-01 RX ADMIN — LEVOTHYROXINE SODIUM 150 MCG: 75 TABLET ORAL at 05:29

## 2019-01-01 RX ADMIN — METOPROLOL SUCCINATE 50 MG: 50 TABLET, EXTENDED RELEASE ORAL at 09:04

## 2019-01-01 RX ADMIN — POTASSIUM CHLORIDE 20 MEQ: 1500 TABLET, EXTENDED RELEASE ORAL at 12:24

## 2019-01-01 RX ADMIN — HEPARIN SODIUM 5000 UNITS: 5000 INJECTION, SOLUTION INTRAVENOUS; SUBCUTANEOUS at 06:35

## 2019-01-01 RX ADMIN — SODIUM CHLORIDE 5 MG/HR: 900 INJECTION, SOLUTION INTRAVENOUS at 05:49

## 2019-01-01 RX ADMIN — HEPARIN SODIUM 5000 UNITS: 5000 INJECTION INTRAVENOUS; SUBCUTANEOUS at 06:36

## 2019-01-01 RX ADMIN — POTASSIUM CHLORIDE 20 MEQ: 1500 TABLET, EXTENDED RELEASE ORAL at 17:20

## 2019-01-01 NOTE — PROGRESS NOTES
Progress Note - Karen Chambers 1945, 72 y o  female MRN: [id number]    Unit/Bed#: [address] [place] [address] Encounter: [id number]    Primary Care Provider: Jodie Harper MD   Date and time admitted to hospital: 12/27/2018  8:20 PM        * Acute cystitis without hematuria   Assessment & Plan    · Continue cefepime for now  Urine culture showing 50,000-59,000 cfu/mL klebsiella enterobacter and 10,000-19,000 cfu/mL proteus  · Blood culture 1/2 also + for enterobacter cloacae complex  Repeat culture negative  · Appreciate Infectious Disease input  · Plan on discharge to be transitioned to 150 mg Levaquin p o  Q day to complete a 10 day course of antibiotics  · CT scan of the abdomen pelvis today to re-evaluate the right hydronephrosis  · Urology to evaluate tomorrow in the morning     Moderate protein-calorie malnutrition ([address] )   Assessment & Plan    Malnutrition Findings:           BMI Findings: Body mass index is 24 54 kg/m²  · Continue Ensure TID   · Encourage PO intake     Acute kidney injury superimposed on chronic kidney disease (HCC)   Assessment & Plan    · Improved, creatinine at baseline  · Continue to monitor creatinine  · Monitor ins and outs     Benign hypertension   Assessment & Plan    · Continue metoprolol   · Monitor blood pressure with routine vitals       VTE Pharmacologic Prophylaxis:   Pharmacologic: Heparin  Mechanical VTE Prophylaxis in Place: Yes    Patient Centered Rounds: I have performed bedside rounds with nursing staff today  Discussions with Specialists or Other Care Team Provider:      Education and Discussions with Family / Patient: at bedside    Time Spent for Care: 30 minutes  More than 50% of total time spent on counseling and coordination of care as described above      Current Length of Stay: 5 day(s)    Current Patient Status: Inpatient   Certification Statement: The patient will continue to require additional inpatient hospital stay due to Right-sided hydronephrosis on renal ultrasound ongoing evaluation of this    Discharge Plan:  Plan will be for discharge home once medically stabilized on p o  Levaquin    Code Status: Level 1 - Full Code      Subjective:   No acute events overnight, no complaints  Denies CP/SOB/Flank pain/N/V  Objective:     Vitals:   Temp (24hrs), Av 9 °F (36 6 °C), Min:97 °F (36 1 °C), Max:99 2 °F (37 3 °C)    Temp:  [97 °F (36 1 °C)-99 2 °F (37 3 °C)] 97 °F (36 1 °C)  HR:  [78-87] 87  Resp:  [17-18] 17  BP: (114-151)/(69-79) 151/75  SpO2:  [90 %-95 %] 95 %  Body mass index is 24 54 kg/m²  Input and Output Summary (last 24 hours): Intake/Output Summary (Last 24 hours) at 19 1028  Last data filed at 19 0021   Gross per 24 hour   Intake              900 ml   Output                0 ml   Net              900 ml       Physical Exam:     Physical Exam   Constitutional: She is oriented to person, place, and time  She appears well-developed and well-nourished  No distress  HENT:   Head: Normocephalic and atraumatic  Right Ear: External ear normal    Left Ear: External ear normal    Nose: Nose normal    Mouth/Throat: Oropharynx is clear and moist  No oropharyngeal exudate  Eyes: Conjunctivae are normal  Right eye exhibits no discharge  Left eye exhibits no discharge  No scleral icterus  Neck: No JVD present  No tracheal deviation present  No thyromegaly present  Cardiovascular: Normal rate, regular rhythm and intact distal pulses  Exam reveals no gallop and no friction rub  Murmur (adilene 1/6) heard  Pulmonary/Chest: Effort normal and breath sounds normal  No stridor  No respiratory distress  She has no wheezes  She has no rales  Abdominal: Soft  Bowel sounds are normal  She exhibits no distension  There is no tenderness  There is no rebound and no guarding  No CVA tenderness   Musculoskeletal: Normal range of motion  She exhibits no edema, tenderness or deformity     Neurological: She is alert and oriented to person, place, and time  Coordination normal    Skin: Skin is warm and dry  No rash noted  She is not diaphoretic  No erythema  No pallor  Psychiatric: She has a normal mood and affect  Her behavior is normal  Judgment and thought content normal          Additional Data:     Labs:      Results from last 7 days  Lab Units 12/31/18  0616   WBC Thousand/uL 5 12   HEMOGLOBIN g/dL 9 5*   HEMATOCRIT % 30 2*   PLATELETS Thousands/uL 176   NEUTROS PCT % 54   LYMPHS PCT % 22   MONOS PCT % 19*   EOS PCT % 5       Results from last 7 days  Lab Units 12/31/18  0634  12/27/18  2046   SODIUM mmol/L 143  < > 134*   POTASSIUM mmol/L 4 7  < > 4 5   CHLORIDE mmol/L 111*  < > 99*   CO2 mmol/L 24  < > 27   BUN mg/dL 16  < > 35*   CREATININE mg/dL 1 19  < > 2 30*   ANION GAP mmol/L 8  < > 8   CALCIUM mg/dL 8 3  < > 8 5   ALBUMIN g/dL  --   --  2 9*   TOTAL BILIRUBIN mg/dL  --   --  0 40   ALK PHOS U/L  --   --  99   ALT U/L  --   --  22   AST U/L  --   --  17   GLUCOSE RANDOM mg/dL 91  < > 118   < > = values in this interval not displayed  Results from last 7 days  Lab Units 12/27/18  2046   INR  1 12               Results from last 7 days  Lab Units 12/30/18  1046 12/28/18  0014 12/27/18 2055   LACTIC ACID mmol/L  --   --  1 2   PROCALCITONIN ng/ml 0 51* 0 54* 0 48*           * I Have Reviewed All Lab Data Listed Above  * Additional Pertinent Lab Tests Reviewed: [address] Admission Reviewed    Imaging:    Imaging Reports Reviewed Today Include:  Renal ultrasound  Imaging Personally Reviewed by Myself Includes:       Recent Cultures (last 7 days):       Results from last 7 days  Lab Units 12/30/18  1047 12/30/18  1046 12/27/18  2206 12/27/18 2055 12/27/18 2049   BLOOD CULTURE  No Growth at 24 hrs  No Growth at 24 hrs   --  No Growth After 4 Days    Enterobacter cloacae complex*  --    GRAM STAIN RESULT   --   --   --  Gram negative rods  --    URINE CULTURE   --   --  50,000-59,000 cfu/ml Enterobacter cloacae complex*  10,000-19,000 cfu/ml Proteus mirabilis*  --   --    INFLUENZA B PCR   --   --   --   --  None Detected   RSV PCR   --   --   --   --  None Detected       Last 24 Hours Medication List:     Current Facility-Administered Medications:  acetaminophen 650 mg Oral Q6H PRN Leggett Miller, PA-C    albuterol 2 puff Inhalation Q6H PRN Ra Miller, PA-C    aspirin 325 mg Oral Daily Leggett Miller, PA-C    atorvastatin 40 mg Oral Daily Ra Miller, PA-C    calcium carbonate 1,000 mg Oral Daily PRN Leggett Miller, PA-C    cefepime 1,000 mg Intravenous Q24H Ra Miller, PA-C Last Rate: 1,000 mg (01/01/19 0020)   co-enzyme Q-10 30 mg Oral Daily Ra Miller, PA-C    diphenhydrAMINE 25 mg Oral Q6H PRN Will Schafer    fish oil 1,000 mg Oral Daily Leggett Miller, PA-C    gabapentin 200 mg Oral TID Leggett Miller, PA-C    heparin (porcine) 5,000 Units Subcutaneous [facility] Ra Miller, PA-C    levothyroxine 100 mcg Oral Early Morning Leggett Miller, PA-C    metoprolol succinate 50 mg Oral BID Ra Miller, PA-C    ondansetron 4 mg Intravenous Q6H PRN Leggett Miller, PA-C    senna 1 tablet Oral Daily Leggett Miller, PA-C    sodium chloride (PF) 3 mL Intravenous PRN Kevin Roldan, PA-C    sodium chloride 50 mL/hr Intravenous Continuous Wilton Schafer Last Rate: 50 mL/hr (01/01/19 0021)        Today, Patient Was Seen By: Earl Fernandez MD    ** Please Note: Dictation voice to text software may have been used in the creation of this document   **

## 2019-01-01 NOTE — ASSESSMENT & PLAN NOTE
· Continue cefepime for now  Urine culture showing 50,000-59,000 cfu/mL klebsiella enterobacter and 10,000-19,000 cfu/mL proteus  · Blood culture 1/2 also + for enterobacter cloacae complex  Repeat culture negative  · Appreciate Infectious Disease input  · Plan on discharge to be transitioned to 150 mg Levaquin p o   Q day to complete a 10 day course of antibiotics  · CT scan of the abdomen pelvis today to re-evaluate the right hydronephrosis  · Urology to evaluate tomorrow in the morning

## 2019-01-01 NOTE — ASSESSMENT & PLAN NOTE
Malnutrition Findings:           BMI Findings: Body mass index is 24 54 kg/m²     · Continue Ensure TID   · Encourage PO intake

## 2019-01-01 NOTE — CONSULTS
UROLOGY CONSULTATION NOTE     Patient Identifiers: Ivan France (MRN [id number])  Service Requesting Consultation:  Medicine  Service Providing Consultation:  Urology, Radah Steel MD    Date of Service: 1/1/2019  Consults    Reason for Consultation:  Febrile UTI    History of Present Illness:     Ivan France is a 72 y o  old admitted with fevers chills symptoms of UTI  Cultures showed Enterobacter in Proteus  Ultrasound read as moderate right hydronephrosis  CT scan October 2018 showed hydro, possibly a right extrarenal pelvis  Minimal irritative voiding symptoms when she came in, but no much better  Denies any bladder symptoms before this except for occasional urgency, and has not had frequent UTIs denies gross hematuria or prior Uro procedures    ASSESSMENT & PLAN:     CT scan ordered and is pending for a better assessment of whether she does have hydronephrosis  Follow-up determined on those results      Past Medical, Past Surgical History:     Past Medical History:   Diagnosis Date    Aortic aneurysm [facility] )     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[facility] )     ASCVD (arteriosclerotic cardiovascular disease)     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 ([facility] )     Back problem     Blood type B-     Cancer (HCC)     lung CA    Cataract     COPD [facility]ch[facility]     Coronary artery disease     CVA (cerebral vascular accident) ([facility] ) 2012    Depression     Disease of thyroid gland     History of cataract     Hyperlipidemia     Hypertension     Lung mass     Mediastinal adenopathy     Muscle pain     Prediabetes     PVD (peripheral vascular disease) ([facility] )     Thoracic aortic aneurysm (HCC)     Transient insomnia    :    Past Surgical History:   Procedure Laterality Date    CAROTID STENT Left     OK COLONOSCOPY FLX DX W/COLLJ SPEC WHEN PFRMD N/A 9/27/2017    Procedure: EGD AND COLONOSCOPY;  Surgeon: Jennifer Barger MD;  Location: [facility];  Service: Gastroenterology    [place] EDG US EXAM SURGICAL ALTER STOM DUODENUM/JEJUNUM N/A 8/1/2018    Procedure: LINEAR ENDOSCOPIC U/S;  Surgeon: Juvencio Crystal MD;  Location: [facility]; Service: Gastroenterology    TUBAL LIGATION     :    Medications, Allergies:     Current Facility-Administered Medications   Medication Dose Route Frequency    acetaminophen (TYLENOL) tablet 650 mg  650 mg Oral Q6H PRN    albuterol (PROVENTIL HFA,VENTOLIN HFA) inhaler 2 puff  2 puff Inhalation Q6H PRN    aspirin (ECOTRIN) EC tablet 325 mg  325 mg Oral Daily    atorvastatin (LIPITOR) tablet 40 mg  40 mg Oral Daily    calcium carbonate (TUMS) chewable tablet 1,000 mg  1,000 mg Oral Daily PRN    cefepime (MAXIPIME) 1 g/50 mL dextrose IVPB  1,000 mg Intravenous Q24H    co-enzyme Q-10 capsule 30 mg  30 mg Oral Daily    diphenhydrAMINE (BENADRYL) oral elixir 25 mg  25 mg Oral Q6H PRN    fish oil capsule 1,000 mg  1,000 mg Oral Daily    gabapentin (NEURONTIN) capsule 200 mg  200 mg Oral TID    heparin (porcine) subcutaneous injection 5,000 Units  5,000 Units Subcutaneous Q8H [address]    levothyroxine tablet 100 mcg  100 mcg Oral Early Morning    metoprolol succinate (TOPROL-XL) 24 hr tablet 50 mg  50 mg Oral BID    ondansetron (ZOFRAN) injection 4 mg  4 mg Intravenous Q6H PRN    senna (SENOKOT) tablet 8 6 mg  1 tablet Oral Daily    sodium chloride (PF) 0 9 % injection 3 mL  3 mL Intravenous PRN    sodium chloride 0 9 % infusion  50 mL/hr Intravenous Continuous       Allergies:   Allergies   Allergen Reactions    Penicillins Rash   :    Social and Family History:   Social History:   Social History   Substance Use Topics    Smoking status: Former Smoker     Packs/day: 0 50     Years: 45 00     Quit date: 3/6/2018    Smokeless tobacco: Never Used    Alcohol use No        History   Smoking Status    Former Smoker    Packs/day: 0 50    Years: 45 00    Quit date: 3/6/2018   Smokeless Tobacco    Never Used Family History:  Family History   Problem Relation Age of Onset    Hypertension Mother     Heart disease Mother     Diabetes Father     Liver disease Father         hepatic failure     Breast cancer Daughter     Substance Abuse Neg Hx     Mental illness Neg Hx    :     Review of Systems:     General: Rebecca Fever, chills, or night sweats  Cardiac: Negative for chest pain  Pulmonary: Negative for shortness of breath  Gastrointestinal: Denies Abdominal pain, no  nausea, vomiting, or diarrhea   Genitourinary: See HPI above  Neurologic: No focal signs   Musculo-skeletal: No complaints    All other systems queried were negative  Physical Exam:     General appearance: alert and oriented, in no acute distress  Neck: no adenopathy, no carotid bruit, no JVD, supple, symmetrical, trachea midline and thyroid not enlarged, symmetric, no tenderness/mass/nodules  Back: symmetric, no curvature  ROM normal  No CVA tenderness    Lungs: clear to auscultation bilaterally  Heart: regular rate and rhythm, S1, S2 normal, no murmur, click, rub or gallop  Abdomen: soft, non-tender; bowel sounds normal; no masses,  no organomegaly  Extremities: extremities normal, warm and well-perfused; no cyanosis, clubbing, or edema  Pulses: 2+ and symmetric  Lymph nodes: Cervical, supraclavicular, and axillary nodes normal   Neurologic: Grossly normal      Labs:     Lab Results   Component Value Date    HGB 9 5 (L) 12/31/2018    HCT 30 2 (L) 12/31/2018    WBC 5 12 12/31/2018     12/31/2018   ]    Lab Results   Component Value Date     04/23/2015    K 4 7 12/31/2018     (H) 12/31/2018    CO2 24 12/31/2018    BUN 16 12/31/2018    CREATININE 1 19 12/31/2018    CALCIUM 8 3 12/31/2018    GLUCOSE 82 11/23/2018   ]    Imaging:   I personally reviewed the images and report of the following studies, and reviewed them with the patient:  CT scan  Ultrasound          Thank you for allowing me to participate in this patients care   Please do not hesitate to call with any additional questions    Estefany Roy MD

## 2019-01-01 NOTE — PLAN OF CARE
Problem: Potential for Falls  Goal: Patient will remain free of falls  INTERVENTIONS:  - Assess patient frequently for physical needs  -  Identify physical deficits and behaviors that affect risk of falls  -  [place] fall precautions as indicated by assessment   - Educate patient/family on patient safety including physical limitations  - Instruct patient to call for assistance with activity based on assessment  - Modify environment to reduce risk of injury  - Consider OT/PT consult to assist with strengthening/mobility    Outcome: Progressing      Problem: Nutrition/Hydration-ADULT  Goal: Nutrient/Hydration intake appropriate for improving, restoring or maintaining nutritional needs  Monitor and assess patient's nutrition/hydration status for malnutrition (ex- brittle hair, bruises, dry skin, pale skin and conjunctiva, muscle wasting, smooth red tongue, and disorientation)  Collaborate with interdisciplinary team and initiate plan and interventions as ordered  Monitor patient's weight and dietary intake as ordered or per policy  Utilize nutrition screening tool and intervene per policy  Determine patient's food preferences and provide high-protein, high-caloric foods as appropriate       INTERVENTIONS:  - Monitor oral intake, urinary output, labs, and treatment plans  - Assess nutrition and hydration status and recommend course of action  - Evaluate amount of meals eaten  - Assist patient with eating if necessary   - Allow adequate time for meals  - Recommend/ encourage appropriate diets, oral nutritional supplements, and vitamin/mineral supplements  - Order, calculate, and assess calorie counts as needed  - Recommend, monitor, and adjust tube feedings and TPN/PPN based on assessed needs  - Assess need for intravenous fluids  - Provide specific nutrition/hydration education as appropriate  - Include patient/family/caregiver in decisions related to nutrition   Outcome: Progressing      Problem: PAIN - ADULT  Goal: Verbalizes/displays adequate comfort level or baseline comfort level  Interventions:  - Encourage patient to monitor pain and request assistance  - Assess pain using appropriate pain scale  - Administer analgesics based on type and severity of pain and evaluate response  - Implement non-pharmacological measures as appropriate and evaluate response  - Notify physician/advanced practitioner if interventions unsuccessful or patient reports new pain   Outcome: Progressing      Problem: INFECTION - ADULT  Goal: Absence or prevention of progression during hospitalization  INTERVENTIONS:  - Assess and monitor for signs and symptoms of infection  - Monitor lab/diagnostic results  - Monitor all insertion sites, i e  indwelling lines  - [place] appropriate cooling/warming therapies per order  - Administer medications as ordered  - Instruct and encourage patient and family to use good hand hygiene technique  - Identify and instruct in appropriate isolation precautions for identified infection/condition   Outcome: Progressing      Problem: DISCHARGE PLANNING  Goal: Discharge to home or other facility with appropriate resources  INTERVENTIONS:  - Identify barriers to discharge w/patient and caregiver  - Arrange for needed discharge resources and transportation as appropriate  - Identify discharge learning needs (meds, wound care, etc )  - Refer to Case Management Department for coordinating discharge planning if the patient needs post-hospital services based on physician/advanced practitioner order or complex needs related to functional status, cognitive ability, or social support system   Outcome: Progressing      Problem: Knowledge Deficit  Goal: Patient/family/caregiver demonstrates understanding of disease process, treatment plan, medications, and discharge instructions  Complete learning assessment and assess knowledge base    Interventions:  - Provide teaching at level of understanding  - Provide teaching via preferred learning methods   Outcome: Progressing      Problem: RESPIRATORY - ADULT  Goal: Achieves optimal ventilation and oxygenation  INTERVENTIONS:  - Assess for changes in respiratory status  - Assess for changes in mentation and behavior  - Encourage broncho-pulmonary hygiene including cough, deep breathe, Incentive Spirometry  - Assess and instruct to report SOB or any respiratory difficulty  - Respiratory Therapy support as indicated   Outcome: Progressing      Problem: GASTROINTESTINAL - ADULT  Goal: Minimal or absence of nausea and/or vomiting  INTERVENTIONS:  - Administer IV fluids as ordered to ensure adequate hydration  - Administer ordered antiemetic medications as needed  - Provide nonpharmacologic comfort measures as appropriate  - Advance diet as tolerated, if ordered  - Nutrition services referral to assist patient with adequate nutrition and appropriate food choices   Outcome: Progressing    Goal: Maintains adequate nutritional intake  INTERVENTIONS:  - Monitor percentage of each meal consumed  - Identify factors contributing to decreased intake, treat as appropriate  - Assist with meals as needed  - Monitor I&O, WT and lab values  - Obtain nutrition services referral as needed   Outcome: Progressing      Problem: GENITOURINARY - ADULT  Goal: Maintains or returns to baseline urinary function  INTERVENTIONS:  - Assess urinary function  - Encourage oral fluids to ensure adequate hydration  - Administer IV fluids as ordered to ensure adequate hydration  - Administer ordered medications as needed  - Offer frequent toileting  - Follow urinary retention protocol if ordered   Outcome: Progressing      Problem: METABOLIC, FLUID AND ELECTROLYTES - ADULT  Goal: Electrolytes maintained within normal limits  INTERVENTIONS:  - Monitor labs and assess patient for signs and symptoms of electrolyte imbalances  - Administer electrolyte replacement as ordered  - Monitor response to electrolyte replacements, including repeat lab results as appropriate  - Instruct patient on fluid and nutrition as appropriate   Outcome: Progressing    Goal: Fluid balance maintained  INTERVENTIONS:  - Monitor labs and assess for signs and symptoms of volume excess or deficit  - Monitor I/O and WT  - Instruct patient on fluid and nutrition as appropriate   Outcome: Progressing      Problem: Prexisting or High Potential for Compromised Skin Integrity  Goal: Skin integrity is maintained or improved  INTERVENTIONS:  - Identify patients at risk for skin breakdown  - Assess and monitor skin integrity  - Assess and monitor nutrition and hydration status  - Monitor labs (i e  albumin)  - Assess for incontinence   - Turn and reposition patient  - Assist with mobility/ambulation  - Relieve pressure over bony prominences  - Avoid friction and shearing  - Provide appropriate hygiene as needed including keeping skin clean and dry  - Evaluate need for skin moisturizer/barrier cream  - Collaborate with interdisciplinary team (i e  Nutrition, Rehabilitation, etc )   - Patient/family teaching   Outcome: Progressing

## 2019-01-02 NOTE — PLAN OF CARE
Problem: Potential for Falls  Goal: Patient will remain free of falls  INTERVENTIONS:  - Assess patient frequently for physical needs  -  Identify physical deficits and behaviors that affect risk of falls  -  [place] fall precautions as indicated by assessment   - Educate patient/family on patient safety including physical limitations  - Instruct patient to call for assistance with activity based on assessment  - Modify environment to reduce risk of injury  - Consider OT/PT consult to assist with strengthening/mobility    Outcome: Adequate for Discharge      Problem: Nutrition/Hydration-ADULT  Goal: Nutrient/Hydration intake appropriate for improving, restoring or maintaining nutritional needs  Monitor and assess patient's nutrition/hydration status for malnutrition (ex- brittle hair, bruises, dry skin, pale skin and conjunctiva, muscle wasting, smooth red tongue, and disorientation)  Collaborate with interdisciplinary team and initiate plan and interventions as ordered  Monitor patient's weight and dietary intake as ordered or per policy  Utilize nutrition screening tool and intervene per policy  Determine patient's food preferences and provide high-protein, high-caloric foods as appropriate       INTERVENTIONS:  - Monitor oral intake, urinary output, labs, and treatment plans  - Assess nutrition and hydration status and recommend course of action  - Evaluate amount of meals eaten  - Assist patient with eating if necessary   - Allow adequate time for meals  - Recommend/ encourage appropriate diets, oral nutritional supplements, and vitamin/mineral supplements  - Order, calculate, and assess calorie counts as needed  - Recommend, monitor, and adjust tube feedings and TPN/PPN based on assessed needs  - Assess need for intravenous fluids  - Provide specific nutrition/hydration education as appropriate  - Include patient/family/caregiver in decisions related to nutrition   Outcome: Adequate for Discharge      Problem: PAIN - ADULT  Goal: Verbalizes/displays adequate comfort level or baseline comfort level  Interventions:  - Encourage patient to monitor pain and request assistance  - Assess pain using appropriate pain scale  - Administer analgesics based on type and severity of pain and evaluate response  - Implement non-pharmacological measures as appropriate and evaluate response  - Notify physician/advanced practitioner if interventions unsuccessful or patient reports new pain   Outcome: Adequate for Discharge      Problem: INFECTION - ADULT  Goal: Absence or prevention of progression during hospitalization  INTERVENTIONS:  - Assess and monitor for signs and symptoms of infection  - Monitor lab/diagnostic results  - Monitor all insertion sites, i e  indwelling lines  - [place] appropriate cooling/warming therapies per order  - Administer medications as ordered  - Instruct and encourage patient and family to use good hand hygiene technique  - Identify and instruct in appropriate isolation precautions for identified infection/condition   Outcome: Adequate for Discharge      Problem: DISCHARGE PLANNING  Goal: Discharge to home or other facility with appropriate resources  INTERVENTIONS:  - Identify barriers to discharge w/patient and caregiver  - Arrange for needed discharge resources and transportation as appropriate  - Identify discharge learning needs (meds, wound care, etc )  - Refer to Case Management Department for coordinating discharge planning if the patient needs post-hospital services based on physician/advanced practitioner order or complex needs related to functional status, cognitive ability, or social support system   Outcome: Adequate for Discharge      Problem: Knowledge Deficit  Goal: Patient/family/caregiver demonstrates understanding of disease process, treatment plan, medications, and discharge instructions  Complete learning assessment and assess knowledge base    Interventions:  - Provide teaching at level of understanding  - Provide teaching via preferred learning methods   Outcome: Adequate for Discharge      Problem: RESPIRATORY - ADULT  Goal: Achieves optimal ventilation and oxygenation  INTERVENTIONS:  - Assess for changes in respiratory status  - Assess for changes in mentation and behavior  - Encourage broncho-pulmonary hygiene including cough, deep breathe, Incentive Spirometry  - Assess and instruct to report SOB or any respiratory difficulty  - Respiratory Therapy support as indicated   Outcome: Adequate for Discharge      Problem: GASTROINTESTINAL - ADULT  Goal: Minimal or absence of nausea and/or vomiting  INTERVENTIONS:  - Administer IV fluids as ordered to ensure adequate hydration  - Administer ordered antiemetic medications as needed  - Provide nonpharmacologic comfort measures as appropriate  - Advance diet as tolerated, if ordered  - Nutrition services referral to assist patient with adequate nutrition and appropriate food choices   Outcome: Adequate for Discharge    Goal: Maintains adequate nutritional intake  INTERVENTIONS:  - Monitor percentage of each meal consumed  - Identify factors contributing to decreased intake, treat as appropriate  - Assist with meals as needed  - Monitor I&O, WT and lab values  - Obtain nutrition services referral as needed   Outcome: Adequate for Discharge      Problem: GENITOURINARY - ADULT  Goal: Maintains or returns to baseline urinary function  INTERVENTIONS:  - Assess urinary function  - Encourage oral fluids to ensure adequate hydration  - Administer IV fluids as ordered to ensure adequate hydration  - Administer ordered medications as needed  - Offer frequent toileting  - Follow urinary retention protocol if ordered   Outcome: Adequate for Discharge      Problem: METABOLIC, FLUID AND ELECTROLYTES - ADULT  Goal: Electrolytes maintained within normal limits  INTERVENTIONS:  - Monitor labs and assess patient for signs and symptoms of electrolyte imbalances  - Administer electrolyte replacement as ordered  - Monitor response to electrolyte replacements, including repeat lab results as appropriate  - Instruct patient on fluid and nutrition as appropriate   Outcome: Adequate for Discharge    Goal: Fluid balance maintained  INTERVENTIONS:  - Monitor labs and assess for signs and symptoms of volume excess or deficit  - Monitor I/O and WT  - Instruct patient on fluid and nutrition as appropriate   Outcome: Adequate for Discharge      Problem: Prexisting or High Potential for Compromised Skin Integrity  Goal: Skin integrity is maintained or improved  INTERVENTIONS:  - Identify patients at risk for skin breakdown  - Assess and monitor skin integrity  - Assess and monitor nutrition and hydration status  - Monitor labs (i e  albumin)  - Assess for incontinence   - Turn and reposition patient  - Assist with mobility/ambulation  - Relieve pressure over bony prominences  - Avoid friction and shearing  - Provide appropriate hygiene as needed including keeping skin clean and dry  - Evaluate need for skin moisturizer/barrier cream  - Collaborate with interdisciplinary team (i e  Nutrition, Rehabilitation, etc )   - Patient/family teaching   Outcome: Adequate for Discharge

## 2019-01-02 NOTE — PROGRESS NOTES
Progress Note - General Surgery   Krysten Mejia 72 y o  female MRN: [id number]  Unit/Bed#: [address] [place] [address] [id number] Encounter: [id number]    Assessment/Plan:  Febrile UTI   Cultures positive for her enterobacter and proteus  Improving with antibiotic  Okay for d/c to home with abx    Right hydronephrosis  Mild with unknown etiology  Will have her follow up for outpatient work up    Subjective/Objective   Chief Complaint: Feeling great    Subjective: Feeling good, eager to go home today  Reports abdominal pain and dysuria have resolved  Agrees with outpatient work up for hydronephrosis  Objective:     Blood pressure 119/66, pulse 94, temperature 98 1 °F (36 7 °C), temperature source Oral, resp  rate 18, height 5' (1 524 m), weight 57 6 kg (126 lb 15 8 oz), SpO2 96 %  ,Body mass index is 24 8 kg/m²        Intake/Output Summary (Last 24 hours) at 01/02/19 1257  Last data filed at 01/02/19 0641   Gross per 24 hour   Intake              560 ml   Output                0 ml   Net              560 ml       Invasive Devices     Peripheral Intravenous Line            Peripheral IV 12/30/18 Left Forearm 3 days                Physical Exam: General appearance: alert and oriented, in no acute distress and sitting on side of bed eating lunch  Lungs: clear to auscultation bilaterally and without wheezes, crackles, or rhonchi  Heart: regular rate and rhythm, S1, S2 normal, no murmur, click, rub or gallop  Abdomen: soft, non-tender; bowel sounds normal; no masses,  no organomegaly and no CVA tenderness    Lab, Imaging and other studies:CBC: No results found for: WBC, HGB, HCT, MCV, PLT, ADJUSTEDWBC, MCH, MCHC, RDW, MPV, NRBC, CMP: No results found for: SODIUM, K, CL, CO2, ANIONGAP, BUN, CREATININE, GLUCOSE, CALCIUM, AST, ALT, ALKPHOS, PROT, BILITOT, EGFR

## 2019-01-02 NOTE — PLAN OF CARE
Problem: Potential for Falls  Goal: Patient will remain free of falls  INTERVENTIONS:  - Assess patient frequently for physical needs  -  Identify physical deficits and behaviors that affect risk of falls  -  [place] fall precautions as indicated by assessment   - Educate patient/family on patient safety including physical limitations  - Instruct patient to call for assistance with activity based on assessment  - Modify environment to reduce risk of injury  - Consider OT/PT consult to assist with strengthening/mobility    Outcome: Progressing      Problem: Nutrition/Hydration-ADULT  Goal: Nutrient/Hydration intake appropriate for improving, restoring or maintaining nutritional needs  Monitor and assess patient's nutrition/hydration status for malnutrition (ex- brittle hair, bruises, dry skin, pale skin and conjunctiva, muscle wasting, smooth red tongue, and disorientation)  Collaborate with interdisciplinary team and initiate plan and interventions as ordered  Monitor patient's weight and dietary intake as ordered or per policy  Utilize nutrition screening tool and intervene per policy  Determine patient's food preferences and provide high-protein, high-caloric foods as appropriate       INTERVENTIONS:  - Monitor oral intake, urinary output, labs, and treatment plans  - Assess nutrition and hydration status and recommend course of action  - Evaluate amount of meals eaten  - Assist patient with eating if necessary   - Allow adequate time for meals  - Recommend/ encourage appropriate diets, oral nutritional supplements, and vitamin/mineral supplements  - Order, calculate, and assess calorie counts as needed  - Recommend, monitor, and adjust tube feedings and TPN/PPN based on assessed needs  - Assess need for intravenous fluids  - Provide specific nutrition/hydration education as appropriate  - Include patient/family/caregiver in decisions related to nutrition   Outcome: Progressing      Problem: PAIN - ADULT  Goal: Verbalizes/displays adequate comfort level or baseline comfort level  Interventions:  - Encourage patient to monitor pain and request assistance  - Assess pain using appropriate pain scale  - Administer analgesics based on type and severity of pain and evaluate response  - Implement non-pharmacological measures as appropriate and evaluate response  - Notify physician/advanced practitioner if interventions unsuccessful or patient reports new pain   Outcome: Progressing      Problem: INFECTION - ADULT  Goal: Absence or prevention of progression during hospitalization  INTERVENTIONS:  - Assess and monitor for signs and symptoms of infection  - Monitor lab/diagnostic results  - Monitor all insertion sites, i e  indwelling lines  - [place] appropriate cooling/warming therapies per order  - Administer medications as ordered  - Instruct and encourage patient and family to use good hand hygiene technique  - Identify and instruct in appropriate isolation precautions for identified infection/condition   Outcome: Progressing      Problem: DISCHARGE PLANNING  Goal: Discharge to home or other facility with appropriate resources  INTERVENTIONS:  - Identify barriers to discharge w/patient and caregiver  - Arrange for needed discharge resources and transportation as appropriate  - Identify discharge learning needs (meds, wound care, etc )  - Refer to Case Management Department for coordinating discharge planning if the patient needs post-hospital services based on physician/advanced practitioner order or complex needs related to functional status, cognitive ability, or social support system   Outcome: Progressing      Problem: Knowledge Deficit  Goal: Patient/family/caregiver demonstrates understanding of disease process, treatment plan, medications, and discharge instructions  Complete learning assessment and assess knowledge base    Interventions:  - Provide teaching at level of understanding  - Provide teaching via preferred learning methods   Outcome: Progressing      Problem: RESPIRATORY - ADULT  Goal: Achieves optimal ventilation and oxygenation  INTERVENTIONS:  - Assess for changes in respiratory status  - Assess for changes in mentation and behavior  - Encourage broncho-pulmonary hygiene including cough, deep breathe, Incentive Spirometry  - Assess and instruct to report SOB or any respiratory difficulty  - Respiratory Therapy support as indicated   Outcome: Progressing      Problem: GASTROINTESTINAL - ADULT  Goal: Minimal or absence of nausea and/or vomiting  INTERVENTIONS:  - Administer IV fluids as ordered to ensure adequate hydration  - Administer ordered antiemetic medications as needed  - Provide nonpharmacologic comfort measures as appropriate  - Advance diet as tolerated, if ordered  - Nutrition services referral to assist patient with adequate nutrition and appropriate food choices   Outcome: Progressing    Goal: Maintains adequate nutritional intake  INTERVENTIONS:  - Monitor percentage of each meal consumed  - Identify factors contributing to decreased intake, treat as appropriate  - Assist with meals as needed  - Monitor I&O, WT and lab values  - Obtain nutrition services referral as needed   Outcome: Progressing      Problem: GENITOURINARY - ADULT  Goal: Maintains or returns to baseline urinary function  INTERVENTIONS:  - Assess urinary function  - Encourage oral fluids to ensure adequate hydration  - Administer IV fluids as ordered to ensure adequate hydration  - Administer ordered medications as needed  - Offer frequent toileting  - Follow urinary retention protocol if ordered   Outcome: Progressing      Problem: METABOLIC, FLUID AND ELECTROLYTES - ADULT  Goal: Electrolytes maintained within normal limits  INTERVENTIONS:  - Monitor labs and assess patient for signs and symptoms of electrolyte imbalances  - Administer electrolyte replacement as ordered  - Monitor response to electrolyte replacements, including repeat lab results as appropriate  - Instruct patient on fluid and nutrition as appropriate   Outcome: Progressing    Goal: Fluid balance maintained  INTERVENTIONS:  - Monitor labs and assess for signs and symptoms of volume excess or deficit  - Monitor I/O and WT  - Instruct patient on fluid and nutrition as appropriate   Outcome: Progressing      Problem: Prexisting or High Potential for Compromised Skin Integrity  Goal: Skin integrity is maintained or improved  INTERVENTIONS:  - Identify patients at risk for skin breakdown  - Assess and monitor skin integrity  - Assess and monitor nutrition and hydration status  - Monitor labs (i e  albumin)  - Assess for incontinence   - Turn and reposition patient  - Assist with mobility/ambulation  - Relieve pressure over bony prominences  - Avoid friction and shearing  - Provide appropriate hygiene as needed including keeping skin clean and dry  - Evaluate need for skin moisturizer/barrier cream  - Collaborate with interdisciplinary team (i e  Nutrition, Rehabilitation, etc )   - Patient/family teaching   Outcome: Progressing

## 2019-01-02 NOTE — PHYSICAL THERAPY NOTE
PT eval   01/02/19 1030   Note Type   Note type Eval only   Pain Assessment   Pain Assessment No/denies pain   Pain Score No Pain   Home Living   Type of Home House   Home Layout Two level   Additional Comments ind pta   Prior Function   Level of [place] Independent with ADLs and functional mobility   Lives With Significant other   Receives Help From Family   ADL Assistance Independent   IADLs Independent   Falls in the last 6 months 0   Comments only problem is with le pain due to circulation take gabapentin   Restrictions/Precautions   Weight Bearing Precautions Per Order No   General   Additional Pertinent History adm with actue cystitis   Family/Caregiver Present Yes   Cognition   Overall Cognitive Status WFL   Arousal/Participation Alert   Orientation Level Oriented X4   Memory Within functional limits   Following Commands Follows all commands and directions without difficulty   RUE Assessment   RUE Assessment WFL   LUE Assessment   LUE Assessment WFL   RLE Assessment   RLE Assessment WFL   LLE Assessment   LLE Assessment WFL   Coordination   Movements are Fluid and Coordinated 1   Proprioception   RLE Proprioception Grossly intact   LLE Proprioception Grossly Intact   Bed Mobility   Supine to Sit 7  Independent   Transfers   Sit to Stand 7  Independent   Stand to Sit 7  Independent   Stand pivot 7  Independent   Ambulation/Elevation   Gait pattern Narrow VITO;WNL   Gait Assistance 7  Independent   Assistive Device None   Distance 150'   Stair Management Assistance 7  Independent   Stair Management Technique One rail R;Alternating pattern;Reciprocal;Foreward   Number of Stairs 2   Balance   Static Sitting Normal   Dynamic Sitting Normal   Static Standing Normal   Dynamic Standing Normal   Ambulatory Good   Endurance Deficit   Endurance Deficit No   Activity Tolerance   Activity Tolerance Patient tolerated treatment well   Nurse Made Aware yes   Assessment   Prognosis Good   Assessment PT alejandro as a funcitonal ambualtor wihtout AD  no skilled PT needs at this time  Pt approrpaite for d/c once medically cleared   d/c PT   Goals   Patient Goals go home   Plan   PT Frequency (d/c PT)   Recommendation   Recommendation Home with family support   Equipment Recommended (none)   PT - OK to Discharge Yes   Barthel Index   Feeding 10   Bathing 5   Grooming Score 5   Dressing Score 10   Bladder Score 10   Bowels Score 10   Toilet Use Score 10   Transfers (Bed/Chair) Score 15   Mobility (Level Surface) Score 15   Stairs Score 10   Barthel Index Score 100   Rupinder Llanes, PT

## 2019-01-02 NOTE — DISCHARGE SUMMARY
Discharge Summary - Dagoberto Roblero 72 y o  female MRN: [id number]    Unit/Bed#: [address] [place] [id number] Encounter: [id number]    Admission Date:   Admission Orders     Ordered        12/27/18 2220  Inpatient Admission (expected length of stay for this patient is greater than two midnights)  Once               Admitting Diagnosis: Abdominal pain [R10 9]  Fever [R50 9]  NYDIA (acute kidney injury) ([facility] [address] ) [N17 9]    HPI: Dagoberto Roblero is a 72 y o  female with a PMH for lung cancer undergoing chemo, COPD, HTN who presents with fever that began this afternoon  Patient underwent chemo today and states that around 1600 she began to feel jittery and came to the ED  Upon arrival T a 100 6°  Patient complains of dysuria for several days  She states that she has had a UTI in the past and this feels similar  UA in the ED was positive for leukocytes and nitrites  Her creatinine was also elevated 2 3, her baseline appears to be 1 2 to 1 4  She denies nausea, vomiting, , diarrhea, chills, chest pain, palpitations, shortness of breath, trouble breathing  Procedures Performed:   Orders Placed This Encounter   Procedures    ED ECG Documentation Only       Summary of Hospital Course:  Patient was initially placed on IV cefepime for treatment of UTI, blood culture came back positive for gram-negative in the blood at this point infectious Disease was consulted  Blood cultures came back positive for Enterobacter urine culture came back positive for Enterobacter and Proteus  Given the presence of Proteus the an ultrasound of the kidneys was performed to evaluate for hydronephrosis and mild hydronephrosis was found by imaging, on the right side  Urology was consulted to evaluate clinical significance of hydronephrosis  They performed a CT scan further evaluate this finding and after reviewing it as well as evaluating the patient they considered the patient to be stable for discharge home      Significant Findings, Care, Treatment and Services Provided:  Anterior bacteriuria, right-sided hydronephrosis, Proteus and Enterobacter UTI  Complications: none    Discharge Diagnosis:  Enterobacter bacteremia secondary to Enterobacter urinary tract infection  Proteus urinary tract infection  Resolved Problems  Date Reviewed: 12/30/2018    None          Condition at Discharge: good         Discharge instructions/Information to patient and family:   See after visit summary for information provided to patient and family  Provisions for Follow-Up Care:  See after visit summary for information related to follow-up care and any pertinent home health orders  PCP: Jean-Claude Bailey MD    Disposition: Home    Planned Readmission: No    Discharge Statement   I spent 30 minutes discharging the patient  This time was spent on the day of discharge  I had direct contact with the patient on the day of discharge  Additional documentation is required if more than 30 minutes were spent on discharge  Discharge Medications:  See after visit summary for reconciled discharge medications provided to patient and family

## 2019-01-02 NOTE — OCCUPATIONAL THERAPY NOTE
[address]  Evaluation     Patient Name: Rosamaria Melendrez  [id number] Date: 1/2/2019  Problem List  Patient Active Problem List   Diagnosis    Hypothyroidism    Pure hypercholesterolemia    ASCVD (arteriosclerotic cardiovascular disease)    Aneurysm, thoracoabdominal aortic ([facility] )    Benign hypertension    Spinal stenosis of lumbar region with neurogenic claudication    Bilateral carotid artery stenosis    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)    Acute kidney injury superimposed on chronic kidney disease ([facility] )    Moderate protein-calorie malnutrition ([facility] )    Acute cystitis without hematuria    Gram-negative bacteremia     Past Medical History  Past Medical History:   Diagnosis Date    Aortic aneurysm ([facility] )     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HCC)     ASCVD (arteriosclerotic cardiovascular disease)     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 ([facility] )     Back problem     Blood type B-     Cancer ([facility] )     lung CA    Cataract     COPD (chronic obstructive pulmonary disease) ([facility] )     Coronary artery disease     CVA (cerebral vascular accident) ([facility] ) 2012    Depression     Disease of thyroid gland     History of cataract     Hyperlipidemia     Hypertension     Lung mass     Mediastinal adenopathy     Muscle pain     Prediabetes     PVD (peripheral vascular disease) ([facility] )     Thoracic aortic aneurysm (HCC)     Transient insomnia      Past Surgical History  Past Surgical History:   Procedure Laterality Date    CAROTID STENT Left     FL COLONOSCOPY FLX DX W/COLLJ SPEC WHEN PFRMD N/A 9/27/2017    Procedure: EGD AND COLONOSCOPY;  Surgeon: Nixon Esteban MD;  [facility]: [facility];  Service: Gastroenterology    FL EDG US EXAM SURGICAL ALTER STOM DUODENUM/JEJUNUM N/A 8/1/2018    Procedure: LINEAR ENDOSCOPIC U/S;  Surgeon: Estrellita Caban MD;  Location: [facility];   Service: Gastroenterology    TUBAL LIGATION           01/02/19 1031   Note Type   Note type Eval only   Pain Assessment   Pain Assessment No/denies pain   Home Living   Type of [facility] Two level;Stairs to enter with rails;Bed/bath upstairs   Bathroom Shower/Tub Walk-in shower   Bathroom Toilet Standard   Bathroom Equipment (No DME)   Bathroom Accessibility Accessible   Prior Function   Level of [place] Independent with ADLs and functional mobility   Lives With Significant other   Receives Help From Family   ADL Assistance Independent   IADLs Independent   Falls in the last 6 months 0   Vocational Retired   [address] in all ADLs, IADLs and functional mobility   Reciprocal Relationships Lives with significant other   Intrinsic Gratification Looking forward to cruise   Psychosocial   Psychosocial (WDL) WDL   Patient Behaviors/Mood Cooperative   Subjective   Subjective "I can't wait to get out of here"    ADL   Where Assessed Edge of bed   Eating Assistance 7  Independent   Grooming Assistance 7  Independent   UB Bathing Assistance 7  Independent   LB Bathing Assistance 7  Independent   UB Dressing Assistance 7  Independent   LB Dressing Assistance 7  Independent   Toileting Assistance  7  Independent   Functional Assistance 7  Independent   Bed Mobility   Rolling R 7  Independent   Rolling L 7  Independent   Supine to Sit 7  Independent   Transfers   Sit to Stand 7  Independent   Stand to Sit 7  Independent   Stand pivot 7  Independent   Functional Mobility   Functional Mobility 7  Independent   Additional Comments No AD, reports occasional difficulty with stairs 2* to pain in legs      Balance   Static Sitting Normal   Dynamic Sitting Good   Static Standing Normal   Dynamic Standing Good   Activity Tolerance   Activity Tolerance Patient tolerated treatment well   Nurse Made Aware OK to see per Dontae   RUE Assessment   RUE Assessment WFL   LUE Assessment   LUE Assessment WFL   Hand Function   Gross Motor Coordination Functional   Fine Motor Coordination Functional   Cognition   Overall Cognitive Status WFL   Arousal/Participation Alert; Cooperative   Attention Attends with cues to redirect   Orientation Level Oriented X4   Memory Within functional limits   Following Commands Follows all commands and directions without difficulty   Assessment   Prognosis Good   Assessment Pt is a 72 y o  female seen for OT evaluation s/p admit to [facility] on 12/27/2018 w/ Acute cystitis without hematuria  Comorbidities affecting pt's functional performance at time of assessment include: hypothyroidism, pure hypercholesteremia, ASCVD, aneurism, benign hypertension, spinal stenosis of lumbar region with neurogenic claudication, bilateral carotid artery stenosis, NYDIA, nonsquamous nonsmall cell neoplasm of L lung  Personal factors affecting pt at time of IE include: chemotherapy and steps to enter home  Prior to admission, pt was independent in all ADLs/IADLs and functional mobility without the use of an AD  Upon evaluation: Pt requires no assistance with ADLs or functional mobility, and has no concerns for returning home  From OT standpoint, recommendation at time of d/c would be home independent  DC OT  Goals   Patient Goals Go home and go on cruise      Plan   OT Frequency Eval only   Recommendation   OT Discharge Recommendation Home independent   Barthel Index   Feeding 10   Bathing 5   Grooming Score 5   Dressing Score 10   Bladder Score 10   Bowels Score 10   Toilet Use Score 10   Transfers (Bed/Chair) Score 15   Mobility (Level Surface) Score 15   Stairs Score 10   Barthel Index Score 100       ITZEL Patel, OTR/L

## 2019-01-07 PROBLEM — N17.9 ACUTE KIDNEY INJURY SUPERIMPOSED ON CHRONIC KIDNEY DISEASE (HCC): Chronic | Status: RESOLVED | Noted: 2018-01-01 | Resolved: 2019-01-01

## 2019-01-07 PROBLEM — N18.9 ACUTE KIDNEY INJURY SUPERIMPOSED ON CHRONIC KIDNEY DISEASE (HCC): Chronic | Status: RESOLVED | Noted: 2018-01-01 | Resolved: 2019-01-01

## 2019-01-07 PROBLEM — R78.81 GRAM-NEGATIVE BACTEREMIA: Status: RESOLVED | Noted: 2018-01-01 | Resolved: 2019-01-01

## 2019-01-07 NOTE — PROGRESS NOTES
Assessment/Plan:      Diagnoses and all orders for this visit:    ASCVD (arteriosclerotic cardiovascular disease)    Benign hypertension    Acute cystitis without hematuria    Nonsquamous nonsmall cell neoplasm of left lung ([address] )    Spinal stenosis of lumbar region with neurogenic claudication  -     traMADol (ULTRAM) 50 mg tablet; Take 1 tablet (50 mg total) by mouth every 6 (six) hours as needed for moderate pain      will see back in 4 weeks before she goes on her cruise  Subjective:     Patient ID: Malini Bunch is a 72 y o  female  HPI     Patient seen after being discharged on hospital patient presented the hospital with acute kidney injury as well as sepsis  This was a urinary source with Enterobacter  Patient was also found to have a mild right hydronephrosis  There may be some Urology outpatient follow-up  Has 3 more days of antibiotic  Coronary artery disease-The patient denies CP, SOB or palpitations  The patient is compliant with antiplatelet and secondary prevention regimens  Has chronic back pain and RLE pain  Review of Systems   Constitutional: Negative  Respiratory: Negative  Cardiovascular: Negative  Gastrointestinal: Negative  Genitourinary: Negative  Musculoskeletal: Positive for back pain  Objective:     Physical Exam   Constitutional: She appears well-developed and well-nourished  Cardiovascular: Normal rate and regular rhythm  Pulmonary/Chest: Effort normal and breath sounds normal    Abdominal: Soft  Bowel sounds are normal  She exhibits distension  Musculoskeletal: She exhibits no edema  Right foot since shows toes to be somewhat cold no active ulcers or evidence of erythema  Vitals reviewed          Vitals:    01/07/19 1104   BP: 120/70   BP Location: Left arm   Patient Position: Sitting   Cuff Size: Standard   Pulse: 70   Weight: 52 2 kg (115 lb)   Height: 5' (1 524 m)       Transitional Care Management Review:  Malini Bunch is a 72 y o  female here for TCM follow up  During the TCM phone call patient stated:    TCM Call (since 12/7/2018)     Date and time call was made  1/4/2019  4:21 PM    Hospital care reviewed  Records reviewed    Patient was hospitialized at  [facility]    Date of Admission  12/27/18    Date of discharge  01/02/19    Diagnosis  NYDIA    Disposition  Home    Were the patients medications reviewed and updated  Yes    Current Symptoms  None      TCM Call (since 12/7/2018)     Post hospital issues  None    Scheduled for follow up? Yes    Patients specialists  Other (comment)    Other specialists names  Catherine Quick MD - Hematology - Chemo- infusion     Did you obtain your prescribed medications  Yes    Do you need help managing your prescriptions or medications  No    Is transportation to your appointment needed  No    I have advised the patient to call PCP with any new or worsening symptoms  JARED Herring MA     Living Arrangements  Spouse or Significiant other    Support System  Spouse; Friends;  Family    Are you recieving any outpatient services  No    Are you recieving home care services  No              Joe Montanez MD

## 2019-01-09 NOTE — PROGRESS NOTES
Hematology/Oncology Outpatient Follow- up Note  Osvaldo Thayer 72 y o  female MRN: @ Encounter: [id number]        Date:  1/9/2019    Presenting Complaint/Diagnosis : Stage IIIa left upper lobe adenocarcinoma of the lung  HPI:      Reynolds Boeck Trumbauer is seen for initial consultation 8/16/2018 regarding Newly diagnosed stage IIIa adenocarcinoma of the lung  The patient initially was found to have a left hilar mass with what appeared to be adenopathy extending under her aortic arch in the AP window  PET/CT scan was denies so she had an EUS guided biopsy of the adenopathy pathology of which was consistent with adenocarcinoma of pulmonary primary  This made it a stage IIIa lung cancer  She was seen by her colleagues in cardiothoracic surgery who referred her to see us and her colleagues in radiation oncology to consider concurrent chemoradiation  Previous Hematologic/ Oncologic History:       Nonsquamous nonsmall cell neoplasm of left lung ([facility] [address] )    8/2018 Initial Diagnosis     [facility]onsquamous nonsmall [facility] )         8/1/2018 Biopsy     Lymph node, mediastinal (fine needle aspiration with cell block preparation):     - Involvement by non-small cell carcinoma, most compatible with pulmonary adenocarcinoma     - Positive: Emmett-Ep4, MOC-31, TTF1, CKAE1/3     - Negative: Hammond-8, CDX2, synaptophysin, CD56, HMB-45, P40         8/9/2018 - 10/22/2018 Radiation     Treatments:  Course: C1    Plan ID Energy Fractions Dose per Fraction (cGy) Dose Correction (cGy) Total Dose Delivered (cGy) Elapsed Days   L LUNG MED 6X 33 / 33 180 0 [phone]      Treatment dates:  8/29/2018 - 10/22/2018            8/29/2018 - 10/4/2018 Chemotherapy     Carboplatin AUC 2 and Taxol 50 mg/m2 concurrent with RT          11/8/2018 -  Chemotherapy     Imfinzi (immunotherapy) every 2 weeks           Carboplatin AUC of 2 with Taxol 50 mg/m² g with concurrent chemoradiation   She got 5 cycles with concurrent therapy and her last week is actually without chemotherapy secondary to cytopenia along with side effects    Current Hematologic/ Oncologic Treatment:    Imfinzi 10 mg for 2 mg every 2 weeks  [place] #5 is on the 10th of jan, 2019    Interval History:    The patient returns for follow-up visit  She has had 4 doses of immunotherapy so far  She is tolerating it well with no symptoms of pneumonitis, thyroiditis or any other side effects  She states she does have chronic back pain which is worse currently  She does have spinal stenosis so I will refer her to our colleagues in pain medicine  She has seen them in the past for epidural  Denies any nausea or vomiting  Denies any diarrhea  The rest of her 14 point review of systems today was negative  Test Results:    Imaging: Ct Abdomen Pelvis Wo Contrast    Result Date: 1/1/2019  Narrative: CT ABDOMEN AND PELVIS WITHOUT IV CONTRAST INDICATION:   Possible hydronephrosis  No barium, no contrast IV  COMPARISON:  Renal ultrasound from 12/31/2018, and abdomen pelvic CT from 10/8/2018  TECHNIQUE:  CT examination of the abdomen and pelvis was performed without intravenous contrast   Axial, sagittal, and coronal 2D reformatted images were created from the source data and submitted for interpretation  Radiation dose length product (DLP) for this visit:  205 43 mGy-cm   This examination, like all CT scans performed in the [facility], was performed utilizing techniques to minimize radiation dose exposure, including the use of iterative  reconstruction and automated exposure control  Enteric contrast was administered  FINDINGS: ABDOMEN LOWER CHEST:  No clinically significant abnormality identified in the visualized lower chest  LIVER/BILIARY TREE:  Unremarkable  GALLBLADDER:  No calcified gallstones  No pericholecystic inflammatory change  SPLEEN:  Unremarkable  PANCREAS:  Unremarkable  ADRENAL GLANDS:  Unremarkable   KIDNEYS/URETERS:  Mild right hydronephrosis, unchanged compared to the 12/31/2018 ultrasound  Etiology is unclear  There is no left-sided hydronephrosis  STOMACH AND BOWEL:  Unremarkable  APPENDIX:  No findings to suggest appendicitis  ABDOMINOPELVIC CAVITY:  No ascites or free intraperitoneal air  No lymphadenopathy  VESSELS:  Unchanged, partially visualized 6 2 cm distal descending thoracic aortic aneurysm, and a 4 5 cm abdominal aortic aneurysm without rupture  PELVIS REPRODUCTIVE ORGANS:  Unremarkable for patient's age  URINARY BLADDER:  Unremarkable  ABDOMINAL WALL/INGUINAL REGIONS:  Unremarkable  OSSEOUS STRUCTURES:  No acute fracture or destructive osseous lesion  Impression: Mild right hydronephrosis, unchanged compared to the 12/31/2018 ultrasound  Etiology is unclear  Unchanged, partially visualized 6 2 cm distal descending thoracic aortic aneurysm, and a 4 5 cm abdominal aortic aneurysm without rupture  Workstation performed: [id number]     Xr Chest Pa & Lateral    Result Date: 12/27/2018  Narrative: CHEST INDICATION:  Fever  COMPARISON:  9/22/2018, CTA chest, abdomen and pelvis 6/11/2018 EXAM PERFORMED/VIEWS:  XR CHEST PA & LATERAL  The frontal view was performed utilizing dual energy radiographic technique  FINDINGS: Cardiomediastinal silhouette appears unchanged with calcific, uncoiled thoracic aorta  Vascular stent seen along the proximal arch  Aneurysmal dilatation of the descending thoracic aorta suggested up to 6 5 cm distally  This measured up to 6 1 cm on the most recent CT abdominal study in October 2018  No acute infiltrates  Calcified granuloma or lymph node along the upper pole left hilum  Opacity in the left upper lobe adjacent to the aortic arch seen on recent CT studies is not as well-visualized by plain film  No pneumothorax or pleural effusion  Degenerative changes of the spine  Impression: No evidence of pneumonia  Left upper lobe periaortic opacity described on previous CT imaging is not well visualized by plain film   Follow-up should be based on previous CT recommendations  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  Workstation performed: [id number]     Us Kidney And Bladder    Result Date: 12/31/2018  Narrative: RENAL ULTRASOUND INDICATION:   PYELONEPHRITIS, UNCOMPLICATED pyelonephritis  COMPARISON: CT 10/8/2018  TECHNIQUE:   Ultrasound of the retroperitoneum was performed with a curvilinear transducer utilizing volumetric sweeps and still imaging techniques  FINDINGS: KIDNEYS: Symmetric and normal size  Right kidney:  10 4 x 4 point cm  Left kidney:  8 0 x 3 9 cm  Right kidney Normal echogenicity and contour  No suspicious masses detected  Mild to moderate right hydronephrosis is noted  No shadowing calculi  No perinephric fluid collections  Left kidney Normal echogenicity and contour  No suspicious masses detected  No hydronephrosis  No shadowing calculi  No perinephric fluid collections  URETERS: Nonvisualized  BLADDER: Normally distended  No focal thickening or mass lesions  Bilateral ureteral jets detected  Prevoid bladder volume of 241 mL is noted  Postvoid bladder volume of 64 mL is noted  Aneurysmal dilatation of the abdominal aorta is again identified measuring 5 1 x 5 3 cm proximally  Impression: Mild to moderate right hydronephrosis  Right ureteral jet was identified  Abdominal aortic aneurysm again identified  Small post void bladder residual volume   Workstation performed: [id number]       Labs:   Lab Results   Component Value Date    WBC 5 12 12/31/2018    HGB 9 5 (L) 12/31/2018    HCT 30 2 (L) 12/31/2018     (H) 12/31/2018     12/31/2018     Lab Results   Component Value Date     04/23/2015    K 4 7 12/31/2018     (H) 12/31/2018    CO2 24 12/31/2018    ANIONGAP 14 05/04/2015    BUN 16 12/31/2018    CREATININE 1 19 12/31/2018    GLUCOSE 82 11/23/2018    GLUF 90 06/12/2017    CALCIUM 8 3 12/31/2018    AST 17 12/27/2018    ALT 22 12/27/2018    ALKPHOS 99 12/27/2018    PROT 6 9 04/23/2015    BILITOT 0 25 04/23/2015    EGFR 46 12/31/2018     Lab Results   Component Value Date    OXPCTQWJ28 774 02/27/2018         ROS: As stated in the history of present illness otherwise his 14 point review of systems today was negative  Active Problems:   Patient Active Problem List   Diagnosis    Hypothyroidism    Pure hypercholesterolemia    ASCVD (arteriosclerotic cardiovascular disease)    Aneurysm, thoracoabdominal aortic (HCC)    Benign hypertension    Spinal stenosis of lumbar region with neurogenic claudication    Bilateral carotid artery stenosis    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)    Moderate protein-calorie malnutrition ([address] )    Acute cystitis without hematuria       Past Medical History:   Past Medical History:   Diagnosis Date    Aortic aneurysm ([address] )     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HCC)     ASCVD (arteriosclerotic cardiovascular disease)     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 ([address] )     Back problem     Blood type B-     Cancer ([address] )     lung CA    Cataract     [facility]ronic obstructive [facility] [address] [facility]     Coronary artery disease     CVA (cerebral vascular accident) ([facility] [address] ) 2012    Depression     Disease of thyroid gland     History of cataract     Hyperlipidemia     Hypertension     Lung mass     Mediastinal adenopathy     Muscle pain     Prediabetes     PVD (peripheral vascular disease) ([address] )     Thoracic aortic aneurysm (HCC)     Transient insomnia        Surgical History:   Past Surgical History:   Procedure Laterality Date    CAROTID STENT Left     NH COLONOSCOPY FLX DX W/COLLJ SPEC WHEN PFRMD N/A 9/27/2017    Procedure: EGD AND COLONOSCOPY;  Surgeon: Alfredo Raygoza MD;  Location:  MAIN OR;  Service: Gastroenterology    NH EDG US EXAM SURGICAL ALTER STOM DUODENUM/JEJUNUM N/A 8/1/2018    Procedure: LINEAR ENDOSCOPIC U/S;  Surgeon: Cory Verde MD;  Location: [facility];   Service: Gastroenterology   [facility] TUBAL LIGATION         Family History:    Family History   Problem Relation Age of Onset    Hypertension Mother     Heart disease Mother     Diabetes Father     Liver disease Father         hepatic failure     Breast cancer Daughter     Substance Abuse Neg Hx     Mental illness Neg Hx        Cancer-related family history includes Breast cancer in her daughter  Social History:   Social History     Social History    Marital status:      Spouse name: N/A    Number of children: N/A    Years of education: N/A     Occupational History    Not on file  Social History Main Topics    Smoking status: Former Smoker     Packs/day: 0 50     Years: 45 00     Quit date: 3/6/2018    Smokeless tobacco: Never Used    Alcohol use No    Drug use: No    Sexual activity: Not Currently     Other Topics Concern    Not on file     Social History Narrative    Lives with family  Feels safe at home  Sees dentist occas  No living will  Dental care, occasionally     Does not exercise     Living alone    Living situation: supportive and safe    No advance directives     No caffeine use        Current Medications:   Current Outpatient Prescriptions   Medication Sig Dispense Refill    aspirin (ECOTRIN) 325 mg EC tablet Take 325 mg by mouth daily        atorvastatin (LIPITOR) 40 mg tablet Take 1 tablet (40 mg total) by mouth daily (Patient taking differently: Take 40 mg by mouth daily in the early morning  ) 90 tablet 3    co-enzyme Q-10 30 MG capsule Take 30 mg by mouth daily        gabapentin (NEURONTIN) 100 mg capsule Take 2 capsules (200 mg total) by mouth 3 (three) times a day 180 capsule 0    levofloxacin (LEVAQUIN) 500 mg tablet Take 1 tablet (500 mg total) by mouth every 24 hours for 7 days 7 tablet 0    levothyroxine 100 mcg tablet Take 1 tablet (100 mcg total) by mouth daily 30 tablet 3    Methylcobalamin (B12-ACTIVE PO) Take by mouth daily        metoprolol succinate (TOPROL-XL) 50 mg 24 hr tablet Take 1 tablet (50 mg total) by mouth 2 (two) times a day 60 tablet 2    Omega-3 Fatty Acids (FISH OIL PO) Take 1 capsule by mouth daily        traMADol (ULTRAM) 50 mg tablet Take 1 tablet (50 mg total) by mouth every 6 (six) hours as needed for moderate pain 30 tablet 2     No current facility-administered medications for this visit  Facility-Administered Medications Ordered in Other Visits   Medication Dose Route Frequency Provider Last Rate Last Dose    [START ON 1/10/2019] durvalumab (IMFINZI) 540 mg in sodium chloride 0 9 % 100 mL IVPB  540 mg Intravenous Once Fortino Litten, MD Taunya Clunes Lurline Layman ON 1/10/2019] sodium chloride 0 9 % infusion  20 mL/hr Intravenous Continuous Fortino Litten, MD           Allergies: Allergies   Allergen Reactions    Penicillins Rash       Physical Exam:    Body surface area is 1 48 meters squared  Wt Readings from Last 3 Encounters:   01/09/19 52 2 kg (115 lb)   01/07/19 52 2 kg (115 lb)   01/02/19 57 6 kg (126 lb 15 8 oz)        Temp Readings from Last 3 Encounters:   01/09/19 (!) 97 2 °F (36 2 °C) (Tympanic)   01/02/19 98 1 °F (36 7 °C) (Oral)   12/27/18 99 3 °F (37 4 °C) (Tympanic)        BP Readings from Last 3 Encounters:   01/09/19 (!) 78/56   01/07/19 120/70   01/02/19 119/66         Pulse Readings from Last 3 Encounters:   01/09/19 (!) 110   01/07/19 70   01/02/19 94     @LASTSAO2(3)@      Physical Exam     Constitutional   General appearance: No acute distress, well appearing and well nourished  Eyes   Conjunctiva and lids: No swelling, erythema or discharge  Pupils and irises: Equal, round and reactive to light  Ears, Nose, Mouth, and Throat   External inspection of ears and nose: Normal     Nasal mucosa, septum, and turbinates: Normal without edema or erythema  Oropharynx: Normal with no erythema, edema, exudate or lesions  Pulmonary   Respiratory effort: No increased work of breathing or signs of respiratory distress      Auscultation of lungs: Clear to auscultation  Cardiovascular   Palpation of heart: Normal PMI, no thrills  Auscultation of heart: Normal rate and rhythm, normal S1 and S2, without murmurs  Examination of extremities for edema and/or varicosities: Normal     Carotid pulses: Normal     Abdomen   Abdomen: Non-tender, no masses  Liver and spleen: No hepatomegaly or splenomegaly  Lymphatic   Palpation of lymph nodes in neck: No lymphadenopathy  Musculoskeletal   Gait and station: Normal     Digits and nails: Normal without clubbing or cyanosis  Inspection/palpation of joints, bones, and muscles: Normal     Skin   Skin and subcutaneous tissue: Normal without rashes or lesions  Neurologic   Cranial nerves: Cranial nerves 2-12 intact  Sensation: No sensory loss  Psychiatric   Orientation to person, place, and time: Normal     Mood and affect: Normal         Assessment / Plan:    The patient is a pleasant 61-year-old female with newly diagnosed stage IIIa adenocarcinoma of the lung  Her PET/CT scan showed disease in the lymph nodes and the primary mass  There was no metastatic lesions seen  She was seen by our colleagues in surgery who recommended concurrent chemoradiation  Based on her stage I agreed with this  The patient was started on carboplatin at an AUC of 2 and Taxol at 48 m square with concurrent chemoradiation  She got 5 cycles but then was admitted with C  Difficile and diarrhea       She finished up the remainder of her radiation after that episode without chemotherapy because of low blood counts       Based on the data from the [address] [place] [address] trial We had a discussion once she finished her concurrent chemoradiation and because she has stage IIIa malignancy we  put her on IMFINZI every 2 weeks to complete 1 year of treatment  The patient agreed with this  She is doing well on her medication  She was admitted for a UTI but the symptoms have now resolved and she was adequately treated   She does have back pain related to spinal stenosis  I will set her up to see her colleagues in pain medicine I e  Dr Adrienne Mayberry  She is already set up for imaging  I'll see her back in 4-6 weeks  She will have her PET CT scan before then  This is to monitor for response/restage her after her concurrent chemoradiation as she is currently on active therapy  Goals and Barriers:  Current Goal:  Prolong Survival from Lung cancer  Barriers: None  Patient's Capacity to Self Care:  Patient able to self care  Portions of the record may have been created with voice recognition software   Occasional wrong word or "sound a like" substitutions may have occurred due to the inherent limitations of voice recognition software   Read the chart carefully and recognize, using context, where substitutions have occurred

## 2019-01-10 NOTE — PROGRESS NOTES
Pt in infusion center today for chemotherapy infusion  VSS  Lizzeth Potts RN with Dr Lina Yañez office notified of creatinine 3 0  Patient receiving hydration at this time  Patient to have repeat blood work drawn next week  Script provided to patient  Patient aware to follow up with PCP if she begins to have symptoms of a UTI including burning discomfort and frequency  Pt gave verbal understanding of information provided

## 2019-01-29 PROBLEM — N30.00 ACUTE CYSTITIS WITHOUT HEMATURIA: Status: RESOLVED | Noted: 2018-01-01 | Resolved: 2019-01-01

## 2019-01-29 PROBLEM — N18.30 STAGE 3 CHRONIC KIDNEY DISEASE (HCC): Status: ACTIVE | Noted: 2019-01-01

## 2019-01-29 NOTE — PROGRESS NOTES
Assessment/Plan:       Diagnoses and all orders for this visit:    ASCVD (arteriosclerotic cardiovascular disease)    Benign hypertension  -     Basic metabolic panel; Future    Nonsquamous nonsmall cell neoplasm of left lung ([address] )    Pure hypercholesterolemia    Spinal stenosis of lumbar region with neurogenic claudication  -     HYDROcodone-acetaminophen (NORCO) 5-325 mg per tablet; Take 1 tablet by mouth every 6 (six) hours as needed for pain Max Daily Amount: 4 tablets          All of the above diagnoses have been assessed  Additional COMMENTS/PLAN:  Will see back in 6 weeks    Blood work to be done with attention to her creatinine off the lisinopril in 2 weeks    Hydrocodone as prescribed before she goes away to make sure she tolerates this for pain  Will discontinue tramadol  Subjective:      Patient ID: Maciej Luciano is a 72 y o  female  HPI    The following portions of the patient's history were revised and updated as appropriate: Problem list, allergies, med list, FH, SH, Past medical and surgical histories  Current Outpatient Prescriptions   Medication Sig Dispense Refill    aspirin (ECOTRIN) 325 mg EC tablet Take 325 mg by mouth daily        atorvastatin (LIPITOR) 40 mg tablet Take 1 tablet (40 mg total) by mouth daily (Patient taking differently: Take 40 mg by mouth daily in the early morning  ) 90 tablet 3    co-enzyme Q-10 30 MG capsule Take 30 mg by mouth daily        gabapentin (NEURONTIN) 100 mg capsule Take 2 capsules (200 mg total) by mouth 3 (three) times a day 180 capsule 0    levothyroxine 100 mcg tablet Take 1 tablet (100 mcg total) by mouth daily 30 tablet 3    Methylcobalamin (B12-ACTIVE PO) Take by mouth daily        metoprolol succinate (TOPROL-XL) 50 mg 24 hr tablet Take 1 tablet (50 mg total) by mouth 2 (two) times a day 60 tablet 2    Omega-3 Fatty Acids (FISH OIL PO) Take 1 capsule by mouth daily        HYDROcodone-acetaminophen (NORCO) 5-325 mg per tablet Take 1 tablet by mouth every 6 (six) hours as needed for pain Max Daily Amount: 4 tablets 20 tablet 0     No current facility-administered medications for this visit  Facility-Administered Medications Ordered in Other Visits   Medication Dose Route Frequency Provider Last Rate Last Dose    [START ON 1/31/2019] durvalumab (IMFINZI) 500 mg in sodium chloride 0 9 % 100 mL IVPB  500 mg Intravenous Once Soha Urrutia MD       [place] [START ON 1/31/2019] sodium chloride 0 9 % infusion  20 mL/hr Intravenous Once Soha Urrutia MD           Review of Systems      Objective:    /60 (BP Location: Right arm, Patient Position: Sitting, Cuff Size: Standard)   Pulse 86   Ht 5' (1 524 m)   Wt 51 3 kg (113 lb)   LMP  (LMP Unknown)   BMI 22 07 kg/m²      Physical Exam      Hospital Outpatient Visit on 01/29/2019   Component Date Value Ref Range Status    POC Glucose 01/29/2019 83  65 - 140 mg/dl Final   Orders Only on 01/17/2019   Component Date Value Ref Range Status    Glucose, Random 01/17/2019 84  65 - 99 mg/dL Final    Comment:               Fasting reference interval         BUN 01/17/2019 29* 7 - 25 mg/dL Final    Creatinine 01/17/2019 2 20* 0 60 - 0 93 mg/dL Final    Comment: For patients >52years of age, the reference limit  for Creatinine is approximately 13% higher for people  identified as -American           eGFR Non African American 01/17/2019 22* > OR = 60 mL/min/1 73m2 Final    SL AMB EGFR  01/17/2019 25* > OR = 60 mL/min/1 73m2 Final    SL AMB BUN/CREATININE RATIO 01/17/2019 13  6 - 22 (calc) Final    Sodium 01/17/2019 137  135 - 146 mmol/L Final    Potassium 01/17/2019 5 0  3 5 - 5 3 mmol/L Final    Chloride 01/17/2019 101  98 - 110 mmol/L Final    CO2 01/17/2019 26  20 - 32 mmol/L Final    SL AMB CALCIUM 01/17/2019 9 3  8 6 - 10 4 mg/dL Final    SL AMB PROTEIN, TOTAL 01/17/2019 6 4  6 1 - 8 1 g/dL Final    Albumin 01/17/2019 3 7  3 6 - 5 1 g/dL Final    Globulin 01/17/2019 2 7  1 9 - 3 7 g/dL (calc) Final    Albumin/Globulin Ratio 01/17/2019 1 4  1 0 - 2 5 (calc) Final    TOTAL BILIRUBIN 01/17/2019 0 4  0 2 - 1 2 mg/dL Final    Alkaline Phosphatase 01/17/2019 116  33 - 130 U/L Final    SL AMB AST 01/17/2019 12  10 - 35 U/L Final    SL AMB ALT 01/17/2019 8  6 - 29 U/L Final    White Blood Cell Count 01/17/2019 9 5  3 8 - 10 8 Thousand/uL Final    Red Blood Cell Count 01/17/2019 3 41* 3 80 - 5 10 Million/uL Final    Hemoglobin 01/17/2019 11 4* 11 7 - 15 5 g/dL Final    HCT 01/17/2019 33 9* 35 0 - 45 0 % Final    MCV 01/17/2019 99 4  80 0 - 100 0 fL Final    MCH 01/17/2019 33 4* 27 0 - 33 0 pg Final    MCHC 01/17/2019 33 6  32 0 - 36 0 g/dL Final    RDW 01/17/2019 12 3  11 0 - 15 0 % Final    Platelet Count 79/37/2488 280  140 - 400 Thousand/uL Final    SL AMB MPV 01/17/2019 11 4  7 5 - 12 5 fL Final    Neutrophils (Absolute) 01/17/2019 6403  1,500 - 7,800 cells/uL Final    Lymphocytes (Absolute) 01/17/2019 1539  850 - 3,900 cells/uL Final    Monocytes (Absolute) 01/17/2019 1064* 200 - 950 cells/uL Final    Eosinophils (Absolute) 01/17/2019 437  15 - 500 cells/uL Final    Basophils ABS 01/17/2019 57  0 - 200 cells/uL Final    Neutrophils 01/17/2019 67 4  % Final    Lymphocytes 01/17/2019 16 2  % Final    Monocytes 01/17/2019 11 2  % Final    Eosinophils 01/17/2019 4 6  % Final    Basophils PCT 01/17/2019 0 6  % Final         Casandra Waterman MD

## 2019-01-30 NOTE — PROGRESS NOTES
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  She recovered well from combined chemotherapy and radiation therapy  She  started on Imfinzi, immunotherapy, on November 8, 2018 which is scheduled every 2 weeks for 1 year  12/27/18 - 1/2/19 adm to [facility] with fever and dysuria  Treated for UTI and d/c home on 1/2 1/9/19 Med Onc, Dr Dexter Bamberger follow-up  Pt tolerated 4 doses of Imfinzi so far, she will receive 5th dose on 1/10  Reports worsening back pain, she has hx of spinal stenosis and has had epidurals in the past  Will refer to pain medicine  1/29/19 PET/CT  IMPRESSION:  1  Findings compatible with response to therapy  2   Previously noted FDG avid left upper lobe lesion along with the FDG avid mediastinal and left perihilar josé miguel disease is no longer seen compatible with response to therapy  3   No new findings suspicious for hypermetabolic malignancy  4   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  This is  slowly enlarging over time  Recommend continued follow up with vascular surgery        Upcoming Appts:  1/31/19 Infusion, 6th dose Imfinzi  3/5/19 Dr Abena Fang, Pain Medicine  3/6/19 Dr Dexter Bamberger follow-up          Nonsquamous nonsmall cell neoplasm of left lung ([facility] [address] )    8/2018 Initial Diagnosis     Nonsquamous nonsmall cell neoplasm of lung ([facility] [address] )         8/1/2018 Biopsy     Lymph node, mediastinal (fine needle aspiration with cell block preparation):     - Involvement by non-small cell carcinoma, most compatible with pulmonary adenocarcinoma     - Positive: Emmett-Ep4, MOC-31, TTF1, CKAE1/3     - Negative: [place]-8, CDX2, synaptophysin, CD56, HMB-45, P40         8/9/2018 - 10/22/2018 Radiation     Treatments:  Course: C1    Plan ID Energy Fractions Dose per Fraction (cGy) Dose Correction (cGy) Total Dose Delivered (cGy) Elapsed Days   L LUNG MED 6X 33 / 33 180 0 5,[phone] 54      Treatment dates:  8/29/2018 - 10/22/2018            8/29/2018 - 10/4/2018 Chemotherapy     Carboplatin AUC 2 and Taxol 50 mg/m2 concurrent with RT          11/8/2018 -  Chemotherapy     Imfinzi (immunotherapy) every 2 weeks             Clinical Trial: No    Screening  Tobacco  Current tobacco user: no  If yes, brief counseling provided: No    Hypertension  Hypertension screening performed: yes  Normotensive:  yes  If no, referred to PCP: no    Depression Screening  Screened for depression using PHQ-2: yes    Screened for depression using PHQ-9:  no  Screening positive or negative:  negative  If score >4, was any of the following actions taken?    Additional evaluation for depression, suicide risk assesment, referral to PCP or psychiatry, medication started:  No    Advanced Care Planning for Patients >65 years  Advanced Care Planning Discussed:  yes  Patient named surrogate decision maker or care plan in chart: no      Health Maintenance   Topic Date Due    Medicare Annual Wellness Visit (AWV)  1945    DTaP,Tdap,and Td Vaccines (1 - Tdap) 06/05/1968    MAMMOGRAM  06/28/2018    Pneumococcal PPSV23/PCV13 65+ Years / High and Highest Risk (2 of 2 - PPSV23) 11/27/2018    Fall Risk  05/31/2019    Urinary Incontinence Screening  05/31/2019    CRC Screening: Colonoscopy  09/27/2027    Hepatitis C Screening  Completed    INFLUENZA VACCINE  Completed       Patient Active Problem List   Diagnosis    Hypothyroidism    Pure hypercholesterolemia    ASCVD (arteriosclerotic cardiovascular disease)    Aneurysm, thoracoabdominal aortic ([address] )    Benign hypertension    Spinal stenosis of lumbar region with neurogenic claudication    Bilateral carotid artery stenosis    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)    Moderate protein-calorie malnutrition (HCC)    Stage 3 chronic kidney disease (HCC)     Past Medical History:   Diagnosis Date    Aortic aneurysm ([facility] )     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HCC)     ASCVD (arteriosclerotic cardiovascular disease)     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 ([facility] )     Back problem     Blood type B-     Cancer ([place] [address] )     lung CA    Cataract     COPD (chronic obstructive pulmonary disease) (HCC)     Coronary artery disease     CVA (cerebral vascular accident) ([place] [address] ) 2012    Depression     Disease of thyroid gland     History of cataract     Hyperlipidemia     Hypertension     Lung mass     Mediastinal adenopathy     Muscle pain     Prediabetes     PVD (peripheral vascular disease) ([place] [address] )     Thoracic aortic aneurysm (HCC)     Transient insomnia      Past Surgical History:   Procedure Laterality Date    CAROTID STENT Left     UT COLONOSCOPY FLX DX W/COLLJ SPEC WHEN PFRMD N/A 9/27/2017    Procedure: EGD AND COLONOSCOPY;  Surgeon: Bruno Rankin MD;  [facility];  Service: Gastroenterology    UT EDG US EXAM SURGICAL ALTER STOM DUODENUM/JEJUNUM N/A 8/1/2018    Procedure: LINEAR ENDOSCOPIC U/S;  Surgeon: Oj Vargas MD;  [facility]: [facility]; Service: Gastroenterology    TUBAL LIGATION       Family History   Problem Relation Age of Onset    Hypertension Mother     Heart disease Mother     Diabetes Father     Liver disease Father         hepatic failure     Breast cancer Daughter     Substance Abuse Neg Hx     Mental illness Neg Hx      Social History     Social History    Marital status:      Spouse name: N/A    Number of children: N/A    Years of education: N/A     Occupational History    Not on file       Social History Main Topics    Smoking status: Former Smoker     Packs/day: 0 50     Years: 37 1     Quit date: 3/6/2018    Smokeless tobacco: Never Used    Alcohol use No    Drug use: No    Sexual activity: Not Currently     Other Topics Concern    Not on file     Social History Narrative    Lives with family  Feels safe at home  Sees dentist occas  No living will  Dental care, occasionally     Does not exercise     Living alone    Living situation: supportive and safe    No advance directives     No caffeine use        Current Outpatient Prescriptions:     aspirin (ECOTRIN) 325 mg EC tablet, Take 325 mg by mouth daily  , Disp: , Rfl:     atorvastatin (LIPITOR) 40 mg tablet, Take 1 tablet (40 mg total) by mouth daily (Patient taking differently: Take 40 mg by mouth daily in the early morning  ), Disp: 90 tablet, Rfl: 3    co-enzyme Q-10 30 MG capsule, Take 30 mg by mouth daily  , Disp: , Rfl:     gabapentin (NEURONTIN) 100 mg capsule, Take 2 capsules (200 mg total) by mouth 3 (three) times a day, Disp: 180 capsule, Rfl: 0    HYDROcodone-acetaminophen (NORCO) 5-325 mg per tablet, Take 1 tablet by mouth every 6 (six) hours as needed for pain Max Daily Amount: 4 tablets, Disp: 20 tablet, Rfl: 0    levothyroxine 100 mcg tablet, Take 1 tablet (100 mcg total) by mouth daily, Disp: 30 tablet, Rfl: 3    Methylcobalamin (B12-ACTIVE PO), Take by mouth daily  , Disp: , Rfl:     metoprolol succinate (TOPROL-XL) 50 mg 24 hr tablet, Take 1 tablet (50 mg total) by mouth 2 (two) times a day, Disp: 60 tablet, Rfl: 2    Omega-3 Fatty Acids (FISH OIL PO), Take 1 capsule by mouth daily  , Disp: , Rfl:   No current facility-administered medications for this visit       Facility-Administered Medications Ordered in Other Visits:     [START ON 1/31/2019] alteplase (CATHFLO) injection 2 mg, 2 mg, Intracatheter, Once PRN, MD Ariadne Lancasterk  [START ON 1/31/2019] durvalumab (IMFINZI) 500 mg in sodium chloride 0 9 % 100 mL IVPB, 500 mg, Intravenous, Once, Fernando Joshi MD    [START ON 1/31/2019] sodium chloride 0 9 % infusion, 20 mL/hr, Intravenous, Once, Roe Becker MD  Allergies   Allergen Reactions    Penicillins Rash       Review of Systems:  Review of Systems   Constitutional: Positive for appetite change (decreased appetite  Drinks 2 ensure daily), fatigue and unexpected weight change (8 lb wt loss since Nov 2018 follow-up)  HENT: Negative  Eyes: Negative  Respiratory: Positive for shortness of breath (on exertion )  Cardiovascular: Negative  Gastrointestinal: Positive for diarrhea (intermittent)  Endocrine: Negative  Genitourinary: Negative  Musculoskeletal: Positive for arthralgias and back pain (started Norco prn, she has appt to see Pain Medicine in March 2019)  Fractured left foot 3 weeks ago, has some discomfort when ambulating longer distances   Skin: Negative  Allergic/Immunologic: Negative  Neurological: Negative  Hematological: Negative  Psychiatric/Behavioral: Negative  Vitals:    01/30/19 1409   BP: 110/62   BP Location: Right arm   Pulse: 88   Resp: 18   Temp: 97 8 °F (36 6 °C)   TempSrc: Temporal   SpO2: 93%   Weight: 50 6 kg (111 lb 9 6 oz)            Imaging:Ct Abdomen Pelvis Wo Contrast    Result Date: 1/1/2019  Narrative: CT ABDOMEN AND PELVIS WITHOUT IV CONTRAST INDICATION:   Possible hydronephrosis  No barium, no contrast IV  COMPARISON:  Renal ultrasound from 12/31/2018, and abdomen pelvic CT from 10/8/2018  TECHNIQUE:  CT examination of the abdomen and pelvis was performed without intravenous contrast   Axial, sagittal, and coronal 2D reformatted images were created from the source data and submitted for interpretation  Radiation dose length product (DLP) for this visit:  205 43 mGy-cm   This examination, like all CT scans performed in the [facility], was performed utilizing techniques to minimize radiation dose exposure, including the use of iterative  reconstruction and automated exposure control   Enteric contrast was administered  FINDINGS: ABDOMEN LOWER CHEST:  No clinically significant abnormality identified in the visualized lower chest  LIVER/BILIARY TREE:  Unremarkable  GALLBLADDER:  No calcified gallstones  No pericholecystic inflammatory change  SPLEEN:  Unremarkable  PANCREAS:  Unremarkable  ADRENAL GLANDS:  Unremarkable  KIDNEYS/URETERS:  Mild right hydronephrosis, unchanged compared to the 12/31/2018 ultrasound  Etiology is unclear  There is no left-sided hydronephrosis  STOMACH AND BOWEL:  Unremarkable  APPENDIX:  No findings to suggest appendicitis  ABDOMINOPELVIC CAVITY:  No ascites or free intraperitoneal air  No lymphadenopathy  VESSELS:  Unchanged, partially visualized 6 2 cm distal descending thoracic aortic aneurysm, and a 4 5 cm abdominal aortic aneurysm without rupture  PELVIS REPRODUCTIVE ORGANS:  Unremarkable for patient's age  URINARY BLADDER:  Unremarkable  ABDOMINAL WALL/INGUINAL REGIONS:  Unremarkable  OSSEOUS STRUCTURES:  No acute fracture or destructive osseous lesion  Impression: Mild right hydronephrosis, unchanged compared to the 12/31/2018 ultrasound  Etiology is unclear  Unchanged, partially visualized 6 2 cm distal descending thoracic aortic aneurysm, and a 4 5 cm abdominal aortic aneurysm without rupture  Workstation performed: [id number]     Nm Pet Ct Skull Base To Mid Thigh    Result Date: 1/29/2019  Narrative: PET/CT SCAN INDICATION: Restaging of lung cancer  Post concurrent chemoradiation  Now on immunotherapy  C34 92: Malignant neoplasm of unspecified part of left bronchus or lung MODIFIER: PS COMPARISON: CT abdomen pelvis 1/1/2019, PET/CT 8/17/2018 CELL TYPE:  non-small cell carcinoma, c/w pulmonary adenocarcinoma (mediastinal lymph node biopsy 8/1/18) TECHNIQUE:   8 1 mCi F-18-FDG administered IV   Multiplanar attenuation corrected and non attenuation corrected PET images are available for interpretation, and contiguous, low dose, axial CT sections were obtained from the skull base through the femurs   Intravenous contrast material was not utilized  This examination, like all CT scans performed in the [facility], was performed utilizing techniques to minimize radiation dose exposure, including the use of iterative reconstruction and automated exposure control  Fasting serum glucose: 83 mg/dl FINDINGS: VISUALIZED BRAIN:   No acute abnormalities are seen  HEAD/NECK:   New moderate diffuse FDG uptake in the thyroid gland, SUV max of 3 6  Findings may be related to thyroiditis  Scattered small foci of FDG uptake in the fat of the posterior neck compatible with brown fat activity  No FDG avid cervical adenopathy is seen  CT images: Unremarkable  CHEST:   No FDG avid soft tissue lesions are seen  Previously noted FDG avid left upper lobe lesion is no longer seen  The FDG avid lymph nodes in the mediastinum and left perihilar region have resolved  Scattered mild foci of brown fat activity in the paravertebral region bilaterally  Again noted is physiologic FDG uptake in the region of the interatrial septum  Patchy FDG uptake in the left ventricular wall of the heart is similar to the prior exam  CT images: Aneurysm of the thoracoabdominal aorta again noted  This measures a maximum transverse diameter of 6 8 cm at the level of the diaphragmatic hiatus, slightly larger previously 6 3 cm on prior PET/CT  On 10/10/2017, this measured up to 5 1 cm in diameter  Dense coronary artery calcifications  Prominent calcified granuloma again noted in the left upper lobe posteriorly  Underlying emphysematous changes of the lung fields  ABDOMEN:   No FDG avid soft tissue lesions are seen  CT images: Aneurysmal dilatation of the abdominal aorta  Infrarenal portion measures up to 4 9 cm in diameter, previously 4 4 cm on prior PET/CT  Stable mild-to-moderate right-sided hydronephrosis  PELVIS: No FDG avid soft tissue lesions are seen   Previously noted small skin focus at the right mons pubis is no longer seen  CT images: Distended bowel viscus noted in the posterior pelvis, likely clustered small bowel loops  OSSEOUS STRUCTURES: No FDG avid lesions are seen  CT images: No significant findings  Impression: 1  Findings compatible with response to therapy  2   Previously noted FDG avid left upper lobe lesion along with the FDG avid mediastinal and left perihilar josé miguel disease is no longer seen compatible with response to therapy  3   No new findings suspicious for hypermetabolic malignancy  4   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  This is  slowly enlarging over time  Recommend continued follow up with vascular surgery  The study was marked in EPIC for significant notification   Workstation performed: [id number]

## 2019-01-30 NOTE — PROGRESS NOTES
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  She recovered well from combined chemotherapy and radiation therapy   She  started on Imfinzi, immunotherapy, on November 8, 2018 which is scheduled every 2 weeks for 1 year       12/27/18 - 1/2/19 adm to Q with fever and dysuria  Treated for UTI and d/c home on 1/2 1/9/19 Med Onc, Dr Bernadine Reid follow-up  Pt tolerated 4 doses of Imfinzi so far, she will receive 5th dose on 1/10  Reports worsening back pain, she has hx of spinal stenosis and has had epidurals in the past  Will refer to pain medicine       1/29/19 PET/CT  IMPRESSION:  1   Findings compatible with response to therapy     2   Previously noted FDG avid left upper lobe lesion along with the FDG avid mediastinal and left perihilar josé miguel disease is no longer seen compatible with response to therapy  3   No new findings suspicious for hypermetabolic malignancy  4   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   This is  slowly enlarging over time   Recommend continued follow up with vascular surgery  She stop smoking tobacco in March of 2018  She reports less back pain now as well as less pain in her left foot  She is feeling better and plans to go on a cruise soon with her family  She has a 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 that has been followed    She was seen last spring by Dr Deana Landin, Dr Raymond Lee, and Dr Suzanne Alexandra                          Upcoming Appts:  1/31/19 Infusion, 6th dose Imfinzi  3/5/19 Dr Paula Armendariz, Pain Medicine  3/6/19 Dr Bernadine Reid follow-up    Historical Information Nonsquamous nonsmall cell neoplasm of left lung ([facility] )    8/2018 Initial Diagnosis     Nonsquamous nonsmall cell neoplasm of lung ([facility] )         8/1/2018 Biopsy     Lymph node, mediastinal (fine needle aspiration with cell block preparation):     - Involvement by non-small cell carcinoma, most compatible with pulmonary adenocarcinoma     - Positive: Emmett-Ep4, MOC-31, TTF1, CKAE1/3     - Negative: [place]-8, CDX2, synaptophysin, CD56, HMB-45, P40         8/9/2018 - 10/22/2018 Radiation     Treatments:  Course: C1    Plan ID Energy Fractions Dose per Fraction (cGy) Dose Correction (cGy) Total Dose Delivered (cGy) Elapsed Days   L LUNG MED 6X 33 / 33 180 0 [phone] 54      Treatment dates:  8/29/2018 - 10/22/2018            8/29/2018 - 10/4/2018 Chemotherapy     Carboplatin AUC 2 and Taxol 50 mg/m2 concurrent with RT          11/8/2018 -  Chemotherapy     Imfinzi (immunotherapy) every 2 weeks           Clinical Trial: No     Screening  Tobacco  Current tobacco user: no  If yes, brief counseling provided: No     Hypertension  Hypertension screening performed: yes  Normotensive:  yes  If no, referred to PCP: no     Depression Screening  Screened for depression using PHQ-2: yes     Screened for depression using PHQ-9:  no  Screening positive or negative:  negative  If score >4, was any of the following actions taken? 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 ([facility] )     Back problem     Blood type B-     Cancer ([facility] )     lung CA    Cataract     COPD (chronic obstructive pulmonary disease) ([facility] )     Coronary artery disease     CVA (cerebral vascular accident) ([facility] ) 2012    Depression     Disease of thyroid gland     History of cataract     Hyperlipidemia     Hypertension     Lung mass     Mediastinal adenopathy     Muscle pain     Prediabetes     PVD (peripheral vascular disease) ([facility] )     Thoracic aortic aneurysm (HCC)     Transient insomnia      Past Surgical History:   Procedure Laterality Date    CAROTID STENT Left     TX COLONOSCOPY FLX DX W/COLLJ SPEC WHEN PFRMD N/A 9/27/2017    Procedure: EGD AND COLONOSCOPY;  Surgeon: Johnathan Rothman MD;  Location: [facility];  Service: Gastroenterology    TX EDG US EXAM SURGICAL ALTER STOM DUODENUM/JEJUNUM N/A 8/1/2018    Procedure: LINEAR ENDOSCOPIC U/S;  Surgeon: Madi Abernathy MD;  Location: [facility]; Service: Gastroenterology    TUBAL LIGATION         Social History   History   Alcohol Use No     History   Drug Use No     History   Smoking Status    Former Smoker    Packs/day: 0 50    Years: 45 00    Quit date: 3/6/2018   Smokeless Tobacco    Never Used     Meds/Allergies     Current Outpatient Prescriptions:     aspirin (ECOTRIN) 325 mg EC tablet, Take 325 mg by mouth daily  , Disp: , Rfl:     atorvastatin (LIPITOR) 40 mg tablet, Take 1 tablet (40 mg total) by mouth daily (Patient taking differently: Take 40 mg by mouth daily in the early morning  ), Disp: 90 tablet, Rfl: 3    co-enzyme Q-10 30 MG capsule, Take 30 mg by mouth daily  , Disp: , Rfl:     gabapentin (NEURONTIN) 100 mg capsule, Take 2 capsules (200 mg total) by mouth 3 (three) times a day, Disp: 180 capsule, Rfl: 0    HYDROcodone-acetaminophen (NORCO) 5-325 mg per tablet, Take 1 tablet by mouth every 6 (six) hours as needed for pain Max Daily Amount: 4 tablets, Disp: 20 tablet, Rfl: 0    levothyroxine 100 mcg tablet, Take 1 tablet (100 mcg total) by mouth daily, Disp: 30 tablet, Rfl: 3    Methylcobalamin (B12-ACTIVE PO), Take by mouth daily  , Disp: , Rfl:     metoprolol succinate (TOPROL-XL) 50 mg 24 hr tablet, Take 1 tablet (50 mg total) by mouth 2 (two) times a day, Disp: 60 tablet, Rfl: 2    Omega-3 Fatty Acids (FISH OIL PO), Take 1 capsule by mouth daily  , Disp: , Rfl:   No current facility-administered medications for this visit  Facility-Administered Medications Ordered in Other Visits:     [START ON 1/31/2019] alteplase (CATHFLO) injection 2 mg, 2 mg, Intracatheter, Once PRN, MD Josh Jones  [START ON 1/31/2019] durvalumab (IMFINZI) 500 mg in sodium chloride 0 9 % 100 mL IVPB, 500 mg, Intravenous, Once, MD Josh Jones  [START ON 1/31/2019] sodium chloride 0 9 % infusion, 20 mL/hr, Intravenous, Once, Amrita Whitlock MD  Allergies   Allergen Reactions    Penicillins Rash     Review of Systems   Constitutional: Positive for appetite change (decreased appetite  Drinks 2 ensure daily), fatigue and unexpected weight change (8 lb wt loss since Nov 2018 follow-up)  HENT: Negative  Eyes: Negative  Respiratory: Positive for shortness of breath (on exertion )  Cardiovascular: Negative  Gastrointestinal: Positive for diarrhea (intermittent)  Endocrine: Negative  Genitourinary: Negative  Musculoskeletal: Positive for arthralgias and back pain (started Norco prn, she has appt to see Pain Medicine in March 2019)  Fractured left foot 3 weeks ago, has some discomfort when ambulating longer distances   Skin: Negative  Allergic/Immunologic: Negative  Neurological: Negative  Hematological: Negative  Psychiatric/Behavioral: Negative        OBJECTIVE:   /62 (BP Location: Right arm)   Pulse 88   Temp 97 8 °F (36 6 °C) (Temporal)   Resp 18   Wt 50 6 kg (111 lb 9 6 oz)   LMP  (LMP Unknown)   SpO2 93%   BMI 21 80 kg/m²   Pain Assessment:  2  ECOG/Zubrod/WHO: 1 - Symptomatic but completely ambulatory    Physical Exam   Constitutional: She is oriented to person, place, and time   She appears well-developed and well-nourished  No distress  HENT:   Head: Normocephalic and atraumatic  Mouth/Throat: No oropharyngeal exudate  Eyes: Pupils are equal, round, and reactive to light  Conjunctivae and EOM are normal  No scleral icterus  Neck: Normal range of motion  Neck supple  No tracheal deviation present  No thyromegaly present  Cardiovascular: Normal rate, regular rhythm and normal heart sounds  Pulmonary/Chest: Effort normal and breath sounds normal  No respiratory distress  She has no wheezes  She has no rales  She exhibits no tenderness  Abdominal: Soft  Bowel sounds are normal  She exhibits no distension and no mass  There is no tenderness  Musculoskeletal: Normal range of motion  She exhibits no edema or tenderness  Lymphadenopathy:     She has no cervical adenopathy  She has no axillary adenopathy  Right: No supraclavicular adenopathy present  Left: No supraclavicular adenopathy present  Neurological: She is alert and oriented to person, place, and time  No cranial nerve deficit  Coordination normal    Skin: Skin is warm and dry  No rash noted  She is not diaphoretic  No erythema  No pallor  Psychiatric: She has a normal mood and affect  Her behavior is normal  Judgment and thought content normal    Nursing note and vitals reviewed       RESULTS    Lab Results:   Recent Results (from the past 672 hour(s))   Comprehensive metabolic panel    Collection Time: 01/09/19 10:40 AM   Result Value Ref Range    Glucose, Random 93 65 - 99 mg/dL    BUN 34 (H) 7 - 25 mg/dL    Creatinine 3 00 (H) 0 60 - 0 93 mg/dL    eGFR Non  15 (L) > OR = 60 mL/min/1 73m2    SL AMB EGFR  17 (L) > OR = 60 mL/min/1 73m2    SL AMB BUN/CREATININE RATIO 11 6 - 22 (calc)    Sodium 138 135 - 146 mmol/L    Potassium 4 9 3 5 - 5 3 mmol/L    Chloride 100 98 - 110 mmol/L    CO2 28 20 - 32 mmol/L    SL AMB CALCIUM 9 5 8 6 - 10 4 mg/dL    SL AMB PROTEIN, TOTAL 6 4 6 1 - 8 1 g/dL    Albumin 3 5 (L) 3 6 - 5 1 g/dL    Globulin 2 9 1 9 - 3 7 g/dL (calc)    Albumin/Globulin Ratio 1 2 1 0 - 2 5 (calc)    TOTAL BILIRUBIN 0 4 0 2 - 1 2 mg/dL    Alkaline Phosphatase 131 (H) 33 - 130 U/L    SL AMB AST 12 10 - 35 U/L    SL AMB ALT 13 6 - 29 U/L   CBC and differential    Collection Time: 01/09/19 10:40 AM   Result Value Ref Range    White Blood Cell Count 10 6 3 8 - 10 8 Thousand/uL    Red Blood Cell Count 3 04 (L) 3 80 - 5 10 Million/uL    Hemoglobin 10 2 (L) 11 7 - 15 5 g/dL    HCT 30 6 (L) 35 0 - 45 0 %     7 (H) 80 0 - 100 0 fL    MCH 33 6 (H) 27 0 - 33 0 pg    MCHC 33 3 32 0 - 36 0 g/dL    RDW 12 5 11 0 - 15 0 %    Platelet Count 130 206 - 400 Thousand/uL    SL AMB MPV 11 2 7 5 - 12 5 fL    Neutrophils (Absolute) 8,491 (H) 1,500 - 7,800 cells/uL    Lymphocytes (Absolute) 890 850 - 3,900 cells/uL    Monocytes (Absolute) 912 200 - 950 cells/uL    Eosinophils (Absolute) 265 15 - 500 cells/uL    Basophils ABS 42 0 - 200 cells/uL    Neutrophils 80 1 %    Lymphocytes 8 4 %    Monocytes 8 6 %    Eosinophils 2 5 %    Basophils PCT 0 4 %   TSH, 3rd generation with Free T4 reflex    Collection Time: 01/09/19 10:40 AM   Result Value Ref Range    TSH W/RFX TO FREE T4 0 36 (L) 0 40 - 4 50 mIU/L   T4, free    Collection Time: 01/09/19 10:40 AM   Result Value Ref Range    Free t4 1 5 0 8 - 1 8 ng/dL   Comprehensive metabolic panel    Collection Time: 01/17/19  2:19 PM   Result Value Ref Range    Glucose, Random 84 65 - 99 mg/dL    BUN 29 (H) 7 - 25 mg/dL    Creatinine 2 20 (H) 0 60 - 0 93 mg/dL    eGFR Non  22 (L) > OR = 60 mL/min/1 73m2    SL AMB EGFR  25 (L) > OR = 60 mL/min/1 73m2    SL AMB BUN/CREATININE RATIO 13 6 - 22 (calc)    Sodium 137 135 - 146 mmol/L    Potassium 5 0 3 5 - 5 3 mmol/L    Chloride 101 98 - 110 mmol/L    CO2 26 20 - 32 mmol/L    SL AMB CALCIUM 9 3 8 6 - 10 4 mg/dL    SL AMB PROTEIN, TOTAL 6 4 6 1 - 8 1 g/dL    Albumin 3 7 3 6 - 5 1 g/dL Globulin 2 7 1 9 - 3 7 g/dL (calc)    Albumin/Globulin Ratio 1 4 1 0 - 2 5 (calc)    TOTAL BILIRUBIN 0 4 0 2 - 1 2 mg/dL    Alkaline Phosphatase 116 33 - 130 U/L    SL AMB AST 12 10 - 35 U/L    SL AMB ALT 8 6 - 29 U/L   CBC and differential    Collection Time: 01/17/19  2:19 PM   Result Value Ref Range    White Blood Cell Count 9 5 3 8 - 10 8 Thousand/uL    Red Blood Cell Count 3 41 (L) 3 80 - 5 10 Million/uL    Hemoglobin 11 4 (L) 11 7 - 15 5 g/dL    HCT 33 9 (L) 35 0 - 45 0 %    MCV 99 4 80 0 - 100 0 fL    MCH 33 4 (H) 27 0 - 33 0 pg    MCHC 33 6 32 0 - 36 0 g/dL    RDW 12 3 11 0 - 15 0 %    Platelet Count 441 431 - 400 Thousand/uL    SL AMB MPV 11 4 7 5 - 12 5 fL    Neutrophils (Absolute) 6,403 1,500 - 7,800 cells/uL    Lymphocytes (Absolute) 1,539 850 - 3,900 cells/uL    Monocytes (Absolute) 1,064 (H) 200 - 950 cells/uL    Eosinophils (Absolute) 437 15 - 500 cells/uL    Basophils ABS 57 0 - 200 cells/uL    Neutrophils 67 4 %    Lymphocytes 16 2 %    Monocytes 11 2 %    Eosinophils 4 6 %    Basophils PCT 0 6 %   Fingerstick Glucose (POCT)    Collection Time: 01/29/19  8:03 AM   Result Value Ref Range    POC Glucose 83 65 - 140 mg/dl       Imaging Studies:Ct Abdomen Pelvis Wo Contrast    Result Date: 1/1/2019  Narrative: CT ABDOMEN AND PELVIS WITHOUT IV CONTRAST INDICATION:   Possible hydronephrosis  No barium, no contrast IV  COMPARISON:  Renal ultrasound from 12/31/2018, and abdomen pelvic CT from 10/8/2018  TECHNIQUE:  CT examination of the abdomen and pelvis was performed without intravenous contrast   Axial, sagittal, and coronal 2D reformatted images were created from the source data and submitted for interpretation  Radiation dose length product (DLP) for this visit:  205 43 mGy-cm     This examination, like all CT scans performed in the [facility], was performed utilizing techniques to minimize radiation dose exposure, including the use of iterative  reconstruction and automated exposure control  Enteric contrast was administered  FINDINGS: ABDOMEN LOWER CHEST:  No clinically significant abnormality identified in the visualized lower chest  LIVER/BILIARY TREE:  Unremarkable  GALLBLADDER:  No calcified gallstones  No pericholecystic inflammatory change  SPLEEN:  Unremarkable  PANCREAS:  Unremarkable  ADRENAL GLANDS:  Unremarkable  KIDNEYS/URETERS:  Mild right hydronephrosis, unchanged compared to the 12/31/2018 ultrasound  Etiology is unclear  There is no left-sided hydronephrosis  STOMACH AND BOWEL:  Unremarkable  APPENDIX:  No findings to suggest appendicitis  ABDOMINOPELVIC CAVITY:  No ascites or free intraperitoneal air  No lymphadenopathy  VESSELS:  Unchanged, partially visualized 6 2 cm distal descending thoracic aortic aneurysm, and a 4 5 cm abdominal aortic aneurysm without rupture  PELVIS REPRODUCTIVE ORGANS:  Unremarkable for patient's age  URINARY BLADDER:  Unremarkable  ABDOMINAL WALL/INGUINAL REGIONS:  Unremarkable  OSSEOUS STRUCTURES:  No acute fracture or destructive osseous lesion  Impression: Mild right hydronephrosis, unchanged compared to the 12/31/2018 ultrasound  Etiology is unclear  Unchanged, partially visualized 6 2 cm distal descending thoracic aortic aneurysm, and a 4 5 cm abdominal aortic aneurysm without rupture  Workstation performed: [id number]     Nm Pet Ct Skull Base To Mid Thigh    Result Date: 1/29/2019  Narrative: PET/CT SCAN INDICATION: Restaging of lung cancer  Post concurrent chemoradiation  Now on immunotherapy  C34 92: Malignant neoplasm of unspecified part of left bronchus or lung MODIFIER: PS COMPARISON: CT abdomen pelvis 1/1/2019, PET/CT 8/17/2018 CELL TYPE:  non-small cell carcinoma, c/w pulmonary adenocarcinoma (mediastinal lymph node biopsy 8/1/18) TECHNIQUE:   8 1 mCi F-18-FDG administered IV   Multiplanar attenuation corrected and non attenuation corrected PET images are available for interpretation, and contiguous, low dose, axial CT sections were obtained from the skull base through the femurs   Intravenous contrast material was not utilized  This examination, like all CT scans performed in the [facility], was performed utilizing techniques to minimize radiation dose exposure, including the use of iterative reconstruction and automated exposure control  Fasting serum glucose: 83 mg/dl FINDINGS: VISUALIZED BRAIN:   No acute abnormalities are seen  HEAD/NECK:   New moderate diffuse FDG uptake in the thyroid gland, SUV max of 3 6  Findings may be related to thyroiditis  Scattered small foci of FDG uptake in the fat of the posterior neck compatible with brown fat activity  No FDG avid cervical adenopathy is seen  CT images: Unremarkable  CHEST:   No FDG avid soft tissue lesions are seen  Previously noted FDG avid left upper lobe lesion is no longer seen  The FDG avid lymph nodes in the mediastinum and left perihilar region have resolved  Scattered mild foci of brown fat activity in the paravertebral region bilaterally  Again noted is physiologic FDG uptake in the region of the interatrial septum  Patchy FDG uptake in the left ventricular wall of the heart is similar to the prior exam  CT images: Aneurysm of the thoracoabdominal aorta again noted  This measures a maximum transverse diameter of 6 8 cm at the level of the diaphragmatic hiatus, slightly larger previously 6 3 cm on prior PET/CT  On 10/10/2017, this measured up to 5 1 cm in diameter  Dense coronary artery calcifications  Prominent calcified granuloma again noted in the left upper lobe posteriorly  Underlying emphysematous changes of the lung fields  ABDOMEN:   No FDG avid soft tissue lesions are seen  CT images: Aneurysmal dilatation of the abdominal aorta  Infrarenal portion measures up to 4 9 cm in diameter, previously 4 4 cm on prior PET/CT  Stable mild-to-moderate right-sided hydronephrosis  PELVIS: No FDG avid soft tissue lesions are seen   Previously noted small skin focus at the right mons pubis is no longer seen  CT images: Distended bowel viscus noted in the posterior pelvis, likely clustered small bowel loops  OSSEOUS STRUCTURES: No FDG avid lesions are seen  CT images: No significant findings  Impression: 1  Findings compatible with response to therapy  2   Previously noted FDG avid left upper lobe lesion along with the FDG avid mediastinal and left perihilar josé miguel disease is no longer seen compatible with response to therapy  3   No new findings suspicious for hypermetabolic malignancy  4   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  This is  slowly enlarging over time  Recommend continued follow up with vascular surgery  The study was marked in EPIC for significant notification  Workstation performed: [id number]           Assessment/Plan:  No orders of the defined types were placed in this encounter  Dagoberto Roblero is a 72y o  year old female with a stage IIIA, T2a, N2, M0 non-small cell left upper lobe lung carcinoma  She completed definitive radiation therapy along with concurrent chemotherapy on October 22, 2018  She returns today for follow-up visit  She has started on Imfinzi on November 8, 2018 which is scheduled every 2 weeks for 1 year  She is tolerating this well  She had PET-CT study done on January 29, 2019 that shows a good response to treatment  The previously noted left upper lobe lung mass along with mediastinal left perihilar lymphadenopathy has resolved  There were no new sites of malignancy  There was slight progression of her thoracoabdominal aortic aneurysms  She will scheduled follow-up with vascular surgery  She has follow-up with Dr Dexter Bamberger on March 6, 2019  She will return here for follow-up in 6 months      Quinn De Los Santos MD  1/30/2019,3:16 PM    Portions of the record may have been created with voice recognition software   Occasional wrong word or "sound a like" substitutions may have occurred due to the inherent limitations of voice recognition software   Read the chart carefully and recognize, using context, where substitutions have occurred

## 2019-01-31 NOTE — PROGRESS NOTES
Pt here for Imfinzi; VSS: pt's creatnine 1 94; notified Mari Enriquez RN/ Dr Tabatha Mims; awaiting further orders

## 2019-01-31 NOTE — PROGRESS NOTES
Pt receiving hydration in response to her creatnine as per Ben Hebert RN/Dr Desean Hartman; order in chart

## 2019-02-05 NOTE — TELEPHONE ENCOUNTER
I will call in Rx for tylenol with codeine  Not sure if this will help  Will give 2 weeks worth and have her follow up with dr Sunshine Jameson when he returns

## 2019-02-15 NOTE — PROGRESS NOTES
Spoke to Kira Luciano RN  Ok to treat pt today without TSH  Reprinted script for labs for patient highlighting required labs  Explained to pt that very important to make sure TSH gets drawn every 4 weeks, along with CBC and CMP, every 2 weeks, in order to treat with C8

## 2019-02-15 NOTE — PROGRESS NOTES
Labs obtained from [address] [place] [address] did not have TSH drawn  Awaiting instructions from office  [facility]

## 2019-02-15 NOTE — SOCIAL WORK
Met with pt during treatment today  Pt states she is managing well and feels very hopeful because "I think my cancer is all gone"  Pt reports no problems managing at home and remains as active as possible  She has some concerns with outstanding balances for copays  MSW will refer to financial counselor to evaluate

## 2019-03-04 NOTE — TELEPHONE ENCOUNTER
Patient left a message with the Answering Service to cancel her appt 3/5/19 @ 9 AM with Jorge  I called the patient and she stated she was cancelling her appt due to numerous doctor appts and the inclement weather  The patient will call the office to reschedule

## 2019-03-05 NOTE — TELEPHONE ENCOUNTER
I called and spoke with the patient, she is taking Levothyroxine 100 mcg daily  Do you want to change her medication dose?

## 2019-03-05 NOTE — TELEPHONE ENCOUNTER
----- Message from Rojas Warner sent at 3/5/2019  1:40 PM EST -----      ----- Message -----  From: Corbin Palacios MD  Sent: 3/5/2019   1:34 PM  To: [facility] Clinical    She must not be taking her synthroid-please check to see if she has it

## 2019-03-06 NOTE — PROGRESS NOTES
Hematology/Oncology Outpatient Follow- up Note  Chris Gregorio 72 y o  female MRN: @ Encounter: [id number]        Date:  3/6/2019    Presenting Complaint/Diagnosis : Stage IIIa left upper lobe adenocarcinoma of the lung      HPI:    Juancarlos Kirkland seen for initial consultation 8/16/2018 regarding Newly diagnosed stage IIIa adenocarcinoma of the lung  The patient initially was found to have a left hilar mass with what appeared to be adenopathy extending under her aortic arch in the AP window  PET/CT scan was denies so she had an EUS guided biopsy of the adenopathy pathology of which was consistent with adenocarcinoma of pulmonary primary  This made it a stage IIIa lung cancer  She was seen by her colleagues in cardiothoracic surgery who referred her to see us and her colleagues in radiation oncology to consider concurrent chemoradiation  Previous Hematologic/ Oncologic History:       Nonsquamous nonsmall cell neoplasm of left lung ([facility] [address] )    8/2018 Initial Diagnosis     Nonsquamous nonsmall cell neoplasm of lung ([facility] [address] )         8/1/2018 Biopsy     Lymph node, mediastinal (fine needle aspiration with cell block preparation):     - Involvement by non-small cell carcinoma, most compatible with pulmonary adenocarcinoma     - Positive: Emmett-Ep4, MOC-31, TTF1, CKAE1/3     - Negative: [place]-8, CDX2, synaptophysin, CD56, HMB-45, P40         8/9/2018 - 10/22/2018 Radiation     Treatments:  Course: C1    Plan ID Energy Fractions Dose per Fraction (cGy) Dose Correction (cGy) Total Dose Delivered (cGy) Elapsed Days   L LUNG MED 6X 33 / 33 180 0 [phone]      Treatment dates:  8/29/2018 - 10/22/2018            8/29/2018 - 10/4/2018 Chemotherapy     Carboplatin AUC 2 and Taxol 50 mg/m2 concurrent with RT          11/8/2018 -  Chemotherapy     Imfinzi (immunotherapy) every 2 weeks           Carboplatin AUC of 2 with Taxol 50 mg/m² g with concurrent chemoradiation   She got 5 cycles with concurrent therapy and her last week is actually without chemotherapy secondary to cytopenia along with side effects      Current Hematologic/ Oncologic Treatment:      Imfinzi 10 mg for 2 mg every 2 weeks  Dose #8 is on 7 March  Interval History:    Patient returns for follow-up visit  Her last PET/CT scan on 29 January had shown findings compatible with response to therapy  Her thyroid function is abnormal and is being adjusted  As far as symptoms are concerned She is doing very well  Denies any complaints today  Denies any nausea denies any vomiting denies any diarrhea  She was on vacation and had a good time in the [facility]  Her 14 point review of systems today was negative  Test Results:    Imaging: No results found  Labs:   Lab Results   Component Value Date    WBC 6 7 03/04/2019    HGB 11 6 (L) 03/04/2019    HCT 33 5 (L) 03/04/2019    MCV 97 4 03/04/2019     03/04/2019     Lab Results   Component Value Date     04/23/2015    K 4 6 03/04/2019     03/04/2019    CO2 25 03/04/2019    ANIONGAP 14 05/04/2015    BUN 33 (H) 03/04/2019    CREATININE 1 19 12/31/2018    GLUCOSE 82 11/23/2018    GLUF 90 06/12/2017    CALCIUM 9 6 03/04/2019    AST 13 03/04/2019    ALT 10 03/04/2019    ALKPHOS 109 03/04/2019    PROT 6 9 04/23/2015    BILITOT 0 25 04/23/2015    EGFR 46 12/31/2018         Lab Results   Component Value Date    IPOGBKTC46 501 02/27/2018         ROS: As stated in the history of present illness otherwise his 14 point review of systems today was negative        Active Problems:   Patient Active Problem List   Diagnosis    Hypothyroidism    Pure hypercholesterolemia    ASCVD (arteriosclerotic cardiovascular disease)    Aneurysm, thoracoabdominal aortic (HCC)    Benign hypertension    Spinal stenosis of lumbar region with neurogenic claudication    Bilateral carotid artery stenosis    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)    Moderate protein-calorie malnutrition (HCC)    Stage 3 chronic kidney disease [facility])       Past Medical History:   Past Medical History:   Diagnosis Date    Aortic aneurysm ([place] [address] )     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HCC)     ASCVD (arteriosclerotic cardiovascular disease)     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 ([place] [address] )     Back problem     Blood type B-     Cancer (HCC)     lung CA    Cataract     COPD (chronic obstructive pulmonary disease) (HCC)     Coronary artery disease     CVA (cerebral vascular accident) ([place] [address] ) 2012    Depression     Disease of thyroid gland     History of cataract     Hyperlipidemia     Hypertension     Lung mass     Mediastinal adenopathy     Muscle pain     Prediabetes     PVD (peripheral vascular disease) ([place] [address] )     Thoracic aortic aneurysm (HCC)     Transient insomnia        Surgical History:   Past Surgical History:   Procedure Laterality Date    CAROTID STENT Left     CO COLONOSCOPY FLX DX W/COLLJ SPEC WHEN PFRMD N/A 9/27/2017    Procedure: EGD AND COLONOSCOPY;  Surgeon: Simon Bowen MD;  Location: [facility];  Service: Gastroenterology    CO EDG US EXAM SURGICAL ALTER STOM DUODENUM/JEJUNUM N/A 8/1/2018    Procedure: LINEAR ENDOSCOPIC U/S;  Surgeon: Rich Duffy MD;  Location: [facility]; Service: Gastroenterology    TUBAL LIGATION         Family History:    Family History   Problem Relation Age of Onset    Hypertension Mother     Heart disease Mother     Diabetes Father     Liver disease Father         hepatic failure     Breast cancer Daughter     Substance Abuse Neg Hx     Mental illness Neg Hx        Cancer-related family history includes Breast cancer in her daughter  Social History:   Social History     Socioeconomic History    Marital status:       Spouse name: Not on file    Number of children: Not on file    Years of education: Not on file    Highest education level: Not on file   Occupational History    Not on file   Social Needs    Financial resource strain: Not on file    Food insecurity:     Worry: Not on file     Inability: Not on file    Transportation needs:     Medical: Not on file     Non-medical: Not on file   Tobacco Use    Smoking status: Former Smoker     Packs/day: 0 50     Years: 45 00     Pack years: 22 50     Last attempt to quit: 3/6/2018     Years since quittin 0    Smokeless tobacco: Never Used   Substance and Sexual Activity    Alcohol use: No    Drug use: No    Sexual activity: Not Currently   Lifestyle    Physical activity:     Days per week: Not on file     Minutes per session: Not on file    Stress: Not on file   Relationships    Social connections:     Talks on phone: Not on file     Gets together: Not on file     Attends Zoroastrianism service: Not on file     Active member of club or organization: Not on file     Attends meetings of clubs or organizations: Not on file     Relationship status: Not on file    Intimate partner violence:     Fear of current or ex partner: Not on file     Emotionally abused: Not on file     Physically abused: Not on file     Forced sexual activity: Not on file   Other Topics Concern    Not on file   Social History Narrative    Lives with family  Feels safe at home  Sees dentist occas  No living will  Dental care, occasionally     Does not exercise     Living alone    Living situation: supportive and safe    No advance directives     No caffeine use        Current Medications:   Current Outpatient Medications   Medication Sig Dispense Refill    aspirin (ECOTRIN) 325 mg EC tablet Take 325 mg by mouth daily        atorvastatin (LIPITOR) 40 mg tablet Take 1 tablet (40 mg total) by mouth daily (Patient taking differently: Take 40 mg by mouth daily in the early morning  ) 90 tablet 3    co-enzyme Q-10 30 MG capsule Take 30 mg by mouth daily        gabapentin (NEURONTIN) 100 mg capsule Take 2 capsules (200 mg total) by mouth 3 (three) times a day 180 capsule 3    HYDROcodone-acetaminophen (NORCO) 5-325 mg per tablet Take 1 tablet by mouth every 6 (six) hours as needed for pain Max Daily Amount: 4 tablets 20 tablet 0    levothyroxine 150 mcg tablet Take 1 tablet (150 mcg total) by mouth daily in the early morning 30 tablet 5    Methylcobalamin (B12-ACTIVE PO) Take by mouth daily        metoprolol succinate (TOPROL-XL) 50 mg 24 hr tablet Take 1 tablet (50 mg total) by mouth 2 (two) times a day 60 tablet 2    Omega-3 Fatty Acids (FISH OIL PO) Take 1 capsule by mouth daily        predniSONE 10 mg tablet TAKE ONE TABLET BY MOUTH ONCE DAILY 5 tablet 2     No current facility-administered medications for this visit  Facility-Administered Medications Ordered in Other Visits   Medication Dose Route Frequency Provider Last Rate Last Dose    [START ON 3/7/2019] durvalumab (IMFINZI) 500 mg in sodium chloride 0 9 % 100 mL IVPB  500 mg Intravenous Once Don Flores MD        [START ON 3/7/2019] sodium chloride 0 9 % infusion  20 mL/hr Intravenous Continuous Don Flores MD           Allergies: Allergies   Allergen Reactions    Vicodin [Hydrocodone-Acetaminophen] Rash    Penicillins Rash       Physical Exam:    Body surface area is 1 49 meters squared  Wt Readings from Last 3 Encounters:   03/06/19 52 2 kg (115 lb)   02/15/19 52 kg (114 lb 10 2 oz)   01/31/19 51 7 kg (113 lb 15 7 oz)        Temp Readings from Last 3 Encounters:   03/06/19 (!) 96 1 °F (35 6 °C) (Tympanic)   02/15/19 97 5 °F (36 4 °C) (Tympanic)   01/31/19 98 1 °F (36 7 °C) (Temporal)        BP Readings from Last 3 Encounters:   03/06/19 92/78   02/15/19 124/73   01/31/19 132/80         Pulse Readings from Last 3 Encounters:   02/15/19 78   01/31/19 88   01/30/19 88          Physical Exam     Constitutional   General appearance: No acute distress, well appearing and well nourished  Eyes   Conjunctiva and lids: No swelling, erythema or discharge  Pupils and irises: Equal, round and reactive to light      Ears, Nose, Mouth, and Throat   External inspection of ears and nose: Normal     Nasal mucosa, septum, and turbinates: Normal without edema or erythema  Oropharynx: Normal with no erythema, edema, exudate or lesions  Pulmonary   Respiratory effort: No increased work of breathing or signs of respiratory distress  Auscultation of lungs: Clear to auscultation  Cardiovascular   Palpation of heart: Normal PMI, no thrills  Auscultation of heart: Normal rate and rhythm, normal S1 and S2, without murmurs  Examination of extremities for edema and/or varicosities: Normal     Carotid pulses: Normal     Abdomen   Abdomen: Non-tender, no masses  Liver and spleen: No hepatomegaly or splenomegaly  Lymphatic   Palpation of lymph nodes in neck: No lymphadenopathy  Musculoskeletal   Gait and station: Normal     Digits and nails: Normal without clubbing or cyanosis  Inspection/palpation of joints, bones, and muscles: Normal     Skin   Skin and subcutaneous tissue: Normal without rashes or lesions  Neurologic   Cranial nerves: Cranial nerves 2-12 intact  Sensation: No sensory loss  Psychiatric   Orientation to person, place, and time: Normal     Mood and affect: Normal         Assessment / Plan:      The patient is a pleasant 68-year-old female with newly diagnosed stage IIIa adenocarcinoma of the lung  Her PET/CT scan showed disease in the lymph nodes and the primary mass  There was no metastatic lesions seen  She was seen by our colleagues in surgery who recommended concurrent chemoradiation  Based on her stage I agreed with this  The patient was started on carboplatin at an AUC of 2 and Taxol at 48 m square with concurrent chemoradiation  She got 5 cycles but then was admitted with C   Difficile and diarrhea       She finished up the remainder of her radiation after that episode without chemotherapy because of low blood counts       Based on the data from the [address] [place] [address] trial We had a discussion once she finished her concurrent chemoradiation and because she has stage IIIa malignancy we  put her on IMFINZI every 2 weeks to complete 1 year of treatment  The patient agreed with this  She is doing well on her medication  Her most recent imaging shows a very nice response  This was end of January  I will repeat her imaging in the end of April  She will stay on her immunotherapy  Her thyroid medication will be adjusted by her primary care physician and she is Already in touch with them and they are doing so  I'll see her back in a month  There'll be no changes to her regimen at this point  We will continue to complete 1 year of immunotherapy  Goals and Barriers:  Current Goal:  Prolong Survival from Non-small cell lung cancer  Barriers: None  Patient's Capacity to Self Care:  Patient able to self care  Portions of the record may have been created with voice recognition software   Occasional wrong word or "sound a like" substitutions may have occurred due to the inherent limitations of voice recognition software   Read the chart carefully and recognize, using context, where substitutions have occurred

## 2019-03-21 NOTE — SOCIAL WORK
MSW met with the pt in the infusion center at [facility] on this day  This pt is familiar to this MSW from the [address]  The pt shared her health history since the last time this MSW had seen her in the infusion center  The pt had also been looking forward to a cruise when she was going through her radiation and she was eager to tell this MSW all about her trip on this day  Pt appears to be coping well at this time and continues to have a good support system  The pt did share that she has bene struggling with her wig and the comfort level with styling it has not been as easy as she expected  MSW will look into getting a volunteer to assist her with this and provide her with this information  Emotional support provided and will be ongoing

## 2019-03-21 NOTE — PROGRESS NOTES
Pt here for Imfinzi; VSS; TSH not done with labs; ordered in computer for every 4 weeks and had it done 3/4/19; spoke with Aure Naranjo who stated to proceed with treatment today without TSH and it will be drawn in 2 weeks with next labs

## 2019-03-22 PROBLEM — E44.0 MODERATE PROTEIN-CALORIE MALNUTRITION (HCC): Status: RESOLVED | Noted: 2018-01-01 | Resolved: 2019-01-01

## 2019-03-22 NOTE — PROGRESS NOTES
Assessment/Plan:       Diagnoses and all orders for this visit:    ASCVD (arteriosclerotic cardiovascular disease)  -     Lipid Panel with Direct LDL reflex; Future    Bilateral carotid artery stenosis    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)    Spinal stenosis of lumbar region with neurogenic claudication    Post-menopausal osteoporosis  -     DXA bone density spine hip and pelvis; Future    Encounter for screening for malignant neoplasm of breast  -     Mammo screening bilateral w cad; Future    Hypothyroidism, unspecified type  -     TSH, 3rd generation; Future          All of the above diagnoses have been assessed  Additional COMMENTS/PLAN: seems to be doing well    Will do wellness exam the next time      Subjective:      Patient ID: Karen Chambers is a 72 y o  female  HPI     Lung CA -has had good repsonse on last PET scan-still getting chemotx    Foot fx-needs a dexa scan  HTN-this is stable     Spinal stenosis-gabapentin helps this  Aortic aneurism-thoracic and adominal-has vascular apt coming up  The following portions of the patient's history were revised and updated as appropriate: Problem list, allergies, med list, FH, SH, Past medical and surgical histories  Current Outpatient Medications   Medication Sig Dispense Refill    aspirin (ECOTRIN) 325 mg EC tablet Take 325 mg by mouth daily        atorvastatin (LIPITOR) 40 mg tablet Take 1 tablet (40 mg total) by mouth daily (Patient taking differently: Take 40 mg by mouth daily in the early morning  ) 90 tablet 3    co-enzyme Q-10 30 MG capsule Take 30 mg by mouth daily        gabapentin (NEURONTIN) 100 mg capsule Take 2 capsules (200 mg total) by mouth 3 (three) times a day 180 capsule 3    levothyroxine 150 mcg tablet Take 1 tablet (150 mcg total) by mouth daily in the early morning 30 tablet 5    Methylcobalamin (B12-ACTIVE PO) Take by mouth daily        metoprolol succinate (TOPROL-XL) 50 mg 24 hr tablet Take 1 tablet (50 mg total) by mouth 2 (two) times a day 60 tablet 2    Omega-3 Fatty Acids (FISH OIL PO) Take 1 capsule by mouth daily         No current facility-administered medications for this visit  Facility-Administered Medications Ordered in Other Visits   Medication Dose Route Frequency Provider Last Rate Last Dose    alteplase (CATHFLO) injection 2 mg  2 mg Intracatheter Once PRN Mannie Muniz MD        sodium chloride 0 9 % infusion  20 mL/hr Intravenous Continuous Mannie Muniz MD   Stopped at 03/21/19 1608       Review of Systems      Objective:    /70 (BP Location: Left arm, Patient Position: Sitting, Cuff Size: Standard)   Pulse (!) 115   Ht 5' 1" (1 549 m)   Wt 51 3 kg (113 lb)   LMP  (LMP Unknown)   BMI 21 35 kg/m²      Physical Exam   Constitutional: She appears well-developed and well-nourished  Neck: No thyromegaly present  Cardiovascular: Normal rate and regular rhythm  Murmur heard  Pulmonary/Chest: Effort normal and breath sounds normal    Abdominal: Soft  Bowel sounds are normal  She exhibits no distension  Musculoskeletal: She exhibits no edema  Vitals reviewed  Orders Only on 03/20/2019   Component Date Value Ref Range Status    Glucose, Random 03/20/2019 97  65 - 99 mg/dL Final    Comment:               Fasting reference interval         BUN 03/20/2019 28* 7 - 25 mg/dL Final    Creatinine 03/20/2019 2 10* 0 60 - 0 93 mg/dL Final    Comment: For patients >52years of age, the reference limit  for Creatinine is approximately 13% higher for people  identified as -American           eGFR Non  03/20/2019 23* > OR = 60 mL/min/1 73m2 Final    eGFR  03/20/2019 27* > OR = 60 mL/min/1 73m2 Final    SL AMB BUN/CREATININE RATIO 03/20/2019 13  6 - 22 (calc) Final    Sodium 03/20/2019 140  135 - 146 mmol/L Final    Potassium 03/20/2019 4 4  3 5 - 5 3 mmol/L Final    Chloride 03/20/2019 103  98 - 110 mmol/L Final    CO2 03/20/2019 29 20 - 32 mmol/L Final    SL AMB CALCIUM 03/20/2019 9 4  8 6 - 10 4 mg/dL Final    Protein, Total 03/20/2019 6 8  6 1 - 8 1 g/dL Final    Albumin 03/20/2019 3 9  3 6 - 5 1 g/dL Final    Globulin 03/20/2019 2 9  1 9 - 3 7 g/dL (calc) Final    Albumin/Globulin Ratio 03/20/2019 1 3  1 0 - 2 5 (calc) Final    TOTAL BILIRUBIN 03/20/2019 0 5  0 2 - 1 2 mg/dL Final    Alkaline Phosphatase 03/20/2019 118  33 - 130 U/L Final    AST 03/20/2019 13  10 - 35 U/L Final    ALT 03/20/2019 13  6 - 29 U/L Final    White Blood Cell Count 03/20/2019 7 2  3 8 - 10 8 Thousand/uL Final    Red Blood Cell Count 03/20/2019 3 50* 3 80 - 5 10 Million/uL Final    Hemoglobin 03/20/2019 11 3* 11 7 - 15 5 g/dL Final    HCT 03/20/2019 34 0* 35 0 - 45 0 % Final    MCV 03/20/2019 97 1  80 0 - 100 0 fL Final    MCH 03/20/2019 32 3  27 0 - 33 0 pg Final    MCHC 03/20/2019 33 2  32 0 - 36 0 g/dL Final    RDW 03/20/2019 14 1  11 0 - 15 0 % Final    Platelet Count 70/58/7157 178  140 - 400 Thousand/uL Final    SL AMB MPV 03/20/2019 10 5  7 5 - 12 5 fL Final    Neutrophils (Absolute) 03/20/2019 4,738  1,500 - 7,800 cells/uL Final    Lymphocytes (Absolute) 03/20/2019 1,238  850 - 3,900 cells/uL Final    Monocytes (Absolute) 03/20/2019 878  200 - 950 cells/uL Final    Eosinophils (Absolute) 03/20/2019 295  15 - 500 cells/uL Final    Basophils ABS 03/20/2019 50  0 - 200 cells/uL Final    Neutrophils 03/20/2019 65 8  % Final    Lymphocytes 03/20/2019 17 2  % Final    Monocytes 03/20/2019 12 2  % Final    Eosinophils 03/20/2019 4 1  % Final    Basophils PCT 03/20/2019 0 7  % Final         Joe Montanez MD

## 2019-03-27 NOTE — PATIENT INSTRUCTIONS
Progression of aneurysmal disease now with 6 2 centimeter thoracic aneurysm as well as a 4 5 centimeter infrarenal aneurysm  Her creatinine on a recent study was 2 2 and we need a CT angiogram for full evaluation and planning of treatment for her aneurysmal disease  Plan: Will obtain a nephrology consultation for clearance of CT angiogram of the chest abdomen and pelvis, then back in the office for further evaluation and recommendations

## 2019-03-27 NOTE — TELEPHONE ENCOUNTER
Pt needs nephrology clearance for CTA chest/ab/pelvis   Pt has a clearance appt 4/3 at 3:45 w/ Dr Tess Garcia

## 2019-03-27 NOTE — PROGRESS NOTES
Assessment/Plan:    Aneurysm, thoracoabdominal aortic (HCC)  Progression of aneurysmal disease now with 6 2 centimeter thoracic aneurysm as well as a 4 5 centimeter infrarenal aneurysm  Her creatinine on a recent study was 2 2 and we need a CT angiogram for full evaluation and planning of treatment for her aneurysmal disease  Plan: Will obtain a nephrology consultation for clearance of CT angiogram of the chest abdomen and pelvis, then back in the office for further evaluation and recommendations  Diagnoses and all orders for this visit:    Thoracoabdominal aortic aneurysm (TAAA) without rupture [facility])  -     Ambulatory referral to Nephrology; Future        Subjective:      Patient ID: Colleen Saxena is a 72 y o  female  HPI history of mid thoracic descending aneurysm with infrarenal aneurysm is well now on the 6 2 centimeter range in the chest   No back pain referable to this aneurysm she does have some lumbosacral discomfort at times which is long standing  Creatinine of 2 2 will need nephrology clearance for CTA    The following portions of the patient's history were reviewed and updated as appropriate: allergies, current medications, past family history, past medical history, past social history, past surgical history and problem list     Review of Systems  thoracoabdominal aneurysm, recent treatment for cancer, chronic kidney disease, all other systems negative    Objective:      /82 (BP Location: Right arm, Patient Position: Sitting, Cuff Size: Adult)   Pulse 84   Temp 98 2 °F (36 8 °C) (Tympanic)   Resp 18   Ht 5' 1" (1 549 m)   Wt 51 7 kg (114 lb)   LMP  (LMP Unknown)   BMI 21 54 kg/m²          Physical Exam        Oriented x3 no evidence of clinical depression  Neck is supple carotid pulses equal bilaterally no bruits heard    Chest lungs clear, heart regular rhythm  Abdomen soft nontender pulsatile mass present in the midepigastrium      Pulses are decreased bilateral femoral no pulses below the inguinal ligaments  Neurological exam intact cranial nerves 2-12 grossly intact no gross motor sensory deficits detected      I have reviewed and made appropriate changes to the review of systems input by the medical assistant  Vitals:    03/27/19 1357   BP: 132/82   BP Location: Right arm   Patient Position: Sitting   Cuff Size: Adult   Pulse: 84   Resp: 18   Temp: 98 2 °F (36 8 °C)   TempSrc: Tympanic   Weight: 51 7 kg (114 lb)   Height: 5' 1" (1 549 m)       Patient Active Problem List   Diagnosis    Hypothyroidism    Pure hypercholesterolemia    ASCVD (arteriosclerotic cardiovascular disease)    Aneurysm, thoracoabdominal aortic (HCC)    Benign hypertension    Spinal stenosis of lumbar region with neurogenic claudication    Bilateral carotid artery stenosis    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)    Stage 3 chronic kidney disease ([facility] [address] )       Past Surgical History:   Procedure Laterality Date    CAROTID STENT Left     [place] COLONOSCOPY FLX DX W/COLLJ SPEC WHEN PFRMD N/A 9/27/2017    Procedure: EGD AND COLONOSCOPY;  Surgeon: Ninoska Santana MD;  Location: [facility];  Service: Gastroenterology    NJ EDG US EXAM SURGICAL ALTER STOM DUODENUM/JEJUNUM N/A 8/1/2018    Procedure: LINEAR ENDOSCOPIC U/S;  Surgeon: Hien Ochoa MD;  Location: [facility]; Service: Gastroenterology    TUBAL LIGATION         Family History   Problem Relation Age of Onset    Hypertension Mother     Heart disease Mother     Diabetes Father     Liver disease Father         hepatic failure     Breast cancer Daughter     Substance Abuse Neg Hx     Mental illness Neg Hx        Social History     Socioeconomic History    Marital status:       Spouse name: Not on file    Number of children: Not on file    Years of education: Not on file    Highest education level: Not on file   Occupational History    Not on file   Social Needs    Financial resource strain: Not on file  Food insecurity:     Worry: Not on file     Inability: Not on file    Transportation needs:     Medical: Not on file     Non-medical: Not on file   Tobacco Use    Smoking status: Former Smoker     Packs/day: 0 50     Years: 45 00     Pack years: 22 50     Last attempt to quit: 3/6/2018     Years since quittin 0    Smokeless tobacco: Never Used   Substance and Sexual Activity    Alcohol use: No    Drug use: No    Sexual activity: Not Currently   Lifestyle    Physical activity:     Days per week: Not on file     Minutes per session: Not on file    Stress: Not on file   Relationships    Social connections:     Talks on phone: Not on file     Gets together: Not on file     Attends Taoism service: Not on file     Active member of club or organization: Not on file     Attends meetings of clubs or organizations: Not on file     Relationship status: Not on file    Intimate partner violence:     Fear of current or ex partner: Not on file     Emotionally abused: Not on file     Physically abused: Not on file     Forced sexual activity: Not on file   Other Topics Concern    Not on file   Social History Narrative    Lives with family  Feels safe at home  Sees dentist occas  No living will  Dental care, occasionally     Does not exercise     Living alone    Living situation: supportive and safe    No advance directives     No caffeine use        Allergies   Allergen Reactions    Vicodin [Hydrocodone-Acetaminophen] Rash    Penicillins Rash         Current Outpatient Medications:     aspirin (ECOTRIN) 325 mg EC tablet, Take 325 mg by mouth daily  , Disp: , Rfl:     atorvastatin (LIPITOR) 40 mg tablet, Take 1 tablet (40 mg total) by mouth daily (Patient taking differently: Take 40 mg by mouth daily in the early morning  ), Disp: 90 tablet, Rfl: 3    co-enzyme Q-10 30 MG capsule, Take 30 mg by mouth daily  , Disp: , Rfl:     gabapentin (NEURONTIN) 100 mg capsule, Take 2 capsules (200 mg total) by mouth 3 (three) times a day, Disp: 180 capsule, Rfl: 3    levothyroxine 150 mcg tablet, Take 1 tablet (150 mcg total) by mouth daily in the early morning, Disp: 30 tablet, Rfl: 5    Methylcobalamin (B12-ACTIVE PO), Take by mouth daily  , Disp: , Rfl:     metoprolol succinate (TOPROL-XL) 50 mg 24 hr tablet, Take 1 tablet (50 mg total) by mouth 2 (two) times a day, Disp: 60 tablet, Rfl: 2    Omega-3 Fatty Acids (FISH OIL PO), Take 1 capsule by mouth daily  , Disp: , Rfl:

## 2019-04-03 PROBLEM — N13.30 HYDRONEPHROSIS: Status: ACTIVE | Noted: 2019-01-01

## 2019-04-03 PROBLEM — R80.1 PERSISTENT PROTEINURIA: Status: ACTIVE | Noted: 2019-01-01

## 2019-04-03 PROBLEM — D64.9 ANEMIA: Status: ACTIVE | Noted: 2019-01-01

## 2019-04-04 NOTE — TELEPHONE ENCOUNTER
Rev neph note, called office and s/w Poo[place], ? If pt is cleared or does she need repeat labs? If cleared can clearance form and iv hydration form be completed  refaxed forms to her at [phone]

## 2019-04-06 PROBLEM — R77.8 ELEVATED TROPONIN: Status: ACTIVE | Noted: 2019-01-01

## 2019-04-06 NOTE — PLAN OF CARE
Problem: PAIN - ADULT  Goal: Verbalizes/displays adequate comfort level or baseline comfort level  Description  Interventions:  - Encourage patient to monitor pain and request assistance  - Assess pain using appropriate pain scale  - Administer analgesics based on type and severity of pain and evaluate response  - Implement non-pharmacological measures as appropriate and evaluate response  - Consider cultural and social influences on pain and pain management  - Notify physician/advanced practitioner if interventions unsuccessful or patient reports new pain  Outcome: Progressing     Problem: INFECTION - ADULT  Goal: Absence or prevention of progression during hospitalization  Description  INTERVENTIONS:  - Assess and monitor for signs and symptoms of infection  - Monitor lab/diagnostic results  - Monitor all insertion sites, i e  indwelling lines, tubes, and drains  - Monitor endotracheal (as able) and nasal secretions for changes in amount and color  - [place] appropriate cooling/warming therapies per order  - Administer medications as ordered  - Instruct and encourage patient and family to use good hand hygiene technique  - Identify and instruct in appropriate isolation precautions for identified infection/condition  Outcome: Progressing  Goal: Absence of fever/infection during neutropenic period  Description  INTERVENTIONS:  - Monitor WBC  - Implement neutropenic guidelines  Outcome: Progressing     Problem: SAFETY ADULT  Goal: Patient will remain free of falls  Description  INTERVENTIONS:  - Assess patient frequently for physical needs  -  Identify cognitive and physical deficits and behaviors that affect risk of falls    -  [place] fall precautions as indicated by assessment   - Educate patient/family on patient safety including physical limitations  - Instruct patient to call for assistance with activity based on assessment  - Modify environment to reduce risk of injury  - Consider OT/PT consult to assist with strengthening/mobility  Outcome: Progressing  Goal: Maintain or return to baseline ADL function  Description  INTERVENTIONS:  -  Assess patient's ability to carry out ADLs; assess patient's baseline for ADL function and identify physical deficits which impact ability to perform ADLs (bathing, care of mouth/teeth, toileting, grooming, dressing, etc )  - Assess/evaluate cause of self-care deficits   - Assess range of motion  - Assess patient's mobility; develop plan if impaired  - Assess patient's need for assistive devices and provide as appropriate  - Encourage maximum independence but intervene and supervise when necessary  ¯ Involve family in performance of ADLs  ¯ Assess for home care needs following discharge   ¯ Request OT consult to assist with ADL evaluation and planning for discharge  ¯ Provide patient education as appropriate  Outcome: Progressing  Goal: Maintain or return mobility status to optimal level  Description  INTERVENTIONS:  - Assess patient's baseline mobility status (ambulation, transfers, stairs, etc )    - Identify cognitive and physical deficits and behaviors that affect mobility  - Identify mobility aids required to assist with transfers and/or ambulation (gait belt, sit-to-stand, lift, walker, cane, etc )  - [place] fall precautions as indicated by assessment  - Record patient progress and toleration of activity level on Mobility SBAR; progress patient to next Phase/Stage  - Instruct patient to call for assistance with activity based on assessment  - Request Rehabilitation consult to assist with strengthening/weightbearing, etc   Outcome: Progressing     Problem: DISCHARGE PLANNING  Goal: Discharge to home or other facility with appropriate resources  Description  INTERVENTIONS:  - Identify barriers to discharge w/patient and caregiver  - Arrange for needed discharge resources and transportation as appropriate  - Identify discharge learning needs (meds, wound care, etc )  - Arrange for interpretive services to assist at discharge as needed  - Refer to Case Management Department for coordinating discharge planning if the patient needs post-hospital services based on physician/advanced practitioner order or complex needs related to functional status, cognitive ability, or social support system  Outcome: Progressing     Problem: Knowledge Deficit  Goal: Patient/family/caregiver demonstrates understanding of disease process, treatment plan, medications, and discharge instructions  Description  Complete learning assessment and assess knowledge base  Interventions:  - Provide teaching at level of understanding  - Provide teaching via preferred learning methods  Outcome: Progressing     Problem: RESPIRATORY - ADULT  Goal: Achieves optimal ventilation and oxygenation  Description  INTERVENTIONS:  - Assess for changes in respiratory status  - Assess for changes in mentation and behavior  - Position to facilitate oxygenation and minimize respiratory effort  - Oxygen administration by appropriate delivery method based on oxygen saturation (per order) or ABGs  - Initiate smoking cessation education as indicated  - Encourage broncho-pulmonary hygiene including cough, deep breathe, Incentive Spirometry  - Assess the need for suctioning and aspirate as needed  - Assess and instruct to report SOB or any respiratory difficulty  - Respiratory Therapy support as indicated  Outcome: Progressing     Problem: Potential for Falls  Goal: Patient will remain free of falls  Description  INTERVENTIONS:  - Assess patient frequently for physical needs  -  Identify cognitive and physical deficits and behaviors that affect risk of falls    -  [place] fall precautions as indicated by assessment   - Educate patient/family on patient safety including physical limitations  - Instruct patient to call for assistance with activity based on assessment  - Modify environment to reduce risk of injury  - Consider OT/PT consult to assist with strengthening/mobility  Outcome: Progressing     Problem: Nutrition/Hydration-ADULT  Goal: Nutrient/Hydration intake appropriate for improving, restoring or maintaining nutritional needs  Description  Monitor and assess patient's nutrition/hydration status for malnutrition (ex- brittle hair, bruises, dry skin, pale skin and conjunctiva, muscle wasting, smooth red tongue, and disorientation)  Collaborate with interdisciplinary team and initiate plan and interventions as ordered  Monitor patient's weight and dietary intake as ordered or per policy  Utilize nutrition screening tool and intervene per policy  Determine patient's food preferences and provide high-protein, high-caloric foods as appropriate       INTERVENTIONS:  - Monitor oral intake, urinary output, labs, and treatment plans  - Assess nutrition and hydration status and recommend course of action  - Evaluate amount of meals eaten  - Assist patient with eating if necessary   - Allow adequate time for meals  - Recommend/ encourage appropriate diets, oral nutritional supplements, and vitamin/mineral supplements  - Order, calculate, and assess calorie counts as needed  - Recommend, monitor, and adjust tube feedings and TPN/PPN based on assessed needs  - Assess need for intravenous fluids  - Provide specific nutrition/hydration education as appropriate  - Include patient/family/caregiver in decisions related to nutrition  Outcome: Progressing     Problem: METABOLIC, FLUID AND ELECTROLYTES - ADULT  Goal: Electrolytes maintained within normal limits  Description  INTERVENTIONS:  - Monitor labs and assess patient for signs and symptoms of electrolyte imbalances  - Administer electrolyte replacement as ordered  - Monitor response to electrolyte replacements, including repeat lab results as appropriate  - Instruct patient on fluid and nutrition as appropriate  Outcome: Progressing     Problem: SKIN/TISSUE INTEGRITY - ADULT  Goal: Skin integrity remains intact  Description  INTERVENTIONS  - Identify patients at risk for skin breakdown  - Assess and monitor skin integrity  - Assess and monitor nutrition and hydration status  - Monitor labs (i e  albumin)  - Assess for incontinence   - Turn and reposition patient  - Assist with mobility/ambulation  - Relieve pressure over bony prominences  - Avoid friction and shearing  - Provide appropriate hygiene as needed including keeping skin clean and dry  - Evaluate need for skin moisturizer/barrier cream  - Collaborate with interdisciplinary team (i e  Nutrition, Rehabilitation, etc )   - Patient/family teaching  Outcome: Progressing     Problem: MUSCULOSKELETAL - ADULT  Goal: Maintain or return mobility to safest level of function  Description  INTERVENTIONS:  - Assess patient's ability to carry out ADLs; assess patient's baseline for ADL function and identify physical deficits which impact ability to perform ADLs (bathing, care of mouth/teeth, toileting, grooming, dressing, etc )  - Assess/evaluate cause of self-care deficits   - Assess range of motion  - Assess patient's mobility; develop plan if impaired  - Assess patient's need for assistive devices and provide as appropriate  - Encourage maximum independence but intervene and supervise when necessary  - Involve family in performance of ADLs  - Assess for home care needs following discharge   - Request OT consult to assist with ADL evaluation and planning for discharge  - Provide patient education as appropriate  Outcome: Progressing

## 2019-04-06 NOTE — CONSULTS
Consultation - Cardiology   Karene Leventhal Trumbauer 72 y o  female MRN: [id number]  Unit/Bed#: [address] [place] [address] Encounter: [id number]  04/06/19  3:02 PM      Assessment:  Principal Problem:    Acute respiratory failure with hypoxia ([facility] )  Active Problems:    Aneurysm, thoracoabdominal aortic ([facility] )    Benign hypertension    Nonsquamous nonsmall cell neoplasm of left lung ([facility] )    Stage 3 chronic kidney disease (HCC)    COPD exacerbation ([facility] )    Elevated troponin    Chief Complaint   Patient presents with    Shortness of Breath     Pt c/o of shortness of breath  Admitting diagnosis:  Shortness of breath [R06 02]  Elevated troponin [R74 8]  COPD exacerbation (HCC) [J44 1]      Impression:    79-year-old with history of 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, no known CAD/CHF/arrhythmias, admitted with what appears to be a COPD exacerbation  She is improving with current treatment  No evidence of congestive heart failure on exam     Plan:    Troponin elevation: This represents a type 2 NSTEMI in the setting of COPD exacerbation and increased demand  It is possible that she has some coronary vascular disease although I doubt this is significant considering that she had a stress test less than 1 year ago that did not show any ischemia  Except for a routine echocardiogram, no further evaluation is necessary at this time  Shortness of breath: This is from a COPD exacerbation, this is not congestive heart failure  Sinus tachycardia:  Should improve as her primary issues are treated, she is now on metoprolol 50 b i d , continue the same but  should be changed to metoprolol tartrate for shorter acting effect     At baseline on 50 mg daily  Thoracoabdominal aortic aneurysm:  Has a follow-up CT scan in a couple of days  CKD:  Baseline creatinine probably around 2-2 2, 1 97 at the time of presentation, currently 1 85      History of Present Illness   Physician Requesting Consult: Devi Soria DO   Reason for Consult / Principal Problem:  Shortness of breath, troponin elevation  HPI: Marlon Thornton is a 72y o  year old female With a history of hypertension, no diabetes, dyslipidemia-on atorvastatin, prior history of tobacco use-quit about 1 year ago, negative pharmacologic stress test-July 2018 in the setting of preoperative evaluation prior to thoracoabdominal aortic aneurysm repair, will be undergoing a repeat CT scan in 2 days for follow-up of the same  She does not follow with a cardiologist and does not have any known cardiac problems except for atherosclerotic vascular disease  She was admitted with symptoms of shortness of breath with no evidence of volume overload by symptoms  She denies any lower extremity edema, weight gain, abdominal distention or her clothes or rings getting tighter  She is not on a diuretic at baseline and has not been treated as heart failure so far  There is no proBNP  ECG without any acutely ischemic changes except for poor R-wave progression which is due to lead placement on her ECGs  Chest x-ray did not show any volume  Troponin I's were mildly elevated-overall showing a flat pattern, starting at 0 21, most recently at 0 72  Consults    Review of Systems:  feels ill and fatigued  Review of Systems   Constitutional: Negative  HENT: Negative  Eyes: Negative  Respiratory: Positive for cough and shortness of breath  Negative for apnea, choking, chest tightness, wheezing and stridor  Cardiovascular: Negative  Gastrointestinal: Negative  Endocrine: Negative  Musculoskeletal: Positive for arthralgias and back pain  Negative for gait problem, joint swelling, myalgias, neck pain and neck stiffness  Skin: Negative          Historical Information   Past Medical History:   Diagnosis Date    Aortic aneurysm ([address] )     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HCC)     ASCVD (arteriosclerotic cardiovascular disease)     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 ([facility] )     Back problem     Blood type B-     Cancer (HCC)     lung CA    Cataract     Chronic kidney disease     COPD (chronic obstructive pulmonary disease) (HCC)     Coronary artery disease     CVA (cerebral vascular accident) ([facility] )     Depression     Disease of thyroid gland     History of cataract     Hyperlipidemia     Hypertension     Lung mass     Mediastinal adenopathy     Muscle pain     Prediabetes     PVD (peripheral vascular disease) ([facility] )     Thoracic aortic aneurysm (HCC)     Transient insomnia      Past Surgical History:   Procedure Laterality Date    CAROTID STENT Left     NC COLONOSCOPY FLX DX W/COLLJ SPEC WHEN PFRMD N/A 2017    Procedure: EGD AND COLONOSCOPY;  Surgeon: Elin Cedeño MD;  Location: [facility];  Service: Gastroenterology    NC EDG US EXAM SURGICAL ALTER STOM DUODENUM/JEJUNUM N/A 2018    Procedure: LINEAR ENDOSCOPIC U/S;  Surgeon: Josefa Ochoa MD;  Location: [facility];   Service: Gastroenterology    TUBAL LIGATION       Social History     Substance and Sexual Activity   Alcohol Use Not Currently     Social History     Substance and Sexual Activity   Drug Use No     Social History     Tobacco Use   Smoking Status Former Smoker    Packs/day: 0 50    Years: 45 00    Pack years: 22 50    Last attempt to quit: 3/6/2018    Years since quittin 0   Smokeless Tobacco Never Used     Family History:   Family History   Problem Relation Age of Onset    Hypertension Mother     Heart disease Mother     Hearing loss Mother     Diabetes Father     Liver disease Father         hepatic failure     Breast cancer Daughter     Substance Abuse Neg Hx     Mental illness Neg Hx      No family history of premature CAD or Sudden Cardiac Death    Meds/Allergies     Current Facility-Administered Medications:     acetaminophen (TYLENOL) tablet 650 mg, 650 mg, Oral, Q4H PRN, Yumiko Caprice TRACY Witt, 650 mg at 04/06/19 0851    aspirin (ECOTRIN) EC tablet 325 mg, 325 mg, Oral, Daily, TRACY Damon, 325 mg at 04/06/19 0848    atorvastatin (LIPITOR) tablet 40 mg, 40 mg, Oral, Early Morning, TRACY Damon, 40 mg at 04/06/19 0636    co-enzyme Q-10 capsule 30 mg, 30 mg, Oral, Daily, TRACY Damon, 30 mg at 04/06/19 0848    fish oil capsule 1,000 mg, 1,000 mg, Oral, Daily, TRACY Cummings, 1,000 mg at 04/06/19 0848    gabapentin (NEURONTIN) capsule 200 mg, 200 mg, Oral, TID, TRACY Cummings, 200 mg at 04/06/19 0848    heparin (porcine) subcutaneous injection 5,000 Units, 5,000 Units, Subcutaneous, Q8H [address], 5,000 Units at 04/06/19 1447 **AND** [COMPLETED] Platelet count, , , Once, TRACY Cummings    ipratropium-albuterol (DUO-NEB) 0 5-2 5 mg/3 mL inhalation solution 3 mL, 3 mL, Nebulization, Q6H, TRACY Damon, 3 mL at 04/06/19 1332    levothyroxine tablet 150 mcg, 150 mcg, Oral, Early Morning, TRACY Damon, 150 mcg at 04/06/19 0636    methylPREDNISolone sodium succinate (Solu-MEDROL) injection 40 mg, 40 mg, Intravenous, Q8H [address], TRACY Damon, 40 mg at 04/06/19 1447    metoprolol succinate (TOPROL-XL) 24 hr tablet 50 mg, 50 mg, Oral, BID, TRACY Damon, 50 mg at 04/06/19 0848    potassium chloride (K-DUR,KLOR-CON) CR tablet 20 mEq, 20 mEq, Oral, BID With Meals, [employer], DO, 20 mEq at 04/06/19 1224    Facility-Administered Medications Ordered in Other Encounters:     alteplase (CATHFLO) injection 2 mg, 2 mg, Intracatheter, Once PRN, Jose Anton MD    sodium chloride 0 9 % infusion, 20 mL/hr, Intravenous, Continuous, Jose Anton MD  Allergies   Allergen Reactions    Vicodin [Hydrocodone-Acetaminophen] Rash    Penicillins Rash       Objective   Vitals: Blood pressure 154/92, pulse (!) 115, temperature 98 3 °F (36 8 °C), temperature source Oral, resp   rate 18, height 5' 1" (1 549 m), weight 50 kg (110 lb 3 7 oz), SpO2 96 %  , Body mass index is 20 83 kg/m² ,   Orthostatic Blood Pressures      Most Recent Value   Blood Pressure  154/92 filed at 04/06/2019 0727   Patient Position - Orthostatic VS  Lying filed at 04/06/2019 7414          No intake or output data in the 24 hours ending 04/06/19 1502    Weight (last 2 days)     Date/Time   Weight    04/06/19 0300   50 (110 23)              Invasive Devices     Peripheral Intravenous Line            Peripheral IV 04/06/19 Right Antecubital less than 1 day                  Physical Exam:  GEN: Roberto Chou appears well, alert and oriented x 3, pleasant and cooperative   HEENT: pupils equal, round, and reactive to light; extraocular muscles intact  NECK: supple, no carotid bruits or JVD  HEART:  Tachycardic but regular rhythm, normal S1 and S2, systolic murmur present -aortic sclerosis, no clicks, gallops or rubs   LUNGS: o wheezes or rhonchi, no rales ,  decreased breath sounds bilaterally    ABDOMEN/GI: normal bowel sounds, soft, no tenderness, no distention  EXTREMITIES/Musculoskeltal: peripheral pulses normal; no clubbing, cyanosis, or edema  NEURO: no focal motor findings   SKIN: normal without suspicious lesions on exposed skin      Lab Results:   Admission on 04/06/2019   Component Date Value    WBC 04/06/2019 8 60     RBC 04/06/2019 3 11*    Hemoglobin 04/06/2019 10 1*    Hematocrit 04/06/2019 31 6*    MCV 04/06/2019 102*    MCH 04/06/2019 32 5     MCHC 04/06/2019 32 0     RDW 04/06/2019 14 6     MPV 04/06/2019 11 0     Platelets 82/52/0148 201     nRBC 04/06/2019 0     Neutrophils Relative 04/06/2019 71     Immat GRANS % 04/06/2019 1     Lymphocytes Relative 04/06/2019 12*    Monocytes Relative 04/06/2019 12     Eosinophils Relative 04/06/2019 3     Basophils Relative 04/06/2019 1     Neutrophils Absolute 04/06/2019 6 24     Immature Grans Absolute 04/06/2019 0 04     Lymphocytes Absolute 04/06/2019 1 03     Monocytes Absolute 04/06/2019 1 02     Eosinophils Absolute 04/06/2019 0 23     Basophils Absolute 04/06/2019 0 04     Sodium 04/06/2019 142     Potassium 04/06/2019 3 6     Chloride 04/06/2019 103     CO2 04/06/2019 27     ANION GAP 04/06/2019 12     BUN 04/06/2019 27*    Creatinine 04/06/2019 1 70*    Glucose 04/06/2019 120     Calcium 04/06/2019 9 6     AST 04/06/2019 32     ALT 04/06/2019 40     Alkaline Phosphatase 04/06/2019 200*    Total Protein 04/06/2019 7 0     Albumin 04/06/2019 3 2*    Total Bilirubin 04/06/2019 0 30     eGFR 04/06/2019 30     Troponin I 04/06/2019 0 21*    Procalcitonin 04/06/2019 <0 05     Troponin I 04/06/2019 0 58*    Troponin I 04/06/2019 0 72*    Sodium 04/06/2019 138     Potassium 04/06/2019 3 2*    Chloride 04/06/2019 102     CO2 04/06/2019 24     ANION GAP 04/06/2019 12     BUN 04/06/2019 26*    Creatinine 04/06/2019 1 85*    Glucose 04/06/2019 269*    Calcium 04/06/2019 8 8     eGFR 04/06/2019 27     WBC 04/06/2019 8 00     RBC 04/06/2019 2 91*    Hemoglobin 04/06/2019 9 2*    Hematocrit 04/06/2019 29 1*    MCV 04/06/2019 100*    MCH 04/06/2019 31 6     MCHC 04/06/2019 31 6     RDW 04/06/2019 14 4     MPV 04/06/2019 10 6     Platelets 80/86/2245 169     nRBC 04/06/2019 0     Cholesterol 04/06/2019 132     Triglycerides 04/06/2019 48     HDL, Direct 04/06/2019 37*    LDL Calculated 04/06/2019 85     Platelets 46/17/4445 185     MPV 04/06/2019 10 9     Segmented % 04/06/2019 93*    Lymphocytes % 04/06/2019 4*    Monocytes % 04/06/2019 3*    Eosinophils, % 04/06/2019 0     Basophils % 04/06/2019 0     Absolute Neutrophils 04/06/2019 7 44     Lymphocytes Absolute 04/06/2019 0 32*    Monocytes Absolute 04/06/2019 0 24     Eosinophils Absolute 04/06/2019 0 00     Basophils Absolute 04/06/2019 0 00     Total Counted 04/06/2019 100     RBC Morphology 04/06/2019 Present     Hypochromia 04/06/2019 Present     Macrocytes 04/06/2019 Present     Ovalocytes 04/06/2019 Present     Poikilocytes 04/06/2019 Present     Polychromasia 04/06/2019 Present     Platelet Estimate 20/51/8751 Adequate          Imaging: I have personally reviewed pertinent reports  EKG/Telemetry:  No events except for sinus tachycardia    Counseling / Coordination of Care  Total floor / unit time spent today 40 minutes  Greater than 50% of total time was spent with the patient and / or family counseling and / or coordination of care  We will continue to follow with the patient throughout their hospitalization  Thank you for allowing us to participate in their care     Josey Carey MD

## 2019-04-06 NOTE — ASSESSMENT & PLAN NOTE
Creatinine 1 70 in the ED  Down from 1 97 four days ago  · Avoid nephrotoxin medications  · Recheck BMP in [facility]ong [place]

## 2019-04-06 NOTE — ED PROVIDER NOTES
History  Chief Complaint   Patient presents with    Shortness of Breath     Pt c/o of shortness of breath  72year old female presents for evaluation of shortness of breath for the past 2 hours that did not improve after using her inhaler approximately 30 minutes prior to arrival  Patient has history of COPD  She reports chronic cough  Patient denies chest pain  She states that she has chronic lumbar back pain and abdominal pain which is unchanged  Patient states that she has a history of thoracoabdominal aneurysm and is scheduled for an outpatient CT with contrast on Monday to monitor  Patient denies fevers, chills, nausea or vomiting  Shortness of Breath   Severity:  Moderate  Onset quality:  Gradual  Duration:  2 hours  Timing:  Constant  Progression:  Worsening  Chronicity:  Chronic  Relieved by:  Nothing  Worsened by:  Nothing  Ineffective treatments:  Inhaler  Associated symptoms: abdominal pain (chronic) and cough (chronic)    Associated symptoms: no chest pain, no fever, no headaches, no neck pain, no sore throat and no vomiting    Risk factors: no hx of PE/DVT, no obesity, no prolonged immobilization, no recent surgery and no tobacco use (former smoker)        Prior to Admission Medications   Prescriptions Last Dose Informant Patient Reported? Taking? Acetylcysteine 600 MG CAPS   No No   Sig: Take 2 capsules (1,200 mg total) by mouth every 12 (twelve) hours for 4 doses START DAY PRIOR TO CTA AND DAY OF CTA  Methylcobalamin (B12-ACTIVE PO)  Self Yes No   Sig: Take by mouth daily     Omega-3 Fatty Acids (FISH OIL PO)  Self Yes No   Sig: Take 1 capsule by mouth daily     aspirin (ECOTRIN) 325 mg EC tablet  Self Yes No   Sig: Take 325 mg by mouth daily     atorvastatin (LIPITOR) 40 mg tablet  Self No No   Sig: Take 1 tablet (40 mg total) by mouth daily   Patient taking differently: Take 40 mg by mouth daily in the early morning     co-enzyme Q-10 30 MG capsule  Self Yes No   Sig: Take 30 mg by mouth daily     gabapentin (NEURONTIN) 100 mg capsule  Self No No   Sig: Take 2 capsules (200 mg total) by mouth 3 (three) times a day   levothyroxine 150 mcg tablet  Self No No   Sig: Take 1 tablet (150 mcg total) by mouth daily in the early morning   metoprolol succinate (TOPROL-XL) 50 mg 24 hr tablet  Self No No   Sig: Take 1 tablet (50 mg total) by mouth 2 (two) times a day      Facility-Administered Medications: None       Past Medical History:   Diagnosis Date    Aortic aneurysm ([facility] )     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HCC)     ASCVD (arteriosclerotic cardiovascular disease)     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 ([place] [address] )     Back problem     Blood type B-     Cancer ([place] [address] )     lung CA    Cataract     Chronic kidney disease     COPD (chronic obstructive pulmonary disease) ([place] [address] )     Coronary artery disease     CVA (cerebral vascular accident) ([place] [address] ) 2012    Depression     Disease of thyroid gland     History of cataract     Hyperlipidemia     Hypertension     Lung mass     Mediastinal adenopathy     Muscle pain     Prediabetes     PVD (peripheral vascular disease) ([place] [address] )     Thoracic aortic aneurysm (HCC)     Transient insomnia        Past Surgical History:   Procedure Laterality Date    CAROTID STENT Left     WY COLONOSCOPY FLX DX W/COLLJ SPEC WHEN PFRMD N/A 9/27/2017    Procedure: EGD AND COLONOSCOPY;  Surgeon: Nixon Esteban MD;  Location: [facility];  Service: Gastroenterology    W[place] EDG US EXAM SURGICAL ALTER STOM DUODENUM/JEJUNUM N/A 8/1/2018    Procedure: LINEAR ENDOSCOPIC U/S;  Surgeon: Estrellita Caban MD;  Location: [facility];   Service: Gastroenterology    TUBAL LIGATION         Family History   Problem Relation Age of Onset    Hypertension Mother     Heart disease Mother     Diabetes Father     Liver disease Father         hepatic failure     Breast cancer Daughter     Substance Abuse Neg Hx     Mental illness Neg Hx      I have reviewed and agree with the history as documented  Social History     Tobacco Use    Smoking status: Former Smoker     Packs/day: 0 50     Years: 45 00     Pack years: 22 50     Last attempt to quit: 3/6/2018     Years since quittin 0    Smokeless tobacco: Never Used   Substance Use Topics    Alcohol use: No    Drug use: No        Review of Systems   Constitutional: Negative for appetite change, chills and fever  HENT: Negative for congestion, rhinorrhea and sore throat  Respiratory: Positive for cough (chronic) and shortness of breath  Negative for chest tightness  Cardiovascular: Negative for chest pain, palpitations and leg swelling  Gastrointestinal: Positive for abdominal pain (chronic)  Negative for constipation, diarrhea, nausea and vomiting  Genitourinary: Negative for dysuria, frequency and hematuria  Musculoskeletal: Positive for back pain (chronic)  Negative for myalgias, neck pain and neck stiffness  Skin: Negative for pallor  Neurological: Negative for syncope, weakness and headaches  All other systems reviewed and are negative  Physical Exam  Physical Exam   Constitutional: She is oriented to person, place, and time  She appears well-developed and well-nourished  Non-toxic appearance  No distress  HENT:   Head: Normocephalic and atraumatic  Eyes: Pupils are equal, round, and reactive to light  Conjunctivae and EOM are normal    Neck: Normal range of motion  Neck supple  No tracheal deviation present  No thyromegaly present  Cardiovascular: Regular rhythm, normal heart sounds and intact distal pulses  Tachycardia present  Pulmonary/Chest: Effort normal  Tachypnea noted  She has decreased breath sounds (throughout)  Abdominal: Soft  Bowel sounds are normal  She exhibits no distension  There is no tenderness  Lymphadenopathy:     She has no cervical adenopathy  Neurological: She is alert and oriented to person, place, and time  Skin: Skin is warm and dry   She is not diaphoretic  Nursing note and vitals reviewed        Vital Signs  ED Triage Vitals   Temp Pulse Respirations Blood Pressure SpO2   -- 04/06/19 0200 04/06/19 0200 04/06/19 0200 04/06/19 0200    (!) 115 (!) 33 152/96 92 %      Temp src Heart Rate Source Patient Position - Orthostatic VS BP Location FiO2 (%)   -- -- -- -- --             Pain Score       04/06/19 0130       No Pain           Vitals:    04/06/19 0200 04/06/19 0215 04/06/19 0230   BP: 152/96 (!) 159/102    Pulse: (!) 115 (!) 115 (!) 114         Visual Acuity  Visual Acuity      Most Recent Value   L Pupil Size (mm)  3   R Pupil Size (mm)  3          ED Medications  Medications   ipratropium (ATROVENT) 0 02 % inhalation solution 0 5 mg (0 5 mg Nebulization Given 4/6/19 0054)   albuterol inhalation solution 5 mg (5 mg Nebulization Given 4/6/19 0053)   methylPREDNISolone sodium succinate (Solu-MEDROL) injection 40 mg (40 mg Intravenous Given 4/6/19 0054)       Diagnostic Studies  Results Reviewed     Procedure Component Value Units Date/Time    Comprehensive metabolic panel [[id number]]  (Abnormal) Collected:  04/06/19 0038    Lab Status:  Final result Specimen:  Blood from Line, Venous Updated:  04/06/19 0140     Sodium 142 mmol/L      Potassium 3 6 mmol/L      Chloride 103 mmol/L      CO2 27 mmol/L      ANION GAP 12 mmol/L      BUN 27 mg/dL      Creatinine 1 70 mg/dL      Glucose 120 mg/dL      Calcium 9 6 mg/dL      AST 32 U/L      ALT 40 U/L      Alkaline Phosphatase 200 U/L      Total Protein 7 0 g/dL      Albumin 3 2 g/dL      Total Bilirubin 0 30 mg/dL      eGFR 30 ml/min/1 73sq m     Narrative:       [place] guidelines for Chronic Kidney Disease (CKD):     Stage 1 with normal or high GFR (GFR > 90 mL/min/1 73 square meters)    Stage 2 Mild CKD (GFR = 60-89 mL/min/1 73 square meters)    Stage 3A Moderate CKD (GFR = 45-59 mL/min/1 73 square meters)    Stage 3B Moderate CKD (GFR = 30-44 mL/min/1 73 square meters)   Stage 4 Severe CKD (GFR = 15-29 mL/min/1 73 square meters)    Stage 5 End Stage CKD (GFR <15 mL/min/1 73 square meters)  Note: GFR calculation is accurate only with a steady state creatinine    Troponin I [[id number]]  (Abnormal) Collected:  04/06/19 0038    Lab Status:  Final result Specimen:  Blood from Line, Venous Updated:  04/06/19 0140     Troponin I 0 21 ng/mL     CBC and differential [[id number]]  (Abnormal) Collected:  04/06/19 0038    Lab Status:  Final result Specimen:  Blood from Line, Venous Updated:  04/06/19 0130     WBC 8 60 Thousand/uL      RBC 3 11 Million/uL      Hemoglobin 10 1 g/dL      Hematocrit 31 6 %       fL      MCH 32 5 pg      MCHC 32 0 g/dL      RDW 14 6 %      MPV 11 0 fL      Platelets 492 Thousands/uL      nRBC 0 /100 WBCs      Neutrophils Relative 71 %      Immat GRANS % 1 %      Lymphocytes Relative 12 %      Monocytes Relative 12 %      Eosinophils Relative 3 %      Basophils Relative 1 %      Neutrophils Absolute 6 24 Thousands/µL      Immature Grans Absolute 0 04 Thousand/uL      Lymphocytes Absolute 1 03 Thousands/µL      Monocytes Absolute 1 02 Thousand/µL      Eosinophils Absolute 0 23 Thousand/µL      Basophils Absolute 0 04 Thousands/µL                  XR chest 2 views   ED Interpretation by Ra Waters MD (04/06 4520)   COPD                 Procedures  ECG 12 Lead Documentation  Date/Time: 4/6/2019 12:34 AM  Performed by: Ra Waters MD  Authorized by: Ra Waters MD     Indications / Diagnosis:  Dyspnea  ECG reviewed by me, the ED Provider: yes    Patient location:  ED  Previous ECG:     Previous ECG:  Compared to current    Comparison ECG info:  12/27/2018 sinus tachycardia    Similarity:  No change  Interpretation:     Interpretation: normal    Rate:     ECG rate:  112    ECG rate assessment: tachycardic    Rhythm:     Rhythm: sinus tachycardia    Ectopy:     Ectopy: none    QRS:     QRS axis:  Normal    QRS intervals:  Normal  Conduction: Conduction: normal    ST segments:     ST segments:  Normal  T waves:     T waves: normal             Phone Contacts  ED Phone Contact    ED Course  ED Course as of Apr 06 0240   Sat Apr 06, 2019   0141 No chest pain, chronic back pain   Troponin I(!): 0 21   0141 1 97 four days ago   Creatinine(!): 1 70   0142 9 5 three months ago   Hemoglobin(!): 10 1                               [facility]  Number of Diagnoses or Management Options  COPD exacerbation ([facility] ): new and requires workup  Elevated troponin: new and requires workup  Diagnosis management comments: 72year old female presents for evaluation of shortness of breath for the past 2 hours  History of COPD  EKG unremarkable  Elevated troponin  No chest pain  Patient does not meet STEMI criteria  Improvement in symptoms with nebulizer treatment  History of thoracoabdominal aneurysm with chronic abdominal pain  Abdomen soft and nontender on exam  Patient admitted for further evaluation and management          Amount and/or Complexity of Data Reviewed  Clinical lab tests: ordered and reviewed  Tests in the radiology section of CPT®: ordered  Independent visualization of images, tracings, or specimens: yes    Patient Progress  Patient progress: stable      Disposition  Final diagnoses:   COPD exacerbation ([place] [address] )   Elevated troponin     Time reflects when diagnosis was documented in both MDM as applicable and the Disposition within this note     Time User Action Codes Description Comment    4/6/2019  2:15 AM Cb Ovalles Add [J44 1] COPD exacerbation ([facility] )     4/6/2019  2:16 AM Aníbal Jacobsen Add [R74 8] Elevated troponin     4/6/2019  2:16 AM Pascale Alicea Modify [J44 1] COPD exacerbation ([facility] )     4/6/2019  2:16 AM Pascale Alicea Modify [R74 8] Elevated troponin       ED Disposition     ED Disposition Condition Date/Time Comment    Admit Stable Sat Apr 6, 2019  2:16 AM Case was discussed with ROSAURA and the patient's admission status was agreed to be Admission Status: observation status to the service of Dr Gino Cast   Follow-up Information    None         Patient's Medications   Discharge Prescriptions    No medications on file     No discharge procedures on file      ED Provider  Electronically Signed by           Alexus Woodard MD  04/06/19 7594

## 2019-04-06 NOTE — ASSESSMENT & PLAN NOTE
Patient follows with Dr Mayi Valles  She completed her radiation treatment but is to continue on Imfinzi until November

## 2019-04-06 NOTE — PLAN OF CARE
Problem: PAIN - ADULT  Goal: Verbalizes/displays adequate comfort level or baseline comfort level  Description  Interventions:  - Encourage patient to monitor pain and request assistance  - Assess pain using appropriate pain scale  - Administer analgesics based on type and severity of pain and evaluate response  - Implement non-pharmacological measures as appropriate and evaluate response  - Consider cultural and social influences on pain and pain management  - Notify physician/advanced practitioner if interventions unsuccessful or patient reports new pain  Outcome: Progressing     Problem: INFECTION - ADULT  Goal: Absence or prevention of progression during hospitalization  Description  INTERVENTIONS:  - Assess and monitor for signs and symptoms of infection  - Monitor lab/diagnostic results  - Monitor all insertion sites, i e  indwelling lines, tubes, and drains  - Monitor endotracheal (as able) and nasal secretions for changes in amount and color  - [place] appropriate cooling/warming therapies per order  - Administer medications as ordered  - Instruct and encourage patient and family to use good hand hygiene technique  - Identify and instruct in appropriate isolation precautions for identified infection/condition  Outcome: Progressing  Goal: Absence of fever/infection during neutropenic period  Description  INTERVENTIONS:  - Monitor WBC  - Implement neutropenic guidelines  Outcome: Progressing     Problem: SAFETY ADULT  Goal: Patient will remain free of falls  Description  INTERVENTIONS:  - Assess patient frequently for physical needs  -  Identify cognitive and physical deficits and behaviors that affect risk of falls    -  [place] fall precautions as indicated by assessment   - Educate patient/family on patient safety including physical limitations  - Instruct patient to call for assistance with activity based on assessment  - Modify environment to reduce risk of injury  - Consider OT/PT consult to assist with strengthening/mobility  Outcome: Progressing  Goal: Maintain or return to baseline ADL function  Description  INTERVENTIONS:  -  Assess patient's ability to carry out ADLs; assess patient's baseline for ADL function and identify physical deficits which impact ability to perform ADLs (bathing, care of mouth/teeth, toileting, grooming, dressing, etc )  - Assess/evaluate cause of self-care deficits   - Assess range of motion  - Assess patient's mobility; develop plan if impaired  - Assess patient's need for assistive devices and provide as appropriate  - Encourage maximum independence but intervene and supervise when necessary  ¯ Involve family in performance of ADLs  ¯ Assess for home care needs following discharge   ¯ Request OT consult to assist with ADL evaluation and planning for discharge  ¯ Provide patient education as appropriate  Outcome: Progressing  Goal: Maintain or return mobility status to optimal level  Description  INTERVENTIONS:  - Assess patient's baseline mobility status (ambulation, transfers, stairs, etc )    - Identify cognitive and physical deficits and behaviors that affect mobility  - Identify mobility aids required to assist with transfers and/or ambulation (gait belt, sit-to-stand, lift, walker, cane, etc )  - [place] fall precautions as indicated by assessment  - Record patient progress and toleration of activity level on Mobility SBAR; progress patient to next Phase/Stage  - Instruct patient to call for assistance with activity based on assessment  - Request Rehabilitation consult to assist with strengthening/weightbearing, etc   Outcome: Progressing     Problem: DISCHARGE PLANNING  Goal: Discharge to home or other facility with appropriate resources  Description  INTERVENTIONS:  - Identify barriers to discharge w/patient and caregiver  - Arrange for needed discharge resources and transportation as appropriate  - Identify discharge learning needs (meds, wound care, etc )  - Arrange for interpretive services to assist at discharge as needed  - Refer to Case Management Department for coordinating discharge planning if the patient needs post-hospital services based on physician/advanced practitioner order or complex needs related to functional status, cognitive ability, or social support system  Outcome: Progressing     Problem: Knowledge Deficit  Goal: Patient/family/caregiver demonstrates understanding of disease process, treatment plan, medications, and discharge instructions  Description  Complete learning assessment and assess knowledge base    Interventions:  - Provide teaching at level of understanding  - Provide teaching via preferred learning methods  Outcome: Progressing     Problem: RESPIRATORY - ADULT  Goal: Achieves optimal ventilation and oxygenation  Description  INTERVENTIONS:  - Assess for changes in respiratory status  - Assess for changes in mentation and behavior  - Position to facilitate oxygenation and minimize respiratory effort  - Oxygen administration by appropriate delivery method based on oxygen saturation (per order) or ABGs  - Initiate smoking cessation education as indicated  - Encourage broncho-pulmonary hygiene including cough, deep breathe, Incentive Spirometry  - Assess the need for suctioning and aspirate as needed  - Assess and instruct to report SOB or any respiratory difficulty  - Respiratory Therapy support as indicated  Outcome: Progressing

## 2019-04-06 NOTE — ASSESSMENT & PLAN NOTE
· Initial troponin elevated at 0 21  Type 2 MI diagnosed by Cardiology  · Obtain serial troponins  · Monitor on telemetry  · Obtain EKG with complaints of chest pain or change in rhythm  · Plan for echocardiogram in [place]

## 2019-04-06 NOTE — ASSESSMENT & PLAN NOTE
Chest x-ray shows COPD  Diminished breath sounds on auscultation  States that she could not breathe    · Duo nebs q 6 hours  · Solu-Medrol 40 mg IV q 8 hours  · O2 as needed

## 2019-04-06 NOTE — ASSESSMENT & PLAN NOTE
BP borderline high in the ED  Most likely due to respiratory distress  · Will continue Toprol XL 50 mg p o  B i d  · Monitor BP per nursing unit

## 2019-04-06 NOTE — ASSESSMENT & PLAN NOTE
Initial troponin elevated at 0 21  No complaints of chest pain  No ischemic changes noted on EKG  · Obtain serial troponins  · Monitor on telemetry  · Obtain EKG with complaints of chest pain or change in rhythm

## 2019-04-06 NOTE — ASSESSMENT & PLAN NOTE
SpO2 88% on room air  Placed on O2 at 2 L  Patient does not use oxygen at home    · Administer O2 and titrate to keep SpO2 greater than 92%

## 2019-04-06 NOTE — H&P
H&P- Susan Bad 1945, 72 y o  female MRN: [id number]    Unit/Bed#: [address] [place] [address] Encounter: [id number]    Primary Care Provider: Corbin Palacios MD   Date and time admitted to hospital: 4/6/2019 12:38 AM        * Acute respiratory failure with hypoxia ([facility] )  Assessment & Plan  SpO2 88% on room air  Placed on O2 at 2 L  Patient does not use oxygen at home  · Administer O2 and titrate to keep SpO2 greater than 92%      COPD exacerbation [facility])  Assessment & Plan  Chest x-ray shows COPD  Diminished breath sounds on auscultation  States that she could not breathe  · Duo nebs q 6 hours  · Solu-Medrol 40 mg IV q 8 hours  · O2 as needed    Elevated troponin  Assessment & Plan  Initial troponin elevated at 0 21  No complaints of chest pain  No ischemic changes noted on EKG  · Obtain serial troponins  · Monitor on telemetry  · Obtain EKG with complaints of chest pain or change in rhythm  Benign hypertension  Assessment & Plan  BP borderline high in the ED  Most likely due to respiratory distress  · Will continue Toprol XL 50 mg p o  B i d  · Monitor BP per nursing unit  Aneurysm, thoracoabdominal aortic [facility])  Assessment & Plan  Patient is scheduled for CTA of abdomen on Monday  She is to take acetylcysteine 1200 mg p o  Q 12 hours starting the day prior up and including the day of  Stage 3 chronic kidney disease ([facility] )  Assessment & Plan  Creatinine 1 70 in the ED  Down from 1 97 four days ago  · Avoid nephrotoxin medications  · Recheck BMP in a m     Nonsquamous nonsmall cell neoplasm of left lung [facility])  Assessment & Plan  Patient follows with Dr Hong Kelly  She completed her radiation treatment but is to continue on Imfinzi until November      VTE Prophylaxis: Heparin  / sequential compression device   Code Status: Full code  POLST: There is no POLST form on file for this patient (pre-hospital)  Discussion with family: No family present    Anticipated Length of Stay:  Patient will be admitted on an Inpatient basis with an anticipated length of stay of  > 2 midnights  Justification for Hospital Stay: IV medications    Total Time for Visit, including Counseling / Coordination of Care: 30 minutes  Greater than 50% of this total time spent on direct patient counseling and coordination of care  Chief Complaint:   Shortness of breath    History of Present Illness:    Kristie Mcginnis is a 72 y o  female with history of COPD, CAD, adenocarcinoma of LAURIE, CKD, and aortic aneurysm who presents with c/o worsening shortness of breath over the past 2-3 hours  Pt states that she wasn't feeling well and went to bed  She was unable to get comfortable or fall asleep because she couldn't breathe  Pt does not wear O2 at home  She used to have inhalers but does not have them anymore  Quit smoking over a year ago  No recent sick contacts  No fevers or chills  Denies SOB or chest pain  She does admit to 6/10 lower abdominal pain and lower back pain which is chronic for her  Pt is scheduled for CTA of abdomen on Monday to evaluate thoracic aneurysm  She is prescribed acetylcysteine 1200mg Q12H the day before and the day of the CTA for a total of 4 doses  Review of Systems:    Review of Systems   Constitutional: Negative for activity change, appetite change, chills and fever  HENT: Negative for congestion, rhinorrhea, sneezing and sore throat  Respiratory: Positive for shortness of breath  Negative for apnea and cough  Cardiovascular: Negative for chest pain, palpitations and leg swelling  Gastrointestinal: Positive for abdominal pain (chronic)  Negative for abdominal distention, constipation, diarrhea, nausea and vomiting  Genitourinary: Negative for dysuria  Musculoskeletal: Positive for back pain (chronic)  Neurological: Negative for dizziness, seizures, syncope, facial asymmetry, weakness, light-headedness, numbness and headaches     Psychiatric/Behavioral: Negative for agitation and confusion  The patient is not nervous/anxious  All other systems reviewed and are negative  Past Medical and Surgical History:     Past Medical History:   Diagnosis Date    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 ) 2012    Depression     Disease of thyroid gland     History of cataract     Hyperlipidemia     Hypertension     Lung mass     Mediastinal adenopathy     Muscle pain     Prediabetes     PVD [facility]peripheral vascular disease) ([facility] )     Thoracic aortic aneurysm (HCC)     Transient insomnia        Past Surgical History:   Procedure Laterality Date    CAROTID STENT Left     [place] COLONOSCOPY FLX DX W/COLLJ SPEC WHEN PFRMD N/A 9/27/2017    Procedure: EGD AND COLONOSCOPY;  Surgeon: Gregoria Beaver MD;  Location: [facility];  Service: Gastroenterology    [place] EDG US EXAM SURGICAL ALTER STOM DUODENUM/JEJUNUM N/A 8/1/2018    Procedure: LINEAR ENDOSCOPIC U/S;  Surgeon: Chandrika Lane MD;  Location: [facility]; Service: Gastroenterology    TUBAL LIGATION         Meds/Allergies:    Prior to Admission medications    Medication Sig Start Date End Date Taking? Authorizing Provider   Acetylcysteine 600 MG CAPS Take 2 capsules (1,200 mg total) by mouth every 12 (twelve) hours for 4 doses START DAY PRIOR TO CTA AND DAY OF CTA  4/4/19 4/6/19  Rosalia Mills MD   aspirin (ECOTRIN) 325 mg EC tablet Take 325 mg by mouth daily      Historical Provider, MD   atorvastatin (LIPITOR) 40 mg tablet Take 1 tablet (40 mg total) by mouth daily  Patient taking differently: Take 40 mg by mouth daily in the early morning   4/5/18   Mylene Guzman MD   co-enzyme Q-10 30 MG capsule Take 30 mg by mouth daily      Historical Provider, MD   gabapentin (NEURONTIN) 100 mg capsule Take 2 capsules (200 mg total) by mouth 3 (three) times a day 19   Marcelo Espinosa MD   levothyroxine 150 mcg tablet Take 1 tablet (150 mcg total) by mouth daily in the early morning 3/5/19   Marcelo Espinosa MD   Methylcobalamin (B12-ACTIVE PO) Take by mouth daily      Historical Provider, MD   metoprolol succinate (TOPROL-XL) 50 mg 24 hr tablet Take 1 tablet (50 mg total) by mouth 2 (two) times a day 10/16/18   J Carlos Ferro PA-C   Omega-3 Fatty Acids (FISH OIL PO) Take 1 capsule by mouth daily      Historical Provider, MD     I have reviewed home medications with patient personally  Allergies: Allergies   Allergen Reactions    Vicodin [Hydrocodone-Acetaminophen] Rash    Penicillins Rash       Social History:     Marital Status:    Occupation: Retired  Patient Pre-hospital Living Situation: Lives with friend Astrid Goodrich  Patient Pre-hospital Level of Mobility: Independent  Patient Pre-hospital Diet Restrictions: None  Substance Use History:   Social History     Substance and Sexual Activity   Alcohol Use Not Currently     Social History     Tobacco Use   Smoking Status Former Smoker    Packs/day: 0 50    Years: 45 00    Pack years: 22 50    Last attempt to quit: 3/6/2018    Years since quittin 0   Smokeless Tobacco Never Used     Social History     Substance and Sexual Activity   Drug Use No       Family History:    non-contributory    Physical Exam:     Vitals:   Blood Pressure: (!) 141/101 (19)  Pulse: (!) 114 (19)  Temperature: 99 °F (37 2 °C) (19)  Temp Source: Oral (19)  Respirations: 19 (19 0230)  Height: 5' 1" (154 9 cm) (19)  Weight - Scale: 50 kg (110 lb 3 7 oz) (19)  SpO2: 94 % (19 023)    Physical Exam   Constitutional: She is oriented to person, place, and time   She appears well-developed and well-nourished  She is cooperative  No distress  Nasal cannula in place  HENT:   Head: Normocephalic and atraumatic  Eyes: Pupils are equal, round, and reactive to light  EOM are normal    Neck: Normal range of motion  Neck supple  Cardiovascular: Regular rhythm, normal heart sounds and intact distal pulses  Tachycardia present  Exam reveals no gallop and no friction rub  No murmur heard  Pulmonary/Chest: Effort normal  No respiratory distress  She has decreased breath sounds  She has no wheezes  She has no rales  Abdominal: Soft  Bowel sounds are normal  She exhibits no distension  There is tenderness in the suprapubic area  Musculoskeletal: Normal range of motion  She exhibits no edema  Neurological: She is alert and oriented to person, place, and time  GCS eye subscore is 4  GCS verbal subscore is 5  GCS motor subscore is 6  Skin: Skin is warm and dry  Psychiatric: She has a normal mood and affect  Judgment normal    Nursing note and vitals reviewed  Additional Data:     Lab Results: I have personally reviewed pertinent reports  Results from last 7 days   Lab Units 04/06/19  0038   WBC Thousand/uL 8 60   HEMOGLOBIN g/dL 10 1*   HEMATOCRIT % 31 6*   PLATELETS Thousands/uL 201   NEUTROS PCT % 71   LYMPHS PCT % 12*   MONOS PCT % 12   EOS PCT % 3     Results from last 7 days   Lab Units 04/06/19  0038   SODIUM mmol/L 142   POTASSIUM mmol/L 3 6   CHLORIDE mmol/L 103   CO2 mmol/L 27   BUN mg/dL 27*   CREATININE mg/dL 1 70*   ANION GAP mmol/L 12   CALCIUM mg/dL 9 6   ALBUMIN g/dL 3 2*   TOTAL BILIRUBIN mg/dL 0 30   ALK PHOS U/L 200*   ALT U/L 40   AST U/L 32   GLUCOSE RANDOM mg/dL 120                       Imaging: I have personally reviewed pertinent reports        XR chest 2 views   ED Interpretation by Kurtis Caraballo MD (04/06 1962)   COPD          EKG, Pathology, and Other Studies Reviewed on Admission:   · EKG: ST    Allscripts / Epic Records Reviewed: Yes     ** Please Note: This note has been constructed using a voice recognition system   **

## 2019-04-07 NOTE — PROGRESS NOTES
Pt c/o acute onset of nausea  EKG obtained due to elevated troponin on admission  EKG revealed ST with biatrial enlargement with ST depression  Notified Dr Alicia Garrett from Cardiology  No new orders noted

## 2019-04-07 NOTE — ASSESSMENT & PLAN NOTE
Patient is scheduled for CTA of abdomen on Monday  She is to take acetylcysteine 1200 mg p o  Q 12 hours starting the day prior up and including the day of

## 2019-04-07 NOTE — ASSESSMENT & PLAN NOTE
SpO2 88% on room air  Placed on O2 at 2 L  Patient does not use oxygen at home  · Administer O2 and titrate to keep SpO2 greater than 92%    · Continue supportive care and transition IV to p o  Steroids

## 2019-04-07 NOTE — UTILIZATION REVIEW
Initial Clinical Review    Admission: Date/Time/Statement: 4/6/19 @ 0456 INPATIENT  Orders Placed This Encounter    Inpatient Admission     Standing Status:   Standing     Number of Occurrences:   1     Order Specific Question:   Admitting Physician     Answer:   Judith Grande [[id number]]     Order Specific Question:   Level of Care     Answer:   Med Surg [[id number]]     Order Specific Question:   Estimated length of stay     Answer:   More than 2 Midnights     Order Specific Question:   Certification     Answer:   I certify that inpatient services are medically necessary for this patient for a duration of greater than two midnights  See H&P and MD Progress Notes for additional information about the patient's course of treatment  ED: Date/Time/Mode of Arrival:   ED Arrival Information     Expected Arrival Acuity Means of Arrival Escorted By Service Admission Type    - 4/6/2019 00:17 Urgent Walk-In Friend Hospitalist Urgent    Arrival Complaint    SHORTNESS OF BREATH        Chief Complaint:   Chief Complaint   Patient presents with    Shortness of Breath     Pt c/o of shortness of breath  Assessment/Plan: 73 y/o female with hx COPD, CAD, LAURIE adenocarcinoma, CKD, thoracoabdominal aneurysm presents to ED from home with worsening SOB for past 2-3 hours with no improvement after inhaler use  Pt states she attempted to go to bed, but could not fall asleep because she felt she was unable to breathe laying down  Does not require home O2  On exam, pt has tachycardia, tachypnea, decreased breath sounds  Admitted as inpatient due to acute respiratory failure with hypoxia, COPD exacerbation, elevated troponin  Continue supplemental O2  ATC duonebs  Continue IV solumedrol  Trend troponins  Tele  Per cardiology, troponin elevation represents type 2 NSTEMI in the setting of COPD exac and increased demand  Pt remains tachycardic with decreased breath sounds bilaterally      ED Vital Signs:   ED Triage Vitals Temperature Pulse Respirations Blood Pressure SpO2   04/06/19 0300 04/06/19 0200 04/06/19 0200 04/06/19 0200 04/06/19 0200   99 °F (37 2 °C) (!) 115 (!) 33 152/96 92 %      Temp Source Heart Rate Source Patient Position - Orthostatic VS BP Location FiO2 (%)   04/06/19 0300 04/06/19 0300 04/06/19 0300 04/06/19 0300 --   Oral Monitor Sitting Left arm       Pain Score       04/06/19 0130       No Pain        Wt Readings from Last 1 Encounters:   04/06/19 50 kg (110 lb 3 7 oz)     Vital Signs (abnormal):   04/07/19 0844  98 °F (36 7 °C)  115  20  131/69  93 %  Nasal cannula 2L   04/06/19 2233  98 5 °F (36 9 °C)  119   22  161/97  94 %  Nasal cannula   04/06/19 2100    126   26  153/104  94 %  Nasal cannula 3L   04/06/19 2055          86 %   None (Room air)   04/06/19 2015    114       94 %  Nasal cannula 2L   04/06/19 1554          92 %  Nasal cannula 2L   04/06/19 1553  98 °F (36 7 °C)  115  18  134/83  89 %   None (Room air)   04/06/19 0727  98 3 °F (36 8 °C)  115   18  154/92  95 %  Nasal cannula   04/06/19 0555    111      94 %  Nasal cannula   04/06/19 0300  99 °F (37 2 °C)  114     141/101       04/06/19 0215    115  57   159/102  91 %         Pertinent Labs/Diagnostic Test Results:   BUN/Cr 27/1 70  Alk Phos 200  Alb 3 2  Trop 0 21, 0 58, 0 72  BNP 21,059  H/H 10 1/31 6  EKG:  Sinus tachycardia with occasional Premature atrial complexes  Poor R wave progression  Repeat EKG:  Normal sinus rhythm  Possible Left atrial enlargement  Prolonged QT  CXR:  No acute cardiopulmonary disease      Echo pending    ED Treatment:   Medication Administration from 04/06/2019 0017 to 04/06/2019 0316       Date/Time Order Dose Route Action     04/06/2019 0054 ipratropium (ATROVENT) 0 02 % inhalation solution 0 5 mg 0 5 mg Nebulization Given     04/06/2019 0053 albuterol inhalation solution 5 mg 5 mg Nebulization Given     04/06/2019 0054 methylPREDNISolone sodium succinate (Solu-MEDROL) injection 40 mg 40 mg Intravenous Given        Past Medical/Surgical History:    Active Ambulatory Problems     Diagnosis Date Noted    Acute respiratory failure with hypoxia ([facility] ) 02/18/2016    Hypothyroidism 02/18/2016    Pure hypercholesterolemia 02/18/2016    ASCVD (arteriosclerotic cardiovascular disease) 02/19/2016    A[facility], [facility] 05/27/2015    Benign hypertension 04/20/2015    Spinal stenosis of lumbar region with neurogenic claudication 04/23/2018    Bilateral carotid artery stenosis 05/17/2018    Nonsquamous nonsmall cell neoplasm of left lung ([facility] ) 08/07/2018    Stage 3 chronic kidney disease ([place] [address] ) 01/29/2019    Persistent proteinuria 04/03/2019    Anemia 04/03/2019    Hydronephrosis 04/03/2019    Thoracic aortic aneurysm [facility])      Past Medical History:   Diagnosis Date    Aortic aneurysm ([facility] )     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HCC)     ASCVD (arteriosclerotic cardiovascular disease)     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)     Back problem     Blood type B-     Cancer ([place] [address] )     Cataract     Chronic kidney disease     COPD (chronic obstructive pulmonary disease) (HCC)     Coronary artery disease     CVA (cerebral vascular accident) ([place] [address] ) 2012    Depression     Disease of thyroid gland     History of cataract     Hyperlipidemia     Hypertension     Lung mass     Mediastinal adenopathy     Muscle pain     Prediabetes     PVD [facility]peripheral vascular disease) [facility]     Thoracic aortic aneurysm (HCC)     Transient insomnia      Admitting Diagnosis: Shortness of breath [R06 02]  Elevated troponin [R74 8]  COPD exacerbation (HCC) [J44 1]  Age/Sex: 72 y o  female     Admission Orders:  SCDs  NC O2 to keep sat >92%  I/O  VS  Tele  Continuous ST monitoring  EKG with chest pain or ST change  Labs  Sputum cx  Cardiac diet  Cardiology consult  Respiratory protocol  Peak flow  Echo    Scheduled Meds:   Current Facility-Administered Medications:  acetaminophen 650 mg Oral Q4H PRN x4   aspirin 325 mg Oral Daily   atorvastatin 40 mg Oral Early Morning   co-enzyme Q-10 30 mg Oral Daily   fish oil 1,000 mg Oral Daily   gabapentin 200 mg Oral TID   heparin (porcine) 5,000 Units Subcutaneous Q8H [facility]   ipratropium-albuterol 3 mL Nebulization Q6H   levothyroxine 150 mcg Oral Early Morning   metoprolol tartrate 50 mg Oral Q12H PATY   ondansetron 4 mg Intravenous Q4H PRN x1   potassium chloride 20 mEq Oral BID With Meals   predniSONE 40 mg Oral Daily With Breakfast   methylPREDNISolone sodium succinate (Solu-MEDROL) injection 40 mg   Dose: 40 mg  Freq: Every 8 hours scheduled Route: IV x3 doses on 4/6, x1 dose on 4/7    Network Utilization Review Department  Phone: [phone]; Fax [phone]  [email or web address]  ATTENTION: Please call with any questions or concerns to [phone]  and carefully listen to the prompts so that you are directed to the right person  Send all requests for admission clinical reviews, approved or denied determinations and any other requests to fax [phone]   All voicemails are confidential

## 2019-04-07 NOTE — PROGRESS NOTES
Progress Note - Edilma Wolf 1945, 72 y o  female MRN: [id number]    Unit/Bed#: [address] [place] [address] Encounter: [id number]    Primary Care Provider: Orlando Moreno MD   Date and time admitted to hospital: 2019 12:38 AM        * Acute respiratory failure with hypoxia ([address] )  Assessment & Plan  SpO2 88% on room air  Placed on O2 at 2 L  Patient does not use oxygen at home  · Administer O2 and titrate to keep SpO2 greater than 92%    · Continue supportive care and transition IV to p o  Steroids  Elevated troponin  Assessment & Plan  · Initial troponin elevated at 0 21  Type 2 MI diagnosed by Cardiology  · Obtain serial troponins  · Monitor on telemetry  · Obtain EKG with complaints of chest pain or change in rhythm  · Plan for echocardiogram in a m       Benign hypertension  Assessment & Plan  BP borderline high in the ED  Most likely due to respiratory distress  · Will continue Toprol XL 50 mg p o  B i d  · Monitor BP per nursing unit  Aneurysm, thoracoabdominal aortic [facility])  Assessment & Plan  Patient is scheduled for CTA of abdomen on Monday  She is to take acetylcysteine 1200 mg p o  Q 12 hours starting the day prior up and including the day of  VTE Pharmacologic Prophylaxis:   Pharmacologic: Heparin  Mechanical VTE Prophylaxis in Place: Yes    Patient Centered Rounds: I have performed bedside rounds with nursing staff today  Current Length of Stay: 1 day(s)    Current Patient Status: Inpatient   Certification Statement: The patient will continue to require additional inpatient hospital stay due to Type 2 MI    Code Status: Level 1 - Full Code      Subjective:   Patient feels better today  Had an episode of nausea last night which resolved quickly  No further difficulty with shortness of breath or chest pain      Objective:     Vitals:   Temp (24hrs), Av 2 °F (36 8 °C), Min:98 °F (36 7 °C), Max:98 5 °F (36 9 °C)    Temp:  [98 °F (36 7 °C)-98 5 °F (36 9 °C)] 98 °F (36 7 °C)  HR:  [114-126] 115  Resp:  [18-26] 20  BP: (131-161)/() 131/69  SpO2:  [86 %-96 %] 93 %  Body mass index is 20 83 kg/m²  Input and Output Summary (last 24 hours):     No intake or output data in the 24 hours ending 04/07/19 1113    Physical Exam:  General Appearance:    Alert, cooperative, no distress, appropriately responsive    Head:    Normocephalic, without obvious abnormality, atraumatic, mucous membranes moist    Eyes:    Conjunctiva/corneas clear, EOM's intact   Neck:   Supple, no JVD or bruits noted   Lungs:     Clear to auscultation bilaterally, respirations unlabored, no crackles or wheeze     Heart:    Regular rate and rhythm, S1 and S2    Abdomen:     Soft, non-tender, bowel sounds active all four quadrants,     no masses, no organomegaly   Extremities:   Extremities normal, atraumatic, no cyanosis or edema   Neurologic:  nonfocal, A/O x 3           Additional Data:     Labs:    Results from last 7 days   Lab Units 04/07/19  0603   WBC Thousand/uL 13 60*   HEMOGLOBIN g/dL 9 4*   HEMATOCRIT % 30 5*   PLATELETS Thousands/uL 189   NEUTROS PCT % 87*   LYMPHS PCT % 5*   MONOS PCT % 7   EOS PCT % 0     Results from last 7 days   Lab Units 04/07/19  0603  04/06/19  0038   POTASSIUM mmol/L 4 5   < > 3 6   CHLORIDE mmol/L 107   < > 103   CO2 mmol/L 25   < > 27   BUN mg/dL 29*   < > 27*   CREATININE mg/dL 1 82*   < > 1 70*   CALCIUM mg/dL 9 4   < > 9 6   ALK PHOS U/L  --   --  200*   ALT U/L  --   --  40   AST U/L  --   --  32    < > = values in this interval not displayed  * I Have Reviewed All Lab Data Listed Above  * Additional Pertinent Lab Tests Reviewed: All Labs Within Last 24 Hours Reviewed    Cultures:   Blood Culture:   Lab Results   Component Value Date    BLOODCX No Growth After 5 Days  12/30/2018    BLOODCX No Growth After 5 Days  12/30/2018    BLOODCX No Growth After 5 Days  12/27/2018    BLOODCX Enterobacter cloacae complex (A) 12/27/2018    BLOODCX No Growth After 5 Days  09/21/2018    BLOODCX No Growth After 5 Days  09/21/2018    BLOODCX No Growth After 5 Days  02/18/2016    BLOODCX No Growth After 5 Days  02/18/2016     Urine Culture:   Lab Results   Component Value Date    URINECX 50,000-59,000 cfu/ml Enterobacter cloacae complex (A) 12/27/2018    URINECX 10,000-19,000 cfu/ml Proteus mirabilis (A) 12/27/2018    URINECX <10,000 cfu/ml Mixed Contaminants X2 02/18/2016     Sputum Culture: No components found for: SPUTUMCX  Wound Culture: No results found for: WOUNDCULT    Last 24 Hours Medication List:     Current Facility-Administered Medications:  acetaminophen 650 mg Oral Q4H PRN TRACY Damon   aspirin 325 mg Oral Daily [employer], CRNP   atorvastatin 40 mg Oral Early Morning AnelESTELA LandaverdeNP   co-enzyme Q-10 30 mg Oral Daily Anelmary Witt, TRACY   fish oil 1,000 mg Oral Daily AnelESTELA LandaverdeNP   gabapentin 200 mg Oral TID [employer], ESTELANP   heparin (porcine) 5,000 Units Subcutaneous Q8H [address] TRACY Damon   ipratropium-albuterol 3 mL Nebulization Q6H TRACY Damon   levothyroxine 150 mcg Oral Early Morning TRACY Damon   metoprolol tartrate 50 mg Oral Q12H [address] Bernadette Guzmán MD   ondansetron 4 mg Intravenous Q4H PRN TRACY Damon   potassium chloride 20 mEq Oral BID With Meals Mekhi Alston DO   predniSONE 40 mg Oral Daily Antoinette Reese DO        Today, Patient Was Seen By: Antoinette Reese DO    ** Please Note: Dragon 360 Dictation voice to text software may have been used in the creation of this document   **

## 2019-04-08 NOTE — RESPIRATORY THERAPY NOTE
sl oxygen          Home Oxygen Qualifying Test       Patient name: Dontae Hedrick        : 1945   Date of Test:  2019  Diagnosis:      Home Oxygen Test:    **Medicare Guidelines require item(s) 1-5 on all ambulatory patients or 1 and 2 on non-ambulatory patients  1   Baseline SPO2 on Room Air at rest 91 %  2   SPO2 during exercise on Room Air 87% to 91 %  During exercise monitor SpO2  If SPO2 increases >=89% with ambulation do not add supplemental             oxygen  If <= 88% on room air add O2 via NC and titrate patient  Patient must be ambulated with O2 and titrated to > 88% with exertion  3   SPO2 on Oxygen at rest 96 % 2  lpm     4   SPO2 during exercise on Oxygen  90% to 93% a liter flow of  3  lpm     5   Exercise performed:          walking  On room air +2lpm, and 3lpm,distance 180 feet  [x]  Supplemental Home Oxygen is indicated  []  Client does not qualify for home oxygen        Respiratory Additional Notes-     Bobo Spears , RT

## 2019-04-08 NOTE — PLAN OF CARE
Problem: PAIN - ADULT  Goal: Verbalizes/displays adequate comfort level or baseline comfort level  Description  Interventions:  - Encourage patient to monitor pain and request assistance  - Assess pain using appropriate pain scale  - Administer analgesics based on type and severity of pain and evaluate response  - Implement non-pharmacological measures as appropriate and evaluate response  - Consider cultural and social influences on pain and pain management  - Notify physician/advanced practitioner if interventions unsuccessful or patient reports new pain  Outcome: Progressing     Problem: INFECTION - ADULT  Goal: Absence or prevention of progression during hospitalization  Description  INTERVENTIONS:  - Assess and monitor for signs and symptoms of infection  - Monitor lab/diagnostic results  - Monitor all insertion sites, i e  indwelling lines, tubes, and drains  - Monitor endotracheal (as able) and nasal secretions for changes in amount and color  - [place] appropriate cooling/warming therapies per order  - Administer medications as ordered  - Instruct and encourage patient and family to use good hand hygiene technique  - Identify and instruct in appropriate isolation precautions for identified infection/condition  Outcome: Progressing  Goal: Absence of fever/infection during neutropenic period  Description  INTERVENTIONS:  - Monitor WBC  - Implement neutropenic guidelines  Outcome: Progressing     Problem: SAFETY ADULT  Goal: Patient will remain free of falls  Description  INTERVENTIONS:  - Assess patient frequently for physical needs  -  Identify cognitive and physical deficits and behaviors that affect risk of falls    -  [place] fall precautions as indicated by assessment   - Educate patient/family on patient safety including physical limitations  - Instruct patient to call for assistance with activity based on assessment  - Modify environment to reduce risk of injury  - Consider OT/PT consult to assist with strengthening/mobility  Outcome: Progressing  Goal: Maintain or return to baseline ADL function  Description  INTERVENTIONS:  -  Assess patient's ability to carry out ADLs; assess patient's baseline for ADL function and identify physical deficits which impact ability to perform ADLs (bathing, care of mouth/teeth, toileting, grooming, dressing, etc )  - Assess/evaluate cause of self-care deficits   - Assess range of motion  - Assess patient's mobility; develop plan if impaired  - Assess patient's need for assistive devices and provide as appropriate  - Encourage maximum independence but intervene and supervise when necessary  ¯ Involve family in performance of ADLs  ¯ Assess for home care needs following discharge   ¯ Request OT consult to assist with ADL evaluation and planning for discharge  ¯ Provide patient education as appropriate  Outcome: Progressing  Goal: Maintain or return mobility status to optimal level  Description  INTERVENTIONS:  - Assess patient's baseline mobility status (ambulation, transfers, stairs, etc )    - Identify cognitive and physical deficits and behaviors that affect mobility  - Identify mobility aids required to assist with transfers and/or ambulation (gait belt, sit-to-stand, lift, walker, cane, etc )  - [place] fall precautions as indicated by assessment  - Record patient progress and toleration of activity level on Mobility SBAR; progress patient to next Phase/Stage  - Instruct patient to call for assistance with activity based on assessment  - Request Rehabilitation consult to assist with strengthening/weightbearing, etc   Outcome: Progressing     Problem: DISCHARGE PLANNING  Goal: Discharge to home or other facility with appropriate resources  Description  INTERVENTIONS:  - Identify barriers to discharge w/patient and caregiver  - Arrange for needed discharge resources and transportation as appropriate  - Identify discharge learning needs (meds, wound care, etc )  - Arrange for interpretive services to assist at discharge as needed  - Refer to Case Management Department for coordinating discharge planning if the patient needs post-hospital services based on physician/advanced practitioner order or complex needs related to functional status, cognitive ability, or social support system  Outcome: Progressing     Problem: Knowledge Deficit  Goal: Patient/family/caregiver demonstrates understanding of disease process, treatment plan, medications, and discharge instructions  Description  Complete learning assessment and assess knowledge base  Interventions:  - Provide teaching at level of understanding  - Provide teaching via preferred learning methods  Outcome: Progressing     Problem: RESPIRATORY - ADULT  Goal: Achieves optimal ventilation and oxygenation  Description  INTERVENTIONS:  - Assess for changes in respiratory status  - Assess for changes in mentation and behavior  - Position to facilitate oxygenation and minimize respiratory effort  - Oxygen administration by appropriate delivery method based on oxygen saturation (per order) or ABGs  - Initiate smoking cessation education as indicated  - Encourage broncho-pulmonary hygiene including cough, deep breathe, Incentive Spirometry  - Assess the need for suctioning and aspirate as needed  - Assess and instruct to report SOB or any respiratory difficulty  - Respiratory Therapy support as indicated  Outcome: Progressing     Problem: Potential for Falls  Goal: Patient will remain free of falls  Description  INTERVENTIONS:  - Assess patient frequently for physical needs  -  Identify cognitive and physical deficits and behaviors that affect risk of falls    -  [place] fall precautions as indicated by assessment   - Educate patient/family on patient safety including physical limitations  - Instruct patient to call for assistance with activity based on assessment  - Modify environment to reduce risk of injury  - Consider OT/PT consult to assist with strengthening/mobility  Outcome: Progressing     Problem: Nutrition/Hydration-ADULT  Goal: Nutrient/Hydration intake appropriate for improving, restoring or maintaining nutritional needs  Description  Monitor and assess patient's nutrition/hydration status for malnutrition (ex- brittle hair, bruises, dry skin, pale skin and conjunctiva, muscle wasting, smooth red tongue, and disorientation)  Collaborate with interdisciplinary team and initiate plan and interventions as ordered  Monitor patient's weight and dietary intake as ordered or per policy  Utilize nutrition screening tool and intervene per policy  Determine patient's food preferences and provide high-protein, high-caloric foods as appropriate       INTERVENTIONS:  - Monitor oral intake, urinary output, labs, and treatment plans  - Assess nutrition and hydration status and recommend course of action  - Evaluate amount of meals eaten  - Assist patient with eating if necessary   - Allow adequate time for meals  - Recommend/ encourage appropriate diets, oral nutritional supplements, and vitamin/mineral supplements  - Order, calculate, and assess calorie counts as needed  - Recommend, monitor, and adjust tube feedings and TPN/PPN based on assessed needs  - Assess need for intravenous fluids  - Provide specific nutrition/hydration education as appropriate  - Include patient/family/caregiver in decisions related to nutrition  Outcome: Progressing     Problem: METABOLIC, FLUID AND ELECTROLYTES - ADULT  Goal: Electrolytes maintained within normal limits  Description  INTERVENTIONS:  - Monitor labs and assess patient for signs and symptoms of electrolyte imbalances  - Administer electrolyte replacement as ordered  - Monitor response to electrolyte replacements, including repeat lab results as appropriate  - Instruct patient on fluid and nutrition as appropriate  Outcome: Progressing     Problem: SKIN/TISSUE INTEGRITY - ADULT  Goal: Skin integrity remains intact  Description  INTERVENTIONS  - Identify patients at risk for skin breakdown  - Assess and monitor skin integrity  - Assess and monitor nutrition and hydration status  - Monitor labs (i e  albumin)  - Assess for incontinence   - Turn and reposition patient  - Assist with mobility/ambulation  - Relieve pressure over bony prominences  - Avoid friction and shearing  - Provide appropriate hygiene as needed including keeping skin clean and dry  - Evaluate need for skin moisturizer/barrier cream  - Collaborate with interdisciplinary team (i e  Nutrition, Rehabilitation, etc )   - Patient/family teaching  Outcome: Progressing     Problem: MUSCULOSKELETAL - ADULT  Goal: Maintain or return mobility to safest level of function  Description  INTERVENTIONS:  - Assess patient's ability to carry out ADLs; assess patient's baseline for ADL function and identify physical deficits which impact ability to perform ADLs (bathing, care of mouth/teeth, toileting, grooming, dressing, etc )  - Assess/evaluate cause of self-care deficits   - Assess range of motion  - Assess patient's mobility; develop plan if impaired  - Assess patient's need for assistive devices and provide as appropriate  - Encourage maximum independence but intervene and supervise when necessary  - Involve family in performance of ADLs  - Assess for home care needs following discharge   - Request OT consult to assist with ADL evaluation and planning for discharge  - Provide patient education as appropriate  Outcome: Progressing

## 2019-04-08 NOTE — SOCIAL WORK
Met with Pt  Pt presents AA&Ox3  Discussed role of   Pt lives with significant other in 2sh, 2 raza  Pt is independent with adls and ambulation  Pt has inhaler  Pt and significant other drives and goes grocery shopping  Pt goes to [facility] in [facility]  Pt has VNA in past but unable to recall which agency  Pt goes to [facility] for [facility] once every 2 weeks until November  Pt plans to return home upon discharge  Will follow for home oxygen needs

## 2019-04-08 NOTE — SOCIAL WORK
Continue to follow   informed that Pt will need home oxygen upon discharge  Freedom of Choice given for DME companies  Pt requesting [facility] as Pt has had DME from [facility] in past  Referral sent to [facility] via [place]  Portable oxygen tank taken from hospital consignment closet and given to Pt  Sri Acevedo at [facility] informed that portable tank given from [facility] to Pt  Phone number for [facility] given to Pt to call to have concentrator delivered when she gets home  Pt in agreement

## 2019-04-08 NOTE — TELEPHONE ENCOUNTER
Pt was supposed to have CTA w/ hydration today, however pt was admitted and is now in house  Will need to reach out to pt when she is discharged to set up CTA

## 2019-04-08 NOTE — DISCHARGE SUMMARY
Discharge Summary - Michael Maurer 72 y o  female MRN: [id number]    Unit/Bed#: [address] [place], [place] [address] Encounter: [id number]    Admission Date: 4/6/2019     Admitting Diagnosis: Shortness of breath [R06 02]  Elevated troponin [R74 8]  COPD exacerbation ([address] ) [J44 1]    HPI:  74-year-old female with history of diastolic CHF and COPD presented with shortness of breath and found to be hypoxic with O2 sat at 88% on room air  Patient did have a transiently elevated troponin at 0 21 consistent with type 2 MRI as per Cardiology team   Patient was given IV diuretics and treated with inhalers for her COPD exacerbation  Patient had gradual improvement in her status and felt to be stable for discharge to home with oximetry done prior to discharge  Sats were 87% on room air and patient will be discharged on 2 L continuously with   3 L with ambulation  Patient is to be scheduled for an outpatient CT of the abdomen regarding abdominal aneurysm in the near future  She will need prep prior to the procedure as ordered by Dr Indio Hussein office  Consults     Cardiology  Procedures Performed:   Orders Placed This Encounter   Procedures    ED ECG Documentation Only       Hospital Course as above  Significant Findings, Care, Treatment and Services Provided:  General appearance: alert and cooperative  Neck: no adenopathy, no JVD and thyroid not enlarged, symmetric, no tenderness/mass/nodules  Lungs: No wheezes noted  No rales  Slightly decreased breath sounds in general  Heart: regular rate and rhythm, S1, S2 normal, no murmur, click, rub or gallop  Abdomen: soft, non-tender; bowel sounds normal; no masses,  no organomegaly  Extremities: extremities normal, warm and well-perfused; no cyanosis, clubbing, or edema  Skin: Skin color, texture, turgor normal  No rashes or lesions  Neurologic:  No tremors or focal motor deficits          Complications: none    Discharge Diagnosis: Active Problems:    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)    COPD exacerbation (HCC)    Aneurysm, thoracoabdominal aortic (HCC)    Benign hypertension    Stage 3 chronic kidney disease (HCC)    Elevated troponin        Condition at Discharge: good     Discharge instructions/Information to patient and family:   See after visit summary for information provided to patient and family  Provisions for Follow-Up Care:  See after visit summary for information related to follow-up care and any pertinent home health orders  Disposition: Home    Planned Readmission: No    Discharge Statement   I spent 35 minutes discharging the patient  This time was spent on the day of discharge  I had direct contact with the patient on the day of discharge  Discharge Medications:  See after visit summary for reconciled discharge medications provided to patient and family          Nick Rodgers DO

## 2019-04-09 NOTE — UTILIZATION REVIEW
Auth:   [id number]    Initial Clinical Review    Admission: Date/Time/Statement: 4/6/19 @ 0456 INPATIENT  Orders Placed This Encounter    Inpatient Admission     Standing Status:   Standing     Number of Occurrences:   1     Order Specific Question:   Admitting Physician     Answer:   Sherren Seltzer [[id number]]     Order Specific Question:   Level of Care     Answer:   Med Surg [[id number]]     Order Specific Question:   Estimated length of stay     Answer:   More than 2 Midnights     Order Specific Question:   Certification     Answer:   I certify that inpatient services are medically necessary for this patient for a duration of greater than two midnights  See H&P and MD Progress Notes for additional information about the patient's course of treatment  ED: Date/Time/Mode of Arrival:   ED Arrival Information     Expected Arrival Acuity Means of Arrival Escorted By Service Admission Type    - 4/6/2019 00:17 Urgent [facility]In [facility] Hospitalist Urgent    Arrival Complaint    SHORTNESS OF BREATH        Chief Complaint:   Chief Complaint   Patient presents with    Shortness of Breath     Pt c/o of shortness of breath  Assessment/Plan: 71 y/o female with hx COPD, CAD, LAURIE adenocarcinoma, CKD, thoracoabdominal aneurysm presents to ED from home with worsening SOB for past 2-3 hours with no improvement after inhaler use  Pt states she attempted to go to bed, but could not fall asleep because she felt she was unable to breathe laying down  Does not require home O2  On exam, pt has tachycardia, tachypnea, decreased breath sounds  Admitted as inpatient due to acute respiratory failure with hypoxia, COPD exacerbation, elevated troponin  Continue supplemental O2  ATC duonebs  Continue IV solumedrol  Trend troponins  Tele  Per cardiology, troponin elevation represents type 2 NSTEMI in the setting of COPD exac and increased demand  Pt remains tachycardic with decreased breath sounds bilaterally      ED Vital Signs:   ED Triage Vitals   Temperature Pulse Respirations Blood Pressure SpO2   04/06/19 0300 04/06/19 0200 04/06/19 0200 04/06/19 0200 04/06/19 0200   99 °F (37 2 °C) (!) 115 (!) 33 152/96 92 %      Temp Source Heart Rate Source Patient Position - Orthostatic VS BP Location FiO2 (%)   04/06/19 0300 04/06/19 0300 04/06/19 0300 04/06/19 0300 --   Oral Monitor Sitting Left arm       Pain Score       04/06/19 0130       No Pain          Wt Readings from Last 1 Encounters:   04/06/19 50 kg (110 lb 3 7 oz)     Vital Signs (abnormal):   04/07/19 0844  98 °F (36 7 °C)  115  20  131/69  93 %  Nasal cannula 2L   04/06/19 2233  98 5 °F (36 9 °C)  119   22  161/97  94 %  Nasal cannula   04/06/19 2100    126   26  153/104  94 %  Nasal cannula 3L   04/06/19 2055          86 %   None (Room air)   04/06/19 2015    114       94 %  Nasal cannula 2L   04/06/19 1554          92 %  Nasal cannula 2L   04/06/19 1553  98 °F (36 7 °C)  115  18  134/83  89 %   None (Room air)   04/06/19 0727  98 3 °F (36 8 °C)  115   18  154/92  95 %  Nasal cannula   04/06/19 0555    111      94 %  Nasal cannula   04/06/19 0300  99 °F (37 2 °C)  114     141/101       04/06/19 0215    115  57   159/102  91 %         Pertinent Labs/Diagnostic Test Results:   BUN/Cr 27/1 70  Alk Phos 200  Alb 3 2  Trop 0 21, 0 58, 0 72  BNP 21,059  H/H 10 1/31 6  EKG:  Sinus tachycardia with occasional Premature atrial complexes  Poor R wave progression  Repeat EKG:  Normal sinus rhythm  Possible Left atrial enlargement  Prolonged QT  CXR:  No acute cardiopulmonary disease      Echo pending    ED Treatment:   Medication Administration from 04/06/2019 0017 to 04/06/2019 0316       Date/Time Order Dose Route Action     04/06/2019 0054 ipratropium (ATROVENT) 0 02 % inhalation solution 0 5 mg 0 5 mg Nebulization Given     04/06/2019 0053 albuterol inhalation solution 5 mg 5 mg Nebulization Given     04/06/2019 0054 methylPREDNISolone sodium succinate (Solu-MEDROL) injection 40 mg 40 mg Intravenous Given        Past Medical/Surgical History:    Active Ambulatory Problems     Diagnosis Date Noted    Acute respiratory failure with hypoxia ([facility] ) 02/18/2016    Hypothyroidism 02/18/2016    Pure hypercholesterolemia 02/18/2016    ASCVD (arteriosclerotic cardiovascular disease) 02/19/2016    Aneurys[facility] aortic ([facility] ) 05/27/2015    Benign hypertension 04/20/2015    Spinal stenosis of lumbar region with neurogenic claudication 04/23/2018    Bilateral carotid artery stenosis 05/17/2018    Nonsquamous nonsmall cell neoplasm of left lung ([facility] ) 08/07/2018    Stage 3 chronic kidney disease ([place] [address] ) 01/29/2019    Persistent proteinuria 04/03/2019    Anemia 04/03/2019    Hydronephrosis 04/03/2019    Thoracic aortic aneurysm [facility])      Past Medical History:   Diagnosis Date    Aortic aneurysm ([place] [address] )     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HCC)     ASCVD (arteriosclerotic cardiovascular disease)     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)     Back problem     Blood type B-     Cancer (HCC)     Cataract     Chronic kidney disease     COPD (chronic obstructive pulmonary disease) (HCC)     Coronary artery disease     CVA (cerebral vascular accident) ([place] [address] ) 2012    Depression     Disease of thyroid gland     History of cataract     Hyperlipidemia     Hypertension     Lung mass     Mediastinal adenopathy     Muscle pain     Prediabetes     PVD (peripheral vascular disease) ([place] [address] )     Thoracic aortic aneurysm (HCC)     Transient insomnia      Admitting Diagnosis: Shortness of breath [R06 02]  Elevated troponin [R74 8]  COPD exacerbation (HCC) [J44 1]  Age/Sex: 72 y o  female     Admission Orders:  SCDs  NC O2 to keep sat >92%  I/O  VS  Tele  Continuous ST monitoring  EKG with chest pain or ST change  Labs  Sputum cx  Cardiac diet  Cardiology consult  Respiratory protocol  Peak flow  Echo    Scheduled Meds: Current Facility-Administered Medications:  acetaminophen 650 mg Oral Q4H PRN x4   aspirin 325 mg Oral Daily   atorvastatin 40 mg Oral Early Morning   co-enzyme Q-10 30 mg Oral Daily   fish oil 1,000 mg Oral Daily   gabapentin 200 mg Oral TID   heparin (porcine) 5,000 Units Subcutaneous Q8H [address]   ipratropium-albuterol 3 mL Nebulization Q6H   levothyroxine 150 mcg Oral Early Morning   metoprolol tartrate 50 mg Oral Q12H PATY   ondansetron 4 mg Intravenous Q4H PRN x1   potassium chloride 20 mEq Oral BID With Meals   predniSONE 40 mg Oral Daily With Breakfast   methylPREDNISolone sodium succinate (Solu-MEDROL) injection 40 mg   Dose: 40 mg  Freq: Every 8 hours scheduled Route: IV x3 doses on 4/6, x1 dose on 4/7    Network Utilization Review Department  Phone: [phone]; Fax [phone]  [email or web address]  ATTENTION: Please call with any questions or concerns to [phone]  and carefully listen to the prompts so that you are directed to the right person  Send all requests for admission clinical reviews, approved or denied determinations and any other requests to fax [phone]   All voicemails are confidential

## 2019-04-09 NOTE — TELEPHONE ENCOUNTER
Pt was discharged from the hospital  Connie Tam is working on getting pt scheduled for CTA w/ hydration  OV w/ Dr Dakota Renner will need to be rescheduled until pt has CTA  Scheduling notified and pt informed of this

## 2019-04-09 NOTE — TELEPHONE ENCOUNTER
Pt scheduled for CTA w/ hydration 4/19  Gave pt instructions on mucomyst again, pt confirmed she picked up prescription  Sent to scheduling for to call pt for OV w/ Dr Carlos Whiteside to review results

## 2019-04-11 NOTE — UTILIZATION REVIEW
Auth:   [id number]    Notification of Discharge  This is a Notification of Discharge from our facility [facility]  Please be advised that this patient has been discharge from our facility  Below you will find the admission and discharge date and time including the patients disposition  PRESENTATION DATE: 4/6/2019 12:38 AM  IP ADMISSION DATE: 4/6/19 0456  DISCHARGE DATE: 4/8/2019  4:31 PM  DISPOSITION: [facility]Short Term [facility] [address] Utilization Review Department  Phone: [phone]; Fax [phone]  [email or web address]  ATTENTION: Please call with any questions or concerns to [phone]  and carefully listen to the prompts so that you are directed to the right person  Send all requests for admission clinical reviews, approved or denied determinations and any other requests to fax [phone]   All voicemails are confidential

## 2019-04-26 PROBLEM — G47.00 INSOMNIA: Status: ACTIVE | Noted: 2019-01-01

## 2019-04-26 PROBLEM — Z79.899 OTHER LONG TERM (CURRENT) DRUG THERAPY: Status: ACTIVE | Noted: 2019-01-01

## 2019-05-01 PROBLEM — N18.4 CHRONIC KIDNEY DISEASE, STAGE 4 (SEVERE) (HCC): Status: ACTIVE | Noted: 2019-01-01

## 2019-05-09 PROBLEM — G89.29 CHRONIC BILATERAL LOW BACK PAIN WITHOUT SCIATICA: Status: ACTIVE | Noted: 2019-01-01

## 2019-05-09 PROBLEM — R52 DIFFUSE PAIN: Status: ACTIVE | Noted: 2019-01-01

## 2019-05-09 PROBLEM — M54.50 CHRONIC BILATERAL LOW BACK PAIN WITHOUT SCIATICA: Status: ACTIVE | Noted: 2019-01-01

## 2019-05-31 PROBLEM — M81.0 AGE-RELATED OSTEOPOROSIS WITHOUT CURRENT PATHOLOGICAL FRACTURE: Status: ACTIVE | Noted: 2019-01-01

## 2019-06-01 PROBLEM — K92.0 HEMATEMESIS: Status: ACTIVE | Noted: 2019-01-01

## 2019-06-01 PROBLEM — E87.6 HYPOKALEMIA: Status: ACTIVE | Noted: 2019-01-01

## 2019-06-01 PROBLEM — D63.1 ANEMIA DUE TO CHRONIC KIDNEY DISEASE: Status: ACTIVE | Noted: 2019-01-01

## 2019-06-01 PROBLEM — I16.1 HYPERTENSIVE EMERGENCY: Status: ACTIVE | Noted: 2019-01-01

## 2019-06-01 PROBLEM — N18.9 ANEMIA DUE TO CHRONIC KIDNEY DISEASE: Status: ACTIVE | Noted: 2019-01-01

## 2019-06-02 PROBLEM — E43 SEVERE PROTEIN-CALORIE MALNUTRITION (GOMEZ: LESS THAN 60% OF STANDARD WEIGHT) (HCC): Status: ACTIVE | Noted: 2019-01-01

## 2019-06-03 PROBLEM — I16.1 HYPERTENSIVE EMERGENCY: Status: RESOLVED | Noted: 2019-01-01 | Resolved: 2019-01-01

## 2019-06-15 PROBLEM — R77.8 ELEVATED TROPONIN: Status: ACTIVE | Noted: 2019-01-01

## 2019-06-15 PROBLEM — N18.9 ACUTE KIDNEY INJURY SUPERIMPOSED ON CHRONIC KIDNEY DISEASE (HCC): Chronic | Status: RESOLVED | Noted: 2018-01-01 | Resolved: 2019-01-01

## 2019-06-15 PROBLEM — N17.9 ACUTE KIDNEY INJURY SUPERIMPOSED ON CHRONIC KIDNEY DISEASE (HCC): Chronic | Status: RESOLVED | Noted: 2018-01-01 | Resolved: 2019-01-01

## 2019-06-15 PROBLEM — I16.0 HYPERTENSIVE URGENCY: Status: ACTIVE | Noted: 2019-01-01

## 2019-06-20 PROBLEM — R52 DIFFUSE PAIN: Status: RESOLVED | Noted: 2019-01-01 | Resolved: 2019-01-01

## 2019-06-20 PROBLEM — R80.1 PERSISTENT PROTEINURIA: Status: RESOLVED | Noted: 2019-01-01 | Resolved: 2019-01-01

## 2019-06-20 PROBLEM — I16.0 HYPERTENSIVE URGENCY: Status: RESOLVED | Noted: 2019-01-01 | Resolved: 2019-01-01

## 2019-06-20 PROBLEM — R77.8 ELEVATED TROPONIN: Status: RESOLVED | Noted: 2019-01-01 | Resolved: 2019-01-01

## 2019-06-20 PROBLEM — E43 SEVERE PROTEIN-CALORIE MALNUTRITION (GOMEZ: LESS THAN 60% OF STANDARD WEIGHT) (HCC): Status: RESOLVED | Noted: 2019-01-01 | Resolved: 2019-01-01

## 2019-06-20 PROBLEM — E87.6 HYPOKALEMIA: Status: RESOLVED | Noted: 2019-01-01 | Resolved: 2019-01-01

## 2019-06-20 PROBLEM — K92.0 HEMATEMESIS: Status: RESOLVED | Noted: 2019-01-01 | Resolved: 2019-01-01

## 2019-06-21 PROBLEM — I21.4 NSTEMI (NON-ST ELEVATED MYOCARDIAL INFARCTION) (HCC): Status: ACTIVE | Noted: 2019-01-01

## 2019-06-21 PROBLEM — M79.18 MYOFASCIAL PAIN SYNDROME: Status: RESOLVED | Noted: 2018-01-30 | Resolved: 2019-01-01

## 2019-06-21 PROBLEM — K59.00 CONSTIPATION: Status: ACTIVE | Noted: 2019-01-01

## 2019-06-21 PROBLEM — N17.9 ACUTE KIDNEY INJURY (HCC): Chronic | Status: RESOLVED | Noted: 2018-01-01 | Resolved: 2019-01-01

## 2019-06-21 PROBLEM — E43 SEVERE PROTEIN-CALORIE MALNUTRITION (HCC): Status: ACTIVE | Noted: 2018-01-01

## 2019-06-21 PROBLEM — I65.23 BILATERAL CAROTID ARTERY STENOSIS: Chronic | Status: RESOLVED | Noted: 2018-05-17 | Resolved: 2019-01-01

## 2022-04-27 NOTE — PROGRESS NOTES
Hydration infusion complete w/o complications  PIV removed, pt then d/c'd to home with steady gait 
PIV capped while pt transferred to CT scan  Will return after procedure for remainder of hydration fluids 
Pt returned from CT scan for remainder of hydration infusion  No complaints offered 
Rec'd pt in good spirits, VSS, c/o only of chronic R leg pain that shoots down to feet intermittently, Pt self medicated prior to apt today  PIV obtained in L arm, NSS infusing 
3

## 2023-04-17 NOTE — PLAN OF CARE
----- Message from Katy Alberts sent at 4/17/2023 10:01 AM CDT -----  Pt needs instructions on the 24 hour urine kit. Pt can be reached at [phone].    Thank you.     Problem: OCCUPATIONAL THERAPY ADULT  Goal: Performs self-care activities at highest level of function for planned discharge setting  See evaluation for individualized goals  Treatment Interventions: ADL retraining, Functional transfer training, UE strengthening/ROM, Endurance training, Patient/family training, Equipment evaluation/education, Compensatory technique education, Energy conservation, Activityengagement          See flowsheet documentation for full assessment, interventions and recommendations  Limitation: Decreased ADL status, Decreased UE strength, Decreased endurance, Decreased self-care trans, Decreased high-level ADLs  Prognosis: Good  Assessment: Pt is a 72 y o  female seen for OT evaluation s/p adm to [address] on 9/21/2018 w/ fever and tachycardia and dx'd w/ severe sepsis  Comorbidities affecting pts functional performance include a significant PMH of aortic aneurysm, arterial embolism of R leg, ASCVD, cataract, COPD, CAD, CVA, Depression, HLD, HTN, lung mass, mediastinal adenopathy, prediabetes, PVD, thoracic aortic aneurysm, and transient insomnia  Pt with active OT orders and activity orders for Up and OOB as tolerated   Pt lives with friend in a two level house with 1 [address] and 1st floor setup available if needed  At baseline, pt was I w/ ADLs, IADLs, and functional mobility/transfers w/o use of DME/AD, (+) , and reports 0 falls PTA  Upon evaluation, pt currently requires Mod-Min A for LB ADLs, Min A-Supervision for UB ADLs, Min A for toileting, Mod I for bed mobility, Supervision for transfers, and Min A for functional mobility 2* the following deficits impacting occupational performance: weakness, decreased strength, decreased balance and decreased tolerance   These impairments, as well at pts steps to enter environment, difficulty performing ADLS and difficulty performing IADLS  limit pts ability to safely engage in all baseline areas of occupation, including grooming, bathing, dressing, toileting, functional mobility/transfers, community mobility, laundry , house maintenance, meal prep, cleaning, social participation  and leisure activities   Pt scored overall 55/100 on the Barthel Index  Based on the aforementioned OT evaluation, functional performance deficits, and assessments, pt has been identified as a moderate complexity evaluation  Pt to continue to benefit from continued acute OT services during hospital stay to address defined deficits and to maximize level of functional independence in the following Occupational Performance areas: grooming, bathing/shower, toilet hygiene, dressing, socialization, health maintenance, functional mobility, community mobility, clothing management, cleaning, meal prep, household maintenance, social participation and transfers to common household surfaces  From OT standpoint, recommend home with family support upon D/C (pt currently declining home therapy services)   OT will continue to follow pt 3-5x/wk to address the following goals to  w/in 7-10 days:     OT Discharge Recommendation: Home with family support (pt could benefit from Home OT, however currently declining )  OT - OK to Discharge: Yes (when medically cleared)

## 2023-08-17 NOTE — ASSESSMENT & PLAN NOTE
Patient returned from imaging   · Improved, creatinine at baseline  · Continue to monitor creatinine  · Monitor ins and outs

## 2023-09-09 NOTE — MALNUTRITION/BMI
Reason for Disposition  • HIGH RISK for severe COVID complications (e.g., weak immune system, age > 59 years, obesity with BMI > 22, pregnant, chronic lung disease or other chronic medical condition)  (Exception: Already seen by PCP and no new or worsening symptoms.)    Protocols used: CORONAVIRUS (COVID-19) DIAGNOSED OR SUSPECTED-ADULT- This medical record reflects one or more clinical indicators suggestive of malnutrition and/or morbid obesity  Malnutrition Findings:   Malnutrition type: Chronic illness  Degree of Malnutrition: Malnutrition of moderate degree  Malnutrition Characteristics: Inadequate energy, Weight loss, Other (comment)(Wt is relatively stable x 3  months,,however, significant wt loss x 6 months (11 3%)  Likely consuming < 75% energy intake compared to estimated needs for > 1 month d/t results of chemotherapy for lung cancer)Treating with supplements    BMI Findings: Body mass index is 20 83 kg/m²  See Nutrition note dated 04/07/2019 for additional details  Completed nutrition assessment is viewable in the nutrition documentation

## 2023-12-22 NOTE — ASSESSMENT & PLAN NOTE
Patient is scheduled for CTA of abdomen on Monday  She is to take acetylcysteine 1200 mg p o  Q 12 hours starting the day prior up and including the day of  [Right] : right hand dominant [FreeTextEntry1] :  She comes in today for evaluation of left wrist injury due to a fall 7 days ago while snowboarding. She went to an Urgent care in [place]. She rates her pain as a 0-5/10 and denies any numbness/tingling.   She was accompanied by her Mother today.   She was referred by Dr. Blanco and [employer]

## 2024-11-13 NOTE — ED NOTES
"I guess I'm supposed to be taking those twice a day now"  Pt states only taking Toprol 1x daily, last taken yesterday  Pt states she has this medication at home        Imtiaz Fonseca RN  11/23/18 [id number]
Spoke with infusion center  As per Cassi Daugherty, she states that after speaking with supervisor the center will not be able to see her today  Instructed me to have to her call to reschedule  Provided phone number for reschedule       Gladys Lamar RN  11/23/18 [id number]
[id number]
